# Patient Record
Sex: FEMALE | Race: BLACK OR AFRICAN AMERICAN | NOT HISPANIC OR LATINO | Employment: OTHER | URBAN - METROPOLITAN AREA
[De-identification: names, ages, dates, MRNs, and addresses within clinical notes are randomized per-mention and may not be internally consistent; named-entity substitution may affect disease eponyms.]

---

## 2022-04-13 ENCOUNTER — HISTORICAL (OUTPATIENT)
Dept: ADMINISTRATIVE | Facility: HOSPITAL | Age: 82
End: 2022-04-13

## 2022-05-05 ENCOUNTER — HOSPITAL ENCOUNTER (INPATIENT)
Facility: HOSPITAL | Age: 82
LOS: 12 days | Discharge: NURSING, REHAB OR LONG-TERM CARE | DRG: 472 | End: 2022-05-17
Attending: EMERGENCY MEDICINE | Admitting: INTERNAL MEDICINE
Payer: MEDICARE

## 2022-05-05 DIAGNOSIS — G83.9 MOTOR LEVEL SPINAL WEAKNESS: ICD-10-CM

## 2022-05-05 DIAGNOSIS — M48.00 SPINAL STENOSIS, UNSPECIFIED SPINAL REGION: ICD-10-CM

## 2022-05-05 DIAGNOSIS — Z01.810 PREOP CARDIOVASCULAR EXAM: ICD-10-CM

## 2022-05-05 DIAGNOSIS — G95.19: Primary | ICD-10-CM

## 2022-05-05 LAB
ALBUMIN SERPL-MCNC: 3.3 GM/DL (ref 3.4–4.8)
ALBUMIN/GLOB SERPL: 1.1 RATIO (ref 1.1–2)
ALP SERPL-CCNC: 98 UNIT/L (ref 40–150)
ALT SERPL-CCNC: 34 UNIT/L (ref 0–55)
APTT PPP: 38 SECONDS (ref 23.2–33.7)
AST SERPL-CCNC: 23 UNIT/L (ref 5–34)
BASOPHILS # BLD AUTO: 0.02 X10(3)/MCL (ref 0–0.2)
BASOPHILS NFR BLD AUTO: 0.2 %
BILIRUBIN DIRECT+TOT PNL SERPL-MCNC: 0.1 MG/DL (ref 0–0.5)
BILIRUBIN DIRECT+TOT PNL SERPL-MCNC: 0.1 MG/DL (ref 0–0.8)
BILIRUBIN DIRECT+TOT PNL SERPL-MCNC: 0.2 MG/DL
BUN SERPL-MCNC: 77.1 MG/DL (ref 9.8–20.1)
CALCIUM SERPL-MCNC: 9.6 MG/DL (ref 8.4–10.2)
CHLORIDE SERPL-SCNC: 100 MMOL/L (ref 98–107)
CO2 SERPL-SCNC: 23 MMOL/L (ref 23–31)
CREAT SERPL-MCNC: 2.05 MG/DL (ref 0.55–1.02)
EOSINOPHIL # BLD AUTO: 0.02 X10(3)/MCL (ref 0–0.9)
EOSINOPHIL NFR BLD AUTO: 0.2 %
ERYTHROCYTE [DISTWIDTH] IN BLOOD BY AUTOMATED COUNT: 13.5 % (ref 11.5–17)
GLOBULIN SER-MCNC: 3 GM/DL (ref 2.4–3.5)
GLUCOSE SERPL-MCNC: 312 MG/DL (ref 82–115)
HCT VFR BLD AUTO: 31 % (ref 37–47)
HGB BLD-MCNC: 9.9 GM/DL (ref 12–16)
IMM GRANULOCYTES # BLD AUTO: 0.04 X10(3)/MCL (ref 0–0.02)
IMM GRANULOCYTES NFR BLD AUTO: 0.5 % (ref 0–0.43)
INR BLD: 0.98 (ref 0–1.3)
LYMPHOCYTES # BLD AUTO: 0.92 X10(3)/MCL (ref 0.6–4.6)
LYMPHOCYTES NFR BLD AUTO: 10.8 %
MCH RBC QN AUTO: 31.4 PG (ref 27–31)
MCHC RBC AUTO-ENTMCNC: 31.9 MG/DL (ref 33–36)
MCV RBC AUTO: 98.4 FL (ref 80–94)
MONOCYTES # BLD AUTO: 0.2 X10(3)/MCL (ref 0.1–1.3)
MONOCYTES NFR BLD AUTO: 2.4 %
NEUTROPHILS # BLD AUTO: 7.3 X10(3)/MCL (ref 2.1–9.2)
NEUTROPHILS NFR BLD AUTO: 85.9 %
NRBC BLD AUTO-RTO: 0 %
PLATELET # BLD AUTO: 202 X10(3)/MCL (ref 130–400)
PMV BLD AUTO: 13.9 FL (ref 9.4–12.4)
POTASSIUM SERPL-SCNC: 5.3 MMOL/L (ref 3.5–5.1)
PROT SERPL-MCNC: 6.3 GM/DL (ref 5.8–7.6)
PROTHROMBIN TIME: 12.7 SECONDS (ref 12.5–14.5)
RBC # BLD AUTO: 3.15 X10(6)/MCL (ref 4.2–5.4)
SODIUM SERPL-SCNC: 132 MMOL/L (ref 136–145)
WBC # SPEC AUTO: 8.5 X10(3)/MCL (ref 4.5–11.5)

## 2022-05-05 PROCEDURE — 63600175 PHARM REV CODE 636 W HCPCS: Performed by: INTERNAL MEDICINE

## 2022-05-05 PROCEDURE — 11000001 HC ACUTE MED/SURG PRIVATE ROOM

## 2022-05-05 PROCEDURE — 99285 EMERGENCY DEPT VISIT HI MDM: CPT | Mod: 25

## 2022-05-05 PROCEDURE — 85610 PROTHROMBIN TIME: CPT | Performed by: EMERGENCY MEDICINE

## 2022-05-05 PROCEDURE — 80053 COMPREHEN METABOLIC PANEL: CPT | Performed by: EMERGENCY MEDICINE

## 2022-05-05 PROCEDURE — 85025 COMPLETE CBC W/AUTO DIFF WBC: CPT | Performed by: EMERGENCY MEDICINE

## 2022-05-05 PROCEDURE — 85730 THROMBOPLASTIN TIME PARTIAL: CPT | Performed by: EMERGENCY MEDICINE

## 2022-05-05 RX ORDER — TIMOLOL MALEATE 2.5 MG/ML
1 SOLUTION/ DROPS OPHTHALMIC 2 TIMES DAILY
Status: DISCONTINUED | OUTPATIENT
Start: 2022-05-06 | End: 2022-05-17 | Stop reason: HOSPADM

## 2022-05-05 RX ORDER — DEXTROSE MONOHYDRATE 25 G/50ML
INJECTION, SOLUTION INTRAVENOUS
Status: ON HOLD | COMMUNITY
End: 2022-05-17 | Stop reason: HOSPADM

## 2022-05-05 RX ORDER — AMOXICILLIN 250 MG
1 CAPSULE ORAL 2 TIMES DAILY
Status: DISCONTINUED | OUTPATIENT
Start: 2022-05-06 | End: 2022-05-17 | Stop reason: HOSPADM

## 2022-05-05 RX ORDER — ATORVASTATIN CALCIUM 40 MG/1
80 TABLET, FILM COATED ORAL DAILY
Status: DISCONTINUED | OUTPATIENT
Start: 2022-05-06 | End: 2022-05-17 | Stop reason: HOSPADM

## 2022-05-05 RX ORDER — BRIMONIDINE TARTRATE 1.5 MG/ML
1 SOLUTION/ DROPS OPHTHALMIC 2 TIMES DAILY
Status: DISCONTINUED | OUTPATIENT
Start: 2022-05-06 | End: 2022-05-17 | Stop reason: HOSPADM

## 2022-05-05 RX ORDER — SUCRALFATE 1 G/1
1 TABLET ORAL
COMMUNITY

## 2022-05-05 RX ORDER — ONDANSETRON 2 MG/ML
4 INJECTION INTRAMUSCULAR; INTRAVENOUS EVERY 4 HOURS PRN
Status: DISCONTINUED | OUTPATIENT
Start: 2022-05-06 | End: 2022-05-17 | Stop reason: HOSPADM

## 2022-05-05 RX ORDER — BACLOFEN 10 MG/1
5 TABLET ORAL 2 TIMES DAILY
Status: ON HOLD | COMMUNITY
End: 2022-05-17 | Stop reason: HOSPADM

## 2022-05-05 RX ORDER — ACETAMINOPHEN 325 MG/1
650 TABLET ORAL EVERY 8 HOURS PRN
Status: DISCONTINUED | OUTPATIENT
Start: 2022-05-06 | End: 2022-05-17 | Stop reason: HOSPADM

## 2022-05-05 RX ORDER — BISACODYL 10 MG
10 SUPPOSITORY, RECTAL RECTAL
Status: ON HOLD | COMMUNITY
End: 2022-06-22 | Stop reason: HOSPADM

## 2022-05-05 RX ORDER — TIMOLOL MALEATE 2.5 MG/ML
1 SOLUTION/ DROPS OPHTHALMIC 2 TIMES DAILY
COMMUNITY

## 2022-05-05 RX ORDER — POLYETHYLENE GLYCOL 3350 17 G/17G
17 POWDER, FOR SOLUTION ORAL DAILY
COMMUNITY

## 2022-05-05 RX ORDER — LACTULOSE 10 G/15ML
20 SOLUTION ORAL; RECTAL
COMMUNITY

## 2022-05-05 RX ORDER — AMLODIPINE BESYLATE 5 MG/1
5 TABLET ORAL DAILY
Status: ON HOLD | COMMUNITY
End: 2022-05-17 | Stop reason: HOSPADM

## 2022-05-05 RX ORDER — BRIMONIDINE TARTRATE 2 MG/ML
1 SOLUTION/ DROPS OPHTHALMIC 2 TIMES DAILY
COMMUNITY

## 2022-05-05 RX ORDER — INSULIN ASPART 100 [IU]/ML
0-5 INJECTION, SOLUTION INTRAVENOUS; SUBCUTANEOUS
Status: DISCONTINUED | OUTPATIENT
Start: 2022-05-06 | End: 2022-05-07

## 2022-05-05 RX ORDER — BACLOFEN 5 MG/1
5 TABLET ORAL 2 TIMES DAILY
Status: DISCONTINUED | OUTPATIENT
Start: 2022-05-06 | End: 2022-05-17 | Stop reason: HOSPADM

## 2022-05-05 RX ORDER — AMLODIPINE BESYLATE 5 MG/1
5 TABLET ORAL DAILY
Status: DISCONTINUED | OUTPATIENT
Start: 2022-05-06 | End: 2022-05-08

## 2022-05-05 RX ORDER — OXYCODONE AND ACETAMINOPHEN 5; 325 MG/1; MG/1
1 TABLET ORAL EVERY 4 HOURS PRN
Status: DISCONTINUED | OUTPATIENT
Start: 2022-05-06 | End: 2022-05-09

## 2022-05-05 RX ORDER — PREGABALIN 75 MG/1
75 CAPSULE ORAL 2 TIMES DAILY
Status: ON HOLD | COMMUNITY
End: 2022-06-22 | Stop reason: HOSPADM

## 2022-05-05 RX ORDER — NALOXONE HCL 0.4 MG/ML
0.02 VIAL (ML) INJECTION
Status: DISCONTINUED | OUTPATIENT
Start: 2022-05-06 | End: 2022-05-17 | Stop reason: HOSPADM

## 2022-05-05 RX ORDER — IBUPROFEN 200 MG
16 TABLET ORAL
Status: DISCONTINUED | OUTPATIENT
Start: 2022-05-06 | End: 2022-05-07

## 2022-05-05 RX ORDER — DEXAMETHASONE SODIUM PHOSPHATE 4 MG/ML
4 INJECTION, SOLUTION INTRA-ARTICULAR; INTRALESIONAL; INTRAMUSCULAR; INTRAVENOUS; SOFT TISSUE EVERY 6 HOURS
Status: DISCONTINUED | OUTPATIENT
Start: 2022-05-06 | End: 2022-05-07

## 2022-05-05 RX ORDER — DEXAMETHASONE SODIUM PHOSPHATE 4 MG/ML
4 INJECTION, SOLUTION INTRA-ARTICULAR; INTRALESIONAL; INTRAMUSCULAR; INTRAVENOUS; SOFT TISSUE EVERY 6 HOURS
Status: ON HOLD | COMMUNITY
End: 2022-05-17 | Stop reason: HOSPADM

## 2022-05-05 RX ORDER — LABETALOL 200 MG/1
200 TABLET, FILM COATED ORAL 2 TIMES DAILY
Status: ON HOLD | COMMUNITY
Start: 2022-03-21 | End: 2022-06-22 | Stop reason: HOSPADM

## 2022-05-05 RX ORDER — LIDOCAINE 50 MG/G
1 PATCH TOPICAL
Status: ON HOLD | COMMUNITY
End: 2022-05-17 | Stop reason: HOSPADM

## 2022-05-05 RX ORDER — ACETAMINOPHEN 325 MG/1
650 TABLET ORAL EVERY 4 HOURS PRN
Status: DISCONTINUED | OUTPATIENT
Start: 2022-05-06 | End: 2022-05-09

## 2022-05-05 RX ORDER — NAPROXEN SODIUM 220 MG/1
81 TABLET, FILM COATED ORAL DAILY
COMMUNITY

## 2022-05-05 RX ORDER — ERGOCALCIFEROL 1.25 MG/1
50000 CAPSULE ORAL
COMMUNITY
Start: 2022-03-16

## 2022-05-05 RX ORDER — CLOPIDOGREL BISULFATE 75 MG/1
75 TABLET ORAL DAILY
COMMUNITY
Start: 2022-01-31

## 2022-05-05 RX ORDER — LATANOPROST 50 UG/ML
1 SOLUTION/ DROPS OPHTHALMIC NIGHTLY
Status: DISCONTINUED | OUTPATIENT
Start: 2022-05-06 | End: 2022-05-17 | Stop reason: HOSPADM

## 2022-05-05 RX ORDER — MORPHINE SULFATE 4 MG/ML
2 INJECTION, SOLUTION INTRAMUSCULAR; INTRAVENOUS EVERY 4 HOURS PRN
Status: DISCONTINUED | OUTPATIENT
Start: 2022-05-06 | End: 2022-05-09

## 2022-05-05 RX ORDER — POLYETHYLENE GLYCOL 3350 17 G/17G
17 POWDER, FOR SOLUTION ORAL DAILY
Status: DISCONTINUED | OUTPATIENT
Start: 2022-05-06 | End: 2022-05-17 | Stop reason: HOSPADM

## 2022-05-05 RX ORDER — SODIUM CHLORIDE 0.9 % (FLUSH) 0.9 %
10 SYRINGE (ML) INJECTION EVERY 12 HOURS PRN
Status: DISCONTINUED | OUTPATIENT
Start: 2022-05-06 | End: 2022-05-17 | Stop reason: HOSPADM

## 2022-05-05 RX ORDER — LINAGLIPTIN 5 MG/1
5 TABLET, FILM COATED ORAL DAILY
COMMUNITY

## 2022-05-05 RX ORDER — OXYCODONE AND ACETAMINOPHEN 5; 325 MG/1; MG/1
1 TABLET ORAL EVERY 4 HOURS PRN
Status: ON HOLD | COMMUNITY
End: 2022-05-17 | Stop reason: HOSPADM

## 2022-05-05 RX ORDER — SUCRALFATE 1 G/1
1 TABLET ORAL
Status: DISCONTINUED | OUTPATIENT
Start: 2022-05-06 | End: 2022-05-17 | Stop reason: HOSPADM

## 2022-05-05 RX ORDER — VALSARTAN 80 MG/1
80 TABLET ORAL DAILY
Status: ON HOLD | COMMUNITY
End: 2022-06-22 | Stop reason: HOSPADM

## 2022-05-05 RX ORDER — HYDROCODONE BITARTRATE AND ACETAMINOPHEN 5; 325 MG/1; MG/1
1 TABLET ORAL EVERY 6 HOURS PRN
Status: DISCONTINUED | OUTPATIENT
Start: 2022-05-06 | End: 2022-05-17 | Stop reason: HOSPADM

## 2022-05-05 RX ORDER — PANTOPRAZOLE SODIUM 40 MG/1
40 TABLET, DELAYED RELEASE ORAL DAILY
COMMUNITY

## 2022-05-05 RX ORDER — DEXAMETHASONE SODIUM PHOSPHATE 4 MG/ML
4 INJECTION, SOLUTION INTRA-ARTICULAR; INTRALESIONAL; INTRAMUSCULAR; INTRAVENOUS; SOFT TISSUE EVERY 6 HOURS
Status: DISCONTINUED | OUTPATIENT
Start: 2022-05-06 | End: 2022-05-06

## 2022-05-05 RX ORDER — IBUPROFEN 200 MG
24 TABLET ORAL
Status: DISCONTINUED | OUTPATIENT
Start: 2022-05-06 | End: 2022-05-07

## 2022-05-05 RX ORDER — DEXAMETHASONE SODIUM PHOSPHATE 4 MG/ML
10 INJECTION, SOLUTION INTRA-ARTICULAR; INTRALESIONAL; INTRAMUSCULAR; INTRAVENOUS; SOFT TISSUE ONCE
Status: COMPLETED | OUTPATIENT
Start: 2022-05-05 | End: 2022-05-05

## 2022-05-05 RX ORDER — PANTOPRAZOLE SODIUM 40 MG/1
40 TABLET, DELAYED RELEASE ORAL DAILY
Status: DISCONTINUED | OUTPATIENT
Start: 2022-05-06 | End: 2022-05-17 | Stop reason: HOSPADM

## 2022-05-05 RX ORDER — INSULIN GLARGINE 100 [IU]/ML
25 INJECTION, SOLUTION SUBCUTANEOUS NIGHTLY
COMMUNITY

## 2022-05-05 RX ORDER — PREGABALIN 75 MG/1
75 CAPSULE ORAL 2 TIMES DAILY
Status: DISCONTINUED | OUTPATIENT
Start: 2022-05-06 | End: 2022-05-17 | Stop reason: HOSPADM

## 2022-05-05 RX ORDER — GLUCAGON 1 MG
1 KIT INJECTION
Status: DISCONTINUED | OUTPATIENT
Start: 2022-05-06 | End: 2022-05-07

## 2022-05-05 RX ORDER — LABETALOL 200 MG/1
200 TABLET, FILM COATED ORAL 2 TIMES DAILY
Status: DISCONTINUED | OUTPATIENT
Start: 2022-05-06 | End: 2022-05-17 | Stop reason: HOSPADM

## 2022-05-05 RX ORDER — LATANOPROST 50 UG/ML
1 SOLUTION/ DROPS OPHTHALMIC NIGHTLY
COMMUNITY
Start: 2022-03-24

## 2022-05-05 RX ORDER — ATORVASTATIN CALCIUM 80 MG/1
80 TABLET, FILM COATED ORAL DAILY
Status: ON HOLD | COMMUNITY
Start: 2022-03-16 | End: 2022-06-22 | Stop reason: HOSPADM

## 2022-05-05 RX ADMIN — DEXAMETHASONE SODIUM PHOSPHATE 10 MG: 4 INJECTION, SOLUTION INTRA-ARTICULAR; INTRALESIONAL; INTRAMUSCULAR; INTRAVENOUS; SOFT TISSUE at 11:05

## 2022-05-06 LAB
ALBUMIN SERPL-MCNC: 3 GM/DL (ref 3.4–4.8)
ALBUMIN/GLOB SERPL: 0.9 RATIO (ref 1.1–2)
ALP SERPL-CCNC: 85 UNIT/L (ref 40–150)
ALT SERPL-CCNC: 31 UNIT/L (ref 0–55)
APTT PPP: 36.9 SECONDS (ref 23.2–33.7)
AST SERPL-CCNC: 19 UNIT/L (ref 5–34)
BASOPHILS # BLD AUTO: 0.01 X10(3)/MCL (ref 0–0.2)
BASOPHILS NFR BLD AUTO: 0.1 %
BILIRUBIN DIRECT+TOT PNL SERPL-MCNC: 0.1 MG/DL (ref 0–0.5)
BILIRUBIN DIRECT+TOT PNL SERPL-MCNC: 0.2 MG/DL (ref 0–0.8)
BILIRUBIN DIRECT+TOT PNL SERPL-MCNC: 0.3 MG/DL
BUN SERPL-MCNC: 74.3 MG/DL (ref 9.8–20.1)
CALCIUM SERPL-MCNC: 9.6 MG/DL (ref 8.4–10.2)
CHLORIDE SERPL-SCNC: 100 MMOL/L (ref 98–107)
CO2 SERPL-SCNC: 23 MMOL/L (ref 23–31)
CREAT SERPL-MCNC: 1.72 MG/DL (ref 0.55–1.02)
EOSINOPHIL # BLD AUTO: 0 X10(3)/MCL (ref 0–0.9)
EOSINOPHIL NFR BLD AUTO: 0 %
ERYTHROCYTE [DISTWIDTH] IN BLOOD BY AUTOMATED COUNT: 13.1 % (ref 11.5–17)
GLOBULIN SER-MCNC: 3.3 GM/DL (ref 2.4–3.5)
GLUCOSE SERPL-MCNC: 298 MG/DL (ref 70–110)
GLUCOSE SERPL-MCNC: 423 MG/DL (ref 82–115)
GLUCOSE SERPL-MCNC: 500 MG/DL (ref 70–110)
HCT VFR BLD AUTO: 30.3 % (ref 37–47)
HGB BLD-MCNC: 9.9 GM/DL (ref 12–16)
IMM GRANULOCYTES # BLD AUTO: 0.1 X10(3)/MCL (ref 0–0.02)
IMM GRANULOCYTES NFR BLD AUTO: 1 % (ref 0–0.43)
INR BLD: 1.04 (ref 0–1.3)
LYMPHOCYTES # BLD AUTO: 1.09 X10(3)/MCL (ref 0.6–4.6)
LYMPHOCYTES NFR BLD AUTO: 11.1 %
MCH RBC QN AUTO: 31.3 PG (ref 27–31)
MCHC RBC AUTO-ENTMCNC: 32.7 MG/DL (ref 33–36)
MCV RBC AUTO: 95.9 FL (ref 80–94)
MONOCYTES # BLD AUTO: 0.08 X10(3)/MCL (ref 0.1–1.3)
MONOCYTES NFR BLD AUTO: 0.8 %
NEUTROPHILS # BLD AUTO: 8.5 X10(3)/MCL (ref 2.1–9.2)
NEUTROPHILS NFR BLD AUTO: 87 %
NRBC BLD AUTO-RTO: 0 %
PHOSPHATE SERPL-MCNC: 2.5 MG/DL (ref 2.3–4.7)
PLATELET # BLD AUTO: 203 X10(3)/MCL (ref 130–400)
PMV BLD AUTO: 14.1 FL (ref 9.4–12.4)
POCT GLUCOSE: 418 MG/DL (ref 70–110)
POTASSIUM SERPL-SCNC: 5.1 MMOL/L (ref 3.5–5.1)
PROT SERPL-MCNC: 6.3 GM/DL (ref 5.8–7.6)
PROTHROMBIN TIME: 13.3 SECONDS (ref 12.5–14.5)
RBC # BLD AUTO: 3.16 X10(6)/MCL (ref 4.2–5.4)
SODIUM SERPL-SCNC: 131 MMOL/L (ref 136–145)
WBC # SPEC AUTO: 9.8 X10(3)/MCL (ref 4.5–11.5)

## 2022-05-06 PROCEDURE — 36415 COLL VENOUS BLD VENIPUNCTURE: CPT | Performed by: INTERNAL MEDICINE

## 2022-05-06 PROCEDURE — 80053 COMPREHEN METABOLIC PANEL: CPT | Performed by: INTERNAL MEDICINE

## 2022-05-06 PROCEDURE — 63600175 PHARM REV CODE 636 W HCPCS: Performed by: NURSE PRACTITIONER

## 2022-05-06 PROCEDURE — 11000001 HC ACUTE MED/SURG PRIVATE ROOM

## 2022-05-06 PROCEDURE — 63600175 PHARM REV CODE 636 W HCPCS: Performed by: INTERNAL MEDICINE

## 2022-05-06 PROCEDURE — 84100 ASSAY OF PHOSPHORUS: CPT | Performed by: INTERNAL MEDICINE

## 2022-05-06 PROCEDURE — 85025 COMPLETE CBC W/AUTO DIFF WBC: CPT | Performed by: INTERNAL MEDICINE

## 2022-05-06 PROCEDURE — 25000003 PHARM REV CODE 250: Performed by: INTERNAL MEDICINE

## 2022-05-06 PROCEDURE — 85730 THROMBOPLASTIN TIME PARTIAL: CPT | Performed by: INTERNAL MEDICINE

## 2022-05-06 PROCEDURE — C9399 UNCLASSIFIED DRUGS OR BIOLOG: HCPCS | Performed by: INTERNAL MEDICINE

## 2022-05-06 PROCEDURE — 85610 PROTHROMBIN TIME: CPT | Performed by: INTERNAL MEDICINE

## 2022-05-06 RX ORDER — INSULIN ASPART 100 [IU]/ML
6 INJECTION, SOLUTION INTRAVENOUS; SUBCUTANEOUS ONCE
Status: COMPLETED | OUTPATIENT
Start: 2022-05-06 | End: 2022-05-06

## 2022-05-06 RX ORDER — SODIUM CHLORIDE 9 MG/ML
INJECTION, SOLUTION INTRAVENOUS CONTINUOUS
Status: DISCONTINUED | OUTPATIENT
Start: 2022-05-06 | End: 2022-05-10

## 2022-05-06 RX ORDER — ENOXAPARIN SODIUM 100 MG/ML
30 INJECTION SUBCUTANEOUS EVERY 24 HOURS
Status: DISCONTINUED | OUTPATIENT
Start: 2022-05-06 | End: 2022-05-17 | Stop reason: HOSPADM

## 2022-05-06 RX ORDER — HYDRALAZINE HYDROCHLORIDE 20 MG/ML
10 INJECTION INTRAMUSCULAR; INTRAVENOUS EVERY 4 HOURS PRN
Status: DISCONTINUED | OUTPATIENT
Start: 2022-05-06 | End: 2022-05-17 | Stop reason: HOSPADM

## 2022-05-06 RX ADMIN — LABETALOL HYDROCHLORIDE 200 MG: 200 TABLET, FILM COATED ORAL at 10:05

## 2022-05-06 RX ADMIN — AMLODIPINE BESYLATE 5 MG: 5 TABLET ORAL at 10:05

## 2022-05-06 RX ADMIN — SODIUM ZIRCONIUM CYCLOSILICATE 10 G: 10 POWDER, FOR SUSPENSION ORAL at 02:05

## 2022-05-06 RX ADMIN — INSULIN DETEMIR 10 UNITS: 100 INJECTION, SOLUTION SUBCUTANEOUS at 09:05

## 2022-05-06 RX ADMIN — PREGABALIN 75 MG: 75 CAPSULE ORAL at 10:05

## 2022-05-06 RX ADMIN — LATANOPROST 1 DROP: 50 SOLUTION OPHTHALMIC at 10:05

## 2022-05-06 RX ADMIN — ENOXAPARIN SODIUM 30 MG: 30 INJECTION SUBCUTANEOUS at 05:05

## 2022-05-06 RX ADMIN — SUCRALFATE 1 G: 1 TABLET ORAL at 12:05

## 2022-05-06 RX ADMIN — BACLOFEN 5 MG: 5 TABLET ORAL at 10:05

## 2022-05-06 RX ADMIN — TIMOLOL MALEATE 1 DROP: 2.5 SOLUTION/ DROPS OPHTHALMIC at 10:05

## 2022-05-06 RX ADMIN — TIMOLOL MALEATE 1 DROP: 2.5 SOLUTION/ DROPS OPHTHALMIC at 11:05

## 2022-05-06 RX ADMIN — SUCRALFATE 1 G: 1 TABLET ORAL at 10:05

## 2022-05-06 RX ADMIN — INSULIN DETEMIR 30 UNITS: 100 INJECTION, SOLUTION SUBCUTANEOUS at 11:05

## 2022-05-06 RX ADMIN — INSULIN ASPART 5 UNITS: 100 INJECTION, SOLUTION INTRAVENOUS; SUBCUTANEOUS at 05:05

## 2022-05-06 RX ADMIN — SODIUM CHLORIDE: 9 INJECTION, SOLUTION INTRAVENOUS at 10:05

## 2022-05-06 RX ADMIN — DEXAMETHASONE SODIUM PHOSPHATE 4 MG: 4 INJECTION, SOLUTION INTRA-ARTICULAR; INTRALESIONAL; INTRAMUSCULAR; INTRAVENOUS; SOFT TISSUE at 05:05

## 2022-05-06 RX ADMIN — PANTOPRAZOLE SODIUM 40 MG: 40 TABLET, DELAYED RELEASE ORAL at 10:05

## 2022-05-06 RX ADMIN — SENNOSIDES, DOCUSATE SODIUM 1 TABLET: 8.6; 5 TABLET ORAL at 10:05

## 2022-05-06 RX ADMIN — DEXAMETHASONE SODIUM PHOSPHATE 4 MG: 4 INJECTION, SOLUTION INTRA-ARTICULAR; INTRALESIONAL; INTRAMUSCULAR; INTRAVENOUS; SOFT TISSUE at 11:05

## 2022-05-06 RX ADMIN — ATORVASTATIN CALCIUM 80 MG: 40 TABLET, FILM COATED ORAL at 10:05

## 2022-05-06 RX ADMIN — INSULIN ASPART 3 UNITS: 100 INJECTION, SOLUTION INTRAVENOUS; SUBCUTANEOUS at 12:05

## 2022-05-06 RX ADMIN — SUCRALFATE 1 G: 1 TABLET ORAL at 05:05

## 2022-05-06 RX ADMIN — DEXAMETHASONE SODIUM PHOSPHATE 4 MG: 4 INJECTION, SOLUTION INTRA-ARTICULAR; INTRALESIONAL; INTRAMUSCULAR; INTRAVENOUS; SOFT TISSUE at 06:05

## 2022-05-06 RX ADMIN — BRIMONIDINE TARTRATE 1 DROP: 1.5 SOLUTION OPHTHALMIC at 03:05

## 2022-05-06 RX ADMIN — INSULIN ASPART 3 UNITS: 100 INJECTION, SOLUTION INTRAVENOUS; SUBCUTANEOUS at 11:05

## 2022-05-06 RX ADMIN — DEXAMETHASONE SODIUM PHOSPHATE 4 MG: 4 INJECTION, SOLUTION INTRA-ARTICULAR; INTRALESIONAL; INTRAMUSCULAR; INTRAVENOUS; SOFT TISSUE at 12:05

## 2022-05-06 RX ADMIN — INSULIN ASPART 5 UNITS: 100 INJECTION, SOLUTION INTRAVENOUS; SUBCUTANEOUS at 06:05

## 2022-05-06 RX ADMIN — BRIMONIDINE TARTRATE 1 DROP: 1.5 SOLUTION OPHTHALMIC at 10:05

## 2022-05-06 RX ADMIN — INSULIN ASPART 6 UNITS: 100 INJECTION, SOLUTION INTRAVENOUS; SUBCUTANEOUS at 11:05

## 2022-05-06 NOTE — PROGRESS NOTES
[facility]  Hospital Medicine Progress Note        Chief Complaint: Inpatient Follow-up for Lucien leg weakness     HPI:   Natty Nelson is a 82 y.o. female who was transferred from [facility] ED with complaint of weakness and MRI cervical spine showing severe stenosis and cord compression.  Patient has been suffering from progressive weakness of lower extremities> upper extremities over the past 2 to 3 months, she has been having multiple falls, denies low of bowel or bladder control. 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 On arrival to ED she was hemodynamically stable and afebrile.  Labs notable for LALIT on CKD and mild hypokalemia.  MRI imaging as described below.  Neurosurgery consulted and referred to hospital medicine service for further evaluation and management. Seen by neurosurgery team and recommended surgical intervention.     Interval Hx:   Patient today awake and comfortable. Has mild neck pain. Lucien Leg weakness is now better. Denies any UE weakness or numbness. Has not ambulated.     Objective/physical exam:  General: In no acute distress, afebrile, + Obese   Chest: Clear to auscultation bilaterally  Heart: S1, S2, no appreciable murmur  Abdomen: Soft, nontender, BS +  MSK: Warm, no lower extremity edema, no clubbing or cyanosis  Neurologic: Alert and oriented x4, Lucien UE strength 5/5, LE strength 4/5     VITAL SIGNS: 24 HRS MIN & MAX LAST   Temp  Min: 97.5 °F (36.4 °C)  Max: 98.2 °F (36.8 °C) 98.2 °F (36.8 °C)   BP  Min: 123/66  Max: 175/82 123/66   Pulse  Min: 71  Max: 92  88   Resp  Min: 18  Max: 20 18   SpO2  Min: 93 %  Max: 100 % 97 %       Recent Labs   Lab 05/05/22 2000 05/06/22  0424   WBC 8.5 9.8   RBC 3.15* 3.16*   HGB 9.9* 9.9*   HCT 31.0* 30.3*   MCV 98.4* 95.9*   MCH 31.4* 31.3*   MCHC 31.9* 32.7*   RDW 13.5 13.1   MPV 13.9* 14.1*        Recent Labs   Lab 05/05/22 2000 05/06/22  0424   CO2 23 23   BUN 77.1* 74.3*   CREATININE 2.05* 1.72*   CALCIUM 9.6 9.6   ALBUMIN 3.3* 3.0*   ALKPHOS 98 85   ALT 34 31   AST 23 19          Microbiology Results (last 7 days)     ** No results found for the last 168 hours. **           MRI Cervical Spine Without Contrast  Narrative: EXAMINATION:  MRI CERVICAL SPINE WITHOUT CONTRAST    CLINICAL HISTORY:  Spinal stenosis, cervical;    TECHNIQUE:  Multiple MRI pulse sequences were performed of the cervical spine without contrast.    COMPARISON:  None available    FINDINGS:  Cervical vertebrae stature is maintained.  Cervical vertebral alignment is also preserved.  Small anterior degenerative hypertrophic spurring.  There is no prevertebral soft tissue prominence.  No acute marrow edematous signal.  Multiple indentations upon the thecal sac by disc pathology and spondylosis.  There is no acute cord edema or myelomalacia identified.  Disc segmental is given below:    At C2-C3, there is disc bulge which effaces the ventral subarachnoid space without cord compression.  Right neural foramen is unremarkable.  There is mild spondylotic narrowing of the left neural foramen.    At C3-C4, there is central 2 left paracentral osteophyte disc complex which indents the ventral thecal sac and also causes compression upon the left exiting nerve root.  There is also bilateral facet arthropathy and ligamentum flavum thickening.  Cord is not compressed.  There is moderate narrowing of the right neural foramen and marked narrowing of the left neural canal.    At C4-C5, there is osteophyte disc complex which effaces the ventral subarachnoid space without causing cord compression.  There is moderate spondylotic narrowing of the right neural foramen and mild narrowing of the left neural canal.    At C5-C6, there is osteophyte disc complex and facets arthropathy causing complete effacement of the subarachnoid space.  Cord is also  compressed.  There are bilateral compression upon the exiting nerve roots and bilateral severe narrowings of the neural foramina.    At C6-C7, there is central disc bulge which indents the ventral thecal sac without cord compression.  There is moderate spondylolytic narrowing of the right neural foramen and mild narrowing of the left neural canal.    At C7-T1, disc height is preserved.  Central canal is not stenosed.  There are no narrowings of the neural foramen.  Impression: Cervical spine degenerative disc disease and spondylosis level by level discussed above.  These findings are most significant at the level of C5-C6.    Electronically signed by: Yrn Mcfarland  Date:    05/06/2022  Time:    10:40      Scheduled Med:   amLODIPine  5 mg Oral Daily    atorvastatin  80 mg Oral Daily    baclofen  5 mg Oral BID    brimonidine 0.15 % OPTH DROP  1 drop Both Eyes BID    dexamethasone  4 mg Intravenous Q6H    dexamethasone  4 mg Intravenous Q6H    insulin detemir U-100  25 Units Subcutaneous QHS    labetaloL  200 mg Oral BID    latanoprost  1 drop Both Eyes Nightly    pantoprazole  40 mg Oral Daily    polyethylene glycol  17 g Oral Daily    pregabalin  75 mg Oral BID    senna-docusate 8.6-50 mg  1 tablet Oral BID    sucralfate  1 g Oral QID (AC & HS)    timolol maleate 0.25%  1 drop Both Eyes BID        Continuous Infusions:   sodium chloride 0.9% 75 mL/hr at 05/06/22 1053        PRN Meds:  acetaminophen, acetaminophen, dextrose 10%, dextrose 10%, glucagon (human recombinant), glucose, glucose, hydrALAZINE, HYDROcodone-acetaminophen, insulin aspart U-100, morphine, naloxone, ondansetron, oxyCODONE-acetaminophen, sodium chloride 0.9%       Assessment/Plan:  Severe cervical stenosis with cord compression Vs myelomalacia at C5-6  LALIT superimposed on CKD3  DM2 with hyperglycemia   Obesity     Plan:  Patient feels her weakness is worse when she stands up. Now on bedrest  Will add Lovenox 30 mg sq q hs for VTE  Prophylaxis  Cont iv steroids   For her hyperglycemia will increase dose of Levemir to 30 units sq q hs and cont sliding scale   Cont iv fluids for now and monitor renal functions and volume status   Strict aspiration and fall precautions   No NSAIDs     Labs in am    Cont supportive care         Anticipated discharge and Disposition:      All diagnosis and differential diagnosis have been reviewed; assessment and plan has been documented; I have personally reviewed the labs and test results that are presently available; I have reviewed the patients medication list; I have reviewed the consulting providers response and recommendations. I have reviewed or attempted to review medical records based upon their availability    All of the patient's questions have been  addressed and answered. Patient's is agreeable to the above stated plan. I will continue to monitor closely and make adjustments to medical management as needed.      Nutrition Status:        Rolando Madera MD   05/06/2022

## 2022-05-06 NOTE — ED NOTES
Pt states numbness and weakness in her legs and weakness in her arms ever since she had surgery in April for a pseudoaneurysm repair.  Pt states that she is starting to feel things a little bit in her legs, able to slightly wiggle her toes.  Pt had been at a rehab facility and had an MRI done today and was sent to the ER for neurosurgical eval.

## 2022-05-06 NOTE — PLAN OF CARE
Problem: Adult Inpatient Plan of Care  Goal: Plan of Care Review  Outcome: Ongoing, Progressing  Goal: Patient-Specific Goal (Individualized)  Outcome: Ongoing, Progressing  Goal: Absence of Hospital-Acquired Illness or Injury  Outcome: Ongoing, Progressing  Goal: Optimal Comfort and Wellbeing  Outcome: Ongoing, Progressing  Goal: Readiness for Transition of Care  Outcome: Ongoing, Progressing     Problem: Skin Injury Risk Increased  Goal: Skin Health and Integrity  Outcome: Ongoing, Progressing  Intervention: Optimize Skin Protection  Flowsheets (Taken 5/6/2022 8039)  Pressure Reduction Techniques: frequent weight shift encouraged  Head of Bed (HOB) Positioning: HOB at 20-30 degrees

## 2022-05-06 NOTE — CONSULTS
[facility] - 9th Floor Med Surg  Neurosurgery  Consult Note    Inpatient consult to Neurosurgery  Consult performed by: Yaritza Mcduffie AGACNP-BC  Consult ordered by: Rohith Arredondo MD        Subjective:     Chief Complaint/Reason for Admission:  Weakness.    History of Present Illness:  This is a 82 y.o.  female who was transferred from [facility] emergency department with complaints of weakness and MRI cervical spine showing severe stenosis and cord compression.  Patient has been suffering from progressive weakness of lower extremities> upper extremities over the past 2 to 3 months, she has been having multiple falls, denies low of bowel or bladder control.  She tells me that she is in a wheelchair at baseline was in rehab after a very difficult medical course following some type of pseudoaneurysm repair in her upper extremity which then resulted in her being debilitated and severely deconditioned.      On arrival to ED she was hemodynamically stable and afebrile.  Labs notable for LALIT on CKD and mild hypokalemia.  MRI imaging as described below.  Neurosurgery was consulted to evaluate.  Dr. Joseph has already seen the patient and she will be having surgery on Monday for stabilization of her cervical spine.  Aspirin and Plavix on hold.  Decadron was administered.    On exam she is extremely pleasant and provides a lot of the information.  She moves all extremities but is extremely weak with  strength.  She is also extremely weak in her triceps.  Moderately weak in her biceps.  She is strong to her bilateral lower extremities.  She states that she has been in a wheelchair and that her balance is not good.  Neurologically she is intact and oriented to all spheres.  She does complain of numbness to bilateral upper extremities.  No bowel or bladder dysfunction reported.      PTA Medications   Medication Sig    amLODIPine (NORVASC) 5 MG tablet Take 5 mg by mouth once daily.    aspirin 81 MG  Chew Take 81 mg by mouth Daily.    atorvastatin (LIPITOR) 80 MG tablet Take 80 mg by mouth once daily.    baclofen (LIORESAL) 10 MG tablet Take 5 mg by mouth 2 (two) times daily. On 5/13 increase to 10mg TID    bisacodyL (DULCOLAX) 10 mg Supp Place 10 mg rectally every 72 hours as needed.    brimonidine 0.2% (ALPHAGAN) 0.2 % Drop Place 1 drop into both eyes 2 (two) times a day.    clopidogreL (PLAVIX) 75 mg tablet Take 75 mg by mouth once daily.    dexamethasone (DECADRON) 4 mg/mL injection Inject 4 mg into the vein every 6 (six) hours.    dextrose 50 % in water (DEXTROSE 50%, D50W,) solution Inject into the vein as needed.    insulin glargine (LANTUS) 100 unit/mL injection Inject 25 Units into the skin every evening.    insulin regular 100 unit/mL Inj injection Inject into the skin. Sliding scale as directed prn    iron/vitamin B complex (GERITOL ORAL) Take 20 mLs by mouth 2 (two) times a day.    labetaloL (NORMODYNE) 200 MG tablet Take 200 mg by mouth 2 (two) times daily.    lactulose (CHRONULAC) 10 gram/15 mL solution Take 20 g by mouth every 48 hours as needed.    latanoprost 0.005 % ophthalmic solution Place 1 drop into both eyes nightly.    LIDOcaine (LIDODERM) 5 % Place 1 patch onto the skin every 24 hours. Remove & Discard patch within 12 hours or as directed by MD    linaGLIPtin (TRADJENTA) 5 mg Tab tablet Take 5 mg by mouth once daily.    oxyCODONE-acetaminophen (PERCOCET) 5-325 mg per tablet Take 1 tablet by mouth every 4 (four) hours as needed for Pain.    pantoprazole (PROTONIX) 40 MG tablet Take 40 mg by mouth once daily.    polyethylene glycol (GLYCOLAX) 17 gram PwPk Take 17 g by mouth once daily.    pregabalin (LYRICA) 75 MG capsule Take 75 mg by mouth 2 (two) times daily.    sucralfate (CARAFATE) 1 gram tablet Take 1 g by mouth 4 (four) times daily before meals and nightly.    timolol maleate 0.25% (TIMOPTIC) 0.25 % Drop Place 1 drop into both eyes 2 (two) times daily.     valsartan (DIOVAN) 80 MG tablet Take 80 mg by mouth once daily.    VITAMIN D2 1,250 mcg (50,000 unit) capsule Take 50,000 Units by mouth every Tuesday.       Review of patient's allergies indicates:  No Known Allergies    Past Medical History:   Diagnosis Date    Arthritis     Diabetes mellitus     Hypertension      No past surgical history on file.  Family History    None       Tobacco Use    Smoking status: Not on file    Smokeless tobacco: Not on file   Substance and Sexual Activity    Alcohol use: Not on file    Drug use: Not on file    Sexual activity: Not on file     Review of Systems   Neurological: Positive for weakness and numbness.        Numbness to bilateral upper extremities.     Objective:     Weight: 85.7 kg (188 lb 15 oz)  Body mass index is 34.55 kg/m².  Vital Signs (Most Recent):  Temp: 97.5 °F (36.4 °C) (05/06/22 0700)  Pulse: 92 (05/06/22 1055)  Resp: 20 (05/06/22 0700)  BP: (!) 175/82 (05/06/22 1055)  SpO2: (!) 93 % (05/06/22 0700) Vital Signs (24h Range):  Temp:  [97.5 °F (36.4 °C)-98.2 °F (36.8 °C)] 97.5 °F (36.4 °C)  Pulse:  [71-92] 92  Resp:  [18-20] 20  SpO2:  [93 %-100 %] 93 %  BP: (127-175)/(61-83) 175/82                          Physical Exam:  Vitals reviewed.    Constitutional: She appears well-developed and well-nourished. She is not diaphoretic. No distress.     Eyes: Pupils are equal, round, and reactive to light. Conjunctivae and EOM are normal.   Oriented to all spheres.  PERRLA.  Intact cough corneal gag.  Pleasant.     Cardiovascular: Regular rhythm, normal pulses and intact distal pulses.     Abdominal: Soft. Bowel sounds are normal.     Skin: Skin displays no rash on trunk and no rash on extremities. Skin displays no lesions on trunk and no lesions on extremities.     Psych/Behavior: She is alert. She is oriented to person, place, and time. She has a normal mood and affect.     Musculoskeletal:        Right Upper Extremities: Muscle strength is 2/5.        Left Upper  Extremities: Muscle strength is 2/5.       Right Lower Extremities: Muscle strength is 4/5.        Left Lower Extremities: Muscle strength is 4/5.      Comments: Right upper and lower extremities with triceps strength of 1 or 2 at best. Biceps strength of 3. Bilateral lower extremity strength of 4. Complaints of bilateral upper extremity numbness.   strength extremely poor patient states she drops things.     Neurological:        Sensory: There is no sensory deficit in the trunk. There is no sensory deficit in the extremities.        Cranial nerves: Cranial nerve(s) II, III, IV, V, VI, VII, VIII, IX, X, XI and XII are intact.       Significant Labs:  Recent Labs   Lab 05/05/22 2000 05/06/22 0424   CO2 23 23   BUN 77.1* 74.3*   CREATININE 2.05* 1.72*   CALCIUM 9.6 9.6     Recent Labs   Lab 05/05/22 2000 05/06/22 0424   WBC 8.5 9.8   HGB 9.9* 9.9*   HCT 31.0* 30.3*     Recent Labs   Lab 05/05/22 2000 05/06/22 0424   INR 0.98 1.04   APTT 38.0* 36.9*     Microbiology Results (last 7 days)     ** No results found for the last 168 hours. **          Significant Diagnostics:  MRI cervical spine:  Impression:     1. Grade I posterior listhesis of C3 on C4.   2. There is moderate-to-severe extrinsic cord compression at C5-C6 secondary to disc herniation and ligamentum flavum hypertrophy. There is associated subtle STIR hyperintense signal within the cord at this level (series 3 image 7). This is suspicious for cord edema versus myelomalacia.   3. Moderate diffuse disc bulge with moderate ligamentum flavum hypertrophy is seen at C5-C6 with severe canal stenosis and moderate-to-severe bilateral neuroforaminal narrowing. Mild-to-moderate central disc protrusions are also seen at other levels with associated facet hypertrophy. There is severe left uncinate hypertrophy at C3-C4. There is associated mild-to-moderate canal stenosis. Severe left and moderate to severe right neuroforaminal narrowing is seen at C3-C4.  Correlate clinically as regards to additional evaluation and follow up.   4. Degenerative changes and other details as above.     Assessment/Plan:    Patient was started on Decadron  Surgery scheduled for Monday  NPO after midnight on Sunday night.  Labs on chart.  Please place consent on chart  Pain control  Fall precautions  SCDs       There are no hospital problems to display for this patient.      Thank you for your consult.     MAKSIM Davis-BC  Neurosurgery  [facility] - 9th Floor Med Surg

## 2022-05-06 NOTE — ED PROVIDER NOTES
"Encounter Date: 5/5/2022       History     Chief Complaint   Patient presents with    Neurologic Problem     From [facility] rehab for "neurology consult". Unknown what symptoms.      Patient has a 82-year-old female transferred from a rehab facility to the ER secondary to abnormal MRI of the cervical spine which was done earlier today.  Patient is somewhat of a poor historian but states she has been having weakness of the bilateral lower and bilateral upper extremities which has been going on now for several weeks..  Patient has a history of peripheral vascular disease and coronary artery disease, hypertension and diabetes.  Patient states she thinks she is on blood thinners but I do not have any current records documenting this.  Patient did have surgery last week to the left upper extremity secondary to pseudoaneurysm MRI of the C-spine that was done earlier today is not available to us, no disc was sent.  There is no MRI study within our system.  MRI will have to be repeated.  MRI report was sent with patient which shows C5-6 large disc osteophyte complex with ligamentum flavum buckling and facet disease contributing to severe central canal stenosis with complete obliteration of the thecal sac and cord edema.  There is severe bilateral neural foraminal and lateral recess stenosis..         Review of patient's allergies indicates:  No Known Allergies  No past medical history on file.  No past surgical history on file.  No family history on file.     Review of Systems   Constitutional: Negative.    HENT: Negative.    Eyes: Negative.    Respiratory: Positive for cough. Negative for chest tightness, shortness of breath, wheezing and stridor.    Cardiovascular: Negative.    Gastrointestinal: Negative.    Genitourinary: Negative for difficulty urinating, flank pain and frequency.   Musculoskeletal: Positive for arthralgias and neck pain.   Neurological: Positive for weakness. Negative for dizziness, facial " asymmetry, light-headedness and headaches.       Physical Exam     Initial Vitals [05/05/22 1850]   BP Pulse Resp Temp SpO2   (!) 143/61 72 18 98.2 °F (36.8 °C) 100 %      MAP       --         Physical Exam    Constitutional: She appears well-developed and well-nourished.   HENT:   Head: Normocephalic and atraumatic.   Eyes: Pupils are equal, round, and reactive to light.   Neck: Neck supple.   Normal range of motion.  Cardiovascular: Regular rhythm.   Murmur heard.  Pulmonary/Chest: Breath sounds normal. No respiratory distress. She has no wheezes. She has no rhonchi. She has no rales.   Musculoskeletal:      Cervical back: Normal range of motion and neck supple.     Neurological: She is alert and oriented to person, place, and time.   Patient has generalized weakness of extremities x4.  Patient can move all extremities however she has difficulty lifting the bilateral lower extremities against gravity.  She can move all digits of both feet.  Patient has weak bilateral hand , she can move both upper extremities against gravity, left-sided seems stronger than right upper extremity PICC   Skin: Skin is warm and dry.   Psychiatric: She has a normal mood and affect. Thought content normal.         ED Course   Procedures  Labs Reviewed   CBC W/ AUTO DIFFERENTIAL    Narrative:     The following orders were created for panel order CBC auto differential.  Procedure                               Abnormality         Status                     ---------                               -----------         ------                     CBC with Differential[[id number]]                                                         Please view results for these tests on the individual orders.   COMPREHENSIVE METABOLIC PANEL   APTT   PROTIME-INR   CBC WITH DIFFERENTIAL          Imaging Results          MRI Cervical Spine Without Contrast (In process)                  Medications - No data to display              ED Course as of 05/05/22  2214   Thu May 05, 2022   2126 Consult Dr. Joseph [MM]   2138 Consult Hospitalist [MM]   2158 I spoke with Dr. Barros earlier, agrees with repeat MRI secondary to images are not available, does not recommend steroids at this time, admit to hospitalist, JOCELINE Plavix, consult Cardiology. [KG]   2159 Love NP, OK to admit [KG]      ED Course User Index  [KG] José Luis Vasques MD  [MM] Seble Martines             Clinical Impression:   Final diagnoses:  [G95.19] Edema, spinal cord (Primary)  [M48.00] Spinal stenosis, unspecified spinal region  [G83.9] Motor level spinal weakness          ED Disposition Condition    Admit               José Luis Vasques MD  05/05/22 2214

## 2022-05-06 NOTE — ED NOTES
Call[facility], spoke to Izabella, asked her to fax the current med list

## 2022-05-06 NOTE — ED TRIAGE NOTES
Pt from [facility], sent due to abnormal MRI done today in [place]. Was told she needs to see a neurosurgeon.  Pt has been at rehab for 2 weeks following a surgery. Has had numbness to bilateral UE and LE since then. Numbness has been improving, but still there. No saddle numbness or incontinence.  Denies any pain.

## 2022-05-06 NOTE — H&P
[facility]  Hospital Medicine History & Physical Examination       Patient Name: Natty Nelson  MRN: [id number]  Patient Class: IP- Inpatient   Admission Date: 5/5/2022  6:52 PM  Admitting Physician: Rohith Arredondo MD  Length of Stay: 0  Attending Physician: Roihth Arredondo MD  Primary Care Provider: Primary Doctor No  Face-to-Face encounter date: 05/05/2022  Code Status: Full  Chief Complaint: weakness    HISTORY OF PRESENT ILLNESS:   Natty Nelson is a 82 y.o. female who was transferred from [facility] ED with complaint of weakness and MRI cervical spine showing severe stenosis and cord compression.  Patient has been suffering from progressive weakness of lower extremities> upper extremities over the past 2 to 3 months, she has been having multiple falls, denies low of bowel or bladder control. 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 On arrival to ED she was hemodynamically stable and afebrile.  Labs notable for LALIT on CKD and mild hypokalemia.  MRI imaging as described below.  Neurosurgery consulted and referred to hospital medicine service for further evaluation and management.      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 Authorizing Provider   amLODIPine (NORVASC) 5 MG tablet Take 5 mg by mouth once daily.     Historical Provider   aspirin 81 MG Chew Take 81 mg by mouth Daily.    Historical Provider   atorvastatin (LIPITOR) 80 MG tablet Take 80 mg by mouth once daily. 3/16/22   Historical Provider   baclofen (LIORESAL) 10 MG tablet Take 5 mg by mouth 2 (two) times daily. On 5/13 increase to 10mg TID    Historical Provider   bisacodyL (DULCOLAX) 10 mg Supp Place 10 mg rectally every 72 hours as needed.    Historical Provider   brimonidine 0.2% (ALPHAGAN) 0.2 % Drop Place 1 drop into both eyes 2 (two) times a day.    Historical Provider   clopidogreL (PLAVIX) 75 mg tablet Take 75 mg by mouth once daily. 1/31/22   Historical Provider   dexamethasone (DECADRON) 4 mg/mL injection Inject 4 mg into the vein every 6 (six) hours.    Historical Provider   dextrose 50 % in water (DEXTROSE 50%, D50W,) solution Inject into the vein as needed.    Historical Provider   insulin glargine (LANTUS) 100 unit/mL injection Inject 25 Units into the skin every evening.    Historical Provider   insulin regular 100 unit/mL Inj injection Inject into the skin. Sliding scale as directed prn    Historical Provider   iron/vitamin B complex (GERITOL ORAL) Take 20 mLs by mouth 2 (two) times a day.    Historical Provider   labetaloL (NORMODYNE) 200 MG tablet Take 200 mg by mouth 2 (two) times daily. 3/21/22   Historical Provider   lactulose (CHRONULAC) 10 gram/15 mL solution Take 20 g by mouth every 48 hours as needed.    Historical Provider   latanoprost 0.005 % ophthalmic solution Place 1 drop into both eyes nightly. 3/24/22   Historical Provider   LIDOcaine (LIDODERM) 5 % Place 1 patch onto the skin every 24 hours. Remove & Discard patch within 12 hours or as directed by MD    Historical Provider   linaGLIPtin (TRADJENTA) 5 mg Tab tablet Take 5 mg by mouth once daily.    Historical Provider   oxyCODONE-acetaminophen (PERCOCET) 5-325 mg per tablet Take 1 tablet by mouth every 4 (four) hours as needed for Pain.    Historical Provider   pantoprazole  "(PROTONIX) 40 MG tablet Take 40 mg by mouth once daily.    Historical Provider   polyethylene glycol (GLYCOLAX) 17 gram PwPk Take 17 g by mouth once daily.    Historical Provider   pregabalin (LYRICA) 75 MG capsule Take 75 mg by mouth 2 (two) times daily.    Historical Provider   sucralfate (CARAFATE) 1 gram tablet Take 1 g by mouth 4 (four) times daily before meals and nightly.    Historical Provider   timolol maleate 0.25% (TIMOPTIC) 0.25 % Drop Place 1 drop into both eyes 2 (two) times daily.    Historical Provider   valsartan (DIOVAN) 80 MG tablet Take 80 mg by mouth once daily.    Historical Provider   VITAMIN D2 1,250 mcg (50,000 unit) capsule Take 50,000 Units by mouth every Tuesday. 3/16/22   Historical Provider       REVIEW OF SYSTEMS:   Except as documented, all other systems reviewed and negative     PHYSICAL EXAM:   BP (!) 164/67   Pulse 80   Temp 98.2 °F (36.8 °C) (Oral)   Resp 18   Ht 5' 2" (1.575 m)   Wt 81.2 kg (179 lb)   SpO2 99%   BMI 32.74 kg/m²     Vitals Reviewed  General appearance: Well-developed  HENT: NC/AT  Eyes: Normal extraocular movements.   Neck: Supple.   Lungs: Clear to auscultation bilaterally. No wheezing present.   Heart: RRR. S1 and S2 present, No pedal edema. No JVD present.   Abdomen: Soft, non-distended, non-tender.  Extremities: No cyanosis, clubbing, or edema.  Skin: No Rash.   Neuro: RUE 3/5, LUE 4/5, BLE 2/5  Psych/mental status: Appropriate mood and affect. Responds appropriately to questions.     LABS AND IMAGING:     Admission on 05/05/2022   Component Date Value    Sodium Level 05/05/2022 132 (A)    Potassium Level 05/05/2022 5.3 (A)    Chloride 05/05/2022 100     Carbon Dioxide 05/05/2022 23     Glucose Level 05/05/2022 312 (A)    Blood Urea Nitrogen 05/05/2022 77.1 (A)    Creatinine 05/05/2022 2.05 (A)    Calcium Level Total 05/05/2022 9.6     Protein Total 05/05/2022 6.3     Albumin Level 05/05/2022 3.3 (A)    Globulin 05/05/2022 3.0     " Albumin/Globulin Ratio 05/05/2022 1.1     Bilirubin Total 05/05/2022 0.2     Bilirubin Direct 05/05/2022 0.1     Bilirubin Indirect 05/05/2022 0.10     Alkaline Phosphatase 05/05/2022 98     Alanine Aminotransferase 05/05/2022 34     Aspartate Aminotransfera* 05/05/2022 23     Estimated GFR- Am* 05/05/2022 30     PTT 05/05/2022 38.0 (A)    PT 05/05/2022 12.7     INR 05/05/2022 0.98     WBC 05/05/2022 8.5     RBC 05/05/2022 3.15 (A)    Hgb 05/05/2022 9.9 (A)    Hct 05/05/2022 31.0 (A)    MCV 05/05/2022 98.4 (A)    MCH 05/05/2022 31.4 (A)    MCHC 05/05/2022 31.9 (A)    RDW 05/05/2022 13.5     Platelet 05/05/2022 202     MPV 05/05/2022 13.9 (A)    Neutro Auto 05/05/2022 85.9     Lymph Auto 05/05/2022 10.8     Mono Auto 05/05/2022 2.4     Eos Auto 05/05/2022 0.2     Basophil Auto 05/05/2022 0.2     Abs Lymph 05/05/2022 0.92     Abs Neutro 05/05/2022 7.3     Abs Mono 05/05/2022 0.20     Abs Eos 05/05/2022 0.02     Abs Baso 05/05/2022 0.02     IG# 05/05/2022 0.04 (A)    IG% 05/05/2022 0.5 (A)    NRBC% 05/05/2022 0.0         Microbiology Results (last 7 days)     ** No results found for the last 168 hours. **           MRI Cervical Spine Without Contrast    Result Date: 5/5/2022  START OF REPORT: Technique: Multiplanar, multisequence MRI of the cervical spine without contrast was performed in neutral position. Comparison: None. Clinical History: Multiple falls, leg weakness, pt. in ED, NO PRIORS. Findings: Spinal cord: There is moderate-to-severe extrinsic cord compression at C5-C6 secondary to disc herniation and ligamentum flavum hypertrophy. There is associated subtle STIR hyperintense signal within the cord at this level (series 3 image 7). This is suspicious for cord edema versus myelomalacia. Alignment: Grade I posterior listhesis of C3 on C4. Curvature: Normal cervical lordosis. Vertebral body fracture/deformity: Vertebral body is maintained. Disc morphology: Multilevel disc  desiccation is seen. There is loss of disc height at C3-C4. Mild decreased disc height is also seen at C5-C6. Moderate diffuse disc bulge with moderate ligamentum flavum hypertrophy is seen at C5-C6 with severe canal stenosis and moderate-to-severe bilateral neuroforaminal narrowing. Mild-to-moderate central disc protrusions are also seen at other levels with associated facet hypertrophy. There is severe left uncinate hypertrophy at C3-C4. There is associated mild-to-moderate canal stenosis. Severe left and moderate to severe right neuroforaminal narrowing is seen at C3-C4.     Impression:   1. Grade I posterior listhesis of C3 on C4.   2. There is moderate-to-severe extrinsic cord compression at C5-C6 secondary to disc herniation and ligamentum flavum hypertrophy. There is associated subtle STIR hyperintense signal within the cord at this level (series 3 image 7). This is suspicious for cord edema versus myelomalacia.   3. Moderate diffuse disc bulge with moderate ligamentum flavum hypertrophy is seen at C5-C6 with severe canal stenosis and moderate-to-severe bilateral neuroforaminal narrowing. Mild-to-moderate central disc protrusions are also seen at other levels with associated facet hypertrophy. There is severe left uncinate hypertrophy at C3-C4. There is associated mild-to-moderate canal stenosis. Severe left and moderate to severe right neuroforaminal narrowing is seen at C3-C4. Correlate clinically as regards to additional evaluation and follow up.   4. Degenerative changes and other details as above.       ASSESSMENT & PLAN:   1. 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 LALIT superimposed on CKD3    Plan:    - Dexamethasone 4mg IV q6h  - Hold plavix/aspirin  - Neurosurgery consult  - NS at 75 ml/hr  - SSI q6h  - Resumed home meds  - NPO after midnight    HX HTN, HLD, TDM, glaucoma, anemia of chronic disease      VTE Prophylaxis:  SCDs    __________________________________________________________________________  INPATIENT LIST OF MEDICATIONS     Current Facility-Administered Medications:     acetaminophen tablet 650 mg, 650 mg, Oral, Q4H PRN, Rohith Arredondo MD    acetaminophen tablet 650 mg, 650 mg, Oral, Q8H PRN, Rohith Arredondo MD    amLODIPine tablet 5 mg, 5 mg, Oral, Daily, Rohith Arredondo MD    atorvastatin tablet 80 mg, 80 mg, Oral, Daily, Rohith Arredondo MD    baclofen tablet 5 mg, 5 mg, Oral, BID, Rohith Arredondo MD    brimonidine 0.15 % OPTH DROP ophthalmic solution 1 drop, 1 drop, Both Eyes, BID, Rohith Arredondo MD    dexamethasone injection 4 mg, 4 mg, Intravenous, Q6H, Rohith Arredondo MD    dexamethasone injection 4 mg, 4 mg, Intravenous, Q6H, Rohith Arredondo MD    dextrose 10% bolus 125 mL, 12.5 g, Intravenous, PRN, Rohith Arredondo MD    dextrose 10% bolus 250 mL, 25 g, Intravenous, PRN, Rohith Arredondo MD    glucagon (human recombinant) injection 1 mg, 1 mg, Intramuscular, PRN, Rohith Arredondo MD    glucose chewable tablet 16 g, 16 g, Oral, PRN, Rohith Arredondo MD    glucose chewable tablet 24 g, 24 g, Oral, PRN, Rohith Arredondo MD    hydrALAZINE injection 10 mg, 10 mg, Intravenous, Q4H PRN, Rohith Arredondo MD    HYDROcodone-acetaminophen 5-325 mg per tablet 1 tablet, 1 tablet, Oral, Q6H PRN, Rohith Arredondo MD    insulin aspart U-100 injection 0-5 Units, 0-5 Units, Subcutaneous, QID (AC + HS) PRN, Rohith Arredondo MD    insulin detemir U-100 injection 25 Units, 25 Units, Subcutaneous, QHS, Rohith Arredondo MD    labetaloL tablet 200 mg, 200 mg, Oral, BID, Rohith Arredondo MD    latanoprost 0.005 % ophthalmic solution 1 drop, 1 drop, Both Eyes, Nightly, Rohith Arredondo MD    morphine injection 2 mg, 2 mg, Intravenous, Q4H PRN, Rohith Arredondo MD    naloxone 0.4 mg/mL injection 0.02 mg, 0.02 mg, Intravenous, PRN, Rohith Arredondo MD    ondansetron injection 4 mg, 4 mg, Intravenous, Q4H PRN, Rohith rAredondo MD    oxyCODONE-acetaminophen 5-325 mg per tablet 1 tablet, 1  tablet, Oral, Q4H PRN, Rohith Arredondo MD    pantoprazole EC tablet 40 mg, 40 mg, Oral, Daily, Rohith Arredondo MD    polyethylene glycol packet 17 g, 17 g, Oral, Daily, Rohith Arredondo MD    pregabalin capsule 75 mg, 75 mg, Oral, BID, Rohith Arredondo MD    senna-docusate 8.6-50 mg per tablet 1 tablet, 1 tablet, Oral, BID, Rohith Arredondo MD    sodium chloride 0.9% flush 10 mL, 10 mL, Intravenous, Q12H PRN, Rohith Arredondo MD    sucralfate tablet 1 g, 1 g, Oral, QID (AC & HS), Rohith Arredondo MD    timolol maleate 0.25% ophthalmic solution 1 drop, 1 drop, Both Eyes, BID, Rohith Arredondo MD      Scheduled Meds:   [START ON 5/6/2022] dexamethasone  4 mg Intravenous Q6H    [START ON 5/6/2022] senna-docusate 8.6-50 mg  1 tablet Oral BID    [START ON 5/6/2022] sodium zirconium cyclosilicate  10 g Oral Once     Continuous Infusions:  PRN Meds:.[START ON 5/6/2022] acetaminophen, [START ON 5/6/2022] acetaminophen, [START ON 5/6/2022] dextrose 10%, [START ON 5/6/2022] dextrose 10%, [START ON 5/6/2022] glucagon (human recombinant), [START ON 5/6/2022] glucose, [START ON 5/6/2022] glucose, [START ON 5/6/2022] HYDROcodone-acetaminophen, [START ON 5/6/2022] insulin aspart U-100, [START ON 5/6/2022] morphine, [START ON 5/6/2022] naloxone, [START ON 5/6/2022] ondansetron, [START ON 5/6/2022] sodium chloride 0.9%        Rohith Arredondo MD   05/05/2022

## 2022-05-06 NOTE — ED NOTES
Received pt's current med list, MRI report was included in all pt's paperwork but no disc was sent

## 2022-05-07 PROBLEM — M48.02 CERVICAL STENOSIS OF SPINAL CANAL: Status: ACTIVE | Noted: 2022-05-07

## 2022-05-07 LAB
GLUCOSE SERPL-MCNC: 324 MG/DL (ref 70–110)
GLUCOSE SERPL-MCNC: 367 MG/DL (ref 70–110)
GLUCOSE SERPL-MCNC: 458 MG/DL (ref 70–110)
GLUCOSE SERPL-MCNC: 500 MG/DL (ref 70–110)
POCT GLUCOSE: 341 MG/DL (ref 70–110)
POCT GLUCOSE: 352 MG/DL (ref 70–110)
POCT GLUCOSE: 363 MG/DL (ref 70–110)
POCT GLUCOSE: 367 MG/DL (ref 70–110)
POCT GLUCOSE: 418 MG/DL (ref 70–110)
POCT GLUCOSE: 458 MG/DL (ref 70–110)
POCT GLUCOSE: 469 MG/DL (ref 70–110)
POCT GLUCOSE: >500 MG/DL (ref 70–110)
POCT GLUCOSE: >500 MG/DL (ref 70–110)

## 2022-05-07 PROCEDURE — 63600175 PHARM REV CODE 636 W HCPCS: Performed by: INTERNAL MEDICINE

## 2022-05-07 PROCEDURE — 63600175 PHARM REV CODE 636 W HCPCS: Performed by: NURSE PRACTITIONER

## 2022-05-07 PROCEDURE — 11000001 HC ACUTE MED/SURG PRIVATE ROOM

## 2022-05-07 PROCEDURE — C9399 UNCLASSIFIED DRUGS OR BIOLOG: HCPCS | Performed by: INTERNAL MEDICINE

## 2022-05-07 PROCEDURE — 25000003 PHARM REV CODE 250: Performed by: INTERNAL MEDICINE

## 2022-05-07 RX ORDER — DEXAMETHASONE SODIUM PHOSPHATE 4 MG/ML
4 INJECTION, SOLUTION INTRA-ARTICULAR; INTRALESIONAL; INTRAMUSCULAR; INTRAVENOUS; SOFT TISSUE 3 TIMES DAILY
Status: DISCONTINUED | OUTPATIENT
Start: 2022-05-07 | End: 2022-05-10

## 2022-05-07 RX ORDER — INSULIN ASPART 100 [IU]/ML
10 INJECTION, SOLUTION INTRAVENOUS; SUBCUTANEOUS
Status: DISCONTINUED | OUTPATIENT
Start: 2022-05-07 | End: 2022-05-08

## 2022-05-07 RX ORDER — INSULIN ASPART 100 [IU]/ML
8 INJECTION, SOLUTION INTRAVENOUS; SUBCUTANEOUS ONCE
Status: COMPLETED | OUTPATIENT
Start: 2022-05-07 | End: 2022-05-07

## 2022-05-07 RX ORDER — IBUPROFEN 200 MG
24 TABLET ORAL
Status: DISCONTINUED | OUTPATIENT
Start: 2022-05-07 | End: 2022-05-17 | Stop reason: HOSPADM

## 2022-05-07 RX ORDER — INSULIN ASPART 100 [IU]/ML
1-10 INJECTION, SOLUTION INTRAVENOUS; SUBCUTANEOUS
Status: DISCONTINUED | OUTPATIENT
Start: 2022-05-07 | End: 2022-05-17 | Stop reason: HOSPADM

## 2022-05-07 RX ORDER — IBUPROFEN 200 MG
16 TABLET ORAL
Status: DISCONTINUED | OUTPATIENT
Start: 2022-05-07 | End: 2022-05-17 | Stop reason: HOSPADM

## 2022-05-07 RX ORDER — GLUCAGON 1 MG
1 KIT INJECTION
Status: DISCONTINUED | OUTPATIENT
Start: 2022-05-07 | End: 2022-05-17 | Stop reason: HOSPADM

## 2022-05-07 RX ADMIN — INSULIN DETEMIR 30 UNITS: 100 INJECTION, SOLUTION SUBCUTANEOUS at 09:05

## 2022-05-07 RX ADMIN — SUCRALFATE 1 G: 1 TABLET ORAL at 05:05

## 2022-05-07 RX ADMIN — DEXAMETHASONE SODIUM PHOSPHATE 4 MG: 4 INJECTION, SOLUTION INTRA-ARTICULAR; INTRALESIONAL; INTRAMUSCULAR; INTRAVENOUS; SOFT TISSUE at 06:05

## 2022-05-07 RX ADMIN — LABETALOL HYDROCHLORIDE 200 MG: 200 TABLET, FILM COATED ORAL at 10:05

## 2022-05-07 RX ADMIN — BACLOFEN 5 MG: 5 TABLET ORAL at 10:05

## 2022-05-07 RX ADMIN — INSULIN ASPART 8 UNITS: 100 INJECTION, SOLUTION INTRAVENOUS; SUBCUTANEOUS at 02:05

## 2022-05-07 RX ADMIN — INSULIN ASPART 10 UNITS: 100 INJECTION, SOLUTION INTRAVENOUS; SUBCUTANEOUS at 06:05

## 2022-05-07 RX ADMIN — POLYETHYLENE GLYCOL 3350 17 G: 17 POWDER, FOR SOLUTION ORAL at 10:05

## 2022-05-07 RX ADMIN — ATORVASTATIN CALCIUM 80 MG: 40 TABLET, FILM COATED ORAL at 10:05

## 2022-05-07 RX ADMIN — BACLOFEN 5 MG: 5 TABLET ORAL at 09:05

## 2022-05-07 RX ADMIN — PANTOPRAZOLE SODIUM 40 MG: 40 TABLET, DELAYED RELEASE ORAL at 10:05

## 2022-05-07 RX ADMIN — INSULIN ASPART 10 UNITS: 100 INJECTION, SOLUTION INTRAVENOUS; SUBCUTANEOUS at 05:05

## 2022-05-07 RX ADMIN — SENNOSIDES, DOCUSATE SODIUM 1 TABLET: 8.6; 5 TABLET ORAL at 09:05

## 2022-05-07 RX ADMIN — SENNOSIDES, DOCUSATE SODIUM 1 TABLET: 8.6; 5 TABLET ORAL at 10:05

## 2022-05-07 RX ADMIN — DEXAMETHASONE SODIUM PHOSPHATE 4 MG: 4 INJECTION, SOLUTION INTRA-ARTICULAR; INTRALESIONAL; INTRAMUSCULAR; INTRAVENOUS; SOFT TISSUE at 09:05

## 2022-05-07 RX ADMIN — TIMOLOL MALEATE 1 DROP: 2.5 SOLUTION/ DROPS OPHTHALMIC at 10:05

## 2022-05-07 RX ADMIN — SUCRALFATE 1 G: 1 TABLET ORAL at 10:05

## 2022-05-07 RX ADMIN — INSULIN ASPART 4 UNITS: 100 INJECTION, SOLUTION INTRAVENOUS; SUBCUTANEOUS at 09:05

## 2022-05-07 RX ADMIN — PREGABALIN 75 MG: 75 CAPSULE ORAL at 10:05

## 2022-05-07 RX ADMIN — LATANOPROST 1 DROP: 50 SOLUTION OPHTHALMIC at 09:05

## 2022-05-07 RX ADMIN — SUCRALFATE 1 G: 1 TABLET ORAL at 04:05

## 2022-05-07 RX ADMIN — TIMOLOL MALEATE 1 DROP: 2.5 SOLUTION/ DROPS OPHTHALMIC at 09:05

## 2022-05-07 RX ADMIN — LABETALOL HYDROCHLORIDE 200 MG: 200 TABLET, FILM COATED ORAL at 09:05

## 2022-05-07 RX ADMIN — BRIMONIDINE TARTRATE 1 DROP: 1.5 SOLUTION OPHTHALMIC at 09:05

## 2022-05-07 RX ADMIN — PREGABALIN 75 MG: 75 CAPSULE ORAL at 09:05

## 2022-05-07 RX ADMIN — BRIMONIDINE TARTRATE 1 DROP: 1.5 SOLUTION OPHTHALMIC at 10:05

## 2022-05-07 RX ADMIN — INSULIN ASPART 8 UNITS: 100 INJECTION, SOLUTION INTRAVENOUS; SUBCUTANEOUS at 10:05

## 2022-05-07 RX ADMIN — INSULIN ASPART 10 UNITS: 100 INJECTION, SOLUTION INTRAVENOUS; SUBCUTANEOUS at 03:05

## 2022-05-07 RX ADMIN — DEXAMETHASONE SODIUM PHOSPHATE 4 MG: 4 INJECTION, SOLUTION INTRA-ARTICULAR; INTRALESIONAL; INTRAMUSCULAR; INTRAVENOUS; SOFT TISSUE at 12:05

## 2022-05-07 RX ADMIN — AMLODIPINE BESYLATE 5 MG: 5 TABLET ORAL at 10:05

## 2022-05-07 RX ADMIN — SODIUM CHLORIDE: 9 INJECTION, SOLUTION INTRAVENOUS at 12:05

## 2022-05-07 RX ADMIN — SUCRALFATE 1 G: 1 TABLET ORAL at 09:05

## 2022-05-07 RX ADMIN — ENOXAPARIN SODIUM 30 MG: 30 INJECTION SUBCUTANEOUS at 04:05

## 2022-05-07 NOTE — PROGRESS NOTES
[facility]  Hospital Medicine Progress Note        Chief Complaint: Inpatient Follow-up for Albert leg weakness     HPI:   Natty Nelson is a 82 y.o. female who was transferred from [facility] ED with complaint of weakness and MRI cervical spine showing severe stenosis and cord compression.  Patient has been suffering from progressive weakness of lower extremities> upper extremities over the past 2 to 3 months, she has been having multiple falls, denies low of bowel or bladder control. 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 On arrival to ED she was hemodynamically stable and afebrile.  Labs notable for LALIT on CKD and mild hypokalemia.  MRI imaging as described below.  Neurosurgery consulted and referred to hospital medicine service for further evaluation and management. Seen by neurosurgery team and recommended surgical intervention.     Interval Hx:   Patient today awake and comfortable. Patient still has albert LE weakness. States she is afraid to ambulate. Eating using her hands. Has been afebrile.     Objective/physical exam:  General: In no acute distress, afebrile, + Obese   Chest: Clear to auscultation bilaterally  Heart: S1, S2, no appreciable murmur  Abdomen: Soft, nontender, BS +  MSK: Warm, no lower extremity edema, no clubbing or cyanosis  Neurologic: Alert and oriented x4, Albert UE strength 5/5, LE strength 4/5     VITAL SIGNS: 24 HRS MIN & MAX LAST   Temp  Min: 97.7 °F (36.5 °C)  Max: 98.4 °F (36.9 °C) 97.7 °F (36.5 °C)   BP  Min: 118/69  Max: 183/70 (!) 183/70   Pulse  Min: 76  Max: 89  76   Resp  Min: 18  Max: 18 18   SpO2  Min: 97 %  Max: 98 % 98 %       Recent Labs   Lab 05/05/22 2000 05/06/22  0424   WBC 8.5 9.8   RBC 3.15* 3.16*   HGB 9.9* 9.9*   HCT 31.0* 30.3*   MCV 98.4* 95.9*   MCH 31.4* 31.3*   MCHC 31.9* 32.7*   RDW 13.5 13.1   MPV 13.9* 14.1*        Recent Labs   Lab 05/05/22 2000 05/06/22  0424   CO2 23 23   BUN 77.1* 74.3*   CREATININE 2.05* 1.72*   CALCIUM 9.6 9.6   ALBUMIN 3.3* 3.0*   ALKPHOS 98 85   ALT 34 31   AST 23 19          Microbiology Results (last 7 days)     ** No results found for the last 168 hours. **           MRI Cervical Spine Without Contrast  Narrative: EXAMINATION:  MRI CERVICAL SPINE WITHOUT CONTRAST    CLINICAL HISTORY:  Spinal stenosis, cervical;    TECHNIQUE:  Multiple MRI pulse sequences were performed of the cervical spine without contrast.    COMPARISON:  None available    FINDINGS:  Cervical vertebrae stature is maintained.  Cervical vertebral alignment is also preserved.  Small anterior degenerative hypertrophic spurring.  There is no prevertebral soft tissue prominence.  No acute marrow edematous signal.  Multiple indentations upon the thecal sac by disc pathology and spondylosis.  There is no acute cord edema or myelomalacia identified.  Disc segmental is given below:    At C2-C3, there is disc bulge which effaces the ventral subarachnoid space without cord compression.  Right neural foramen is unremarkable.  There is mild spondylotic narrowing of the left neural foramen.    At C3-C4, there is central 2 left paracentral osteophyte disc complex which indents the ventral thecal sac and also causes compression upon the left exiting nerve root.  There is also bilateral facet arthropathy and ligamentum flavum thickening.  Cord is not compressed.  There is moderate narrowing of the right neural foramen and marked narrowing of the left neural canal.    At C4-C5, there is osteophyte disc complex which effaces the ventral subarachnoid space without causing cord compression.  There is moderate spondylotic narrowing of the right neural foramen and mild narrowing of the left neural canal.    At C5-C6, there is osteophyte disc complex and facets arthropathy causing complete effacement of the subarachnoid space.  Cord is also  compressed.  There are bilateral compression upon the exiting nerve roots and bilateral severe narrowings of the neural foramina.    At C6-C7, there is central disc bulge which indents the ventral thecal sac without cord compression.  There is moderate spondylolytic narrowing of the right neural foramen and mild narrowing of the left neural canal.    At C7-T1, disc height is preserved.  Central canal is not stenosed.  There are no narrowings of the neural foramen.  Impression: Cervical spine degenerative disc disease and spondylosis level by level discussed above.  These findings are most significant at the level of C5-C6.    Electronically signed by: Yrn Mcfarland  Date:    05/06/2022  Time:    10:40      Scheduled Med:   amLODIPine  5 mg Oral Daily    atorvastatin  80 mg Oral Daily    baclofen  5 mg Oral BID    brimonidine 0.15 % OPTH DROP  1 drop Both Eyes BID    dexamethasone  4 mg Intravenous TID    enoxaparin  30 mg Subcutaneous Daily    insulin aspart U-100  10 Units Subcutaneous TIDWM    insulin detemir U-100  30 Units Subcutaneous QHS    labetaloL  200 mg Oral BID    latanoprost  1 drop Both Eyes Nightly    pantoprazole  40 mg Oral Daily    polyethylene glycol  17 g Oral Daily    pregabalin  75 mg Oral BID    senna-docusate 8.6-50 mg  1 tablet Oral BID    sucralfate  1 g Oral QID (AC & HS)    timolol maleate 0.25%  1 drop Both Eyes BID        Continuous Infusions:   sodium chloride 0.9% 75 mL/hr at 05/07/22 1200        PRN Meds:  acetaminophen, acetaminophen, dextrose 10%, dextrose 10%, glucagon (human recombinant), glucose, glucose, hydrALAZINE, HYDROcodone-acetaminophen, insulin aspart U-100, morphine, naloxone, ondansetron, oxyCODONE-acetaminophen, sodium chloride 0.9%       Assessment/Plan:  Severe cervical stenosis with cord compression 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 States she is afraid to try  to ambulate. Feels her legs are still weak.   Cont iv steroids, will wean to TID dose   For her hyperglycemia will cont Levemir to 30 units sq q hs and added lispro   Cont iv fluids for now and monitor renal functions and volume status   Strict aspiration and fall precautions   No NSAIDs     Labs in am    Cont supportive care         Anticipated discharge and Disposition:      All diagnosis and differential diagnosis have been reviewed; assessment and plan has been documented; I have personally reviewed the labs and test results that are presently available; I have reviewed the patients medication list; I have reviewed the consulting providers response and recommendations. I have reviewed or attempted to review medical records based upon their availability    All of the patient's questions have been  addressed and answered. Patient's is agreeable to the above stated plan. I will continue to monitor closely and make adjustments to medical management as needed.      Nutrition Status:        Rolando Madera MD   05/07/2022

## 2022-05-07 NOTE — PROGRESS NOTES
[facility] - 9th Floor Med Surg  Neurosurgery  Progress note      Subjective:    Doing well  Eating and drinking  Sitting up in bed  Continues with upper extremity symptoms.  Vital signs stable        MRI cervical spine showing severe stenosis and cord compression.    On exam she is extremely pleasant and provides a lot of the information.  She moves all extremities but is extremely weak with  strength.  She is also extremely weak in her triceps.  Moderately weak in her biceps.  She is strong to her bilateral lower extremities.  She states that she has been in a wheelchair and that her balance is not good.  Neurologically she is intact and oriented to all spheres.  She does complain of numbness to bilateral upper extremities.  No bowel or bladder dysfunction reported.      PTA Medications   Medication Sig    amLODIPine (NORVASC) 5 MG tablet Take 5 mg by mouth once daily.    aspirin 81 MG Chew Take 81 mg by mouth Daily.    atorvastatin (LIPITOR) 80 MG tablet Take 80 mg by mouth once daily.    baclofen (LIORESAL) 10 MG tablet Take 5 mg by mouth 2 (two) times daily. On 5/13 increase to 10mg TID    bisacodyL (DULCOLAX) 10 mg Supp Place 10 mg rectally every 72 hours as needed.    brimonidine 0.2% (ALPHAGAN) 0.2 % Drop Place 1 drop into both eyes 2 (two) times a day.    clopidogreL (PLAVIX) 75 mg tablet Take 75 mg by mouth once daily.    dexamethasone (DECADRON) 4 mg/mL injection Inject 4 mg into the vein every 6 (six) hours.    dextrose 50 % in water (DEXTROSE 50%, D50W,) solution Inject into the vein as needed.    insulin glargine (LANTUS) 100 unit/mL injection Inject 25 Units into the skin every evening.    insulin regular 100 unit/mL Inj injection Inject into the skin. Sliding scale as directed prn    iron/vitamin B complex (GERITOL ORAL) Take 20 mLs by mouth 2 (two) times a day.    labetaloL (NORMODYNE) 200 MG tablet Take 200 mg by mouth 2 (two) times daily.    lactulose (CHRONULAC)  10 gram/15 mL solution Take 20 g by mouth every 48 hours as needed.    latanoprost 0.005 % ophthalmic solution Place 1 drop into both eyes nightly.    LIDOcaine (LIDODERM) 5 % Place 1 patch onto the skin every 24 hours. Remove & Discard patch within 12 hours or as directed by MD    linaGLIPtin (TRADJENTA) 5 mg Tab tablet Take 5 mg by mouth once daily.    oxyCODONE-acetaminophen (PERCOCET) 5-325 mg per tablet Take 1 tablet by mouth every 4 (four) hours as needed for Pain.    pantoprazole (PROTONIX) 40 MG tablet Take 40 mg by mouth once daily.    polyethylene glycol (GLYCOLAX) 17 gram PwPk Take 17 g by mouth once daily.    pregabalin (LYRICA) 75 MG capsule Take 75 mg by mouth 2 (two) times daily.    sucralfate (CARAFATE) 1 gram tablet Take 1 g by mouth 4 (four) times daily before meals and nightly.    timolol maleate 0.25% (TIMOPTIC) 0.25 % Drop Place 1 drop into both eyes 2 (two) times daily.    valsartan (DIOVAN) 80 MG tablet Take 80 mg by mouth once daily.    VITAMIN D2 1,250 mcg (50,000 unit) capsule Take 50,000 Units by mouth every Tuesday.       Review of patient's allergies indicates:  No Known Allergies    Past Medical History:   Diagnosis Date    Arthritis     Diabetes mellitus     Hypertension      No past surgical history on file.  Family History    None       Tobacco Use    Smoking status: Not on file    Smokeless tobacco: Not on file   Substance and Sexual Activity    Alcohol use: Not on file    Drug use: Not on file    Sexual activity: Not on file     Review of Systems   Neurological: Positive for weakness and numbness.        Numbness to bilateral upper extremities.     Objective:     Weight: 85.7 kg (188 lb 15 oz)  Body mass index is 34.55 kg/m².  Vital Signs (Most Recent):  Temp: 97.7 °F (36.5 °C) (05/07/22 0700)  Pulse: 76 (05/07/22 1002)  Resp: 18 (05/07/22 0700)  BP: (!) 183/70 (05/07/22 1002)  SpO2: 98 % (05/07/22 0700) Vital Signs (24h Range):  Temp:  [97.7 °F (36.5 °C)-98.4 °F  (36.9 °C)] 97.7 °F (36.5 °C)  Pulse:  [76-89] 76  Resp:  [18] 18  SpO2:  [97 %-98 %] 98 %  BP: (118-183)/(61-86) 183/70                          Physical Exam:  Vitals reviewed.    Constitutional: She appears well-developed and well-nourished. She is not diaphoretic. No distress.     Eyes: Pupils are equal, round, and reactive to light. Conjunctivae and EOM are normal.   Oriented to all spheres.  PERRLA.  Intact cough corneal gag.  Pleasant.     Cardiovascular: Regular rhythm, normal pulses and intact distal pulses.     Abdominal: Soft. Bowel sounds are normal.     Skin: Skin displays no rash on trunk and no rash on extremities. Skin displays no lesions on trunk and no lesions on extremities.     Psych/Behavior: She is alert. She is oriented to person, place, and time. She has a normal mood and affect.     Musculoskeletal:        Right Upper Extremities: Muscle strength is 2/5.        Left Upper Extremities: Muscle strength is 2/5.       Right Lower Extremities: Muscle strength is 4/5.        Left Lower Extremities: Muscle strength is 4/5.      Comments: Right upper and lower extremities with triceps strength of 1 or 2 at best. Biceps strength of 3. Bilateral lower extremity strength of 4. Complaints of bilateral upper extremity numbness.   strength extremely poor patient states she drops things.     Neurological:        Sensory: There is no sensory deficit in the trunk. There is no sensory deficit in the extremities.        Cranial nerves: Cranial nerve(s) II, III, IV, V, VI, VII, VIII, IX, X, XI and XII are intact.       Significant Labs:  Recent Labs   Lab 05/05/22 2000 05/06/22 0424   CO2 23 23   BUN 77.1* 74.3*   CREATININE 2.05* 1.72*   CALCIUM 9.6 9.6     Recent Labs   Lab 05/05/22 2000 05/06/22 0424   WBC 8.5 9.8   HGB 9.9* 9.9*   HCT 31.0* 30.3*     Recent Labs   Lab 05/05/22 2000 05/06/22 0424   INR 0.98 1.04   APTT 38.0* 36.9*     Microbiology Results (last 7 days)     ** No results found for  the last 168 hours. **          Significant Diagnostics:  MRI cervical spine:  Impression:     1. Grade I posterior listhesis of C3 on C4.   2. There is moderate-to-severe extrinsic cord compression at C5-C6 secondary to disc herniation and ligamentum flavum hypertrophy. There is associated subtle STIR hyperintense signal within the cord at this level (series 3 image 7). This is suspicious for cord edema versus myelomalacia.   3. Moderate diffuse disc bulge with moderate ligamentum flavum hypertrophy is seen at C5-C6 with severe canal stenosis and moderate-to-severe bilateral neuroforaminal narrowing. Mild-to-moderate central disc protrusions are also seen at other levels with associated facet hypertrophy. There is severe left uncinate hypertrophy at C3-C4. There is associated mild-to-moderate canal stenosis. Severe left and moderate to severe right neuroforaminal narrowing is seen at C3-C4. Correlate clinically as regards to additional evaluation and follow up.   4. Degenerative changes and other details as above.     Assessment/Plan:    Patient on Decadron  Surgery scheduled for Monday  NPO after midnight on Sunday night.  Labs on chart.  Consent placed on chart.  ACDF 5 6,- 6 7.   Pain control  Fall precautions  SCDs      Patient needs cardiac clearance- consult cardiology please     There are no hospital problems to display for this patient.      Thank you for your consult.     TATIANNA Davis-BC  Neurosurgery  [facility] - 9th Floor Med Surg

## 2022-05-08 ENCOUNTER — ANESTHESIA EVENT (OUTPATIENT)
Dept: SURGERY | Facility: HOSPITAL | Age: 82
DRG: 472 | End: 2022-05-08
Payer: MEDICARE

## 2022-05-08 LAB
ANION GAP SERPL CALC-SCNC: 7 MEQ/L
BUN SERPL-MCNC: 38.5 MG/DL (ref 9.8–20.1)
CALCIUM SERPL-MCNC: 9.2 MG/DL (ref 8.4–10.2)
CHLORIDE SERPL-SCNC: 108 MMOL/L (ref 98–107)
CO2 SERPL-SCNC: 23 MMOL/L (ref 23–31)
CREAT SERPL-MCNC: 1.16 MG/DL (ref 0.55–1.02)
CREAT/UREA NIT SERPL: 33
GLUCOSE SERPL-MCNC: 210 MG/DL (ref 82–115)
GLUCOSE SERPL-MCNC: 254 MG/DL (ref 70–110)
POCT GLUCOSE: 162 MG/DL (ref 70–110)
POCT GLUCOSE: 199 MG/DL (ref 70–110)
POCT GLUCOSE: 202 MG/DL (ref 70–110)
POCT GLUCOSE: 218 MG/DL (ref 70–110)
POCT GLUCOSE: 237 MG/DL (ref 70–110)
POCT GLUCOSE: 254 MG/DL (ref 70–110)
POCT GLUCOSE: 324 MG/DL (ref 70–110)
POTASSIUM SERPL-SCNC: 4.5 MMOL/L (ref 3.5–5.1)
SODIUM SERPL-SCNC: 138 MMOL/L (ref 136–145)

## 2022-05-08 PROCEDURE — 63600175 PHARM REV CODE 636 W HCPCS: Performed by: NURSE PRACTITIONER

## 2022-05-08 PROCEDURE — C9399 UNCLASSIFIED DRUGS OR BIOLOG: HCPCS | Performed by: INTERNAL MEDICINE

## 2022-05-08 PROCEDURE — 25000003 PHARM REV CODE 250: Performed by: INTERNAL MEDICINE

## 2022-05-08 PROCEDURE — 93010 ELECTROCARDIOGRAM REPORT: CPT | Mod: ,,, | Performed by: INTERNAL MEDICINE

## 2022-05-08 PROCEDURE — 11000001 HC ACUTE MED/SURG PRIVATE ROOM

## 2022-05-08 PROCEDURE — 63600175 PHARM REV CODE 636 W HCPCS: Performed by: INTERNAL MEDICINE

## 2022-05-08 PROCEDURE — 80048 BASIC METABOLIC PNL TOTAL CA: CPT | Performed by: INTERNAL MEDICINE

## 2022-05-08 PROCEDURE — 93005 ELECTROCARDIOGRAM TRACING: CPT

## 2022-05-08 PROCEDURE — 36415 COLL VENOUS BLD VENIPUNCTURE: CPT | Performed by: INTERNAL MEDICINE

## 2022-05-08 PROCEDURE — 93010 EKG 12-LEAD: ICD-10-PCS | Mod: ,,, | Performed by: INTERNAL MEDICINE

## 2022-05-08 RX ORDER — AMLODIPINE BESYLATE 5 MG/1
10 TABLET ORAL DAILY
Status: DISCONTINUED | OUTPATIENT
Start: 2022-05-09 | End: 2022-05-17 | Stop reason: HOSPADM

## 2022-05-08 RX ORDER — INSULIN ASPART 100 [IU]/ML
12 INJECTION, SOLUTION INTRAVENOUS; SUBCUTANEOUS
Status: DISCONTINUED | OUTPATIENT
Start: 2022-05-08 | End: 2022-05-13

## 2022-05-08 RX ADMIN — LABETALOL HYDROCHLORIDE 200 MG: 200 TABLET, FILM COATED ORAL at 08:05

## 2022-05-08 RX ADMIN — HYDRALAZINE HYDROCHLORIDE 10 MG: 20 INJECTION INTRAMUSCULAR; INTRAVENOUS at 01:05

## 2022-05-08 RX ADMIN — SUCRALFATE 1 G: 1 TABLET ORAL at 05:05

## 2022-05-08 RX ADMIN — INSULIN ASPART 3 UNITS: 100 INJECTION, SOLUTION INTRAVENOUS; SUBCUTANEOUS at 12:05

## 2022-05-08 RX ADMIN — PANTOPRAZOLE SODIUM 40 MG: 40 TABLET, DELAYED RELEASE ORAL at 08:05

## 2022-05-08 RX ADMIN — PREGABALIN 75 MG: 75 CAPSULE ORAL at 08:05

## 2022-05-08 RX ADMIN — INSULIN ASPART 12 UNITS: 100 INJECTION, SOLUTION INTRAVENOUS; SUBCUTANEOUS at 05:05

## 2022-05-08 RX ADMIN — INSULIN ASPART 2 UNITS: 100 INJECTION, SOLUTION INTRAVENOUS; SUBCUTANEOUS at 04:05

## 2022-05-08 RX ADMIN — TIMOLOL MALEATE 1 DROP: 2.5 SOLUTION/ DROPS OPHTHALMIC at 08:05

## 2022-05-08 RX ADMIN — BACLOFEN 5 MG: 5 TABLET ORAL at 08:05

## 2022-05-08 RX ADMIN — SUCRALFATE 1 G: 1 TABLET ORAL at 08:05

## 2022-05-08 RX ADMIN — INSULIN ASPART 10 UNITS: 100 INJECTION, SOLUTION INTRAVENOUS; SUBCUTANEOUS at 08:05

## 2022-05-08 RX ADMIN — SENNOSIDES, DOCUSATE SODIUM 1 TABLET: 8.6; 5 TABLET ORAL at 08:05

## 2022-05-08 RX ADMIN — HYDRALAZINE HYDROCHLORIDE 10 MG: 20 INJECTION INTRAMUSCULAR; INTRAVENOUS at 05:05

## 2022-05-08 RX ADMIN — BRIMONIDINE TARTRATE 1 DROP: 1.5 SOLUTION OPHTHALMIC at 08:05

## 2022-05-08 RX ADMIN — POLYETHYLENE GLYCOL 3350 17 G: 17 POWDER, FOR SOLUTION ORAL at 08:05

## 2022-05-08 RX ADMIN — SUCRALFATE 1 G: 1 TABLET ORAL at 12:05

## 2022-05-08 RX ADMIN — LATANOPROST 1 DROP: 50 SOLUTION OPHTHALMIC at 08:05

## 2022-05-08 RX ADMIN — INSULIN ASPART 1 UNITS: 100 INJECTION, SOLUTION INTRAVENOUS; SUBCUTANEOUS at 07:05

## 2022-05-08 RX ADMIN — BRIMONIDINE TARTRATE 1 DROP: 1.5 SOLUTION OPHTHALMIC at 09:05

## 2022-05-08 RX ADMIN — INSULIN ASPART 12 UNITS: 100 INJECTION, SOLUTION INTRAVENOUS; SUBCUTANEOUS at 12:05

## 2022-05-08 RX ADMIN — DEXAMETHASONE SODIUM PHOSPHATE 4 MG: 4 INJECTION, SOLUTION INTRA-ARTICULAR; INTRALESIONAL; INTRAMUSCULAR; INTRAVENOUS; SOFT TISSUE at 08:05

## 2022-05-08 RX ADMIN — INSULIN DETEMIR 35 UNITS: 100 INJECTION, SOLUTION SUBCUTANEOUS at 09:05

## 2022-05-08 RX ADMIN — INSULIN ASPART 4 UNITS: 100 INJECTION, SOLUTION INTRAVENOUS; SUBCUTANEOUS at 09:05

## 2022-05-08 RX ADMIN — DEXAMETHASONE SODIUM PHOSPHATE 4 MG: 4 INJECTION, SOLUTION INTRA-ARTICULAR; INTRALESIONAL; INTRAMUSCULAR; INTRAVENOUS; SOFT TISSUE at 03:05

## 2022-05-08 RX ADMIN — ENOXAPARIN SODIUM 30 MG: 30 INJECTION SUBCUTANEOUS at 05:05

## 2022-05-08 RX ADMIN — SODIUM CHLORIDE: 9 INJECTION, SOLUTION INTRAVENOUS at 01:05

## 2022-05-08 RX ADMIN — ATORVASTATIN CALCIUM 80 MG: 40 TABLET, FILM COATED ORAL at 08:05

## 2022-05-08 RX ADMIN — SODIUM CHLORIDE: 9 INJECTION, SOLUTION INTRAVENOUS at 03:05

## 2022-05-08 RX ADMIN — AMLODIPINE BESYLATE 5 MG: 5 TABLET ORAL at 08:05

## 2022-05-08 NOTE — ANESTHESIA PREPROCEDURE EVALUATION
05/08/2022  Natty Nelson is a 82 y.o., female here for C5-7 ACDF with motor evoked potentials as the inpatient.  Patient transferred from outside hospital May 8th with weakness primarily left upper extremity and unable walk for 3 month further workup revealed severe stenosis with cord compression.  Patient is on Plavix due to peripheral stent.  The the  Patient evaluated by Cardiology and deemed intermediate risk and Plavix held by their recommendation    TTE 5/4/2021: LV decreased in size.  Global LV systolic function is normal.  LVEF 60%.  LV diastolic function is impaired grade 1 with normal left atrial pressure.  Anterior mitral leaflet is mildly thickened.  Posterior mitral leaflet is mildly thickened.  Mild MAC is noted.  Mild calcification aortic valve is noted with adequate cusp excursion.  1+ TR.  Trace MR.  PASP 25 mmHg       Last 3 sets of Vitals    Vitals - 1 value per visit 5/8/2022 5/9/2022 5/9/2022   SYSTOLIC 162 187 182   DIASTOLIC 72 76 91   Pulse 75 72 70   Temp 98.3 98.4 -   Resp 16 18 16   SPO2 98 99 100   Weight (lb) - - -   Weight (kg) - - -   Height - - -   BMI (Calculated) - - -         Lab Results   Component Value Date    WBC 16.8 (H) 05/09/2022    HGB 9.3 (L) 05/09/2022    HCT 29.3 (L) 05/09/2022    MCV 98.0 (H) 05/09/2022          BMP  Lab Results   Component Value Date    CO2 23 05/09/2022    BUN 32.4 (H) 05/09/2022    CREATININE 1.10 (H) 05/09/2022    CALCIUM 9.1 05/09/2022        CMP  Carbon Dioxide   Date Value Ref Range Status   05/09/2022 23 23 - 31 mmol/L Final     Blood Urea Nitrogen   Date Value Ref Range Status   05/09/2022 32.4 (H) 9.8 - 20.1 mg/dL Final     Creatinine   Date Value Ref Range Status   05/09/2022 1.10 (H) 0.55 - 1.02 mg/dL Final     Calcium Level Total   Date Value Ref Range Status   05/09/2022 9.1 8.4 - 10.2 mg/dL Final     Albumin Level   Date Value  Ref Range Status   05/06/2022 3.0 (L) 3.4 - 4.8 gm/dL Final     Alkaline Phosphatase   Date Value Ref Range Status   05/06/2022 85 40 - 150 unit/L Final     Aspartate Aminotransferase   Date Value Ref Range Status   05/06/2022 19 5 - 34 unit/L Final     Alanine Aminotransferase   Date Value Ref Range Status   05/06/2022 31 0 - 55 unit/L Final        Lab Results   Component Value Date    INR 1.04 05/06/2022    INR 0.98 05/05/2022               Pre-op Assessment    I have reviewed the Patient Summary Reports.     I have reviewed the Nursing Notes. I have reviewed the NPO Status.   I have reviewed the Medications.     Review of Systems  Anesthesia Hx:  No problems with previous Anesthesia  History of prior surgery of interest to airway management or planning:   Cardiovascular:   Hypertension  Functional Capacity good / => 4 METS    Endocrine:   Diabetes        Physical Exam  General: Well nourished, Cooperative, Alert and Oriented    Airway:  Mallampati: I   Mouth Opening: Normal  TM Distance: Normal  Tongue: Normal  Neck ROM: Normal ROM    Dental:  Edentulous    Chest/Lungs:  Clear to auscultation, Normal Respiratory Rate    Heart:  Rate: Normal  Rhythm: Regular Rhythm    Skin:Limb alert left upper extremity with right neck scar from carotid surgery      Anesthesia Plan  Type of Anesthesia, risks & benefits discussed:    Anesthesia Type: Gen ETT  Intra-op Monitoring Plan: Standard ASA Monitors and Central Line  Post Op Pain Control Plan: multimodal analgesia and IV/PO Opioids PRN  Induction:  IV  Airway Plan: Direct  Informed Consent: Informed consent signed with the Patient and all parties understand the risks and agree with anesthesia plan.  All questions answered.   ASA Score: 3 Emergent  Day of Surgery Review of History & Physical: H&P Update referred to the surgeon/provider.  Anesthesia Plan Notes: Motor evoked potentials with the TIVA    Ready For Surgery From Anesthesia Perspective.     .

## 2022-05-08 NOTE — PROGRESS NOTES
[facility]  Hospital Medicine Progress Note        Chief Complaint: Inpatient Follow-up for Lucien leg weakness     HPI:   Natty Nelson is a 82 y.o. female who was transferred from [facility] ED with complaint of weakness and MRI cervical spine showing severe stenosis and cord compression.  Patient has been suffering from progressive weakness of lower extremities> upper extremities over the past 2 to 3 months, she has been having multiple falls, denies low of bowel or bladder control. 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 On arrival to ED she was hemodynamically stable and afebrile.  Labs notable for LALIT on CKD and mild hypokalemia.  MRI imaging as described below.  Neurosurgery consulted and referred to hospital medicine service for further evaluation and management. Seen by neurosurgery team and recommended surgical intervention.     Interval Hx:   Patient today awake and comfortable. Patient states she is ready for surgery in am. No acute issues overnight. Has been afebrile.     Objective/physical exam:  General: In no acute distress, afebrile, + Obese   Chest: Clear to auscultation bilaterally  Heart: S1, S2, no appreciable murmur  Abdomen: Soft, nontender, BS +  MSK: Warm, no lower extremity edema, no clubbing or cyanosis  Neurologic: Alert and oriented x4, Lucien UE strength 5/5, LE strength 4/5     VITAL SIGNS: 24 HRS MIN & MAX LAST   Temp  Min: 97.5 °F (36.4 °C)  Max: 98.6 °F (37 °C) 98.6 °F (37 °C)   BP  Min: 143/55  Max: 181/79 (!) 163/67   Pulse  Min: 70  Max: 80  70   Resp  Min: 16  Max: 18 18   SpO2  Min: 97 %  Max: 99 % 97 %       Recent Labs   Lab 05/05/22 2000 05/06/22  0424   WBC 8.5 9.8   RBC 3.15* 3.16*   HGB 9.9* 9.9*   HCT 31.0* 30.3*   MCV 98.4* 95.9*   MCH 31.4* 31.3*   MCHC 31.9* 32.7*   RDW 13.5 13.1   MPV 13.9* 14.1*       Recent Labs   Lab  05/05/22 2000 05/06/22  0424 05/08/22  0357   CO2 23 23 23   BUN 77.1* 74.3* 38.5*   CREATININE 2.05* 1.72* 1.16*   CALCIUM 9.6 9.6 9.2   ALBUMIN 3.3* 3.0*  --    ALKPHOS 98 85  --    ALT 34 31  --    AST 23 19  --           Microbiology Results (last 7 days)     ** No results found for the last 168 hours. **           MRI Cervical Spine Without Contrast  Narrative: EXAMINATION:  MRI CERVICAL SPINE WITHOUT CONTRAST    CLINICAL HISTORY:  Spinal stenosis, cervical;    TECHNIQUE:  Multiple MRI pulse sequences were performed of the cervical spine without contrast.    COMPARISON:  None available    FINDINGS:  Cervical vertebrae stature is maintained.  Cervical vertebral alignment is also preserved.  Small anterior degenerative hypertrophic spurring.  There is no prevertebral soft tissue prominence.  No acute marrow edematous signal.  Multiple indentations upon the thecal sac by disc pathology and spondylosis.  There is no acute cord edema or myelomalacia identified.  Disc segmental is given below:    At C2-C3, there is disc bulge which effaces the ventral subarachnoid space without cord compression.  Right neural foramen is unremarkable.  There is mild spondylotic narrowing of the left neural foramen.    At C3-C4, there is central 2 left paracentral osteophyte disc complex which indents the ventral thecal sac and also causes compression upon the left exiting nerve root.  There is also bilateral facet arthropathy and ligamentum flavum thickening.  Cord is not compressed.  There is moderate narrowing of the right neural foramen and marked narrowing of the left neural canal.    At C4-C5, there is osteophyte disc complex which effaces the ventral subarachnoid space without causing cord compression.  There is moderate spondylotic narrowing of the right neural foramen and mild narrowing of the left neural canal.    At C5-C6, there is osteophyte disc complex and facets arthropathy causing complete effacement of the  subarachnoid space.  Cord is also compressed.  There are bilateral compression upon the exiting nerve roots and bilateral severe narrowings of the neural foramina.    At C6-C7, there is central disc bulge which indents the ventral thecal sac without cord compression.  There is moderate spondylolytic narrowing of the right neural foramen and mild narrowing of the left neural canal.    At C7-T1, disc height is preserved.  Central canal is not stenosed.  There are no narrowings of the neural foramen.  Impression: Cervical spine degenerative disc disease and spondylosis level by level discussed above.  These findings are most significant at the level of C5-C6.    Electronically signed by: Yrn Mcfarland  Date:    05/06/2022  Time:    10:40      Scheduled Med:   [START ON 5/9/2022] amLODIPine  10 mg Oral Daily    atorvastatin  80 mg Oral Daily    baclofen  5 mg Oral BID    brimonidine 0.15 % OPTH DROP  1 drop Both Eyes BID    dexamethasone  4 mg Intravenous TID    enoxaparin  30 mg Subcutaneous Daily    insulin aspart U-100  12 Units Subcutaneous TIDWM    insulin detemir U-100  35 Units Subcutaneous QHS    labetaloL  200 mg Oral BID    latanoprost  1 drop Both Eyes Nightly    pantoprazole  40 mg Oral Daily    polyethylene glycol  17 g Oral Daily    pregabalin  75 mg Oral BID    senna-docusate 8.6-50 mg  1 tablet Oral BID    sucralfate  1 g Oral QID (AC & HS)    timolol maleate 0.25%  1 drop Both Eyes BID        Continuous Infusions:   sodium chloride 0.9% 75 mL/hr at 05/08/22 0104        PRN Meds:  acetaminophen, acetaminophen, dextrose 10%, dextrose 10%, glucagon (human recombinant), glucose, glucose, hydrALAZINE, HYDROcodone-acetaminophen, insulin aspart U-100, morphine, naloxone, ondansetron, oxyCODONE-acetaminophen, sodium chloride 0.9%       Assessment/Plan:  Severe cervical stenosis with cord compression Vs myelomalacia at C5-6  LALIT superimposed on CKD3  DM2 with hyperglycemia   Obesity      Plan:  Patient has no new issues. Has been afebrile.   Cont iv steroids TID dose   For her hyperglycemia will cont Levemir to 30 units sq q hs and lispro, adjust dose to keep blood sugars <180  Cont iv fluids, renal functions have improved   Strict aspiration and fall precautions   No NSAIDs     Scheduled for surgery in am     Labs in am    Cont supportive care         Anticipated discharge and Disposition:      All diagnosis and differential diagnosis have been reviewed; assessment and plan has been documented; I have personally reviewed the labs and test results that are presently available; I have reviewed the patients medication list; I have reviewed the consulting providers response and recommendations. I have reviewed or attempted to review medical records based upon their availability    All of the patient's questions have been  addressed and answered. Patient's is agreeable to the above stated plan. I will continue to monitor closely and make adjustments to medical management as needed.      Nutrition Status:        Rolando Madera MD   05/08/2022

## 2022-05-08 NOTE — CONSULTS
Inpatient consult to Cardiology  Consult performed by: RHETT Luther  Consult ordered by: Rolando Madera MD        [facility] - 9th Floor Med Surg  Cardiology  Consult Note    Patient Name: Natty Nelson  MRN: [id number]  Admission Date: 5/5/2022  Hospital Length of Stay: 3 days  Code Status: Full Code   Attending Provider: Rolando Madera MD   Consulting Provider: RHETT Luther  Primary Care Physician: Primary Doctor No  Principal Problem:Cervical stenosis of spinal canal    Patient information was obtained from patient, past medical records and ER records.     Subjective:     Chief Complaint:  Consulted for cardiac risk stratification     HPI:   Ms. Nelson is an 82 y/o AAF, known to Dr. Pierce, with PMH of PVD, JODI s/p R CEA, CAD, HTN, HLD, VHD, DM2.  She initially ER with complaints weakness.  MRI was performed there revealed cervical spine stenosis and cord compression.  She waited and transferred to [facility] for higher level of care.  Patient had been suffering from progressive weakness lower extremities over the past 2 months, had multiple falls.  Patient is planned to have surgery on her cervical spine.  He been consulted for cardiac risk stratification.    Of note patient was recently admitted for repair of left upper extremity pseudoaneurysm following a peripheral angiogram by Dr. Means as outpatient.      PMH: PVD, CIS, CAD, hypertension, hyperlipidemia, VHD, DM 2  PSH: CABG, hysterectomy, back surgery  Family History: Daughter hypertension  Social History: Current smoker    Previous Diagnostics:   BLE Art NIVA 4.14.22  The right lower extremity demonstrated mild arterial flow reduction consistent with multilevel calcified vessel disease.  The left lower extremity demonstrated mild arterial flow reduction consistent with multilevel calcified vessel disease.  The left posterior tibial artery does not compress.    LUE arterial U/S 4/12/22: A 2.4 cm x 1.5 cm  x 3.7cm similar predominantly thrombosed pseudoaneurysm was identified arising from the left distal brachial artery.  A 1.1 cm x 0.2cm x 0.6 cm active pseudoaneurysm remains  BLE arterial ultrasound 4/4/2022: High-grade stenosis bilateral CFA's.  Mild to moderate plaque remainder both legs with no significant stenosis  BLE arterial ultrasound 2/17/2022: Severe bilateral common femoral artery plaquing with elevated peak systolic flow velocities.  Imaging findings are consistent with severe stenosis.  Aortoiliac inflow disease is also suspected  TTE 5/4/2021: LV decreased in size.  Global LV systolic function is normal.  LVEF 60%.  LV diastolic function is impaired grade 1 with normal left atrial pressure.  Anterior mitral leaflet is mildly thickened.  Posterior mitral leaflet is mildly thickened.  Mild MAC is noted.  Mild calcification aortic valve is noted with adequate cusp excursion.  1+ TR.  Trace MR.  PASP 25 mmHg         Review of patient's allergies indicates:  No Known Allergies    No current facility-administered medications on file prior to encounter.     Current Outpatient Medications on File Prior to Encounter   Medication Sig    amLODIPine (NORVASC) 5 MG tablet Take 5 mg by mouth once daily.    aspirin 81 MG Chew Take 81 mg by mouth Daily.    atorvastatin (LIPITOR) 80 MG tablet Take 80 mg by mouth once daily.    baclofen (LIORESAL) 10 MG tablet Take 5 mg by mouth 2 (two) times daily. On 5/13 increase to 10mg TID    bisacodyL (DULCOLAX) 10 mg Supp Place 10 mg rectally every 72 hours as needed.    brimonidine 0.2% (ALPHAGAN) 0.2 % Drop Place 1 drop into both eyes 2 (two) times a day.    clopidogreL (PLAVIX) 75 mg tablet Take 75 mg by mouth once daily.    dexamethasone (DECADRON) 4 mg/mL injection Inject 4 mg into the vein every 6 (six) hours.    dextrose 50 % in water (DEXTROSE 50%, D50W,) solution Inject into the vein as needed.    insulin glargine (LANTUS) 100 unit/mL injection Inject 25 Units  into the skin every evening.    insulin regular 100 unit/mL Inj injection Inject into the skin. Sliding scale as directed prn    iron/vitamin B complex (GERITOL ORAL) Take 20 mLs by mouth 2 (two) times a day.    labetaloL (NORMODYNE) 200 MG tablet Take 200 mg by mouth 2 (two) times daily.    lactulose (CHRONULAC) 10 gram/15 mL solution Take 20 g by mouth every 48 hours as needed.    latanoprost 0.005 % ophthalmic solution Place 1 drop into both eyes nightly.    LIDOcaine (LIDODERM) 5 % Place 1 patch onto the skin every 24 hours. Remove & Discard patch within 12 hours or as directed by MD    linaGLIPtin (TRADJENTA) 5 mg Tab tablet Take 5 mg by mouth once daily.    oxyCODONE-acetaminophen (PERCOCET) 5-325 mg per tablet Take 1 tablet by mouth every 4 (four) hours as needed for Pain.    pantoprazole (PROTONIX) 40 MG tablet Take 40 mg by mouth once daily.    polyethylene glycol (GLYCOLAX) 17 gram PwPk Take 17 g by mouth once daily.    pregabalin (LYRICA) 75 MG capsule Take 75 mg by mouth 2 (two) times daily.    sucralfate (CARAFATE) 1 gram tablet Take 1 g by mouth 4 (four) times daily before meals and nightly.    timolol maleate 0.25% (TIMOPTIC) 0.25 % Drop Place 1 drop into both eyes 2 (two) times daily.    valsartan (DIOVAN) 80 MG tablet Take 80 mg by mouth once daily.    VITAMIN D2 1,250 mcg (50,000 unit) capsule Take 50,000 Units by mouth every Tuesday.         Review of Systems:  Review of Systems   Constitutional: Negative.    HENT: Negative.    Eyes: Negative.    Respiratory: Negative.    Cardiovascular: Negative.    Gastrointestinal: Negative.    Endocrine: Negative.    Genitourinary: Negative.    Musculoskeletal: Negative.    Skin: Negative.    Allergic/Immunologic: Negative.    Neurological: Positive for weakness.   Hematological: Negative.    Psychiatric/Behavioral: Negative.        Objective:     Vital Signs (Most Recent):  Temp: 97.8 °F (36.6 °C) (05/08/22 0558)  Pulse: 80 (05/08/22  0558)  Resp: 18 (05/08/22 0558)  BP: (!) 163/70 (05/08/22 0558)  SpO2: 99 % (05/08/22 0558) Vital Signs (24h Range):  Temp:  [97.5 °F (36.4 °C)-98.4 °F (36.9 °C)] 97.8 °F (36.6 °C)  Pulse:  [71-80] 80  Resp:  [16-18] 18  SpO2:  [98 %-100 %] 99 %  BP: (143-183)/(55-87) 163/70     Weight: 85.7 kg (188 lb 15 oz)  Body mass index is 34.55 kg/m².    SpO2: 99 %  O2 Device (Oxygen Therapy): room air      Intake/Output Summary (Last 24 hours) at 5/8/2022 0719  Last data filed at 5/7/2022 1630  Gross per 24 hour   Intake --   Output 1000 ml   Net -1000 ml       Lines/Drains/Airways     Peripheral Intravenous Line  Duration                Peripheral IV - Single Lumen 05/05/22 1930 22 G Right Antecubital 2 days                  Significant Labs:  Recent Lab Results  (Last 5 results in the past 72 hours)      05/08/22  0357   05/08/22  0011   05/08/22  0004   05/07/22  2126   05/07/22  2100        Anion Gap 7.0               BUN 38.5               BUN/Creatinine Ratio 33               Calcium 9.2               Chloride 108               CO2 23               Creatinine 1.16               eGFR if  57               Glucose 210               POC Glucose   254       324       POCT Glucose     254   324         Potassium 4.5               Sodium 138                                      Significant Imaging: pending EKG  Imaging Results          MRI Cervical Spine Without Contrast (Final result)  Result time 05/06/22 10:40:45    Final result by Yrn Mcfarland MD (05/06/22 10:40:45)                 Impression:      Cervical spine degenerative disc disease and spondylosis level by level discussed above.  These findings are most significant at the level of C5-C6.      Electronically signed by: Yrn Mcfarland  Date:    05/06/2022  Time:    10:40             Narrative:    EXAMINATION:  MRI CERVICAL SPINE WITHOUT CONTRAST    CLINICAL HISTORY:  Spinal stenosis, cervical;    TECHNIQUE:  Multiple MRI pulse sequences were  performed of the cervical spine without contrast.    COMPARISON:  None available    FINDINGS:  Cervical vertebrae stature is maintained.  Cervical vertebral alignment is also preserved.  Small anterior degenerative hypertrophic spurring.  There is no prevertebral soft tissue prominence.  No acute marrow edematous signal.  Multiple indentations upon the thecal sac by disc pathology and spondylosis.  There is no acute cord edema or myelomalacia identified.  Disc segmental is given below:    At C2-C3, there is disc bulge which effaces the ventral subarachnoid space without cord compression.  Right neural foramen is unremarkable.  There is mild spondylotic narrowing of the left neural foramen.    At C3-C4, there is central 2 left paracentral osteophyte disc complex which indents the ventral thecal sac and also causes compression upon the left exiting nerve root.  There is also bilateral facet arthropathy and ligamentum flavum thickening.  Cord is not compressed.  There is moderate narrowing of the right neural foramen and marked narrowing of the left neural canal.    At C4-C5, there is osteophyte disc complex which effaces the ventral subarachnoid space without causing cord compression.  There is moderate spondylotic narrowing of the right neural foramen and mild narrowing of the left neural canal.    At C5-C6, there is osteophyte disc complex and facets arthropathy causing complete effacement of the subarachnoid space.  Cord is also compressed.  There are bilateral compression upon the exiting nerve roots and bilateral severe narrowings of the neural foramina.    At C6-C7, there is central disc bulge which indents the ventral thecal sac without cord compression.  There is moderate spondylolytic narrowing of the right neural foramen and mild narrowing of the left neural canal.    At C7-T1, disc height is preserved.  Central canal is not stenosed.  There are no narrowings of the neural foramen.                     Preliminary result by Yrn Mcfarland MD (05/05/22 21:45:10)                 Narrative:    START OF REPORT:  Technique: Multiplanar, multisequence MRI of the cervical spine without contrast was performed in neutral position.    Comparison: None.    Clinical History: Multiple falls, leg weakness, pt. in ED, NO PRIORS.    Findings:  Spinal cord: There is moderate-to-severe extrinsic cord compression at C5-C6 secondary to disc herniation and ligamentum flavum hypertrophy. There is associated subtle STIR hyperintense signal within the cord at this level (series 3 image 7). This is suspicious for cord edema versus myelomalacia.  Alignment: Grade I posterior listhesis of C3 on C4.  Curvature: Normal cervical lordosis.  Vertebral body fracture/deformity: Vertebral body is maintained.  Disc morphology: Multilevel disc desiccation is seen. There is loss of disc height at C3-C4. Mild decreased disc height is also seen at C5-C6. Moderate diffuse disc bulge with moderate ligamentum flavum hypertrophy is seen at C5-C6 with severe canal stenosis and moderate-to-severe bilateral neuroforaminal narrowing. Mild-to-moderate central disc protrusions are also seen at other levels with associated facet hypertrophy. There is severe left uncinate hypertrophy at C3-C4. There is associated mild-to-moderate canal stenosis. Severe left and moderate to severe right neuroforaminal narrowing is seen at C3-C4.      Impression:  1. Grade I posterior listhesis of C3 on C4.  2. There is moderate-to-severe extrinsic cord compression at C5-C6 secondary to disc herniation and ligamentum flavum hypertrophy. There is associated subtle STIR hyperintense signal within the cord at this level (series 3 image 7). This is suspicious for cord edema versus myelomalacia.  3. Moderate diffuse disc bulge with moderate ligamentum flavum hypertrophy is seen at C5-C6 with severe canal stenosis and moderate-to-severe bilateral neuroforaminal narrowing. Mild-to-moderate  central disc protrusions are also seen at other levels with associated facet hypertrophy. There is severe left uncinate hypertrophy at C3-C4. There is associated mild-to-moderate canal stenosis. Severe left and moderate to severe right neuroforaminal narrowing is seen at C3-C4. Correlate clinically as regards to additional evaluation and follow up.  4. Degenerative changes and other details as above.                                  Last Lipids:  No results found for: CHOL  No results found for: HDL  No results found for: LDLCALC  No results found for: TRIG  No results found for: CHOLHDL    EKG:  No results found for this visit on 05/05/22.    Telemetry:      Physical Exam:  Physical Exam  Constitutional:       Appearance: She is obese.   HENT:      Head: Normocephalic.   Eyes:      Extraocular Movements: Extraocular movements intact.      Conjunctiva/sclera: Conjunctivae normal.   Cardiovascular:      Rate and Rhythm: Normal rate and regular rhythm.      Heart sounds: Normal heart sounds.   Pulmonary:      Effort: Pulmonary effort is normal.      Breath sounds: Normal breath sounds.   Abdominal:      Palpations: Abdomen is soft.   Musculoskeletal:      Cervical back: Neck supple.   Skin:     General: Skin is warm and dry.   Neurological:      Mental Status: She is alert and oriented to person, place, and time. Mental status is at baseline.   Psychiatric:         Mood and Affect: Mood normal.         Behavior: Behavior normal.         Home Medications:   No current facility-administered medications on file prior to encounter.     Current Outpatient Medications on File Prior to Encounter   Medication Sig Dispense Refill    amLODIPine (NORVASC) 5 MG tablet Take 5 mg by mouth once daily.      aspirin 81 MG Chew Take 81 mg by mouth Daily.      atorvastatin (LIPITOR) 80 MG tablet Take 80 mg by mouth once daily.      baclofen (LIORESAL) 10 MG tablet Take 5 mg by mouth 2 (two) times daily. On 5/13 increase to 10mg TID       bisacodyL (DULCOLAX) 10 mg Supp Place 10 mg rectally every 72 hours as needed.      brimonidine 0.2% (ALPHAGAN) 0.2 % Drop Place 1 drop into both eyes 2 (two) times a day.      clopidogreL (PLAVIX) 75 mg tablet Take 75 mg by mouth once daily.      dexamethasone (DECADRON) 4 mg/mL injection Inject 4 mg into the vein every 6 (six) hours.      dextrose 50 % in water (DEXTROSE 50%, D50W,) solution Inject into the vein as needed.      insulin glargine (LANTUS) 100 unit/mL injection Inject 25 Units into the skin every evening.      insulin regular 100 unit/mL Inj injection Inject into the skin. Sliding scale as directed prn      iron/vitamin B complex (GERITOL ORAL) Take 20 mLs by mouth 2 (two) times a day.      labetaloL (NORMODYNE) 200 MG tablet Take 200 mg by mouth 2 (two) times daily.      lactulose (CHRONULAC) 10 gram/15 mL solution Take 20 g by mouth every 48 hours as needed.      latanoprost 0.005 % ophthalmic solution Place 1 drop into both eyes nightly.      LIDOcaine (LIDODERM) 5 % Place 1 patch onto the skin every 24 hours. Remove & Discard patch within 12 hours or as directed by MD      linaGLIPtin (TRADJENTA) 5 mg Tab tablet Take 5 mg by mouth once daily.      oxyCODONE-acetaminophen (PERCOCET) 5-325 mg per tablet Take 1 tablet by mouth every 4 (four) hours as needed for Pain.      pantoprazole (PROTONIX) 40 MG tablet Take 40 mg by mouth once daily.      polyethylene glycol (GLYCOLAX) 17 gram PwPk Take 17 g by mouth once daily.      pregabalin (LYRICA) 75 MG capsule Take 75 mg by mouth 2 (two) times daily.      sucralfate (CARAFATE) 1 gram tablet Take 1 g by mouth 4 (four) times daily before meals and nightly.      timolol maleate 0.25% (TIMOPTIC) 0.25 % Drop Place 1 drop into both eyes 2 (two) times daily.      valsartan (DIOVAN) 80 MG tablet Take 80 mg by mouth once daily.      VITAMIN D2 1,250 mcg (50,000 unit) capsule Take 50,000 Units by mouth every Tuesday.         Current  Inpatient Medications:    Current Facility-Administered Medications:     0.9%  NaCl infusion, , Intravenous, Continuous, Rohith rAredondo MD, Last Rate: 75 mL/hr at 05/08/22 0104, New Bag at 05/08/22 0104    acetaminophen tablet 650 mg, 650 mg, Oral, Q4H PRN, Rohith Arredondo MD    acetaminophen tablet 650 mg, 650 mg, Oral, Q8H PRN, Rohith Arredondo MD    amLODIPine tablet 5 mg, 5 mg, Oral, Daily, Rohith Arredondo MD, 5 mg at 05/07/22 1002    atorvastatin tablet 80 mg, 80 mg, Oral, Daily, Rohith Arredondo MD, 80 mg at 05/07/22 1003    baclofen tablet 5 mg, 5 mg, Oral, BID, Rohith Arredondo MD, 5 mg at 05/07/22 2131    brimonidine 0.15 % OPTH DROP ophthalmic solution 1 drop, 1 drop, Both Eyes, BID, Rohith Arredondo MD, 1 drop at 05/07/22 2128    dexamethasone injection 4 mg, 4 mg, Intravenous, TID, Rolando Madera MD, 4 mg at 05/07/22 2131    dextrose 10% bolus 125 mL, 12.5 g, Intravenous, PRN, Jaimie Nash, FNP    dextrose 10% bolus 250 mL, 25 g, Intravenous, PRN, Jaimie Nash, FNP    enoxaparin injection 30 mg, 30 mg, Subcutaneous, Daily, Rolando Madera MD, 30 mg at 05/07/22 1629    glucagon (human recombinant) injection 1 mg, 1 mg, Intramuscular, PRN, Jaimie Nash, ANITAP    glucose chewable tablet 16 g, 16 g, Oral, PRN, Jaimie Nash, FNP    glucose chewable tablet 24 g, 24 g, Oral, PRN, Jaimie Nash, FNP    hydrALAZINE injection 10 mg, 10 mg, Intravenous, Q4H PRN, Rohith Arredondo MD, 10 mg at 05/08/22 0101    HYDROcodone-acetaminophen 5-325 mg per tablet 1 tablet, 1 tablet, Oral, Q6H PRN, Rohith Arredondo MD    insulin aspart U-100 injection 1-10 Units, 1-10 Units, Subcutaneous, QID (AC + HS) PRN, Jaimie Nash, FNP, 2 Units at 05/08/22 0415    insulin aspart U-100 injection 10 Units, 10 Units, Subcutaneous, TIDWM, Rolando Madera MD, 10 Units at 05/07/22 1839    insulin detemir U-100 injection 30 Units, 30 Units, Subcutaneous, QHS, Rolando Madera MD, 30  Units at 05/07/22 2130    labetaloL tablet 200 mg, 200 mg, Oral, BID, Rohith Arredondo MD, 200 mg at 05/07/22 2131    latanoprost 0.005 % ophthalmic solution 1 drop, 1 drop, Both Eyes, Nightly, Rohith Arredondo MD, 1 drop at 05/07/22 2128    morphine injection 2 mg, 2 mg, Intravenous, Q4H PRN, Rohith Arredondo MD    naloxone 0.4 mg/mL injection 0.02 mg, 0.02 mg, Intravenous, PRN, Rohith Arredondo MD    ondansetron injection 4 mg, 4 mg, Intravenous, Q4H PRN, Rohith Arredondo MD    oxyCODONE-acetaminophen 5-325 mg per tablet 1 tablet, 1 tablet, Oral, Q4H PRN, Rohith Arredondo MD    pantoprazole EC tablet 40 mg, 40 mg, Oral, Daily, Rohith Arredondo MD, 40 mg at 05/07/22 1003    polyethylene glycol packet 17 g, 17 g, Oral, Daily, Rohith Arredondo MD, 17 g at 05/07/22 1002    pregabalin capsule 75 mg, 75 mg, Oral, BID, Rohith Arredondo MD, 75 mg at 05/07/22 2131    senna-docusate 8.6-50 mg per tablet 1 tablet, 1 tablet, Oral, BID, Rohith Arredondo MD, 1 tablet at 05/07/22 2131    sodium chloride 0.9% flush 10 mL, 10 mL, Intravenous, Q12H PRN, Rohith Arredondo MD    sucralfate tablet 1 g, 1 g, Oral, QID (AC & HS), Rohith Arredondo MD, 1 g at 05/08/22 0545    timolol maleate 0.25% ophthalmic solution 1 drop, 1 drop, Both Eyes, BID, Rohith Arredondo MD, 1 drop at 05/07/22 2128         VTE Risk Mitigation (From admission, onward)         Ordered     enoxaparin injection 30 mg  Daily         05/06/22 1533     Reason for No Pharmacological VTE Prophylaxis  Once        Question:  Reasons:  Answer:  Physician Provided (leave comment)  Comment:  possible surgery    05/05/22 2342     IP VTE HIGH RISK PATIENT  Once         05/05/22 2342     Place sequential compression device  Until discontinued         05/05/22 2342                Assessment:     IMPRESSION:  Cardiac risk stratification  Cervical spine stenosis with cord compression  PAD/intervention  CAD  JODI/right CEA  HTN  HLD  VHD  DM type 2  HX of the left brachial artery pseudoaneurysm/repair    PLAN:      Plan:  Intermediate risk for intermediate risk procedure. Not able to perform any METs due to debility. Since this procedure is urgent, with RCRI 6.6%, patient is at elevated risk for MACE. Patient is able to proceed with needed urgent surgery without any further invasive cardiac work-up.  Ok to hold DAPT for now but resume ASAP following surgery  F/U with Dr. Pierce in 7-10 days after DC  Will sign off. Thank you for allowing us to participate in the care of this patient. Please call us with any questions.        Thank you for your consult.     Joaquín Mclain, [facility]-BC  Cardiology  [facility] - 9th Floor Med Surg  05/08/2022 7:19 AM

## 2022-05-08 NOTE — PROGRESS NOTES
The patient is not ready for surgery tomorrow.  I went over the risk and consent with her yesterday.  I showed her the images of his cervical spine.    She would like to proceed with surgery and again I went over the risks with her.    She will be NPO after midnight tonight and she signed the consent.

## 2022-05-09 ENCOUNTER — ANESTHESIA (OUTPATIENT)
Dept: SURGERY | Facility: HOSPITAL | Age: 82
DRG: 472 | End: 2022-05-09
Payer: MEDICARE

## 2022-05-09 LAB
ANION GAP SERPL CALC-SCNC: 6 MEQ/L
ANTIBODY IDENTIFICATION: NORMAL
BASOPHILS # BLD AUTO: 0.01 X10(3)/MCL (ref 0–0.2)
BASOPHILS NFR BLD AUTO: 0.1 %
BUN SERPL-MCNC: 32.4 MG/DL (ref 9.8–20.1)
CALCIUM SERPL-MCNC: 9.1 MG/DL (ref 8.4–10.2)
CHLORIDE SERPL-SCNC: 109 MMOL/L (ref 98–107)
CO2 SERPL-SCNC: 23 MMOL/L (ref 23–31)
CREAT SERPL-MCNC: 1.1 MG/DL (ref 0.55–1.02)
CREAT/UREA NIT SERPL: 29
EOSINOPHIL # BLD AUTO: 0 X10(3)/MCL (ref 0–0.9)
EOSINOPHIL NFR BLD AUTO: 0 %
ERYTHROCYTE [DISTWIDTH] IN BLOOD BY AUTOMATED COUNT: 14.1 % (ref 11.5–17)
GLUCOSE SERPL-MCNC: 148 MG/DL (ref 70–110)
GLUCOSE SERPL-MCNC: 192 MG/DL (ref 70–110)
GLUCOSE SERPL-MCNC: 213 MG/DL (ref 82–115)
GROUP & RH: ABNORMAL
HCT VFR BLD AUTO: 29.3 % (ref 37–47)
HGB BLD-MCNC: 9.3 GM/DL (ref 12–16)
IMM GRANULOCYTES # BLD AUTO: 0.23 X10(3)/MCL (ref 0–0.02)
IMM GRANULOCYTES NFR BLD AUTO: 1.4 % (ref 0–0.43)
INDIRECT COOMBS GEL: ABNORMAL
LYMPHOCYTES # BLD AUTO: 2.14 X10(3)/MCL (ref 0.6–4.6)
LYMPHOCYTES NFR BLD AUTO: 12.8 %
MCH RBC QN AUTO: 31.1 PG (ref 27–31)
MCHC RBC AUTO-ENTMCNC: 31.7 MG/DL (ref 33–36)
MCV RBC AUTO: 98 FL (ref 80–94)
MONOCYTES # BLD AUTO: 0.88 X10(3)/MCL (ref 0.1–1.3)
MONOCYTES NFR BLD AUTO: 5.2 %
NEUTROPHILS # BLD AUTO: 13.5 X10(3)/MCL (ref 2.1–9.2)
NEUTROPHILS NFR BLD AUTO: 80.5 %
NRBC BLD AUTO-RTO: 0 %
PLATELET # BLD AUTO: 198 X10(3)/MCL (ref 130–400)
PMV BLD AUTO: 13 FL (ref 9.4–12.4)
POCT GLUCOSE: 148 MG/DL (ref 70–110)
POCT GLUCOSE: 192 MG/DL (ref 70–110)
POCT GLUCOSE: 195 MG/DL (ref 70–110)
POCT GLUCOSE: 247 MG/DL (ref 70–110)
POTASSIUM SERPL-SCNC: 4.6 MMOL/L (ref 3.5–5.1)
RBC # BLD AUTO: 2.99 X10(6)/MCL (ref 4.2–5.4)
SODIUM SERPL-SCNC: 138 MMOL/L (ref 136–145)
WBC # SPEC AUTO: 16.8 X10(3)/MCL (ref 4.5–11.5)

## 2022-05-09 PROCEDURE — C1713 ANCHOR/SCREW BN/BN,TIS/BN: HCPCS | Performed by: NEUROLOGICAL SURGERY

## 2022-05-09 PROCEDURE — 63600175 PHARM REV CODE 636 W HCPCS: Performed by: INTERNAL MEDICINE

## 2022-05-09 PROCEDURE — 27201423 OPTIME MED/SURG SUP & DEVICES STERILE SUPPLY: Performed by: NEUROLOGICAL SURGERY

## 2022-05-09 PROCEDURE — 25000003 PHARM REV CODE 250: Performed by: NURSE ANESTHETIST, CERTIFIED REGISTERED

## 2022-05-09 PROCEDURE — 97166 OT EVAL MOD COMPLEX 45 MIN: CPT

## 2022-05-09 PROCEDURE — 94760 N-INVAS EAR/PLS OXIMETRY 1: CPT

## 2022-05-09 PROCEDURE — 36000711: Performed by: NEUROLOGICAL SURGERY

## 2022-05-09 PROCEDURE — 63600175 PHARM REV CODE 636 W HCPCS

## 2022-05-09 PROCEDURE — 25000003 PHARM REV CODE 250: Performed by: INTERNAL MEDICINE

## 2022-05-09 PROCEDURE — 63600175 PHARM REV CODE 636 W HCPCS: Performed by: NEUROLOGICAL SURGERY

## 2022-05-09 PROCEDURE — 37000009 HC ANESTHESIA EA ADD 15 MINS: Performed by: NEUROLOGICAL SURGERY

## 2022-05-09 PROCEDURE — 37000008 HC ANESTHESIA 1ST 15 MINUTES: Performed by: NEUROLOGICAL SURGERY

## 2022-05-09 PROCEDURE — 86870 RBC ANTIBODY IDENTIFICATION: CPT | Performed by: INTERNAL MEDICINE

## 2022-05-09 PROCEDURE — 86901 BLOOD TYPING SEROLOGIC RH(D): CPT | Performed by: INTERNAL MEDICINE

## 2022-05-09 PROCEDURE — 25000003 PHARM REV CODE 250: Performed by: NEUROLOGICAL SURGERY

## 2022-05-09 PROCEDURE — 11000001 HC ACUTE MED/SURG PRIVATE ROOM

## 2022-05-09 PROCEDURE — 36415 COLL VENOUS BLD VENIPUNCTURE: CPT | Performed by: INTERNAL MEDICINE

## 2022-05-09 PROCEDURE — 36415 COLL VENOUS BLD VENIPUNCTURE: CPT | Performed by: NEUROLOGICAL SURGERY

## 2022-05-09 PROCEDURE — 86900 BLOOD TYPING SEROLOGIC ABO: CPT | Performed by: INTERNAL MEDICINE

## 2022-05-09 PROCEDURE — 63600175 PHARM REV CODE 636 W HCPCS: Performed by: NURSE PRACTITIONER

## 2022-05-09 PROCEDURE — 80048 BASIC METABOLIC PNL TOTAL CA: CPT | Performed by: INTERNAL MEDICINE

## 2022-05-09 PROCEDURE — 27800903 OPTIME MED/SURG SUP & DEVICES OTHER IMPLANTS: Performed by: NEUROLOGICAL SURGERY

## 2022-05-09 PROCEDURE — 85025 COMPLETE CBC W/AUTO DIFF WBC: CPT | Performed by: INTERNAL MEDICINE

## 2022-05-09 PROCEDURE — 71000033 HC RECOVERY, INTIAL HOUR: Performed by: NEUROLOGICAL SURGERY

## 2022-05-09 PROCEDURE — 63600175 PHARM REV CODE 636 W HCPCS: Performed by: NURSE ANESTHETIST, CERTIFIED REGISTERED

## 2022-05-09 PROCEDURE — 97162 PT EVAL MOD COMPLEX 30 MIN: CPT

## 2022-05-09 PROCEDURE — 36000710: Performed by: NEUROLOGICAL SURGERY

## 2022-05-09 PROCEDURE — 63600175 PHARM REV CODE 636 W HCPCS: Performed by: ANESTHESIOLOGY

## 2022-05-09 DEVICE — IMPLANTABLE DEVICE: Type: IMPLANTABLE DEVICE | Site: NECK | Status: FUNCTIONAL

## 2022-05-09 DEVICE — PUTTY GRAFTON DBF 3CC: Type: IMPLANTABLE DEVICE | Site: NECK | Status: FUNCTIONAL

## 2022-05-09 RX ORDER — CEFAZOLIN SODIUM 2 G/50ML
2 SOLUTION INTRAVENOUS
Status: COMPLETED | OUTPATIENT
Start: 2022-05-09 | End: 2022-05-10

## 2022-05-09 RX ORDER — MAG HYDROX/ALUMINUM HYD/SIMETH 200-200-20
30 SUSPENSION, ORAL (FINAL DOSE FORM) ORAL EVERY 4 HOURS PRN
Status: DISCONTINUED | OUTPATIENT
Start: 2022-05-09 | End: 2022-05-17 | Stop reason: HOSPADM

## 2022-05-09 RX ORDER — METHOCARBAMOL 750 MG/1
750 TABLET, FILM COATED ORAL EVERY 8 HOURS PRN
Status: DISCONTINUED | OUTPATIENT
Start: 2022-05-09 | End: 2022-05-17 | Stop reason: HOSPADM

## 2022-05-09 RX ORDER — HYDRALAZINE HYDROCHLORIDE 20 MG/ML
INJECTION INTRAMUSCULAR; INTRAVENOUS
Status: COMPLETED
Start: 2022-05-09 | End: 2022-05-09

## 2022-05-09 RX ORDER — ROCURONIUM BROMIDE 10 MG/ML
INJECTION, SOLUTION INTRAVENOUS
Status: DISCONTINUED | OUTPATIENT
Start: 2022-05-09 | End: 2022-05-09

## 2022-05-09 RX ORDER — LORAZEPAM 2 MG/ML
0.25 INJECTION INTRAMUSCULAR ONCE AS NEEDED
Status: DISCONTINUED | OUTPATIENT
Start: 2022-05-09 | End: 2022-05-17 | Stop reason: HOSPADM

## 2022-05-09 RX ORDER — CEFAZOLIN SODIUM 1 G/3ML
INJECTION, POWDER, FOR SOLUTION INTRAMUSCULAR; INTRAVENOUS
Status: DISCONTINUED | OUTPATIENT
Start: 2022-05-09 | End: 2022-05-09

## 2022-05-09 RX ORDER — MIDAZOLAM HYDROCHLORIDE 1 MG/ML
2 INJECTION INTRAMUSCULAR; INTRAVENOUS ONCE AS NEEDED
Status: CANCELLED | OUTPATIENT
Start: 2022-05-09 | End: 2033-10-04

## 2022-05-09 RX ORDER — IPRATROPIUM BROMIDE AND ALBUTEROL SULFATE 2.5; .5 MG/3ML; MG/3ML
3 SOLUTION RESPIRATORY (INHALATION)
Status: DISCONTINUED | OUTPATIENT
Start: 2022-05-09 | End: 2022-05-17 | Stop reason: HOSPADM

## 2022-05-09 RX ORDER — HYDROMORPHONE HYDROCHLORIDE 2 MG/ML
INJECTION, SOLUTION INTRAMUSCULAR; INTRAVENOUS; SUBCUTANEOUS
Status: DISCONTINUED | OUTPATIENT
Start: 2022-05-09 | End: 2022-05-09

## 2022-05-09 RX ORDER — MORPHINE SULFATE 10 MG/ML
4 INJECTION INTRAMUSCULAR; INTRAVENOUS; SUBCUTANEOUS
Status: DISCONTINUED | OUTPATIENT
Start: 2022-05-09 | End: 2022-05-17 | Stop reason: HOSPADM

## 2022-05-09 RX ORDER — REMIFENTANIL HYDROCHLORIDE 1 MG/ML
INJECTION, POWDER, LYOPHILIZED, FOR SOLUTION INTRAVENOUS CONTINUOUS PRN
Status: DISCONTINUED | OUTPATIENT
Start: 2022-05-09 | End: 2022-05-09

## 2022-05-09 RX ORDER — MEPERIDINE HYDROCHLORIDE 25 MG/ML
12.5 INJECTION INTRAMUSCULAR; INTRAVENOUS; SUBCUTANEOUS EVERY 10 MIN PRN
Status: ACTIVE | OUTPATIENT
Start: 2022-05-09 | End: 2022-05-10

## 2022-05-09 RX ORDER — PROCHLORPERAZINE EDISYLATE 5 MG/ML
5 INJECTION INTRAMUSCULAR; INTRAVENOUS EVERY 30 MIN PRN
Status: DISCONTINUED | OUTPATIENT
Start: 2022-05-09 | End: 2022-05-17 | Stop reason: HOSPADM

## 2022-05-09 RX ORDER — PROPOFOL 10 MG/ML
VIAL (ML) INTRAVENOUS
Status: DISCONTINUED | OUTPATIENT
Start: 2022-05-09 | End: 2022-05-09

## 2022-05-09 RX ORDER — HYDROMORPHONE HYDROCHLORIDE 2 MG/ML
0.4 INJECTION, SOLUTION INTRAMUSCULAR; INTRAVENOUS; SUBCUTANEOUS EVERY 5 MIN PRN
Status: DISCONTINUED | OUTPATIENT
Start: 2022-05-09 | End: 2022-05-17 | Stop reason: HOSPADM

## 2022-05-09 RX ORDER — LIDOCAINE HYDROCHLORIDE 10 MG/ML
1 INJECTION, SOLUTION EPIDURAL; INFILTRATION; INTRACAUDAL; PERINEURAL ONCE
Status: CANCELLED | OUTPATIENT
Start: 2022-05-09 | End: 2022-05-09

## 2022-05-09 RX ORDER — CEFAZOLIN SODIUM 1 G/3ML
INJECTION, POWDER, FOR SOLUTION INTRAMUSCULAR; INTRAVENOUS
Status: DISCONTINUED | OUTPATIENT
Start: 2022-05-09 | End: 2022-05-09 | Stop reason: HOSPADM

## 2022-05-09 RX ORDER — HYDROCODONE BITARTRATE AND ACETAMINOPHEN 10; 325 MG/1; MG/1
1 TABLET ORAL EVERY 4 HOURS PRN
Status: DISCONTINUED | OUTPATIENT
Start: 2022-05-09 | End: 2022-05-17 | Stop reason: HOSPADM

## 2022-05-09 RX ORDER — FENTANYL CITRATE 50 UG/ML
INJECTION, SOLUTION INTRAMUSCULAR; INTRAVENOUS
Status: DISCONTINUED | OUTPATIENT
Start: 2022-05-09 | End: 2022-05-09

## 2022-05-09 RX ORDER — SODIUM CHLORIDE, SODIUM GLUCONATE, SODIUM ACETATE, POTASSIUM CHLORIDE AND MAGNESIUM CHLORIDE 30; 37; 368; 526; 502 MG/100ML; MG/100ML; MG/100ML; MG/100ML; MG/100ML
1000 INJECTION, SOLUTION INTRAVENOUS CONTINUOUS
Status: CANCELLED | OUTPATIENT
Start: 2022-05-09 | End: 2022-06-08

## 2022-05-09 RX ORDER — HYDROMORPHONE HYDROCHLORIDE 2 MG/ML
INJECTION, SOLUTION INTRAMUSCULAR; INTRAVENOUS; SUBCUTANEOUS
Status: DISPENSED
Start: 2022-05-09 | End: 2022-05-09

## 2022-05-09 RX ORDER — ADHESIVE BANDAGE
30 BANDAGE TOPICAL DAILY PRN
Status: DISCONTINUED | OUTPATIENT
Start: 2022-05-09 | End: 2022-05-17 | Stop reason: HOSPADM

## 2022-05-09 RX ORDER — PROPOFOL 10 MG/ML
VIAL (ML) INTRAVENOUS CONTINUOUS PRN
Status: DISCONTINUED | OUTPATIENT
Start: 2022-05-09 | End: 2022-05-09

## 2022-05-09 RX ORDER — PROCHLORPERAZINE EDISYLATE 5 MG/ML
10 INJECTION INTRAMUSCULAR; INTRAVENOUS EVERY 6 HOURS PRN
Status: DISCONTINUED | OUTPATIENT
Start: 2022-05-09 | End: 2022-05-17 | Stop reason: HOSPADM

## 2022-05-09 RX ADMIN — CEFAZOLIN 2 G: 330 INJECTION, POWDER, FOR SOLUTION INTRAMUSCULAR; INTRAVENOUS at 07:05

## 2022-05-09 RX ADMIN — HYDRALAZINE HYDROCHLORIDE 10 MG: 20 INJECTION INTRAMUSCULAR; INTRAVENOUS at 09:05

## 2022-05-09 RX ADMIN — HYDROMORPHONE HYDROCHLORIDE 0.4 MG: 2 INJECTION INTRAMUSCULAR; INTRAVENOUS; SUBCUTANEOUS at 08:05

## 2022-05-09 RX ADMIN — HYDROMORPHONE HYDROCHLORIDE 0.4 MG: 2 INJECTION, SOLUTION INTRAMUSCULAR; INTRAVENOUS; SUBCUTANEOUS at 09:05

## 2022-05-09 RX ADMIN — REMIFENTANIL HYDROCHLORIDE 0.1 MCG/KG/MIN: 1 INJECTION, POWDER, LYOPHILIZED, FOR SOLUTION INTRAVENOUS at 07:05

## 2022-05-09 RX ADMIN — SUGAMMADEX 200 MG: 100 INJECTION, SOLUTION INTRAVENOUS at 07:05

## 2022-05-09 RX ADMIN — DEXAMETHASONE SODIUM PHOSPHATE 4 MG: 4 INJECTION, SOLUTION INTRA-ARTICULAR; INTRALESIONAL; INTRAMUSCULAR; INTRAVENOUS; SOFT TISSUE at 04:05

## 2022-05-09 RX ADMIN — INSULIN ASPART 2 UNITS: 100 INJECTION, SOLUTION INTRAVENOUS; SUBCUTANEOUS at 01:05

## 2022-05-09 RX ADMIN — CEFAZOLIN SODIUM 2 G: 2 SOLUTION INTRAVENOUS at 04:05

## 2022-05-09 RX ADMIN — ROCURONIUM BROMIDE 50 MG: 10 SOLUTION INTRAVENOUS at 06:05

## 2022-05-09 RX ADMIN — PROPOFOL 120 MG: 10 INJECTION, EMULSION INTRAVENOUS at 06:05

## 2022-05-09 RX ADMIN — HYDROMORPHONE HYDROCHLORIDE 0.2 MG: 2 INJECTION INTRAMUSCULAR; INTRAVENOUS; SUBCUTANEOUS at 08:05

## 2022-05-09 RX ADMIN — HYDROCODONE BITARTRATE AND ACETAMINOPHEN 1 TABLET: 10; 325 TABLET ORAL at 05:05

## 2022-05-09 RX ADMIN — LABETALOL HYDROCHLORIDE 200 MG: 200 TABLET, FILM COATED ORAL at 05:05

## 2022-05-09 RX ADMIN — PROPOFOL 100 MCG/KG/MIN: 10 INJECTION, EMULSION INTRAVENOUS at 07:05

## 2022-05-09 RX ADMIN — SUCRALFATE 1 G: 1 TABLET ORAL at 04:05

## 2022-05-09 RX ADMIN — INSULIN ASPART 12 UNITS: 100 INJECTION, SOLUTION INTRAVENOUS; SUBCUTANEOUS at 05:05

## 2022-05-09 RX ADMIN — ENOXAPARIN SODIUM 30 MG: 30 INJECTION SUBCUTANEOUS at 04:05

## 2022-05-09 RX ADMIN — INSULIN ASPART 1 UNITS: 100 INJECTION, SOLUTION INTRAVENOUS; SUBCUTANEOUS at 05:05

## 2022-05-09 RX ADMIN — CEFAZOLIN SODIUM 2 G: 2 SOLUTION INTRAVENOUS at 09:05

## 2022-05-09 RX ADMIN — FENTANYL CITRATE 50 MCG: 50 INJECTION, SOLUTION INTRAMUSCULAR; INTRAVENOUS at 06:05

## 2022-05-09 NOTE — ANESTHESIA PROCEDURE NOTES
Intubation    Date/Time: 5/9/2022 6:59 AM  Performed by: Nehemias Coronel CRNA  Authorized by: Bladimir Rodriguez Jr., MD     Intubation:     Induction:  Intravenous    Intubated:  Postinduction    Mask Ventilation:  Easy mask    Attempts:  1    Attempted By:  CRNA    Method of Intubation:  Direct    Blade:  Sebastian 2    Laryngeal View Grade: Grade I - full view of cords      Difficult Airway Encountered?: No      Complications:  None    Airway Device:  EMG ETT (NIMS)    Airway Device Size:  7.0    Style/Cuff Inflation:  Cuffed (inflated to minimal occlusive pressure)    Secured at:  The lips (Marked area between vocal cords)    Placement Verified By:  Capnometry    Complicating Factors:  None    Findings Post-Intubation:  BS equal bilateral and atraumatic/condition of teeth unchanged

## 2022-05-09 NOTE — ANESTHESIA PROCEDURE NOTES
Central Line    Diagnosis: inadequate IV access  Patient location during procedure: done in OR  Timeout: 5/9/2022 7:06 AM  Procedure end time: 5/9/2022 7:12 AM    Staffing  Authorizing Provider: Bladimir Rodriguez Jr., MD  Performing Provider: Bladimir Rodriguez Jr., MD    Staffing  Performed: anesthesiologist   Anesthesiologist: Bladimir Rodriguez Jr., MD  Anesthesiologist was present at the time of the procedure.  Preanesthetic Checklist  Completed: patient identified, IV checked, site marked, risks and benefits discussed, surgical consent, monitors and equipment checked, pre-op evaluation, timeout performed and anesthesia consent given  Indication   Indication: hemodynamic monitoring, vascular access, med administration     Anesthesia   general anesthesia    Central Line   Skin Prep: skin prepped with ChloraPrep, skin prep agent completely dried prior to procedure  Sterile Barriers Followed: Yes    All five maximal barriers used- gloves, gown, cap, mask, and large sterile sheet    hand hygiene performed prior to central venous catheter insertion  Location: right internal jugular.   Catheter type: triple lumen  Catheter Size: 7 Fr  Inserted Catheter Length: 16 cm  Ultrasound: none     Manometry: none  Insertion Attempts: 1   Securement:line sutured and chlorhexidine patch    Post-Procedure    Adverse Events:none      Guidewire  Guidewire removed intact, verified with nurse.

## 2022-05-09 NOTE — NURSING
Per Dr. Cruz, I have reached to neuro surgery team to inquire if they are ready to start weaning her off the Dexamethasone. KRISTIN Toro stated that they will start doing this beginning with tomorrow.

## 2022-05-09 NOTE — BRIEF OP NOTE
[facility] - 9th Floor Med Surg  Brief Operative Note    SUMMARY     Surgery Date: 5/9/2022     Surgeon(s) and Role:     * Toni Joseph MD - Primary    Assisting Surgeon: None    Pre-op Diagnosis:  Edema, spinal cord [G95.19]    Post-op Diagnosis:  Post-Op Diagnosis Codes:     * Edema, spinal cord [G95.19]    Procedure(s) (LRB):  DISCECTOMY, SPINE, CERVICAL, ANTERIOR APPROACH, WITH FUSION (Bilateral)  ACDF C56/ fusion, medtronic  Anesthesia: General    Operative Findings: dictated    Estimated Blood Loss: * No values recorded between 5/9/2022  7:21 AM and 5/9/2022  8:32 AM *    Estimated Blood Loss has been documented.         Specimens:   Specimen (24h ago, onward)            None          [id number]

## 2022-05-09 NOTE — PT/OT/SLP EVAL
Occupational Therapy   Evaluation    Name: Natty Nelson  MRN: [id number]  Admitting Diagnosis:  Cervical stenosis of spinal canal  Recent Surgery: Procedure(s) (LRB):  DISCECTOMY, SPINE, CERVICAL, ANTERIOR APPROACH, WITH FUSION (Bilateral) Day of Surgery    Recommendations:     Discharge Recommendations: rehabilitation facility  Discharge Equipment Recommendations:     Barriers to discharge: Severity of deficits    Assessment:     Natty Nelson is a 82 y.o. female with a medical diagnosis of Cervical stenosis of spinal canal. Pt lives w/ alone, however her daughter and granddaughter has been staying w/ pt to care for her over the past few months 2/2 progressive weakness. Pt has required assistance for all ADLs, except feeding and grooming, and has been mostly bed bound. Performance deficits noted during OT eval affecting function: weakness, decreased upper extremity function, impaired coordination, decreased lower extremity function, impaired balance, impaired endurance, impaired self care skills, impaired functional mobilty, impaired fine motor. Pt noted w/ significant UE weakness, w/ the RUE weakness greater than the LUE. Pt required max A to sit EOB, and SBA for static sitting balance. Some R lateral leaning noted, requiring vc to correct. Attempted sit<>stand, however pt unable to clear the bed. Pt would likely benefit from IPR upon d/c in order to maximize function and improve IND w/ ADLs and mobility.     Rehab Prognosis: Good; patient would benefit from acute skilled OT services to address these deficits, reach maximum level of function, and reduce caregiver burden.       Plan:     Patient to be seen 5 x/week, daily to address the above listed problems via self-care/home management, therapeutic activities, therapeutic exercises  · Plan of Care Expires: 05/23/22  · Plan of Care Reviewed with: patient    Subjective     Patient/Family Comments/goals: To get well    Occupational Profile:  Living Environment:  Lives in  home, assistance from daughter and granddaughter  Previous level of function: Assistance for ADLs, except grooming and feeding, mostly bedbound  Equipment Used at Home:  cane, straight, wheelchair  Assistance upon Discharge: Care from family    Pain/Comfort:  · Pain Rating 1: 0/10    Objective:     Communicated with: RN prior to session.  Patient found supine with AYLA drain, peripheral IV, lugo catheter, blood pressure cuff upon OT entry to room.    General Precautions: Standard, fall   Orthopedic Precautions: cervical precautions   Braces:  soft c-collar  Respiratory Status: Nasal cannula, flow 2 L/min    Occupational Performance:    Bed Mobility:    · Patient completed Supine to Sit with maximal assistance X 2  · Patient completed Sit to Supine with maximal assistance X 2    Functional Mobility/Transfers:  · Functional Mobility: Attempted sit<>stand however pt unable to clear bed    Activities of Daily Living:  Feeding:  Set-up to unscrew cap from water bottle and vc for hand to mouth excursion while maintaining cervical precautions    Cognitive/Visual Perceptual:  Cognitive/Psychosocial Skills:     -       Oriented to: Person, Place, Time and Situation   -       Follows Commands/attention:Follows two-step  commands  -       Safety awareness/insight to disability: intact    Physical Exam:  Postural examination/scapula alignment:    -       Rounded shoulders  -       Forward head  -       Kyphosis  Sensation:    -       Intact  light/touch BUEs  Upper Extremity Range of Motion:     -       Right Upper Extremity: PROM WNL; AROM to digits, wrist and elbow WFL; AROM in shoulder flexion limited to 90 degrees 2/2 cervical pxns  -       Left Upper Extremity: PROM WNL; AROM to digits, wrist and elbow WFL; AROM in shoulder flexion limited to 90 degrees 2/2 cervical pxns  Upper Extremity Strength:    -       Right Upper Extremity: 3/5  -       Left Upper Extremity: 3+/5    AMPAC 6 Click ADL:  AMPAC Total Score:  11    Treatment & Education:  Pt participated in cervical precaution education, verbalized understanding and would benefit from cont education and SPV. Large soft collar used, however appears to large and places neck in extension vs neutral. Discussed w/ nurse who reports plans to order a smaller size.  Education:    Patient left supine with all lines intact, call button in reach and RN notified    GOALS:   Multidisciplinary Problems     Occupational Therapy Goals        Problem: Occupational Therapy    Goal Priority Disciplines Outcome Interventions   Occupational Therapy Goal     OT, PT/OT Ongoing, Progressing    Description: Goals to be met by: 05/23/2022    Patient will increase functional independence with ADLs by performing:    Grooming while seated with Stand-by Assistance.  Sitting at edge of bed x10 minutes with Stand-by Assistance.  Toilet transfer to bedside commode with Moderate Assistance.                     History:     Past Medical History:   Diagnosis Date    Arthritis     Diabetes mellitus     Hypertension        No past surgical history on file.    Time Tracking:     OT Date of Treatment: 05/09/22  OT Start Time: 1359  OT Stop Time: 1422  OT Total Time (min): 23 min    Billable Minutes:Evaluation 23 Minute Mod Complex    5/9/2022

## 2022-05-09 NOTE — ANESTHESIA POSTPROCEDURE EVALUATION
Anesthesia Post Evaluation    Patient: Natty Nelsno    Procedure(s) Performed: Procedure(s) (LRB):  DISCECTOMY, SPINE, CERVICAL, ANTERIOR APPROACH, WITH FUSION (Bilateral)    Final Anesthesia Type: general      Patient location during evaluation: PACU  Patient participation: Yes- Able to Participate  Level of consciousness: awake and alert  Post-procedure vital signs: reviewed and stable  Pain management: adequate  Airway patency: patent  CORINA mitigation strategies: Verification of full reversal of neuromuscular block and Multimodal analgesia  PONV status at discharge: No PONV  Anesthetic complications: no      Cardiovascular status: blood pressure returned to baseline, stable and hemodynamically stable  Respiratory status: unassisted and room air  Hydration status: euvolemic  Follow-up not needed.          Vitals Value Taken Time   /62 05/09/22 0942     05/09/22 1354   Pulse 70 05/09/22 0947   Resp 10 05/09/22 0947   SpO2 100 % 05/09/22 0947   Vitals shown include unvalidated device data.      Event Time   Out of Recovery 05/09/2022 09:48:00         Pain/Marcie Score: Pain Rating Prior to Med Admin: 5 (5/9/2022  9:25 AM)  Marcie Score: 9 (5/9/2022  9:30 AM)

## 2022-05-09 NOTE — TRANSFER OF CARE
"Anesthesia Transfer of Care Note    Patient: Natty Nelson    Procedure(s) Performed: Procedure(s) (LRB):  DISCECTOMY, SPINE, CERVICAL, ANTERIOR APPROACH, WITH FUSION (Bilateral)    Patient location: PACU    Transport from OR: Transported from OR on 2-3 L/min O2 by NC with adequate spontaneous ventilation    Post pain: adequate analgesia    Post assessment: no apparent anesthetic complications and tolerated procedure well    Post vital signs: stable    Level of consciousness: awake and alert    Complications: none    Transfer of care protocol was followed      Last vitals:   Visit Vitals  BP (!) 172/74   Pulse 75   Temp 36.9 °C (98.4 °F) (Oral)   Resp 10   Ht 5' 2.01" (1.575 m)   Wt 85.7 kg (188 lb 15 oz)   SpO2 100%   Breastfeeding No   BMI 34.55 kg/m²     "

## 2022-05-09 NOTE — OP NOTE
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    POSTOPERATIVE DIAGNOSIS:  Severe cord compression 5-6 with weakness, myelopathy,   inability to walk for several months now.    PROCEDURE:  Anterior cervical diskectomy, removal of pressure off the spinal   cord, subsequent fusion with graft and plate from 5-6 with two screws in 5 and   two in 6.    COMPLICATIONS:  There were no issues, no complications.    INDICATION:  The patient is a lady with severe cord compression, now here for   ACDF.  She had her blood thinners stopped a few days ago, has clearance now.    She understands why we are doing this surgery.  Has been falling, has a hard   time walking, difficulty using her legs.  Now here for anterior cervical   diskectomy, fusion.  The risks, benefits, consent discussed.  We spent some time   going over it.  She understands, wants to proceed with surgery.    OPERATIVE PROCEDURE:  In the operating room, intubated.  Neck was prepped and   draped.  We made a midline incision, went through the platysma down to the   longus colli, identified the 5-6, dissected 5 and 6, did a diskectomy, curetted   the endplates, taking the pressure off the spinal cord, gradually freeing up the   nerve roots then the thecal sac as the herniation came out.  We went behind the   vertebral body.  Each foramen was opened up.  It took some decent amount of   time.  We obtained hemostasis with  FloSeal and Avitene into the spinal cord.    Everything was nice and dry.  We made sure multiple times, making sure   everything was all clean and dry with FloSeal and Avitene.  We then put a graft   from 5 to 6.  We elected not to do 6-7 because after reviewing, there was no   cord compression at that level, so we only did 5-6.    We then put a plate from 5 to 6, with two screws in 5 and two in 6 with locking   mechanism.  Final x-ray AP lateral looked great.  There were no issues, no   complications.  We put a drain, closed it with 3-0 Vicryl, Steri-Strips.      In fact, there was slight improvement of motors at the end of the case.        ______________________________  MD DIOGO Gr/PIPER  DD:  05/09/2022  Time:  08:35AM  DT:  05/09/2022  Time:  09:15AM  Job #:  [id number]      OPERATIVE REPORT

## 2022-05-09 NOTE — PT/OT/SLP EVAL
Physical Therapy Evaluation    Patient Name:  Natty Nelson   MRN:  [id number]    Recommendations:     Discharge Recommendations:  rehabilitation facility   Discharge Equipment Recommendations:     Barriers to discharge: impaired functional mobility    Assessment:     Natty Nelson is a 82 y.o. female admitted with a medical diagnosis of Cervical stenosis of spinal canal. Pt is now s/p C5-6 ACDF.  She presents with the following impairments/functional limitations:  weakness, impaired endurance, impaired functional mobilty, gait instability, impaired balance, decreased upper extremity function, decreased lower extremity function .    Rehab Prognosis: Good; patient would benefit from acute skilled PT services to address these deficits and reach maximum level of function.    Recent Surgery: Procedure(s) (LRB):  DISCECTOMY, SPINE, CERVICAL, ANTERIOR APPROACH, WITH FUSION (Bilateral) Day of Surgery    Plan:     During this hospitalization, patient to be seen daily to address the identified rehab impairments via gait training, therapeutic activities, therapeutic exercises, neuromuscular re-education and progress toward the following goals:    · Plan of Care Expires:  05/30/22    Subjective     Chief Complaint: none stated  Patient/Family Comments/goals: to get stronger  Pain/Comfort:  · Pain Rating 1: 0/10    Patients cultural, spiritual, Roman Catholic conflicts given the current situation:      Living Environment:  Prior to admission, patient reports she lived alone, however recently her granddaughter and daughter have started staying with her around the clock due to progressive weakness.   Prior to admission, patients level of function was independent, however recently began requiring assist for mobility and ADLs.  Equipment used at home: cane, straight, wheelchair.  DME owned (not currently used): none.  Upon discharge, patient will have assistance from family.    Objective:     Communicated with RN prior to session.   Patient found supine with blood pressure cuff, lugo catheter, AYLA drain, peripheral IV, SCD  upon PT entry to room.    General Precautions: Standard, other (see comments) (cervical)   Orthopedic Precautions:spinal precautions   Braces: Cervical collar  Respiratory Status: Room air    Exams:  · RLE ROM: WFL  · RLE Strength: Grossly 3+ to 4/5  · LLE ROM: WFL  · LLE Strength: Grossly 4/5    Functional Mobility:  · Bed Mobility:     · Supine to Sit: maximal assistance  · Transfers:     · Sit to Stand:  maximal assistance with hand-held assist  · Balance: Pt required modA for static sitting balance at EOB, slight R lateral lean.     AM-PAC 6 CLICK MOBILITY  Total Score:10     Patient left supine with all lines intact, call button in reach and RN notified.    GOALS:   Multidisciplinary Problems     Physical Therapy Goals        Problem: Physical Therapy    Goal Priority Disciplines Outcome Goal Variances Interventions   Physical Therapy Goal     PT, PT/OT Ongoing, Progressing     Description: Goals to be met by: 22     Patient will increase functional independence with mobility by performin. Supine to sit with Stand-by Assistance  2. Sit to stand transfer with Minimal Assistance  3. Gait  x 150ft feet with Minimal Assistance using Rolling Walker.                      History:     Past Medical History:   Diagnosis Date    Arthritis     Diabetes mellitus     Hypertension        No past surgical history on file.    Time Tracking:     PT Received On: 22  PT Start Time: 1402     PT Stop Time: 1425  PT Total Time (min): 23 min     Billable Minutes: Evaluation Mod complexity, 23min.       2022

## 2022-05-09 NOTE — PLAN OF CARE
Problem: Occupational Therapy  Goal: Occupational Therapy Goal  Description: Goals to be met by: 05/23/2022    Patient will increase functional independence with ADLs by performing:    Grooming while seated with Stand-by Assistance.  Sitting at edge of bed x10 minutes with Stand-by Assistance.  Toilet transfer to bedside commode with Moderate Assistance.    Outcome: Ongoing, Progressing

## 2022-05-09 NOTE — PT/OT/SLP PROGRESS
Occupational Therapy      Patient Name:  Natty Nelson   MRN:  [id number]    Patient not seen today secondary to need for cervical collar.     Cervical collar not present in room upon attempt for OT eval. Will need further clarification regarding soft collar vs Aspen. Discussed w/ nursing who reports plans to contact MD for clarification.      Will follow-up as able.    5/9/2022

## 2022-05-09 NOTE — PROGRESS NOTES
[facility]  Hospital Medicine Progress Note        Chief Complaint: Inpatient Follow-up for Lucien leg weakness     HPI:   Natty Nelson is a 82 y.o. female who was transferred from [facility] ED with complaint of weakness and MRI cervical spine showing severe stenosis and cord compression.  Patient has been suffering from progressive weakness of lower extremities> upper extremities over the past 2 to 3 months, she has been having multiple falls, denies low of bowel or bladder control. 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 On arrival to ED she was hemodynamically stable and afebrile.  Labs notable for LALIT on CKD and mild hypokalemia.  MRI imaging as described below.  Neurosurgery consulted and referred to hospital medicine service for further evaluation and management. Seen by neurosurgery team and recommended surgical intervention.     Interval Hx:   Patient today awake and comfortable. Has been afebrile. Pain well controlled.     She is s/p   DISCECTOMY, SPINE, CERVICAL, ANTERIOR APPROACH, WITH FUSION (Bilateral)  ACDF C56/ fusion, medtronic      Objective/physical exam:  General: In no acute distress, afebrile, + Obese   Chest: Clear to auscultation bilaterally  Heart: RRR +S1, S2, no appreciable murmur  Abdomen: Soft, nontender, BS +  MSK: Warm, no lower extremity edema, no clubbing or cyanosis  Neurologic: Alert and oriented x4, Lucien UE strength 5/5, LE strength 4/5     VITAL SIGNS: 24 HRS MIN & MAX LAST   Temp  Min: 98.3 °F (36.8 °C)  Max: 98.4 °F (36.9 °C) 98.4 °F (36.9 °C)   BP  Min: 131/62  Max: 187/76 131/62   Pulse  Min: 67  Max: 75  68   Resp  Min: 10  Max: 18 12   SpO2  Min: 98 %  Max: 100 % 100 %       Recent Labs   Lab 05/05/22 2000 05/06/22 0424 05/09/22 0424   WBC 8.5 9.8 16.8*   RBC 3.15* 3.16* 2.99*   HGB 9.9* 9.9* 9.3*   HCT 31.0* 30.3* 29.3*    MCV 98.4* 95.9* 98.0*   MCH 31.4* 31.3* 31.1*   MCHC 31.9* 32.7* 31.7*   RDW 13.5 13.1 14.1   MPV 13.9* 14.1* 13.0*       Recent Labs   Lab 05/05/22 2000 05/06/22  0424 05/08/22  0357 05/09/22  0424   CO2 23 23 23 23   BUN 77.1* 74.3* 38.5* 32.4*   CREATININE 2.05* 1.72* 1.16* 1.10*   CALCIUM 9.6 9.6 9.2 9.1   ALBUMIN 3.3* 3.0*  --   --    ALKPHOS 98 85  --   --    ALT 34 31  --   --    AST 23 19  --   --           Microbiology Results (last 7 days)     ** No results found for the last 168 hours. **               Scheduled Med:   amLODIPine  10 mg Oral Daily    atorvastatin  80 mg Oral Daily    baclofen  5 mg Oral BID    brimonidine 0.15 % OPTH DROP  1 drop Both Eyes BID    ceFAZolin (ANCEF) IVPB  2 g Intravenous Q8H    dexamethasone  4 mg Intravenous TID    enoxaparin  30 mg Subcutaneous Daily    HYDROmorphone (PF)        insulin aspart U-100  12 Units Subcutaneous TIDWM    insulin detemir U-100  35 Units Subcutaneous QHS    labetaloL  200 mg Oral BID    latanoprost  1 drop Both Eyes Nightly    pantoprazole  40 mg Oral Daily    polyethylene glycol  17 g Oral Daily    pregabalin  75 mg Oral BID    senna-docusate 8.6-50 mg  1 tablet Oral BID    sucralfate  1 g Oral QID (AC & HS)    timolol maleate 0.25%  1 drop Both Eyes BID        Continuous Infusions:   sodium chloride 0.9% 75 mL/hr at 05/08/22 1511        PRN Meds:  acetaminophen, albuterol-ipratropium, aluminum-magnesium hydroxide-simethicone, dextrose 10%, dextrose 10%, glucagon (human recombinant), glucose, glucose, hydrALAZINE, HYDROcodone-acetaminophen, HYDROcodone-acetaminophen, HYDROmorphone, insulin aspart U-100, lorazepam, magnesium hydroxide 400 mg/5 ml, meperidine, methocarbamoL, morphine, naloxone, ondansetron, prochlorperazine, prochlorperazine, sodium chloride 0.9%       Assessment/Plan:  Severe cervical stenosis with cord compression Vs myelomalacia at C5-6  LALIT superimposed on CKD3  DM2 with hyperglycemia   Obesity      Plan:  Patient is now S/P   DISCECTOMY, SPINE, CERVICAL, ANTERIOR APPROACH, WITH FUSION (Bilateral)  ACDF C56/ fusion, medtronic    Pain is now well controlled. Moving extremities  Cont iv steroids TID dose, wean per neurosurgery team   For her hyperglycemia will cont Levemir to 30 units sq q hs and lispro, adjust dose to keep blood sugars <180  Cont iv fluids, renal functions have improved. Hold IV fluids when patient startes to eat well  Start OT/PT per neurosurgery   Strict aspiration and fall precautions   No NSAIDs       Labs in am    Cont supportive care         Anticipated discharge and Disposition:      All diagnosis and differential diagnosis have been reviewed; assessment and plan has been documented; I have personally reviewed the labs and test results that are presently available; I have reviewed the patients medication list; I have reviewed the consulting providers response and recommendations. I have reviewed or attempted to review medical records based upon their availability    All of the patient's questions have been  addressed and answered. Patient's is agreeable to the above stated plan. I will continue to monitor closely and make adjustments to medical management as needed.      Nutrition Status:        Rolando Madera MD   05/09/2022

## 2022-05-09 NOTE — TRANSFER OF CARE
"Anesthesia Transfer of Care Note    Patient: Natty Nelson    Procedure(s) Performed: Procedure(s) (LRB):  DISCECTOMY, SPINE, CERVICAL, ANTERIOR APPROACH, WITH FUSION (Bilateral)    Anesthesia PACU Handoff    Last vitals:   Visit Vitals  BP (!) 172/74   Pulse 75   Temp 36.9 °C (98.4 °F) (Oral)   Resp 10   Ht 5' 2.01" (1.575 m)   Wt 85.7 kg (188 lb 15 oz)   SpO2 100%   Breastfeeding No   BMI 34.55 kg/m²     "

## 2022-05-09 NOTE — PLAN OF CARE
Problem: Physical Therapy  Goal: Physical Therapy Goal  Description: Goals to be met by: 22     Patient will increase functional independence with mobility by performin. Supine to sit with Stand-by Assistance  2. Sit to stand transfer with Minimal Assistance  3. Gait  x 150ft feet with Minimal Assistance using Rolling Walker.     Outcome: Ongoing, Progressing

## 2022-05-10 LAB
ANION GAP SERPL CALC-SCNC: 7 MEQ/L
BASOPHILS # BLD AUTO: 0.01 X10(3)/MCL (ref 0–0.2)
BASOPHILS NFR BLD AUTO: 0.1 %
BUN SERPL-MCNC: 23.5 MG/DL (ref 9.8–20.1)
CALCIUM SERPL-MCNC: 8.9 MG/DL (ref 8.4–10.2)
CHLORIDE SERPL-SCNC: 108 MMOL/L (ref 98–107)
CO2 SERPL-SCNC: 22 MMOL/L (ref 23–31)
CREAT SERPL-MCNC: 1.07 MG/DL (ref 0.55–1.02)
CREAT/UREA NIT SERPL: 22
EOSINOPHIL # BLD AUTO: 0 X10(3)/MCL (ref 0–0.9)
EOSINOPHIL NFR BLD AUTO: 0 %
ERYTHROCYTE [DISTWIDTH] IN BLOOD BY AUTOMATED COUNT: 14 % (ref 11.5–17)
GLUCOSE SERPL-MCNC: 103 MG/DL (ref 82–115)
HCT VFR BLD AUTO: 31 % (ref 37–47)
HGB BLD-MCNC: 9.6 GM/DL (ref 12–16)
IMM GRANULOCYTES # BLD AUTO: 0.08 X10(3)/MCL (ref 0–0.02)
IMM GRANULOCYTES NFR BLD AUTO: 0.5 % (ref 0–0.43)
LYMPHOCYTES # BLD AUTO: 1.13 X10(3)/MCL (ref 0.6–4.6)
LYMPHOCYTES NFR BLD AUTO: 7.3 %
MCH RBC QN AUTO: 31.1 PG (ref 27–31)
MCHC RBC AUTO-ENTMCNC: 31 MG/DL (ref 33–36)
MCV RBC AUTO: 100.3 FL (ref 80–94)
MONOCYTES # BLD AUTO: 0.69 X10(3)/MCL (ref 0.1–1.3)
MONOCYTES NFR BLD AUTO: 4.5 %
NEUTROPHILS # BLD AUTO: 13.5 X10(3)/MCL (ref 2.1–9.2)
NEUTROPHILS NFR BLD AUTO: 87.6 %
NRBC BLD AUTO-RTO: 0 %
PLATELET # BLD AUTO: 197 X10(3)/MCL (ref 130–400)
PMV BLD AUTO: 12.9 FL (ref 9.4–12.4)
POCT GLUCOSE: 107 MG/DL (ref 70–110)
POCT GLUCOSE: 136 MG/DL (ref 70–110)
POCT GLUCOSE: 251 MG/DL (ref 70–110)
POCT GLUCOSE: 266 MG/DL (ref 70–110)
POCT GLUCOSE: 434 MG/DL (ref 70–110)
POCT GLUCOSE: 69 MG/DL (ref 70–110)
POCT GLUCOSE: 79 MG/DL (ref 70–110)
POTASSIUM SERPL-SCNC: 4.6 MMOL/L (ref 3.5–5.1)
RBC # BLD AUTO: 3.09 X10(6)/MCL (ref 4.2–5.4)
SODIUM SERPL-SCNC: 137 MMOL/L (ref 136–145)
WBC # SPEC AUTO: 15.4 X10(3)/MCL (ref 4.5–11.5)

## 2022-05-10 PROCEDURE — 63600175 PHARM REV CODE 636 W HCPCS: Performed by: INTERNAL MEDICINE

## 2022-05-10 PROCEDURE — 63600175 PHARM REV CODE 636 W HCPCS: Performed by: NURSE PRACTITIONER

## 2022-05-10 PROCEDURE — 85025 COMPLETE CBC W/AUTO DIFF WBC: CPT | Performed by: INTERNAL MEDICINE

## 2022-05-10 PROCEDURE — 11000001 HC ACUTE MED/SURG PRIVATE ROOM

## 2022-05-10 PROCEDURE — 80048 BASIC METABOLIC PNL TOTAL CA: CPT | Performed by: INTERNAL MEDICINE

## 2022-05-10 PROCEDURE — 63600175 PHARM REV CODE 636 W HCPCS: Performed by: PHYSICIAN ASSISTANT

## 2022-05-10 PROCEDURE — 97530 THERAPEUTIC ACTIVITIES: CPT

## 2022-05-10 PROCEDURE — 25000003 PHARM REV CODE 250: Performed by: INTERNAL MEDICINE

## 2022-05-10 PROCEDURE — 36415 COLL VENOUS BLD VENIPUNCTURE: CPT | Performed by: INTERNAL MEDICINE

## 2022-05-10 PROCEDURE — 97530 THERAPEUTIC ACTIVITIES: CPT | Mod: CO

## 2022-05-10 PROCEDURE — 63600175 PHARM REV CODE 636 W HCPCS: Performed by: NEUROLOGICAL SURGERY

## 2022-05-10 PROCEDURE — C9399 UNCLASSIFIED DRUGS OR BIOLOG: HCPCS | Performed by: INTERNAL MEDICINE

## 2022-05-10 RX ORDER — LACTULOSE 10 G/15ML
30 SOLUTION ORAL ONCE
Status: COMPLETED | OUTPATIENT
Start: 2022-05-10 | End: 2022-05-10

## 2022-05-10 RX ORDER — DEXAMETHASONE 4 MG/1
4 TABLET ORAL 3 TIMES DAILY
Status: DISCONTINUED | OUTPATIENT
Start: 2022-05-10 | End: 2022-05-11

## 2022-05-10 RX ADMIN — CEFAZOLIN SODIUM 2 G: 2 SOLUTION INTRAVENOUS at 12:05

## 2022-05-10 RX ADMIN — SUCRALFATE 1 G: 1 TABLET ORAL at 08:05

## 2022-05-10 RX ADMIN — TIMOLOL MALEATE 1 DROP: 2.5 SOLUTION/ DROPS OPHTHALMIC at 08:05

## 2022-05-10 RX ADMIN — LATANOPROST 1 DROP: 50 SOLUTION OPHTHALMIC at 09:05

## 2022-05-10 RX ADMIN — HYDRALAZINE HYDROCHLORIDE 10 MG: 20 INJECTION INTRAMUSCULAR; INTRAVENOUS at 11:05

## 2022-05-10 RX ADMIN — SENNOSIDES, DOCUSATE SODIUM 1 TABLET: 8.6; 5 TABLET ORAL at 08:05

## 2022-05-10 RX ADMIN — INSULIN ASPART 12 UNITS: 100 INJECTION, SOLUTION INTRAVENOUS; SUBCUTANEOUS at 09:05

## 2022-05-10 RX ADMIN — BRIMONIDINE TARTRATE 1 DROP: 1.5 SOLUTION OPHTHALMIC at 08:05

## 2022-05-10 RX ADMIN — SUCRALFATE 1 G: 1 TABLET ORAL at 12:05

## 2022-05-10 RX ADMIN — POLYETHYLENE GLYCOL 3350 17 G: 17 POWDER, FOR SOLUTION ORAL at 08:05

## 2022-05-10 RX ADMIN — INSULIN ASPART 12 UNITS: 100 INJECTION, SOLUTION INTRAVENOUS; SUBCUTANEOUS at 04:05

## 2022-05-10 RX ADMIN — LABETALOL HYDROCHLORIDE 200 MG: 200 TABLET, FILM COATED ORAL at 08:05

## 2022-05-10 RX ADMIN — ENOXAPARIN SODIUM 30 MG: 30 INJECTION SUBCUTANEOUS at 04:05

## 2022-05-10 RX ADMIN — SENNOSIDES, DOCUSATE SODIUM 1 TABLET: 8.6; 5 TABLET ORAL at 12:05

## 2022-05-10 RX ADMIN — INSULIN ASPART 3 UNITS: 100 INJECTION, SOLUTION INTRAVENOUS; SUBCUTANEOUS at 10:05

## 2022-05-10 RX ADMIN — PREGABALIN 75 MG: 75 CAPSULE ORAL at 08:05

## 2022-05-10 RX ADMIN — PANTOPRAZOLE SODIUM 40 MG: 40 TABLET, DELAYED RELEASE ORAL at 08:05

## 2022-05-10 RX ADMIN — TIMOLOL MALEATE 1 DROP: 2.5 SOLUTION/ DROPS OPHTHALMIC at 12:05

## 2022-05-10 RX ADMIN — BRIMONIDINE TARTRATE 1 DROP: 1.5 SOLUTION OPHTHALMIC at 09:05

## 2022-05-10 RX ADMIN — PREGABALIN 75 MG: 75 CAPSULE ORAL at 12:05

## 2022-05-10 RX ADMIN — BACLOFEN 5 MG: 5 TABLET ORAL at 08:05

## 2022-05-10 RX ADMIN — PREGABALIN 75 MG: 75 CAPSULE ORAL at 09:05

## 2022-05-10 RX ADMIN — DEXAMETHASONE SODIUM PHOSPHATE 4 MG: 4 INJECTION, SOLUTION INTRA-ARTICULAR; INTRALESIONAL; INTRAMUSCULAR; INTRAVENOUS; SOFT TISSUE at 12:05

## 2022-05-10 RX ADMIN — LACTULOSE 30 G: 10 SOLUTION ORAL at 04:05

## 2022-05-10 RX ADMIN — TIMOLOL MALEATE 1 DROP: 2.5 SOLUTION/ DROPS OPHTHALMIC at 09:05

## 2022-05-10 RX ADMIN — SUCRALFATE 1 G: 1 TABLET ORAL at 04:05

## 2022-05-10 RX ADMIN — LATANOPROST 1 DROP: 50 SOLUTION OPHTHALMIC at 12:05

## 2022-05-10 RX ADMIN — LABETALOL HYDROCHLORIDE 200 MG: 200 TABLET, FILM COATED ORAL at 09:05

## 2022-05-10 RX ADMIN — ATORVASTATIN CALCIUM 80 MG: 40 TABLET, FILM COATED ORAL at 04:05

## 2022-05-10 RX ADMIN — SUCRALFATE 1 G: 1 TABLET ORAL at 11:05

## 2022-05-10 RX ADMIN — DEXAMETHASONE 4 MG: 4 TABLET ORAL at 09:05

## 2022-05-10 RX ADMIN — DEXAMETHASONE 4 MG: 4 TABLET ORAL at 04:05

## 2022-05-10 RX ADMIN — BACLOFEN 5 MG: 5 TABLET ORAL at 09:05

## 2022-05-10 RX ADMIN — AMLODIPINE BESYLATE 10 MG: 5 TABLET ORAL at 08:05

## 2022-05-10 RX ADMIN — INSULIN DETEMIR 35 UNITS: 100 INJECTION, SOLUTION SUBCUTANEOUS at 09:05

## 2022-05-10 RX ADMIN — SUCRALFATE 1 G: 1 TABLET ORAL at 09:05

## 2022-05-10 RX ADMIN — BRIMONIDINE TARTRATE 1 DROP: 1.5 SOLUTION OPHTHALMIC at 12:05

## 2022-05-10 RX ADMIN — DEXAMETHASONE SODIUM PHOSPHATE 4 MG: 4 INJECTION, SOLUTION INTRA-ARTICULAR; INTRALESIONAL; INTRAMUSCULAR; INTRAVENOUS; SOFT TISSUE at 08:05

## 2022-05-10 RX ADMIN — LABETALOL HYDROCHLORIDE 200 MG: 200 TABLET, FILM COATED ORAL at 12:05

## 2022-05-10 RX ADMIN — BACLOFEN 5 MG: 5 TABLET ORAL at 12:05

## 2022-05-10 NOTE — PT/OT/SLP PROGRESS
Occupational Therapy  Treatment    Natty Nelson   MRN: [id number]   Admitting Diagnosis: Cervical stenosis of spinal canal    OT Date of Treatment: 05/10/22   OT Start Time: 1446  OT Stop Time: 1512  OT Total Time (min): 26 min     Billable Minutes:  Therapeutic Activity 26  Total Minutes: 26     OT/CLAYTON: CLAYTON     CLAYTON Visit Number: 1    General Precautions: Standard, fall    Spiritual, Cultural Beliefs, Yazidism Practices, Values that Affect Care: no    Subjective:  Communicated with RN prior to session.    Pain/Comfort  Pain Rating 1: 0/10  Pain Rating Post-Intervention 1: 0/10  Pain Rating 2: 0/10  Pain Rating Post-Intervention 2: 0/10    Objective:  Patient found with: Isela    Functional Mobility:  Bed Mobility:   Supine to sit: Activity did not occur   Sit to supine: Maximum Assistance   Rolling: Minimal Assistance   Scooting: Maximum Assistance    Transfer Training:   Bed <> Chair:  Squat Pivot with Maximum Assistance with gait belt       Balance:   Static Sit: FAIR: Maintains without assist, but unable to take any challenges   Dynamic Sit: FAIR      Additional Treatment:  Tolerated Tx well.  Stated she likes sitting up in the chair.    Patient left HOB elevated with call button in reach    ASSESSMENT:  Natty Nelson is a 82 y.o. female with a medical diagnosis of Cervical stenosis of spinal canal and presents with decreased mobility, decreased activity tolerance and decreased ADL skills..    Rehab potential is good    Activity tolerance: Good    Discharge recommendations: rehabilitation facility      GOALS:   Multidisciplinary Problems     Occupational Therapy Goals        Problem: Occupational Therapy    Goal Priority Disciplines Outcome Interventions   Occupational Therapy Goal     OT, PT/OT Ongoing, Progressing    Description: Goals to be met by: 05/23/2022    Patient will increase functional independence with ADLs by performing:    Grooming while seated with Stand-by Assistance.  Sitting at edge of  bed x10 minutes with Stand-by Assistance.  Toilet transfer to bedside commode with Moderate Assistance.                     Plan:  Patient to be seen 5 x/week to address the above listed problems via self-care/home management, therapeutic activities, therapeutic exercises  Plan of Care expires: 05/23/22  Plan of Care reviewed with: patient         05/10/2022

## 2022-05-10 NOTE — PROGRESS NOTES
"[facility] - 9th Floor Med Surg  Adult Nutrition  Progress Note    SUMMARY       Recommendations    Recommendation/Intervention:   1. Continue Diabetic diet as tolerated.  2. Recommend Glycemic control.   3. 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 MD to monitor for need to order SLP eval. Hypergycemia noted.    Nutrition Risk Screen    Nutrition Risk Screen: dysphagia or difficulty swallowing    Anthropometrics    Temp: 97.6 °F (36.4 °C)  Height Method: Estimated  Height: 5' 2.01" (157.5 cm)  Height (inches): 62.01 in  Weight Method: Estimated  Weight: 85.7 kg (188 lb 15 oz)  Weight (lb): 188.94 lb  Ideal Body Weight (IBW), Female: 110.05 lb  % Ideal Body Weight, Female (lb): 171.69 %  BMI (Calculated): 34.5  BMI Grade: 30 - 34.9- obesity - grade I       Lab/Procedures/Meds    Pertinent Labs Reviewed: reviewed  Pertinent Labs Comments: 5/10: WBC-15.4, H/H-9.6/31.0, Bun-23.5, Crea-1.07  Pertinent Medications Reviewed: reviewed  Pertinent Medications Comments: dexamethasone, Levemir, SSI        Nutrition Prescription Ordered    Current Diet Order: Diabetic    Evaluation of Received Nutrient/Fluid Intake       % Intake of Estimated Energy Needs: 50 - 75 %  % Meal Intake: 50 - 75 %    Nutrition Risk    Level of Risk/Frequency of Follow-up: low     Monitor and Evaluation    Food and Nutrient Intake: food and beverage intake     Nutrition Follow-Up    RD Follow-up?: Yes    "

## 2022-05-10 NOTE — PROGRESS NOTES
POD#1 ACDF C5-6  She is sitting up in bed, NAD  She does c/o pain at the incision site  Her pre op weakness is unchanged  She is having some difficulty swallowing liquids  She denies HA and N/V    AFVSS  Strength unchanged from pre op  Incision with moderate drainage at AYLA site  AYLA output 30cc overnight    Plan: Continue AYLA drain  OK for daily dressing changes  PT/OT  Will see how she tolerates diet; may need ST consult  Continue decadron for now  SCDs for DVT prophylaxis

## 2022-05-10 NOTE — PROGRESS NOTES
[facility]  Hospital Medicine Progress Note        Chief Complaint: Inpatient Follow-up for Lucien leg weakness     HPI:   Natty Nelson is a 82 y.o. female who was transferred from [facility] ED with complaint of weakness and MRI cervical spine showing severe stenosis and cord compression.  Patient has been suffering from progressive weakness of lower extremities> upper extremities over the past 2 to 3 months, she has been having multiple falls, denies low of bowel or bladder control. 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 On arrival to ED she was hemodynamically stable and afebrile.  Labs notable for LALIT on CKD and mild hypokalemia.  MRI imaging as described below.  Neurosurgery consulted and referred to hospital medicine service for further evaluation and management. Seen by neurosurgery team and recommended surgical intervention.     Interval Hx:   Patient today awake and comfortable sitting up in the chair. Participated with PTx. Pain well controlled. Has been afebrile.     She is s/p   DISCECTOMY, SPINE, CERVICAL, ANTERIOR APPROACH, WITH FUSION (Bilateral)  ACDF C56/ fusion, medtronic      Objective/physical exam:  General: In no acute distress, afebrile, + Obese   Chest: Clear to auscultation bilaterally  Heart: RRR +S1, S2, no appreciable murmur  Abdomen: Soft, nontender, BS +  MSK: Warm, no lower extremity edema, no clubbing or cyanosis  Neurologic: Alert and oriented x4, Lucien UE strength 5/5, LE strength 4/5     VITAL SIGNS: 24 HRS MIN & MAX LAST   Temp  Min: 97.4 °F (36.3 °C)  Max: 98.2 °F (36.8 °C) 98.2 °F (36.8 °C)   BP  Min: 133/56  Max: 195/77 (!) 195/77   Pulse  Min: 65  Max: 80  76   Resp  Min: 16  Max: 18 18   SpO2  Min: 98 %  Max: 100 % 98 %       Recent Labs   Lab 05/06/22  0424 05/09/22  0424 05/10/22  0440   WBC 9.8 16.8* 15.4*   RBC 3.16* 2.99*  3.09*   HGB 9.9* 9.3* 9.6*   HCT 30.3* 29.3* 31.0*   MCV 95.9* 98.0* 100.3*   MCH 31.3* 31.1* 31.1*   MCHC 32.7* 31.7* 31.0*   RDW 13.1 14.1 14.0   MPV 14.1* 13.0* 12.9*       Recent Labs   Lab 05/05/22  2000 05/06/22  0424 05/08/22  0357 05/09/22  0424 05/10/22  0440   CO2 23 23 23 23 22*   BUN 77.1* 74.3* 38.5* 32.4* 23.5*   CREATININE 2.05* 1.72* 1.16* 1.10* 1.07*   CALCIUM 9.6 9.6 9.2 9.1 8.9   ALBUMIN 3.3* 3.0*  --   --   --    ALKPHOS 98 85  --   --   --    ALT 34 31  --   --   --    AST 23 19  --   --   --           Microbiology Results (last 7 days)     ** No results found for the last 168 hours. **               Scheduled Med:   amLODIPine  10 mg Oral Daily    atorvastatin  80 mg Oral Daily    baclofen  5 mg Oral BID    brimonidine 0.15 % OPTH DROP  1 drop Both Eyes BID    dexamethasone  4 mg Intravenous TID    enoxaparin  30 mg Subcutaneous Daily    insulin aspart U-100  12 Units Subcutaneous TIDWM    insulin detemir U-100  35 Units Subcutaneous QHS    labetaloL  200 mg Oral BID    latanoprost  1 drop Both Eyes Nightly    pantoprazole  40 mg Oral Daily    polyethylene glycol  17 g Oral Daily    pregabalin  75 mg Oral BID    senna-docusate 8.6-50 mg  1 tablet Oral BID    sucralfate  1 g Oral QID (AC & HS)    timolol maleate 0.25%  1 drop Both Eyes BID        Continuous Infusions:       PRN Meds:  acetaminophen, albuterol-ipratropium, aluminum-magnesium hydroxide-simethicone, dextrose 10%, dextrose 10%, glucagon (human recombinant), glucose, glucose, hydrALAZINE, HYDROcodone-acetaminophen, HYDROcodone-acetaminophen, HYDROmorphone, insulin aspart U-100, lorazepam, magnesium hydroxide 400 mg/5 ml, methocarbamoL, morphine, naloxone, ondansetron, prochlorperazine, prochlorperazine, sodium chloride 0.9%       Assessment/Plan:  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 Pain well controlled   Cont iv steroids TID dose, wean per neurosurgery team   For her hyperglycemia will cont Levemir to 30 units sq q hs and lispro, adjust dose to keep blood sugars <180    Dc iv fluids, start po DM diet   Strict aspiration and fall precautions   No NSAIDs       Labs in am    Cont supportive care         Anticipated discharge and Disposition: ? SNF vs Home with HH      All diagnosis and differential diagnosis have been reviewed; assessment and plan has been documented; I have personally reviewed the labs and test results that are presently available; I have reviewed the patients medication list; I have reviewed the consulting providers response and recommendations. I have reviewed or attempted to review medical records based upon their availability    All of the patient's questions have been  addressed and answered. Patient's is agreeable to the above stated plan. I will continue to monitor closely and make adjustments to medical management as needed.      Nutrition Status:        Rolando Madera MD   05/10/2022

## 2022-05-10 NOTE — PT/OT/SLP PROGRESS
Physical Therapy Treatment    Patient Name:  Natty Nelson   MRN:  [id number]    Recommendations:     Discharge Recommendations:  rehabilitation facility   Discharge Equipment Recommendations:     Barriers to discharge: impaired functional mobility    Assessment:     Natty Nelson is a 82 y.o. female admitted with a medical diagnosis of Cervical stenosis of spinal canal, s/p ACDF C5-6.  She presents with the following impairments/functional limitations:  weakness, impaired endurance, impaired sensation, impaired functional mobilty, gait instability, impaired balance, decreased lower extremity function, decreased upper extremity function .    Rehab Prognosis: Good; patient would benefit from acute skilled PT services to address these deficits and reach maximum level of function.    Recent Surgery: Procedure(s) (LRB):  DISCECTOMY, SPINE, CERVICAL, ANTERIOR APPROACH, WITH FUSION (Bilateral) 1 Day Post-Op    Plan:     During this hospitalization, patient to be seen daily to address the identified rehab impairments via gait training, therapeutic activities, therapeutic exercises, neuromuscular re-education and progress toward the following goals:    · Plan of Care Expires:  05/30/22    Subjective     Chief Complaint: none  Patient/Family Comments/goals: to get stronger  Pain/Comfort:  · Pain Rating 1: 0/10      Objective:     Communicated with RN prior to session.  Patient found supine with blood pressure cuff, cervical collar, lugo catheter, AYLA drain, PICC line, telemetry upon PT entry to room.     General Precautions: Standard, fall, other (see comments) (cervical precautions, c-collar at all times)   Orthopedic Precautions:spinal precautions   Braces: Cervical collar  Respiratory Status: Room air     Functional Mobility:  · Bed Mobility:  Supine to Sit: maximal assistance  · Transfers:  Sit to Stand:  maximal assistance and of 2 persons with hand-held assist  · Bed to Chair: maximal assistance and of 2 persons with   gait belt  using  Squat Pivot  · Balance: Pt with improved sitting balance at EOB, able to maintain w/ CGA.       AM-PAC 6 CLICK MOBILITY  Turning over in bed (including adjusting bedclothes, sheets and blankets)?: 2  Sitting down on and standing up from a chair with arms (e.g., wheelchair, bedside commode, etc.): 2  Moving from lying on back to sitting on the side of the bed?: 2  Moving to and from a bed to a chair (including a wheelchair)?: 2  Need to walk in hospital room?: 1  Climbing 3-5 steps with a railing?: 1  Basic Mobility Total Score: 10       Therapeutic Activities and Exercises:   Patient tolerated PT treatment well, mobilizing with mod-maxA. Patient required maxA for supine to sit at EOB and scooting. Pt required modA, progressing to CGA for static sitting balance at EOB. Pt required maxA x2 for sit<>stand, unable to take steps at this time. Pt required maxA x2 for squat pivot transfer to chair. Pt is appropriate for continued acute PT services with recommended d/c to IP rehab.     Patient left up in chair with all lines intact, call button in reach, RN notified, daughter present and pt on itzel pad. ..    GOALS:   Multidisciplinary Problems     Physical Therapy Goals        Problem: Physical Therapy    Goal Priority Disciplines Outcome Goal Variances Interventions   Physical Therapy Goal     PT, PT/OT Ongoing, Progressing     Description: Goals to be met by: 22     Patient will increase functional independence with mobility by performin. Supine to sit with Stand-by Assistance  2. Sit to stand transfer with Minimal Assistance  3. Gait  x 150ft feet with Minimal Assistance using Rolling Walker.                      Time Tracking:     PT Received On: 05/10/22  PT Start Time: 1019     PT Stop Time: 1045  PT Total Time (min): 26 min     Billable Minutes: Therapeutic Activity 26min.     Treatment Type: Treatment  PT/PTA: PT     PTA Visit Number: 1     05/10/2022

## 2022-05-11 LAB
ANION GAP SERPL CALC-SCNC: 4 MEQ/L
BASOPHILS # BLD AUTO: 0.01 X10(3)/MCL (ref 0–0.2)
BASOPHILS NFR BLD AUTO: 0.1 %
BUN SERPL-MCNC: 29.4 MG/DL (ref 9.8–20.1)
CALCIUM SERPL-MCNC: 8.9 MG/DL (ref 8.4–10.2)
CHLORIDE SERPL-SCNC: 106 MMOL/L (ref 98–107)
CO2 SERPL-SCNC: 23 MMOL/L (ref 23–31)
CREAT SERPL-MCNC: 1.07 MG/DL (ref 0.55–1.02)
CREAT/UREA NIT SERPL: 27
EOSINOPHIL # BLD AUTO: 0 X10(3)/MCL (ref 0–0.9)
EOSINOPHIL NFR BLD AUTO: 0 %
ERYTHROCYTE [DISTWIDTH] IN BLOOD BY AUTOMATED COUNT: 14 % (ref 11.5–17)
GLUCOSE SERPL-MCNC: 187 MG/DL (ref 82–115)
HCT VFR BLD AUTO: 26.3 % (ref 37–47)
HGB BLD-MCNC: 9 GM/DL (ref 12–16)
IMM GRANULOCYTES # BLD AUTO: 0.09 X10(3)/MCL (ref 0–0.02)
IMM GRANULOCYTES NFR BLD AUTO: 0.6 % (ref 0–0.43)
LYMPHOCYTES # BLD AUTO: 1.31 X10(3)/MCL (ref 0.6–4.6)
LYMPHOCYTES NFR BLD AUTO: 9.1 %
MCH RBC QN AUTO: 31.6 PG (ref 27–31)
MCHC RBC AUTO-ENTMCNC: 34.2 MG/DL (ref 33–36)
MCV RBC AUTO: 92.3 FL (ref 80–94)
MONOCYTES # BLD AUTO: 0.64 X10(3)/MCL (ref 0.1–1.3)
MONOCYTES NFR BLD AUTO: 4.5 %
NEUTROPHILS # BLD AUTO: 12.3 X10(3)/MCL (ref 2.1–9.2)
NEUTROPHILS NFR BLD AUTO: 85.7 %
NRBC BLD AUTO-RTO: 0 %
PLATELET # BLD AUTO: 181 X10(3)/MCL (ref 130–400)
PMV BLD AUTO: 12.7 FL (ref 9.4–12.4)
POCT GLUCOSE: 194 MG/DL (ref 70–110)
POCT GLUCOSE: 200 MG/DL (ref 70–110)
POCT GLUCOSE: 263 MG/DL (ref 70–110)
POCT GLUCOSE: 300 MG/DL (ref 70–110)
POCT GLUCOSE: 323 MG/DL (ref 70–110)
POTASSIUM SERPL-SCNC: 4.5 MMOL/L (ref 3.5–5.1)
RBC # BLD AUTO: 2.85 X10(6)/MCL (ref 4.2–5.4)
SODIUM SERPL-SCNC: 133 MMOL/L (ref 136–145)
WBC # SPEC AUTO: 14.3 X10(3)/MCL (ref 4.5–11.5)

## 2022-05-11 PROCEDURE — 80048 BASIC METABOLIC PNL TOTAL CA: CPT | Performed by: INTERNAL MEDICINE

## 2022-05-11 PROCEDURE — 94761 N-INVAS EAR/PLS OXIMETRY MLT: CPT

## 2022-05-11 PROCEDURE — 25000003 PHARM REV CODE 250: Performed by: INTERNAL MEDICINE

## 2022-05-11 PROCEDURE — 63600175 PHARM REV CODE 636 W HCPCS: Performed by: INTERNAL MEDICINE

## 2022-05-11 PROCEDURE — 99900035 HC TECH TIME PER 15 MIN (STAT)

## 2022-05-11 PROCEDURE — 63600175 PHARM REV CODE 636 W HCPCS: Performed by: PHYSICIAN ASSISTANT

## 2022-05-11 PROCEDURE — 11000001 HC ACUTE MED/SURG PRIVATE ROOM

## 2022-05-11 PROCEDURE — 63600175 PHARM REV CODE 636 W HCPCS: Performed by: NURSE PRACTITIONER

## 2022-05-11 PROCEDURE — C9399 UNCLASSIFIED DRUGS OR BIOLOG: HCPCS | Performed by: INTERNAL MEDICINE

## 2022-05-11 PROCEDURE — 85025 COMPLETE CBC W/AUTO DIFF WBC: CPT | Performed by: INTERNAL MEDICINE

## 2022-05-11 PROCEDURE — 36415 COLL VENOUS BLD VENIPUNCTURE: CPT | Performed by: INTERNAL MEDICINE

## 2022-05-11 PROCEDURE — 97530 THERAPEUTIC ACTIVITIES: CPT

## 2022-05-11 RX ORDER — DEXAMETHASONE 4 MG/1
4 TABLET ORAL EVERY 12 HOURS
Status: COMPLETED | OUTPATIENT
Start: 2022-05-11 | End: 2022-05-13

## 2022-05-11 RX ORDER — HYDRALAZINE HYDROCHLORIDE 50 MG/1
50 TABLET, FILM COATED ORAL EVERY 8 HOURS
Status: DISCONTINUED | OUTPATIENT
Start: 2022-05-11 | End: 2022-05-17 | Stop reason: HOSPADM

## 2022-05-11 RX ADMIN — LATANOPROST 1 DROP: 50 SOLUTION OPHTHALMIC at 09:05

## 2022-05-11 RX ADMIN — BRIMONIDINE TARTRATE 1 DROP: 1.5 SOLUTION OPHTHALMIC at 10:05

## 2022-05-11 RX ADMIN — INSULIN ASPART 1 UNITS: 100 INJECTION, SOLUTION INTRAVENOUS; SUBCUTANEOUS at 09:05

## 2022-05-11 RX ADMIN — SUCRALFATE 1 G: 1 TABLET ORAL at 09:05

## 2022-05-11 RX ADMIN — LABETALOL HYDROCHLORIDE 200 MG: 200 TABLET, FILM COATED ORAL at 09:05

## 2022-05-11 RX ADMIN — SUCRALFATE 1 G: 1 TABLET ORAL at 06:05

## 2022-05-11 RX ADMIN — TIMOLOL MALEATE 1 DROP: 2.5 SOLUTION/ DROPS OPHTHALMIC at 10:05

## 2022-05-11 RX ADMIN — SUCRALFATE 1 G: 1 TABLET ORAL at 05:05

## 2022-05-11 RX ADMIN — BACLOFEN 5 MG: 5 TABLET ORAL at 10:05

## 2022-05-11 RX ADMIN — PANTOPRAZOLE SODIUM 40 MG: 40 TABLET, DELAYED RELEASE ORAL at 10:05

## 2022-05-11 RX ADMIN — PREGABALIN 75 MG: 75 CAPSULE ORAL at 10:05

## 2022-05-11 RX ADMIN — LABETALOL HYDROCHLORIDE 200 MG: 200 TABLET, FILM COATED ORAL at 10:05

## 2022-05-11 RX ADMIN — ATORVASTATIN CALCIUM 80 MG: 40 TABLET, FILM COATED ORAL at 05:05

## 2022-05-11 RX ADMIN — BACLOFEN 5 MG: 5 TABLET ORAL at 09:05

## 2022-05-11 RX ADMIN — INSULIN ASPART 12 UNITS: 100 INJECTION, SOLUTION INTRAVENOUS; SUBCUTANEOUS at 03:05

## 2022-05-11 RX ADMIN — INSULIN ASPART 2 UNITS: 100 INJECTION, SOLUTION INTRAVENOUS; SUBCUTANEOUS at 04:05

## 2022-05-11 RX ADMIN — AMLODIPINE BESYLATE 10 MG: 5 TABLET ORAL at 10:05

## 2022-05-11 RX ADMIN — TIMOLOL MALEATE 1 DROP: 2.5 SOLUTION/ DROPS OPHTHALMIC at 09:05

## 2022-05-11 RX ADMIN — SENNOSIDES, DOCUSATE SODIUM 1 TABLET: 8.6; 5 TABLET ORAL at 10:05

## 2022-05-11 RX ADMIN — BRIMONIDINE TARTRATE 1 DROP: 1.5 SOLUTION OPHTHALMIC at 09:05

## 2022-05-11 RX ADMIN — INSULIN DETEMIR 40 UNITS: 100 INJECTION, SOLUTION SUBCUTANEOUS at 09:05

## 2022-05-11 RX ADMIN — SUCRALFATE 1 G: 1 TABLET ORAL at 12:05

## 2022-05-11 RX ADMIN — INSULIN ASPART 12 UNITS: 100 INJECTION, SOLUTION INTRAVENOUS; SUBCUTANEOUS at 10:05

## 2022-05-11 RX ADMIN — HYDRALAZINE HYDROCHLORIDE 50 MG: 50 TABLET ORAL at 09:05

## 2022-05-11 RX ADMIN — HYDRALAZINE HYDROCHLORIDE 50 MG: 50 TABLET ORAL at 03:05

## 2022-05-11 RX ADMIN — INSULIN ASPART 12 UNITS: 100 INJECTION, SOLUTION INTRAVENOUS; SUBCUTANEOUS at 05:05

## 2022-05-11 RX ADMIN — ENOXAPARIN SODIUM 30 MG: 30 INJECTION SUBCUTANEOUS at 05:05

## 2022-05-11 RX ADMIN — SENNOSIDES, DOCUSATE SODIUM 1 TABLET: 8.6; 5 TABLET ORAL at 09:05

## 2022-05-11 RX ADMIN — PREGABALIN 75 MG: 75 CAPSULE ORAL at 09:05

## 2022-05-11 RX ADMIN — DEXAMETHASONE 4 MG: 4 TABLET ORAL at 09:05

## 2022-05-11 NOTE — PT/OT/SLP PROGRESS
Physical Therapy Treatment    Patient Name:  Natty Nelson   MRN:  [id number]    Recommendations:     Discharge Recommendations:  rehabilitation facility   Discharge Equipment Recommendations:     Barriers to discharge: impaired functional mobility    Assessment:     Natty Nelson is a 82 y.o. female admitted with a medical diagnosis of Cervical stenosis of spinal canal.  She presents with the following impairments/functional limitations:  weakness, impaired endurance, impaired sensation, impaired functional mobilty, impaired balance, decreased lower extremity function, decreased upper extremity function .    Rehab Prognosis: Good; patient would benefit from acute skilled PT services to address these deficits and reach maximum level of function.    Recent Surgery: Procedure(s) (LRB):  DISCECTOMY, SPINE, CERVICAL, ANTERIOR APPROACH, WITH FUSION (Bilateral) 2 Days Post-Op    Plan:     During this hospitalization, patient to be seen daily to address the identified rehab impairments via gait training, therapeutic activities, therapeutic exercises, neuromuscular re-education and progress toward the following goals:    · Plan of Care Expires:  05/31/22    Subjective     Chief Complaint: none stated  Patient/Family Comments/goals: to get stronger  Pain/Comfort:  · Pain Rating 1: 0/10      Objective:     Communicated with RN prior to session.  Patient found supine with cervical collar, AYLA drain, PureWick, peripheral IV, PICC line, SCD, telemetry upon PT entry to room.     General Precautions: Standard, fall, other (see comments) (cervical precautions, c-collar at all times)   Orthopedic Precautions:spinal precautions   Braces: Cervical collar  Respiratory Status: Room air     Functional Mobility:  · Bed Mobility:  Supine to Sit: moderate assistance  · Transfers:  Sit to Stand:  maximal assistance with hand-held assist  · Gait: unable      AM-PAC 6 CLICK MOBILITY  Turning over in bed (including adjusting bedclothes, sheets  and blankets)?: 3  Sitting down on and standing up from a chair with arms (e.g., wheelchair, bedside commode, etc.): 2  Moving from lying on back to sitting on the side of the bed?: 2  Moving to and from a bed to a chair (including a wheelchair)?: 2  Need to walk in hospital room?: 1  Climbing 3-5 steps with a railing?: 1  Basic Mobility Total Score: 11       Therapeutic Activities and Exercises:   Patient seen for PT treatment. Patient with improved bed mobility, supine<>sit at EOB w/ modA, improved ability to move LEs. Pt performed sit<>stand x2 trials w/ maxA x2, HHA w/ bilateral knees blocked. Pt performed squat pivot transfer from bed to chair w/ maxA. Pt is progressing towards functional PT goals and remains appropriate for continued acute PT services.     Patient left up in chair with all lines intact, call button in reach and RN notified, seated on Mary pad.     GOALS:   Multidisciplinary Problems     Physical Therapy Goals        Problem: Physical Therapy    Goal Priority Disciplines Outcome Goal Variances Interventions   Physical Therapy Goal     PT, PT/OT Ongoing, Progressing     Description: Goals to be met by: 22     Patient will increase functional independence with mobility by performin. Supine to sit with Stand-by Assistance  2. Sit to stand transfer with Minimal Assistance  3. Gait  x 150ft feet with Minimal Assistance using Rolling Walker.                      Time Tracking:     PT Received On: 22  PT Start Time: 1108     PT Stop Time: 1132  PT Total Time (min): 24 min     Billable Minutes: Therapeutic Activity 24min.     Treatment Type: Treatment  PT/PTA: PT     PTA Visit Number: 2     2022

## 2022-05-11 NOTE — PROGRESS NOTES
POD#2 ACDF C5-6  She is sitting up in bed, NAD  She does c/o pain at the incision site, improved from previous exam  Her pre op weakness is improving  She is having less difficulty swallowing today  She did work with PT yesterday and did well    AFVSS  Strength improving in LE  Numbness improved in UE  Incision c/d/i  AYLA output 10cc overnight    Plan: We will dc her AYLA drain  OK for daily dressing changes  PT/OT  Will see how she tolerates diet; may need ST consult  Continue decadron for now; will wean to BID today  SCDs for DVT prophylaxis; ok for lovenox tomorrow

## 2022-05-11 NOTE — PLAN OF CARE
Problem: Adult Inpatient Plan of Care  Goal: Plan of Care Review  Outcome: Ongoing, Progressing  Goal: Patient-Specific Goal (Individualized)  Outcome: Ongoing, Progressing  Goal: Absence of Hospital-Acquired Illness or Injury  Outcome: Ongoing, Progressing  Goal: Optimal Comfort and Wellbeing  Outcome: Ongoing, Progressing  Goal: Readiness for Transition of Care  Outcome: Ongoing, Progressing     Problem: Skin Injury Risk Increased  Goal: Skin Health and Integrity  Outcome: Ongoing, Progressing     Problem: Fall Injury Risk  Goal: Absence of Fall and Fall-Related Injury  Outcome: Ongoing, Progressing     Problem: Infection  Goal: Absence of Infection Signs and Symptoms  Outcome: Ongoing, Progressing

## 2022-05-11 NOTE — PROGRESS NOTES
[facility]  Hospital Medicine Progress Note        Chief Complaint: Inpatient Follow-up for Lucien leg weakness     HPI:   Natty Nelson is a 82 y.o. female who was transferred from [facility] ED with complaint of weakness and MRI cervical spine showing severe stenosis and cord compression.  Patient has been suffering from progressive weakness of lower extremities> upper extremities over the past 2 to 3 months, she has been having multiple falls, denies low of bowel or bladder control. 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 On arrival to ED she was hemodynamically stable and afebrile.  Labs notable for LALIT on CKD and mild hypokalemia.  MRI imaging as described below.  Neurosurgery consulted and referred to hospital medicine service for further evaluation and management. Seen by neurosurgery team and recommended surgical intervention.     Interval Hx:   Patient today awake and comfortable sitting up in the chair. Feels her legs are still heavy. Participating with PTx. No family at bedside.     She is s/p   DISCECTOMY, SPINE, CERVICAL, ANTERIOR APPROACH, WITH FUSION (Bilateral)  ACDF C56/ fusion, medtronic      Objective/physical exam:  General: In no acute distress, afebrile, + Obese   Chest: Clear to auscultation bilaterally  Heart: RRR +S1, S2, no appreciable murmur  Abdomen: Soft, nontender, BS +  MSK: Warm, no lower extremity edema, no clubbing or cyanosis  Neurologic: Alert and oriented x4, Lucien UE strength 5/5, LE strength 4/5     VITAL SIGNS: 24 HRS MIN & MAX LAST   Temp  Min: 97.5 °F (36.4 °C)  Max: 98.9 °F (37.2 °C) 98.1 °F (36.7 °C)   BP  Min: 150/69  Max: 186/70 (!) 174/71   Pulse  Min: 79  Max: 93  86   Resp  Min: 18  Max: 20 18   SpO2  Min: 97 %  Max: 100 % 99 %       Recent Labs   Lab 05/09/22  0424 05/10/22  0440 05/11/22  0431   WBC 16.8* 15.4* 14.3*    RBC 2.99* 3.09* 2.85*   HGB 9.3* 9.6* 9.0*   HCT 29.3* 31.0* 26.3*   MCV 98.0* 100.3* 92.3   MCH 31.1* 31.1* 31.6*   MCHC 31.7* 31.0* 34.2   RDW 14.1 14.0 14.0    197 181   MPV 13.0* 12.9* 12.7*       Recent Labs   Lab 05/05/22  2000 05/06/22  0424 05/08/22  0357 05/09/22  0424 05/10/22  0440 05/11/22  0436   * 131*   < > 138 137 133*   K 5.3* 5.1   < > 4.6 4.6 4.5   CO2 23 23   < > 23 22* 23   BUN 77.1* 74.3*   < > 32.4* 23.5* 29.4*   CREATININE 2.05* 1.72*   < > 1.10* 1.07* 1.07*   CALCIUM 9.6 9.6   < > 9.1 8.9 8.9   ALBUMIN 3.3* 3.0*  --   --   --   --    ALKPHOS 98 85  --   --   --   --    ALT 34 31  --   --   --   --    AST 23 19  --   --   --   --    BILITOT 0.2 0.3  --   --   --   --     < > = values in this interval not displayed.          Microbiology Results (last 7 days)     ** No results found for the last 168 hours. **               Scheduled Med:   amLODIPine  10 mg Oral Daily    atorvastatin  80 mg Oral Daily    baclofen  5 mg Oral BID    brimonidine 0.15 % OPTH DROP  1 drop Both Eyes BID    dexAMETHasone  4 mg Oral Q12H    enoxaparin  30 mg Subcutaneous Daily    hydrALAZINE  50 mg Oral Q8H    insulin aspart U-100  12 Units Subcutaneous TIDWM    insulin detemir U-100  40 Units Subcutaneous QHS    labetaloL  200 mg Oral BID    latanoprost  1 drop Both Eyes Nightly    pantoprazole  40 mg Oral Daily    polyethylene glycol  17 g Oral Daily    pregabalin  75 mg Oral BID    senna-docusate 8.6-50 mg  1 tablet Oral BID    sucralfate  1 g Oral QID (AC & HS)    timolol maleate 0.25%  1 drop Both Eyes BID        Continuous Infusions:       PRN Meds:  acetaminophen, albuterol-ipratropium, aluminum-magnesium hydroxide-simethicone, dextrose 10%, dextrose 10%, glucagon (human recombinant), glucose, glucose, hydrALAZINE, HYDROcodone-acetaminophen, HYDROcodone-acetaminophen, HYDROmorphone, insulin aspart U-100, lorazepam, magnesium hydroxide 400 mg/5 ml, methocarbamoL, morphine,  naloxone, ondansetron, prochlorperazine, prochlorperazine, sodium chloride 0.9%       Assessment/Plan:  Severe cervical stenosis with cord compression 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 Still feels her legs ar heavy   Cont OT/PTx   Cont PO steroids BID dose, wean per neurosurgery team   For her hyperglycemia will cont Levemir and lispro, adjust dose to keep blood sugars <180    Cont po DM diet   Strict aspiration and fall precautions   No NSAIDs       Labs in am    Cont supportive care         Anticipated discharge and Disposition: ? SNF vs Rehab       All diagnosis and differential diagnosis have been reviewed; assessment and plan has been documented; I have personally reviewed the labs and test results that are presently available; I have reviewed the patients medication list; I have reviewed the consulting providers response and recommendations. I have reviewed or attempted to review medical records based upon their availability    All of the patient's questions have been  addressed and answered. Patient's is agreeable to the above stated plan. I will continue to monitor closely and make adjustments to medical management as needed.      Nutrition Status:        Rolando Madera MD   05/11/2022         Leukocytosis from steroid use. No signs of sepsis

## 2022-05-11 NOTE — PT/OT/SLP PROGRESS
Occupational Therapy   Treatment    Name: Natty Nelson  MRN: [id number]  Admitting Diagnosis:  Cervical stenosis of spinal canal  2 Days Post-Op    Recommendations:     Discharge Recommendations: rehabilitation facility  Discharge Equipment Recommendations:     Barriers to discharge:   (Severity of Deficits)    Assessment:     Natty Nelson is a 82 y.o. female with a medical diagnosis of Cervical stenosis of spinal canal.  Performance deficits affecting function are weakness, gait instability, decreased upper extremity function, impaired coordination, decreased lower extremity function, impaired balance, impaired endurance, decreased safety awareness, orthopedic precautions, impaired self care skills, impaired functional mobilty. Pt motivated to participate in OT tx. Pt cont to require max A X 2 assistance for all mobility, w/ significant upper and lower body weakness. Pt is a good candidate for IPR to maximize function and improve safety and IND w/ ADLs and mobility.     Rehab Prognosis:  Good; patient would benefit from acute skilled OT services to address these deficits and reach maximum level of function.       Plan:     Patient to be seen 5 x/week to address the above listed problems via self-care/home management, neuromuscular re-education, therapeutic activities, therapeutic exercises  · Plan of Care Expires: 05/23/22  · Plan of Care Reviewed with: patient    Subjective     Pain/Comfort:  · Pain Rating 1: 0/10    Objective:     Communicated with: RN prior to session.  Patient found up in chair with cervical collar, PICC line, PureWick upon OT entry to room.    General Precautions: Standard, fall   Orthopedic Precautions:spinal precautions   Braces: Cervical collar  Respiratory Status: Room air     Occupational Performance:     Bed Mobility:    · Patient completed Sit to Supine with maximal assistance and 2 persons     Functional Mobility/Transfers:  · Patient completed Sit <> Stand Transfer with maximal  assistance and of 2 persons  with  B knee blocking   · Patient completed Bed <> Chair Transfer using Squat Pivot technique with maximal assistance and of 2 persons      AMPAC 6 Click ADL: 13    Patient left supine with call button in reach and RN notifiedEducation:      GOALS:   Multidisciplinary Problems     Occupational Therapy Goals        Problem: Occupational Therapy    Goal Priority Disciplines Outcome Interventions   Occupational Therapy Goal     OT, PT/OT Ongoing, Progressing    Description: Goals to be met by: 05/23/2022    Patient will increase functional independence with ADLs by performing:    Grooming while seated with Stand-by Assistance.  Sitting at edge of bed x10 minutes with Stand-by Assistance.  Toilet transfer to bedside commode with Moderate Assistance.                     Time Tracking:     OT Date of Treatment: 05/11/22  OT Start Time: 1402  OT Stop Time: 1420  OT Total Time (min): 18 min    Billable Minutes:Therapeutic Activity 18 Minutes    OT/CLAYTON: OT     CLAYTON Visit Number: 2    5/11/2022

## 2022-05-12 LAB
POCT GLUCOSE: 258 MG/DL (ref 70–110)
POCT GLUCOSE: 292 MG/DL (ref 70–110)
POCT GLUCOSE: 311 MG/DL (ref 70–110)

## 2022-05-12 PROCEDURE — 63600175 PHARM REV CODE 636 W HCPCS: Performed by: NURSE PRACTITIONER

## 2022-05-12 PROCEDURE — 97530 THERAPEUTIC ACTIVITIES: CPT

## 2022-05-12 PROCEDURE — C9399 UNCLASSIFIED DRUGS OR BIOLOG: HCPCS | Performed by: INTERNAL MEDICINE

## 2022-05-12 PROCEDURE — 63600175 PHARM REV CODE 636 W HCPCS: Performed by: INTERNAL MEDICINE

## 2022-05-12 PROCEDURE — 25000003 PHARM REV CODE 250: Performed by: INTERNAL MEDICINE

## 2022-05-12 PROCEDURE — 97110 THERAPEUTIC EXERCISES: CPT

## 2022-05-12 PROCEDURE — 97530 THERAPEUTIC ACTIVITIES: CPT | Mod: CO

## 2022-05-12 PROCEDURE — 11000001 HC ACUTE MED/SURG PRIVATE ROOM

## 2022-05-12 PROCEDURE — 63600175 PHARM REV CODE 636 W HCPCS: Performed by: PHYSICIAN ASSISTANT

## 2022-05-12 RX ADMIN — PREGABALIN 75 MG: 75 CAPSULE ORAL at 09:05

## 2022-05-12 RX ADMIN — DEXAMETHASONE 4 MG: 4 TABLET ORAL at 08:05

## 2022-05-12 RX ADMIN — INSULIN ASPART 8 UNITS: 100 INJECTION, SOLUTION INTRAVENOUS; SUBCUTANEOUS at 05:05

## 2022-05-12 RX ADMIN — TIMOLOL MALEATE 1 DROP: 2.5 SOLUTION/ DROPS OPHTHALMIC at 09:05

## 2022-05-12 RX ADMIN — AMLODIPINE BESYLATE 10 MG: 5 TABLET ORAL at 08:05

## 2022-05-12 RX ADMIN — HYDRALAZINE HYDROCHLORIDE 50 MG: 50 TABLET ORAL at 02:05

## 2022-05-12 RX ADMIN — INSULIN ASPART 3 UNITS: 100 INJECTION, SOLUTION INTRAVENOUS; SUBCUTANEOUS at 09:05

## 2022-05-12 RX ADMIN — LABETALOL HYDROCHLORIDE 200 MG: 200 TABLET, FILM COATED ORAL at 09:05

## 2022-05-12 RX ADMIN — SENNOSIDES, DOCUSATE SODIUM 1 TABLET: 8.6; 5 TABLET ORAL at 08:05

## 2022-05-12 RX ADMIN — BACLOFEN 5 MG: 5 TABLET ORAL at 09:05

## 2022-05-12 RX ADMIN — DEXAMETHASONE 4 MG: 4 TABLET ORAL at 09:05

## 2022-05-12 RX ADMIN — INSULIN ASPART 6 UNITS: 100 INJECTION, SOLUTION INTRAVENOUS; SUBCUTANEOUS at 05:05

## 2022-05-12 RX ADMIN — ENOXAPARIN SODIUM 30 MG: 30 INJECTION SUBCUTANEOUS at 05:05

## 2022-05-12 RX ADMIN — SUCRALFATE 1 G: 1 TABLET ORAL at 12:05

## 2022-05-12 RX ADMIN — INSULIN DETEMIR 45 UNITS: 100 INJECTION, SOLUTION SUBCUTANEOUS at 09:05

## 2022-05-12 RX ADMIN — SUCRALFATE 1 G: 1 TABLET ORAL at 05:05

## 2022-05-12 RX ADMIN — INSULIN ASPART 12 UNITS: 100 INJECTION, SOLUTION INTRAVENOUS; SUBCUTANEOUS at 12:05

## 2022-05-12 RX ADMIN — POLYETHYLENE GLYCOL 3350 17 G: 17 POWDER, FOR SOLUTION ORAL at 08:05

## 2022-05-12 RX ADMIN — PANTOPRAZOLE SODIUM 40 MG: 40 TABLET, DELAYED RELEASE ORAL at 08:05

## 2022-05-12 RX ADMIN — ATORVASTATIN CALCIUM 80 MG: 40 TABLET, FILM COATED ORAL at 08:05

## 2022-05-12 RX ADMIN — BACLOFEN 5 MG: 5 TABLET ORAL at 08:05

## 2022-05-12 RX ADMIN — SENNOSIDES, DOCUSATE SODIUM 1 TABLET: 8.6; 5 TABLET ORAL at 09:05

## 2022-05-12 RX ADMIN — BRIMONIDINE TARTRATE 1 DROP: 1.5 SOLUTION OPHTHALMIC at 08:05

## 2022-05-12 RX ADMIN — HYDRALAZINE HYDROCHLORIDE 50 MG: 50 TABLET ORAL at 05:05

## 2022-05-12 RX ADMIN — TIMOLOL MALEATE 1 DROP: 2.5 SOLUTION/ DROPS OPHTHALMIC at 08:05

## 2022-05-12 RX ADMIN — LATANOPROST 1 DROP: 50 SOLUTION OPHTHALMIC at 09:05

## 2022-05-12 RX ADMIN — PREGABALIN 75 MG: 75 CAPSULE ORAL at 08:05

## 2022-05-12 RX ADMIN — SUCRALFATE 1 G: 1 TABLET ORAL at 09:05

## 2022-05-12 RX ADMIN — BRIMONIDINE TARTRATE 1 DROP: 1.5 SOLUTION OPHTHALMIC at 09:05

## 2022-05-12 RX ADMIN — HYDRALAZINE HYDROCHLORIDE 50 MG: 50 TABLET ORAL at 09:05

## 2022-05-12 RX ADMIN — INSULIN ASPART 6 UNITS: 100 INJECTION, SOLUTION INTRAVENOUS; SUBCUTANEOUS at 12:05

## 2022-05-12 RX ADMIN — INSULIN ASPART 12 UNITS: 100 INJECTION, SOLUTION INTRAVENOUS; SUBCUTANEOUS at 05:05

## 2022-05-12 NOTE — PROGRESS NOTES
POD#3 ACDF C5-6  She is sitting up in bed, NAD  She continues with pain at the operative site  Her pre op weakness continues to improve  She is tolerating PO    AFVSS  Strength improving in LE  Numbness improved in UE  Incision c/d/i  AYLA site dry    Plan:   Continue with daily dressing changes  PT/OT  We are weaning decadron  SCDs and lovenox for DVT prophylaxis  Rehab placement pending

## 2022-05-12 NOTE — PLAN OF CARE
Problem: Adult Inpatient Plan of Care  Goal: Plan of Care Review  Outcome: Ongoing, Progressing  Goal: Patient-Specific Goal (Individualized)  Outcome: Ongoing, Progressing  Goal: Absence of Hospital-Acquired Illness or Injury  Outcome: Ongoing, Progressing     Problem: Skin Injury Risk Increased  Goal: Skin Health and Integrity  Outcome: Ongoing, Progressing     Problem: Fall Injury Risk  Goal: Absence of Fall and Fall-Related Injury  Outcome: Ongoing, Progressing     Problem: Infection  Goal: Absence of Infection Signs and Symptoms  Outcome: Ongoing, Progressing

## 2022-05-12 NOTE — PT/OT/SLP PROGRESS
Occupational Therapy  Treatment    Natty Nelson   MRN: [id number]   Admitting Diagnosis: Cervical stenosis of spinal canal    OT Date of Treatment: 05/12/22   OT Start Time: 1215  OT Stop Time: 1249  OT Total Time (min): 34 min    Billable Minutes:  Therapeutic Activity 20 and Therapeutic Exercise 14    OT/CLAYTON: OT     CLAYTON Visit Number: 0    General Precautions: Standard, fall, other (see comments) (cervical for anterior approach discectomy)  Orthopedic Precautions:    Braces: Cervical collar  Respiratory Status: Room air         Subjective:  Communicated with nurse prior to session.  Pt up in recliner per PT since this morning.  Pain/Comfort  Pain Rating 1: 0/10    Objective:  Patient found with: cervical collar, PureWick, PICC line     Functional Mobility:  Bed Mobility:       Transfers:        Functional Ambulation: NA    Activities of Daily Living:     Feeding adaptive equipment: NA   Pt fed herself sitting up in recliner with some setup required to open some containers; pt able to open some herself but limited by poor /pinch strength.     UE adaptive equipment: NA     LE adaptive equipment: NA                    Bathing adaptive equipment: NA    Balance:   Static Sit: GOOD-  Dynamic Sit: GOOD-: Incosistently Maintains balance through MODERATE excursions of active trunk movement,       Therapeutic Activities and Exercises:  Pt sitting up in recliner with good endurance and no c/o pain or discomfort.  Pt very motivated, participated in chest presses below 90 degrees shoulder flexion and biceps curls with 2# dowel 20 repsx3, in unsupported sitting position at edge of chair.  Pt performed gentle modified situps and reaching activities to improve trunk strength and postural control, along with underhand throwing/catching in unsupported sitting with rest breaks as needed.  Pt tolerated session well, remained up in recliner to sit for lunch which arrived end of session.    AM-PAC 6 CLICK ADL   How much help from  "another person does this patient currently need?   1 = Unable, Total/Dependent Assistance  2 = A lot, Maximum/Moderate Assistance  3 = A little, Minimum/Contact Guard/Supervision  4 = None, Modified Russell/Independent    Putting on and taking off regular lower body clothing? : 2  Bathing (including washing, rinsing, drying)?: 1  Toileting, which includes using toilet, bedpan, or urinal? : 1 (pt reported incontinent, loose bowels, and PureWick in place)  Putting on and taking off regular upper body clothing?: 2  Taking care of personal grooming such as brushing teeth?: 3  Eating meals?: 4  Daily Activity Total Score: 13     AM-PAC Raw Score CMS "G-Code Modifier Level of Impairment Assistance   6 % Total / Unable   7 - 8 CM 80 - 100% Maximal Assist   9-13 CL 60 - 80% Moderate Assist   14 - 19 CK 40 - 60% Moderate Assist   20 - 22 CJ 20 - 40% Minimal Assist   23 CI 1-20% SBA / CGA   24 CH 0% Independent/ Mod I       Patient left up in chair with call button in reach and nurse aware, and notified to use Mary for transfer back to bed when pt is ready.    ASSESSMENT:  Natty Nelson is a 82 y.o. female with a medical diagnosis of Cervical stenosis of spinal canal and presents with general weakness, decreased endurance, and /pinch weakness limiting FM tasks.    Rehab identified problem list/impairments:      Rehab potential is excellent.    Activity tolerance: Excellent    Discharge recommendations:       Barriers to discharge:      Equipment recommendations:       GOALS:   Multidisciplinary Problems     Occupational Therapy Goals        Problem: Occupational Therapy    Goal Priority Disciplines Outcome Interventions   Occupational Therapy Goal     OT, PT/OT Ongoing, Progressing    Description: Goals to be met by: 05/23/2022    Patient will increase functional independence with ADLs by performing:    Grooming while seated with Stand-by Assistance.  Sitting at edge of bed x10 minutes with Stand-by " Assistance.  Toilet transfer to bedside commode with Moderate Assistance.                     Plan:  Patient to be seen 5 x/week to address the above listed problems via self-care/home management, neuromuscular re-education, therapeutic activities, therapeutic exercises  Plan of Care expires: 05/23/22  Plan of Care reviewed with: patient         05/12/2022

## 2022-05-12 NOTE — PT/OT/SLP PROGRESS
Physical Therapy Treatment    Patient Name:  Natty Nelson   MRN:  [id number]    Recommendations:     Discharge Recommendations:  rehabilitation facility   Discharge Equipment Recommendations:     Barriers to discharge: impaired functional mobility    Assessment:     Natty Nelson is a 82 y.o. female admitted with a medical diagnosis of Cervical stenosis of spinal canal, s/p C5-6 ACDF.  She presents with the following impairments/functional limitations:  weakness, impaired endurance, impaired sensation, impaired functional mobilty, gait instability, impaired balance, decreased safety awareness .    Rehab Prognosis: Good; patient would benefit from acute skilled PT services to address these deficits and reach maximum level of function.    Recent Surgery: Procedure(s) (LRB):  DISCECTOMY, SPINE, CERVICAL, ANTERIOR APPROACH, WITH FUSION (Bilateral) 3 Days Post-Op    Plan:     During this hospitalization, patient to be seen daily to address the identified rehab impairments via gait training, therapeutic activities, therapeutic exercises, neuromuscular re-education and progress toward the following goals:    · Plan of Care Expires:  05/23/22    Subjective     Chief Complaint: none stated  Patient/Family Comments/goals: to get stronger  Pain/Comfort:  · Pain Rating 1: 0/10      Objective:     Communicated with RN prior to session.  Patient found HOB elevated with PureWick, peripheral IV, SCD upon PT entry to room.     General Precautions: Standard, fall   Orthopedic Precautions:spinal precautions   Braces: Cervical collar  Respiratory Status: Room air     Functional Mobility:  · Bed Mobility:  Supine to Sit: stand by assistance  · Transfers:  Sit to Stand:  maximal assistance and of 2 persons with hand-held assist  · Bed to Chair: maximal assistance and of 2 persons with  no AD  using  Squat Pivot      AM-PAC 6 CLICK MOBILITY  Turning over in bed (including adjusting bedclothes, sheets and blankets)?: 4  Sitting down on  and standing up from a chair with arms (e.g., wheelchair, bedside commode, etc.): 2  Moving from lying on back to sitting on the side of the bed?: 4  Moving to and from a bed to a chair (including a wheelchair)?: 2  Need to walk in hospital room?: 1  Climbing 3-5 steps with a railing?: 1  Basic Mobility Total Score: 14       Therapeutic Activities and Exercises:   Pt tolerated PT treatment well, eager to participate. Patient able to mobilize to EOB with SBA for first time since surgery with HOB elevated, used hand rail. Pt able to maintain static sitting balance at EOB w/ SBA. Pt performed sit<>stand x2 trials w/ maxA x2 with HHA and bilateral knee blocking. Patient's LE sensation re-assessed, patient unable to localize light touch in BLEs and presents with impaired proprioception at great toe bilaterally. Patient required maxA for squat pivot transfer from bed to chair. Pt is appropriate for continued acute PT services with recommended d/c to IP rehab facility.     Patient left up in chair with all lines intact, call button in reach, RN notified and itzel pad positioned under patient...    GOALS:   Multidisciplinary Problems     Physical Therapy Goals        Problem: Physical Therapy    Goal Priority Disciplines Outcome Goal Variances Interventions   Physical Therapy Goal     PT, PT/OT Ongoing, Progressing     Description: Goals to be met by: 22     Patient will increase functional independence with mobility by performin. Supine to sit with Stand-by Assistance  2. Sit to stand transfer with Minimal Assistance  3. Gait  x 150ft feet with Minimal Assistance using Rolling Walker.                      Time Tracking:     PT Received On: 22  PT Start Time: 838     PT Stop Time: 853  PT Total Time (min): 15 min     Billable Minutes: Therapeutic Activity 15min.     Treatment Type: Treatment  PT/PTA: PT     PTA Visit Number: 3     2022

## 2022-05-12 NOTE — PT/OT/SLP PROGRESS
Occupational Therapy  Treatment    Natty Nelson   MRN: [id number]   Admitting Diagnosis: Cervical stenosis of spinal canal    OT Date of Treatment: 05/12/22   OT Start Time: 1425  OT Stop Time: 1450  OT Total Time (min): 25 min     Billable Minutes:  Therapeutic Activity 25  Total Minutes: 25     OT/CLAYTON: CLAYTON     CLAYTON Visit Number: 5    General Precautions: Standard, fall  Orthopedic Precautions:    Braces: Cervical collar    Spiritual, Cultural Beliefs, Buddhist Practices, Values that Affect Care: no    Subjective:  Communicated with RN prior to session.    Pain/Comfort  Pain Rating 1: 0/10  Pain Rating Post-Intervention 1: 0/10  Pain Rating 2: 0/10  Pain Rating Post-Intervention 2: 0/10    Objective:       Functional Mobility:  Bed Mobility:   Sit to supine: Moderate Assistance   Scooting: Maximum Assistance    Transfer Training:   Bed <> Chair:  Squat Pivot with Maximum Assistance with gaitbelt unable to turn on first attempt, repositioned patients feet, completed on second attempt      Additional Treatment:  Patient also completed 1 set of 5 reps sit to stand from edge of bed with Mod/Max A.  Pt improving with strength and activity tolerance.    Patient left HOB elevated with call button in reach    ASSESSMENT:  Natty Nelson is a 82 y.o. female with a medical diagnosis of Cervical stenosis of spinal canal and presents with decreased ADL skills, decreased mobility and decreased ax tolerance.    Rehab potential is good    Activity tolerance: Good    Discharge recommendations: rehabilitation facility       GOALS:   Multidisciplinary Problems     Occupational Therapy Goals        Problem: Occupational Therapy    Goal Priority Disciplines Outcome Interventions   Occupational Therapy Goal     OT, PT/OT Ongoing, Progressing    Description: Goals to be met by: 05/23/2022    Patient will increase functional independence with ADLs by performing:    Grooming while seated with Stand-by Assistance.  Sitting at edge of bed  x10 minutes with Stand-by Assistance.  Toilet transfer to bedside commode with Moderate Assistance.                     Plan:  Patient to be seen 5 x/week to address the above listed problems via self-care/home management, therapeutic activities, therapeutic exercises  Plan of Care expires: 05/23/22  Plan of Care reviewed with: patient         05/12/2022

## 2022-05-12 NOTE — PROGRESS NOTES
[facility]  Hospital Medicine Progress Note        Chief Complaint: Inpatient Follow-up for Lucien leg weakness     HPI:   Natty Nelson is a 82 y.o. female who was transferred from [facility] ED with complaint of weakness and MRI cervical spine showing severe stenosis and cord compression.  Patient has been suffering from progressive weakness of lower extremities> upper extremities over the past 2 to 3 months, she has been having multiple falls, denies low of bowel or bladder control. 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 On arrival to ED she was hemodynamically stable and afebrile.  Labs notable for LALIT on CKD and mild hypokalemia.  MRI imaging as described below.  Neurosurgery consulted and referred to hospital medicine service for further evaluation and management. Seen by neurosurgery team and recommended surgical intervention. She is s/p   DISCECTOMY, SPINE, CERVICAL, ANTERIOR APPROACH, WITH FUSION (Bilateral)  ACDF C56/ fusion, medtronic      Interval Hx:   Patient today awake and comfortable sitting up in the chair. Needing full assist for ADLs. Has been afebrile.       Objective/physical exam:  General: In no acute distress, afebrile, + Obese   Chest: Clear to auscultation bilaterally  Heart: RRR +S1, S2, no appreciable murmur  Abdomen: Soft, nontender, BS +  MSK: Warm, no lower extremity edema, no clubbing or cyanosis  Neurologic: Alert and oriented x4, Lucien UE strength 5/5, LE strength 4/5     VITAL SIGNS: 24 HRS MIN & MAX LAST   Temp  Min: 97.5 °F (36.4 °C)  Max: 99 °F (37.2 °C) 98.5 °F (36.9 °C)   BP  Min: 102/56  Max: 158/66 (!) 158/66   Pulse  Min: 71  Max: 77  77   Resp  Min: 18  Max: 19 18   SpO2  Min: 97 %  Max: 100 % 98 %       Recent Labs   Lab 05/09/22  0424 05/10/22  0440 05/11/22  0431   WBC 16.8* 15.4* 14.3*   RBC 2.99* 3.09* 2.85*   HGB 9.3*  9.6* 9.0*   HCT 29.3* 31.0* 26.3*   MCV 98.0* 100.3* 92.3   MCH 31.1* 31.1* 31.6*   MCHC 31.7* 31.0* 34.2   RDW 14.1 14.0 14.0    197 181   MPV 13.0* 12.9* 12.7*       Recent Labs   Lab 05/05/22  2000 05/06/22  0424 05/08/22  0357 05/09/22  0424 05/10/22  0440 05/11/22  0436   * 131*   < > 138 137 133*   K 5.3* 5.1   < > 4.6 4.6 4.5   CO2 23 23   < > 23 22* 23   BUN 77.1* 74.3*   < > 32.4* 23.5* 29.4*   CREATININE 2.05* 1.72*   < > 1.10* 1.07* 1.07*   CALCIUM 9.6 9.6   < > 9.1 8.9 8.9   ALBUMIN 3.3* 3.0*  --   --   --   --    ALKPHOS 98 85  --   --   --   --    ALT 34 31  --   --   --   --    AST 23 19  --   --   --   --    BILITOT 0.2 0.3  --   --   --   --     < > = values in this interval not displayed.          Microbiology Results (last 7 days)     ** No results found for the last 168 hours. **               Scheduled Med:   amLODIPine  10 mg Oral Daily    atorvastatin  80 mg Oral Daily    baclofen  5 mg Oral BID    brimonidine 0.15 % OPTH DROP  1 drop Both Eyes BID    dexAMETHasone  4 mg Oral Q12H    enoxaparin  30 mg Subcutaneous Daily    hydrALAZINE  50 mg Oral Q8H    insulin aspart U-100  12 Units Subcutaneous TIDWM    insulin detemir U-100  40 Units Subcutaneous QHS    labetaloL  200 mg Oral BID    latanoprost  1 drop Both Eyes Nightly    pantoprazole  40 mg Oral Daily    polyethylene glycol  17 g Oral Daily    pregabalin  75 mg Oral BID    senna-docusate 8.6-50 mg  1 tablet Oral BID    sucralfate  1 g Oral QID (AC & HS)    timolol maleate 0.25%  1 drop Both Eyes BID        Continuous Infusions:       PRN Meds:  acetaminophen, albuterol-ipratropium, aluminum-magnesium hydroxide-simethicone, dextrose 10%, dextrose 10%, glucagon (human recombinant), glucose, glucose, hydrALAZINE, HYDROcodone-acetaminophen, HYDROcodone-acetaminophen, HYDROmorphone, insulin aspart U-100, lorazepam, magnesium hydroxide 400 mg/5 ml, methocarbamoL, morphine, naloxone, ondansetron, prochlorperazine,  prochlorperazine, sodium chloride 0.9%       Assessment/Plan:  Severe cervical stenosis with cord compression Vs 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 Needing full assist for ADLs.   Cont OT/PTx   Cont PO steroids BID dose, wean per neurosurgery team   For her hyperglycemia will cont Levemir and lispro, adjust dose to keep blood sugars <180    Cont po DM diet   Strict aspiration and fall precautions   No NSAIDs       Labs in am    Cont supportive care         Anticipated discharge and Disposition: ? SNF vs Rehab       All diagnosis and differential diagnosis have been reviewed; assessment and plan has been documented; I have personally reviewed the labs and test results that are presently available; I have reviewed the patients medication list; I have reviewed the consulting providers response and recommendations. I have reviewed or attempted to review medical records based upon their availability    All of the patient's questions have been  addressed and answered. Patient's is agreeable to the above stated plan. I will continue to monitor closely and make adjustments to medical management as needed.      Nutrition Status:        Rolando Madera MD   05/12/2022

## 2022-05-13 LAB
POCT GLUCOSE: 164 MG/DL (ref 70–110)
POCT GLUCOSE: 222 MG/DL (ref 70–110)
POCT GLUCOSE: 225 MG/DL (ref 70–110)
POCT GLUCOSE: 320 MG/DL (ref 70–110)

## 2022-05-13 PROCEDURE — 63600175 PHARM REV CODE 636 W HCPCS: Performed by: PHYSICIAN ASSISTANT

## 2022-05-13 PROCEDURE — 63600175 PHARM REV CODE 636 W HCPCS: Performed by: NURSE PRACTITIONER

## 2022-05-13 PROCEDURE — 97530 THERAPEUTIC ACTIVITIES: CPT | Mod: CQ

## 2022-05-13 PROCEDURE — 97110 THERAPEUTIC EXERCISES: CPT | Mod: CQ

## 2022-05-13 PROCEDURE — 25000003 PHARM REV CODE 250: Performed by: INTERNAL MEDICINE

## 2022-05-13 PROCEDURE — C9399 UNCLASSIFIED DRUGS OR BIOLOG: HCPCS | Performed by: INTERNAL MEDICINE

## 2022-05-13 PROCEDURE — 97168 OT RE-EVAL EST PLAN CARE: CPT

## 2022-05-13 PROCEDURE — 63600175 PHARM REV CODE 636 W HCPCS: Performed by: INTERNAL MEDICINE

## 2022-05-13 PROCEDURE — 11000001 HC ACUTE MED/SURG PRIVATE ROOM

## 2022-05-13 RX ORDER — INSULIN ASPART 100 [IU]/ML
15 INJECTION, SOLUTION INTRAVENOUS; SUBCUTANEOUS
Status: DISCONTINUED | OUTPATIENT
Start: 2022-05-13 | End: 2022-05-17 | Stop reason: HOSPADM

## 2022-05-13 RX ADMIN — LABETALOL HYDROCHLORIDE 200 MG: 200 TABLET, FILM COATED ORAL at 10:05

## 2022-05-13 RX ADMIN — PANTOPRAZOLE SODIUM 40 MG: 40 TABLET, DELAYED RELEASE ORAL at 10:05

## 2022-05-13 RX ADMIN — SUCRALFATE 1 G: 1 TABLET ORAL at 04:05

## 2022-05-13 RX ADMIN — ENOXAPARIN SODIUM 30 MG: 30 INJECTION SUBCUTANEOUS at 04:05

## 2022-05-13 RX ADMIN — TIMOLOL MALEATE 1 DROP: 2.5 SOLUTION/ DROPS OPHTHALMIC at 10:05

## 2022-05-13 RX ADMIN — SUCRALFATE 1 G: 1 TABLET ORAL at 10:05

## 2022-05-13 RX ADMIN — SUCRALFATE 1 G: 1 TABLET ORAL at 06:05

## 2022-05-13 RX ADMIN — SUCRALFATE 1 G: 1 TABLET ORAL at 12:05

## 2022-05-13 RX ADMIN — LATANOPROST 1 DROP: 50 SOLUTION OPHTHALMIC at 10:05

## 2022-05-13 RX ADMIN — SENNOSIDES, DOCUSATE SODIUM 1 TABLET: 8.6; 5 TABLET ORAL at 10:05

## 2022-05-13 RX ADMIN — BACLOFEN 5 MG: 5 TABLET ORAL at 10:05

## 2022-05-13 RX ADMIN — PREGABALIN 75 MG: 75 CAPSULE ORAL at 10:05

## 2022-05-13 RX ADMIN — ATORVASTATIN CALCIUM 80 MG: 40 TABLET, FILM COATED ORAL at 10:05

## 2022-05-13 RX ADMIN — HYDRALAZINE HYDROCHLORIDE 50 MG: 50 TABLET ORAL at 06:05

## 2022-05-13 RX ADMIN — INSULIN DETEMIR 50 UNITS: 100 INJECTION, SOLUTION SUBCUTANEOUS at 10:05

## 2022-05-13 RX ADMIN — HYDRALAZINE HYDROCHLORIDE 50 MG: 50 TABLET ORAL at 04:05

## 2022-05-13 RX ADMIN — SUCRALFATE 1 G: 1 TABLET ORAL at 05:05

## 2022-05-13 RX ADMIN — AMLODIPINE BESYLATE 10 MG: 5 TABLET ORAL at 10:05

## 2022-05-13 RX ADMIN — INSULIN ASPART 3 UNITS: 100 INJECTION, SOLUTION INTRAVENOUS; SUBCUTANEOUS at 10:05

## 2022-05-13 RX ADMIN — BRIMONIDINE TARTRATE 1 DROP: 1.5 SOLUTION OPHTHALMIC at 10:05

## 2022-05-13 RX ADMIN — DEXAMETHASONE 4 MG: 4 TABLET ORAL at 10:05

## 2022-05-13 RX ADMIN — INSULIN ASPART 15 UNITS: 100 INJECTION, SOLUTION INTRAVENOUS; SUBCUTANEOUS at 04:05

## 2022-05-13 RX ADMIN — INSULIN ASPART 2 UNITS: 100 INJECTION, SOLUTION INTRAVENOUS; SUBCUTANEOUS at 05:05

## 2022-05-13 RX ADMIN — INSULIN ASPART 1 UNITS: 100 INJECTION, SOLUTION INTRAVENOUS; SUBCUTANEOUS at 07:05

## 2022-05-13 RX ADMIN — INSULIN ASPART 12 UNITS: 100 INJECTION, SOLUTION INTRAVENOUS; SUBCUTANEOUS at 07:05

## 2022-05-13 RX ADMIN — INSULIN ASPART 2 UNITS: 100 INJECTION, SOLUTION INTRAVENOUS; SUBCUTANEOUS at 01:05

## 2022-05-13 RX ADMIN — INSULIN ASPART 15 UNITS: 100 INJECTION, SOLUTION INTRAVENOUS; SUBCUTANEOUS at 12:05

## 2022-05-13 RX ADMIN — HYDRALAZINE HYDROCHLORIDE 50 MG: 50 TABLET ORAL at 10:05

## 2022-05-13 NOTE — PT/OT/SLP PROGRESS
Physical Therapy         Treatment        Natty Nelson   MRN: [id number]     PT Received On: 05/13/22  PT Start Time: 0815     PT Stop Time: 0841    PT Total Time (min): 26 min       Billable Minutes:  Therapeutic Activity 16 and Therapeutic Exercise 10  Total Minutes: 26    Treatment Type: Treatment  PT/PTA: PTA     PTA Visit Number: 4       General Precautions: Standard, fall  Orthopedic Precautions: Orthopedic Precautions : spinal precautions   Braces:      Spiritual, Cultural Beliefs, Mandaen Practices, Values that Affect Care: no      Objective:  Patient found in bed upon entry    Functional Mobility:  Bed Mobility:   Supine to sit: Moderate Assistance   Sit to supine: Moderate Assistance   Rolling: Moderate Assistance   Scooting: Moderate Assistance    Balance:   Static Sit: Min A    Static Stand: Mod A  - Pt stood EOB X 2 trials for approx 30 secs each trial. Pt presents with hyperextension of DAVID knee joint and forward flexed posture. TC's to sternum and VC's for trunk extension    Dynamic stand:   Pt performed marching in place at EOB. Pt presents with decreased coordination and proprioception of BLE.     Transfer Training:    Bed > Chair:  Squat Pivot with Maximum Assistance with gait belt     Pt performed sit-to-stand from EOB with Max A.      Additional Treatment:    Therapeutic Exercises  BLE (heel slides, SAQ, hip abd/add) X 12 reps in supine    Pt performed heel to shin slides in sitting and supine X 10 reps to each LE.     Activity Tolerance:  Patient tolerated treatment well    Patient left up in chair with call button in reach.    Assessment:  Natty Nelson is a 82 y.o. female with a medical diagnosis of Cervical stenosis of spinal canal.    Rehab potential is excellent.    Activity tolerance: Excellent    Discharge recommendations: Discharge Facility/Level of Care Needs: rehabilitation facility     Equipment recommendations:       GOALS:   Multidisciplinary Problems     Physical Therapy Goals         Problem: Physical Therapy    Goal Priority Disciplines Outcome Goal Variances Interventions   Physical Therapy Goal     PT, PT/OT Ongoing, Progressing     Description: Goals to be met by: 22     Patient will increase functional independence with mobility by performin. Supine to sit with Stand-by Assistance  2. Sit to stand transfer with Minimal Assistance  3. Gait  x 150ft feet with Minimal Assistance using Rolling Walker.                      PLAN:    Patient to be seen daily  to address the above listed problems via gait training, therapeutic activities, therapeutic exercises, neuromuscular re-education  Plan of Care expires: 22  Plan of Care reviewed with: patient         2022

## 2022-05-13 NOTE — PT/OT/SLP RE-EVAL
Occupational Therapy   Re-evaluation    Name: Natty Nelson  MRN: [id number]  Admitting Diagnosis:  Cervical stenosis of spinal canal  Recent Surgery: Procedure(s) (LRB):  DISCECTOMY, SPINE, CERVICAL, ANTERIOR APPROACH, WITH FUSION (Bilateral) 4 Days Post-Op    Recommendations:     Discharge Recommendations: rehabilitation facility  Discharge Equipment Recommendations:   TBD  Barriers to discharge: Severity of Deficits    Assessment:     Natty Nelson is a 82 y.o. female with a medical diagnosis of Cervical stenosis of spinal canal. Performance deficits affecting function are weakness, gait instability, impaired coordination, decreased lower extremity function, impaired balance, impaired endurance, impaired sensation, impaired fine motor, orthopedic precautions, impaired self care skills, impaired functional mobilty. Pt is making good progress toward OT goals. Pt demonstrates improvements in strength and mobility. Despite such gains, pt cont to require significant assistance, mod-max A. Pt would benefit from AE to address LE dressing at next session. OT recs for d/c include IPR.    Rehab Prognosis:  Good; patient would benefit from acute skilled OT services to address these deficits and reach maximum level of function.       Plan:     Patient to be seen 5 x/week, daily to address the above listed problems via neuromuscular re-education, self-care/home management, therapeutic activities, therapeutic exercises  · Plan of Care Expires: 05/27/22  · Plan of Care Reviewed with: patient    Subjective     Chief Complaint: None at this time  Patient/Family stated goals: To get well  Communicated with: RN prior to session.  Pain/Comfort:  · Pain Rating 1: 0/10    Objective:     Communicated with: RN prior to session.  Patient found up in chair with cervical collar and itzel pad placed underneath upon OT entry to room.    General Precautions: Standard, fall   Orthopedic Precautions:spinal precautions   Braces: Cervical  collar  Respiratory Status: Room air    Occupational Performance:    Bed Mobility:    · Not performed    Functional Mobility/Transfers:  · Patient completed Sit <> Stand Transfer with moderate assistance and of 2 persons  with  B knee blocking     Activities of Daily Living:  · Lower Body Dressing: total assistance to don/doff B socks. Pt would benefit from AE    Cognitive/Visual Perceptual:  Cognitive/Psychosocial Skills:     -       Oriented to: Person, Place, Time and Situation   -       Follows Commands/attention:Follows multistep  commands  -       Safety awareness/insight to disability: intact     Physical Exam:  Sensation:    -       Impaired  Pt reports intact light touch, impaired sharp sensation  Upper Extremity Range of Motion:     -       Right Upper Extremity: WNL  -       Left Upper Extremity: WNL  Upper Extremity Strength:    -       Right Upper Extremity: 3+/5; shoulder hiking noted when testing biceps, verbal and tactile cues to correct  -       Left Upper Extremity: 4-/5  Fine Motor Coordination:    -       Left hand, finger to nose impaired yet functional; Right hand, finger to nose impaired yet functional  -       Left hand thumb/finger opposition skills impaired and Right hand thumb/finger opposition skills impaired    AMPAC 6 Click:  AMPAC Total Score: 15    Treatment & Education:  Education:  Pt able to recall all cervical pxns    Patient left up in chair with call button in reach and RN notified    GOALS:   Multidisciplinary Problems     Occupational Therapy Goals        Problem: Occupational Therapy    Goal Priority Disciplines Outcome Interventions   Occupational Therapy Goal     OT, PT/OT Ongoing, Progressing    Description: Goals to be met by: 05/23/2022    Patient will increase functional independence with ADLs by performing:    Grooming while seated with Stand-by Assistance.  Standing during functional ax x5 minutes with Minimal Assistance.  Toilet transfer to bedside commode with  Moderate Assistance.  Pt will improve UE strength to 4/5 in order to improve IND w/ ADLs by time of d/c.                     History:     Past Medical History:   Diagnosis Date    Arthritis     Diabetes mellitus     Hypertension        Past Surgical History:   Procedure Laterality Date    ANTERIOR CERVICAL DISCECTOMY W/ FUSION Bilateral 5/9/2022    Procedure: DISCECTOMY, SPINE, CERVICAL, ANTERIOR APPROACH, WITH FUSION;  Surgeon: Toni Joseph MD;  Location: [facility];  Service: Neurosurgery;  Laterality: Bilateral;  ACDF C5/6       Time Tracking:     OT Date of Treatment: 05/13/22  OT Start Time: 1048  OT Stop Time: 1104  OT Total Time (min): 16 min    Billable Minutes:Re-eval 16 Minute Re-eval    5/13/2022

## 2022-05-13 NOTE — PLAN OF CARE
Rehab options discussed, verbal FOC obtained. Referral sent to [facility] via care\[facility] [place] [facility]\""

## 2022-05-13 NOTE — PLAN OF CARE
Problem: Occupational Therapy  Goal: Occupational Therapy Goal  Description: Goals to be met by: 05/23/2022    Patient will increase functional independence with ADLs by performing:    Grooming while seated with Stand-by Assistance.  Standing during functional ax x5 minutes with Minimal Assistance.  Toilet transfer to bedside commode with Moderate Assistance.  Pt will improve UE strength to 4/5 in order to improve IND w/ ADLs by time of d/c.    Outcome: Ongoing, Progressing   POC updated to reflect current functional status.

## 2022-05-13 NOTE — PLAN OF CARE
Problem: Occupational Therapy  Goal: Occupational Therapy Goal  Description: Goals to be met by: 05/23/2022    Patient will increase functional independence with ADLs by performing:    Grooming while seated with Stand-by Assistance.  Standing during functional ax x5 minutes with Minimal Assistance.  Toilet transfer to bedside commode with Moderate Assistance.  Pt will improve UE strength to 4/5 in order to improve IND w/ ADLs by time of d/c.  Pt will perform LE dressing w/ min A using AE to improve IND w/ ADLs by time of d/c.    5/13/2022 1442 by Tucker Lara, OT  Outcome: Ongoing, Progressing  5/13/2022 1423 by Tucker Lara OT  Outcome: Ongoing, Progressing   POC updated

## 2022-05-13 NOTE — PROGRESS NOTES
POD#4ACDF C5-6      She is sitting up in bed, NAD  She states she is completely pain-free today.  Her pre op weakness continues to improve  She is tolerating PO    AFVSS  Strength improving in LE  Numbness improved in UE  Incision c/d/i      Plan:     Continue with daily dressing changes  PT/OT  Fall precautions  We are weaning decadron  SCDs and lovenox for DVT prophylaxis  Placement-okay to discharge to rehab when accepted.        [facility] - 9th Floor Med Surg  Neurosurgery  Progress Note          Post-Op Info:  Procedure(s) (LRB):  DISCECTOMY, SPINE, CERVICAL, ANTERIOR APPROACH, WITH FUSION (Bilateral)   4 Days Post-Op      Medications:  Continuous Infusions:  Scheduled Meds:   amLODIPine  10 mg Oral Daily    atorvastatin  80 mg Oral Daily    baclofen  5 mg Oral BID    brimonidine 0.15 % OPTH DROP  1 drop Both Eyes BID    dexAMETHasone  4 mg Oral Q12H    enoxaparin  30 mg Subcutaneous Daily    hydrALAZINE  50 mg Oral Q8H    insulin aspart U-100  15 Units Subcutaneous TIDWM    insulin detemir U-100  50 Units Subcutaneous QHS    labetaloL  200 mg Oral BID    latanoprost  1 drop Both Eyes Nightly    pantoprazole  40 mg Oral Daily    polyethylene glycol  17 g Oral Daily    pregabalin  75 mg Oral BID    senna-docusate 8.6-50 mg  1 tablet Oral BID    sucralfate  1 g Oral QID (AC & HS)    timolol maleate 0.25%  1 drop Both Eyes BID     PRN Meds:acetaminophen, albuterol-ipratropium, aluminum-magnesium hydroxide-simethicone, dextrose 10%, dextrose 10%, glucagon (human recombinant), glucose, glucose, hydrALAZINE, HYDROcodone-acetaminophen, HYDROcodone-acetaminophen, HYDROmorphone, insulin aspart U-100, lorazepam, magnesium hydroxide 400 mg/5 ml, methocarbamoL, morphine, naloxone, ondansetron, prochlorperazine, prochlorperazine, sodium chloride 0.9%     Review of Systems  Objective:     Weight: 85.7 kg (188 lb 15 oz)  Body mass index is 34.56 kg/m².  Vital Signs (Most Recent):  Temp: 98.2  °F (36.8 °C) (05/13/22 0700)  Pulse: 64 (05/13/22 1016)  Resp: 20 (05/13/22 0700)  BP: (!) 183/86 (05/13/22 1016)  SpO2: 100 % (05/13/22 0700) Vital Signs (24h Range):  Temp:  [97.5 °F (36.4 °C)-99.2 °F (37.3 °C)] 98.2 °F (36.8 °C)  Pulse:  [64-89] 64  Resp:  [18-20] 20  SpO2:  [97 %-100 %] 100 %  BP: (104-183)/(51-86) 183/86                          Neurosurgery Physical Exam    Significant Labs:  No results for input(s): GLU, NA, K, CL, CO2, BUN, CREATININE, CALCIUM, MG in the last 48 hours.  No results for input(s): WBC, HGB, HCT, PLT in the last 48 hours.  No results for input(s): LABPT, INR, APTT in the last 48 hours.  Microbiology Results (last 7 days)     ** No results found for the last 168 hours. **          Significant Diagnostics:      Assessment/Plan:     Active Diagnoses:    Diagnosis Date Noted POA    PRINCIPAL PROBLEM:  Cervical stenosis of spinal canal [M48.02] 05/07/2022 Yes      Problems Resolved During this Admission:       Yaritza Mcduffie [facility]-BC  Neurosurgery  [facility] - 9th Floor Med Surg

## 2022-05-13 NOTE — PROGRESS NOTES
[facility]  Hospital Medicine Progress Note        Chief Complaint: Inpatient Follow-up for Lucien leg weakness     HPI:   Natty Nelson is a 82 y.o. female who was transferred from [facility] ED with complaint of weakness and MRI cervical spine showing severe stenosis and cord compression.  Patient has been suffering from progressive weakness of lower extremities> upper extremities over the past 2 to 3 months, she has been having multiple falls, denies low of bowel or bladder control. 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 On arrival to ED she was hemodynamically stable and afebrile.  Labs notable for LALIT on CKD and mild hypokalemia.  MRI imaging as described below.  Neurosurgery consulted and referred to hospital medicine service for further evaluation and management. Seen by neurosurgery team and recommended surgical intervention. She is s/p   DISCECTOMY, SPINE, CERVICAL, ANTERIOR APPROACH, WITH FUSION (Bilateral) ACDF C56/ fusion, medtronic.    Patient had no post operative issues. She was participating with PTx but still weak. Rehab was recommended and patient ok with rehab placement.       Interval Hx:   Patient today awake and comfortable sitting up in the chair. Has lucien leg weakness and unable to ambulate by self. Needing full assist to ambulate.     Objective/physical exam:  General: In no acute distress, afebrile, + Obese   Chest: Clear to auscultation bilaterally  Heart: RRR +S1, S2, no appreciable murmur  Abdomen: Soft, nontender, BS +  MSK: Warm, no lower extremity edema, no clubbing or cyanosis  Neurologic: Alert and oriented x4, Lucien UE strength 5/5, LE strength 4/5     VITAL SIGNS: 24 HRS MIN & MAX LAST   Temp  Min: 97.8 °F (36.6 °C)  Max: 99.2 °F (37.3 °C) 98.2 °F (36.8 °C)   BP  Min: 125/53  Max: 183/86 (!) 183/86   Pulse  Min: 64  Max: 89  64   Resp   Min: 18  Max: 20 20   SpO2  Min: 97 %  Max: 100 % 100 %       Recent Labs   Lab 05/09/22  0424 05/10/22  0440 05/11/22  0431   WBC 16.8* 15.4* 14.3*   RBC 2.99* 3.09* 2.85*   HGB 9.3* 9.6* 9.0*   HCT 29.3* 31.0* 26.3*   MCV 98.0* 100.3* 92.3   MCH 31.1* 31.1* 31.6*   MCHC 31.7* 31.0* 34.2   RDW 14.1 14.0 14.0    197 181   MPV 13.0* 12.9* 12.7*       Recent Labs   Lab 05/09/22  0424 05/10/22  0440 05/11/22  0436    137 133*   K 4.6 4.6 4.5   CO2 23 22* 23   BUN 32.4* 23.5* 29.4*   CREATININE 1.10* 1.07* 1.07*   CALCIUM 9.1 8.9 8.9          Microbiology Results (last 7 days)     ** No results found for the last 168 hours. **               Scheduled Med:   amLODIPine  10 mg Oral Daily    atorvastatin  80 mg Oral Daily    baclofen  5 mg Oral BID    brimonidine 0.15 % OPTH DROP  1 drop Both Eyes BID    enoxaparin  30 mg Subcutaneous Daily    hydrALAZINE  50 mg Oral Q8H    insulin aspart U-100  15 Units Subcutaneous TIDWM    insulin detemir U-100  50 Units Subcutaneous QHS    labetaloL  200 mg Oral BID    latanoprost  1 drop Both Eyes Nightly    pantoprazole  40 mg Oral Daily    polyethylene glycol  17 g Oral Daily    pregabalin  75 mg Oral BID    senna-docusate 8.6-50 mg  1 tablet Oral BID    sucralfate  1 g Oral QID (AC & HS)    timolol maleate 0.25%  1 drop Both Eyes BID        Continuous Infusions:       PRN Meds:  acetaminophen, albuterol-ipratropium, aluminum-magnesium hydroxide-simethicone, dextrose 10%, dextrose 10%, glucagon (human recombinant), glucose, glucose, hydrALAZINE, HYDROcodone-acetaminophen, HYDROcodone-acetaminophen, HYDROmorphone, insulin aspart U-100, lorazepam, magnesium hydroxide 400 mg/5 ml, methocarbamoL, morphine, naloxone, ondansetron, prochlorperazine, prochlorperazine, sodium chloride 0.9%       Assessment/Plan:  Severe cervical stenosis with cord compression Vs 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   Cont OT/PTx   Cont PO steroids BID dose, wean per neurosurgery team   For her hyperglycemia will cont Levemir and lispro, adjust dose to keep blood sugars <180    Cont po DM diet   Strict aspiration and fall precautions   No NSAIDs       Labs in am    Cont supportive care         Anticipated discharge and Disposition: ? SNF vs Rehab       All diagnosis and differential diagnosis have been reviewed; assessment and plan has been documented; I have personally reviewed the labs and test results that are presently available; I have reviewed the patients medication list; I have reviewed the consulting providers response and recommendations. I have reviewed or attempted to review medical records based upon their availability    All of the patient's questions have been  addressed and answered. Patient's is agreeable to the above stated plan. I will continue to monitor closely and make adjustments to medical management as needed.      Nutrition Status:        Rolando Madera MD   05/13/2022

## 2022-05-14 LAB
POCT GLUCOSE: 153 MG/DL (ref 70–110)
POCT GLUCOSE: 178 MG/DL (ref 70–110)
POCT GLUCOSE: 225 MG/DL (ref 70–110)
POCT GLUCOSE: 258 MG/DL (ref 70–110)
POCT GLUCOSE: 285 MG/DL (ref 70–110)
POCT GLUCOSE: 94 MG/DL (ref 70–110)

## 2022-05-14 PROCEDURE — 25000003 PHARM REV CODE 250: Performed by: INTERNAL MEDICINE

## 2022-05-14 PROCEDURE — 63600175 PHARM REV CODE 636 W HCPCS: Performed by: INTERNAL MEDICINE

## 2022-05-14 PROCEDURE — 99900035 HC TECH TIME PER 15 MIN (STAT)

## 2022-05-14 PROCEDURE — 94761 N-INVAS EAR/PLS OXIMETRY MLT: CPT

## 2022-05-14 PROCEDURE — 25000003 PHARM REV CODE 250: Performed by: STUDENT IN AN ORGANIZED HEALTH CARE EDUCATION/TRAINING PROGRAM

## 2022-05-14 PROCEDURE — 11000001 HC ACUTE MED/SURG PRIVATE ROOM

## 2022-05-14 PROCEDURE — C9399 UNCLASSIFIED DRUGS OR BIOLOG: HCPCS | Performed by: INTERNAL MEDICINE

## 2022-05-14 PROCEDURE — 63600175 PHARM REV CODE 636 W HCPCS: Performed by: NURSE PRACTITIONER

## 2022-05-14 RX ORDER — AMLODIPINE BESYLATE 5 MG/1
5 TABLET ORAL DAILY
Status: DISCONTINUED | OUTPATIENT
Start: 2022-05-14 | End: 2022-05-14

## 2022-05-14 RX ORDER — VALSARTAN 80 MG/1
80 TABLET ORAL DAILY
Status: DISCONTINUED | OUTPATIENT
Start: 2022-05-14 | End: 2022-05-17 | Stop reason: HOSPADM

## 2022-05-14 RX ADMIN — SUCRALFATE 1 G: 1 TABLET ORAL at 01:05

## 2022-05-14 RX ADMIN — BACLOFEN 5 MG: 5 TABLET ORAL at 08:05

## 2022-05-14 RX ADMIN — BRIMONIDINE TARTRATE 1 DROP: 1.5 SOLUTION OPHTHALMIC at 08:05

## 2022-05-14 RX ADMIN — PREGABALIN 75 MG: 75 CAPSULE ORAL at 08:05

## 2022-05-14 RX ADMIN — SENNOSIDES, DOCUSATE SODIUM 1 TABLET: 8.6; 5 TABLET ORAL at 08:05

## 2022-05-14 RX ADMIN — INSULIN ASPART 1 UNITS: 100 INJECTION, SOLUTION INTRAVENOUS; SUBCUTANEOUS at 05:05

## 2022-05-14 RX ADMIN — HYDRALAZINE HYDROCHLORIDE 50 MG: 50 TABLET ORAL at 08:05

## 2022-05-14 RX ADMIN — PANTOPRAZOLE SODIUM 40 MG: 40 TABLET, DELAYED RELEASE ORAL at 08:05

## 2022-05-14 RX ADMIN — LABETALOL HYDROCHLORIDE 200 MG: 200 TABLET, FILM COATED ORAL at 08:05

## 2022-05-14 RX ADMIN — TIMOLOL MALEATE 1 DROP: 2.5 SOLUTION/ DROPS OPHTHALMIC at 08:05

## 2022-05-14 RX ADMIN — VALSARTAN 80 MG: 80 TABLET, FILM COATED ORAL at 05:05

## 2022-05-14 RX ADMIN — INSULIN ASPART 15 UNITS: 100 INJECTION, SOLUTION INTRAVENOUS; SUBCUTANEOUS at 05:05

## 2022-05-14 RX ADMIN — HYDRALAZINE HYDROCHLORIDE 50 MG: 50 TABLET ORAL at 05:05

## 2022-05-14 RX ADMIN — SUCRALFATE 1 G: 1 TABLET ORAL at 05:05

## 2022-05-14 RX ADMIN — LATANOPROST 1 DROP: 50 SOLUTION OPHTHALMIC at 08:05

## 2022-05-14 RX ADMIN — HYDRALAZINE HYDROCHLORIDE 50 MG: 50 TABLET ORAL at 01:05

## 2022-05-14 RX ADMIN — SUCRALFATE 1 G: 1 TABLET ORAL at 08:05

## 2022-05-14 RX ADMIN — ATORVASTATIN CALCIUM 80 MG: 40 TABLET, FILM COATED ORAL at 08:05

## 2022-05-14 RX ADMIN — INSULIN DETEMIR 50 UNITS: 100 INJECTION, SOLUTION SUBCUTANEOUS at 08:05

## 2022-05-14 RX ADMIN — AMLODIPINE BESYLATE 10 MG: 5 TABLET ORAL at 08:05

## 2022-05-14 RX ADMIN — ENOXAPARIN SODIUM 30 MG: 30 INJECTION SUBCUTANEOUS at 05:05

## 2022-05-14 RX ADMIN — INSULIN ASPART 1 UNITS: 100 INJECTION, SOLUTION INTRAVENOUS; SUBCUTANEOUS at 08:05

## 2022-05-14 NOTE — PROGRESS NOTES
POD#5  ACDF C5-6      She is assisting to bathe herself sitting up in bed with the nurse's aide.  Very positive attitude and very motivated to rehab.  Ambulated.  She states she is completely pain-free today.  Her pre op weakness continues to improve  She is tolerating PO    AFVSS  Strength improving in LE  Numbness improved in UE  Incision c/d/i      Plan:     Continue with daily dressing changes  PT/OT  Fall precautions  Continue to wean Decadron  SCDs and lovenox for DVT prophylaxis  Placement-okay to discharge to rehab when accepted.    Transfer when accepted.        [facility] 9th Floor Med Surg  Neurosurgery  Progress Note          Post-Op Info:  Procedure(s) (LRB):  DISCECTOMY, SPINE, CERVICAL, ANTERIOR APPROACH, WITH FUSION (Bilateral)   5 Days Post-Op      Medications:  Continuous Infusions:  Scheduled Meds:   amLODIPine  10 mg Oral Daily    atorvastatin  80 mg Oral Daily    baclofen  5 mg Oral BID    brimonidine 0.15 % OPTH DROP  1 drop Both Eyes BID    enoxaparin  30 mg Subcutaneous Daily    hydrALAZINE  50 mg Oral Q8H    insulin aspart U-100  15 Units Subcutaneous TIDWM    insulin detemir U-100  50 Units Subcutaneous QHS    labetaloL  200 mg Oral BID    latanoprost  1 drop Both Eyes Nightly    pantoprazole  40 mg Oral Daily    polyethylene glycol  17 g Oral Daily    pregabalin  75 mg Oral BID    senna-docusate 8.6-50 mg  1 tablet Oral BID    sucralfate  1 g Oral QID (AC & HS)    timolol maleate 0.25%  1 drop Both Eyes BID     PRN Meds:acetaminophen, albuterol-ipratropium, aluminum-magnesium hydroxide-simethicone, dextrose 10%, dextrose 10%, glucagon (human recombinant), glucose, glucose, hydrALAZINE, HYDROcodone-acetaminophen, HYDROcodone-acetaminophen, HYDROmorphone, insulin aspart U-100, lorazepam, magnesium hydroxide 400 mg/5 ml, methocarbamoL, morphine, naloxone, ondansetron, prochlorperazine, prochlorperazine, sodium chloride 0.9%     Review of Systems    Objective:      Weight: 85.7 kg (188 lb 15 oz)  Body mass index is 34.56 kg/m².  Vital Signs (Most Recent):  Temp: 98.6 °F (37 °C) (05/14/22 0700)  Pulse: 81 (05/14/22 0814)  Resp: 18 (05/14/22 0700)  BP: (!) 169/68 (05/14/22 0814)  SpO2: 100 % (05/14/22 0700) Vital Signs (24h Range):  Temp:  [97.9 °F (36.6 °C)-98.6 °F (37 °C)] 98.6 °F (37 °C)  Pulse:  [72-88] 81  Resp:  [18-20] 18  SpO2:  [91 %-100 %] 100 %  BP: (124-169)/(63-74) 169/68                          Neurosurgery Physical Exam      Significant Labs:  No results for input(s): GLU, NA, K, CL, CO2, BUN, CREATININE, CALCIUM, MG in the last 48 hours.  No results for input(s): WBC, HGB, HCT, PLT in the last 48 hours.  No results for input(s): LABPT, INR, APTT in the last 48 hours.  Microbiology Results (last 7 days)     ** No results found for the last 168 hours. **          Significant Diagnostics:      Assessment/Plan:     Active Diagnoses:    Diagnosis Date Noted POA    PRINCIPAL PROBLEM:  Cervical stenosis of spinal canal [M48.02] 05/07/2022 Yes      Problems Resolved During this Admission:       MAKSIM Davis-BC  Neurosurgery  [facility] - 9th Floor Med Surg

## 2022-05-14 NOTE — PROGRESS NOTES
[facility]  Hospital Medicine Progress Note        Chief Complaint: Inpatient Follow-up for Lucien leg weakness     HPI:   Natty Nelson is a 82 y.o. female who was transferred from [facility] ED with complaint of weakness and MRI cervical spine showing severe stenosis and cord compression.  Patient has been suffering from progressive weakness of lower extremities> upper extremities over the past 2 to 3 months, she has been having multiple falls, denies low of bowel or bladder control. 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 On arrival to ED she was hemodynamically stable and afebrile.  Labs notable for LALIT on CKD and mild hypokalemia.  MRI imaging as described below.  Neurosurgery consulted and referred to hospital medicine service for further evaluation and management. Seen by neurosurgery team and recommended surgical intervention. She is s/p   DISCECTOMY, SPINE, CERVICAL, ANTERIOR APPROACH, WITH FUSION (Bilateral) ACDF C56/ fusion, medtronic.    Patient had no post operative issues. She was participating with PTx but still weak. Rehab was recommended and patient ok with rehab placement.       Interval Hx:   Seen and examined patient afebrile vitals stable hemodynamically stable.  She states that her bilateral lower extremity weakness is improving however she still feels numb.   She also stated that she had stent placement to both legs 1 month ago and she has getting aspirin and Plavix.     Objective/physical exam:  General: In no acute distress, afebrile, + Obese   Chest: Clear to auscultation bilaterally  Heart: RRR +S1, S2, no appreciable murmur  Abdomen: Soft, nontender, BS +  MSK: Warm, no lower extremity edema, no clubbing or cyanosis  Neurologic: Alert and oriented x4, Lucien UE strength 5/5, LE strength 4/5     VITAL SIGNS: 24 HRS MIN & MAX LAST   Temp  Min:  97.9 °F (36.6 °C)  Max: 98.6 °F (37 °C) 98.6 °F (37 °C)   BP  Min: 121/57  Max: 169/68 (!) 121/57   Pulse  Min: 72  Max: 88  74   Resp  Min: 18  Max: 20 18   SpO2  Min: 91 %  Max: 100 % 99 %       Recent Labs   Lab 05/09/22  0424 05/10/22  0440 05/11/22  0431   WBC 16.8* 15.4* 14.3*   RBC 2.99* 3.09* 2.85*   HGB 9.3* 9.6* 9.0*   HCT 29.3* 31.0* 26.3*   MCV 98.0* 100.3* 92.3   MCH 31.1* 31.1* 31.6*   MCHC 31.7* 31.0* 34.2   RDW 14.1 14.0 14.0    197 181   MPV 13.0* 12.9* 12.7*       Recent Labs   Lab 05/09/22  0424 05/10/22  0440 05/11/22  0436    137 133*   K 4.6 4.6 4.5   CO2 23 22* 23   BUN 32.4* 23.5* 29.4*   CREATININE 1.10* 1.07* 1.07*   CALCIUM 9.1 8.9 8.9          Microbiology Results (last 7 days)     ** No results found for the last 168 hours. **               Scheduled Med:   amLODIPine  10 mg Oral Daily    atorvastatin  80 mg Oral Daily    baclofen  5 mg Oral BID    brimonidine 0.15 % OPTH DROP  1 drop Both Eyes BID    enoxaparin  30 mg Subcutaneous Daily    hydrALAZINE  50 mg Oral Q8H    insulin aspart U-100  15 Units Subcutaneous TIDWM    insulin detemir U-100  50 Units Subcutaneous QHS    labetaloL  200 mg Oral BID    latanoprost  1 drop Both Eyes Nightly    pantoprazole  40 mg Oral Daily    polyethylene glycol  17 g Oral Daily    pregabalin  75 mg Oral BID    senna-docusate 8.6-50 mg  1 tablet Oral BID    sucralfate  1 g Oral QID (AC & HS)    timolol maleate 0.25%  1 drop Both Eyes BID    valsartan  80 mg Oral Daily        Continuous Infusions:       PRN Meds:  acetaminophen, albuterol-ipratropium, aluminum-magnesium hydroxide-simethicone, dextrose 10%, dextrose 10%, glucagon (human recombinant), glucose, glucose, hydrALAZINE, HYDROcodone-acetaminophen, HYDROcodone-acetaminophen, HYDROmorphone, insulin aspart U-100, lorazepam, magnesium hydroxide 400 mg/5 ml, methocarbamoL, morphine, naloxone, ondansetron, prochlorperazine, prochlorperazine, sodium chloride 0.9%        Assessment/Plan:  Severe cervical stenosis with cord compression 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   Patient has no new issues.Still Needing full assist for ADLs.   Cont OT/PTx   Cont PO steroids BID dose, wean per neurosurgery team   For her hyperglycemia will cont Levemir and lispro, adjust dose to keep blood sugars <180    Cont po DM diet   Strict aspiration and fall precautions   No NSAIDs       Labs in am    Cont supportive care         Anticipated discharge and Disposition: ? SNF vs Rehab       All diagnosis and differential diagnosis have been reviewed; assessment and plan has been documented; I have personally reviewed the labs and test results that are presently available; I have reviewed the patients medication list; I have reviewed the consulting providers response and recommendations. I have reviewed or attempted to review medical records based upon their availability    All of the patient's questions have been  addressed and answered. Patient's is agreeable to the above stated plan. I will continue to monitor closely and make adjustments to medical management as needed.      Nutrition Status:        Julio Lovell MD   05/14/2022

## 2022-05-15 LAB
ALBUMIN SERPL-MCNC: 2.4 GM/DL (ref 3.4–4.8)
ALBUMIN/GLOB SERPL: 1.2 RATIO (ref 1.1–2)
ALP SERPL-CCNC: 54 UNIT/L (ref 40–150)
ALT SERPL-CCNC: 15 UNIT/L (ref 0–55)
AST SERPL-CCNC: 15 UNIT/L (ref 5–34)
BASOPHILS # BLD AUTO: 0 X10(3)/MCL (ref 0–0.2)
BASOPHILS NFR BLD AUTO: 0 %
BILIRUBIN DIRECT+TOT PNL SERPL-MCNC: 0.4 MG/DL
BUN SERPL-MCNC: 34.7 MG/DL (ref 9.8–20.1)
CALCIUM SERPL-MCNC: 8.5 MG/DL (ref 8.4–10.2)
CHLORIDE SERPL-SCNC: 108 MMOL/L (ref 98–107)
CO2 SERPL-SCNC: 25 MMOL/L (ref 23–31)
CREAT SERPL-MCNC: 1.09 MG/DL (ref 0.55–1.02)
EOSINOPHIL # BLD AUTO: 0.11 X10(3)/MCL (ref 0–0.9)
EOSINOPHIL NFR BLD AUTO: 1.2 %
ERYTHROCYTE [DISTWIDTH] IN BLOOD BY AUTOMATED COUNT: 14.2 % (ref 11.5–17)
GLOBULIN SER-MCNC: 2 GM/DL (ref 2.4–3.5)
GLUCOSE SERPL-MCNC: 68 MG/DL (ref 82–115)
HCT VFR BLD AUTO: 27.2 % (ref 37–47)
HGB BLD-MCNC: 8.3 GM/DL (ref 12–16)
IMM GRANULOCYTES # BLD AUTO: 0.08 X10(3)/MCL (ref 0–0.02)
IMM GRANULOCYTES NFR BLD AUTO: 0.8 % (ref 0–0.43)
LYMPHOCYTES # BLD AUTO: 2.36 X10(3)/MCL (ref 0.6–4.6)
LYMPHOCYTES NFR BLD AUTO: 24.8 %
MCH RBC QN AUTO: 30.9 PG (ref 27–31)
MCHC RBC AUTO-ENTMCNC: 30.5 MG/DL (ref 33–36)
MCV RBC AUTO: 101.1 FL (ref 80–94)
MONOCYTES # BLD AUTO: 1.34 X10(3)/MCL (ref 0.1–1.3)
MONOCYTES NFR BLD AUTO: 14.1 %
NEUTROPHILS # BLD AUTO: 5.6 X10(3)/MCL (ref 2.1–9.2)
NEUTROPHILS NFR BLD AUTO: 59.1 %
NRBC BLD AUTO-RTO: 0 %
PLATELET # BLD AUTO: 147 X10(3)/MCL (ref 130–400)
PMV BLD AUTO: 12.4 FL (ref 9.4–12.4)
POCT GLUCOSE: 238 MG/DL (ref 70–110)
POCT GLUCOSE: 246 MG/DL (ref 70–110)
POCT GLUCOSE: 256 MG/DL (ref 70–110)
POCT GLUCOSE: 40 MG/DL (ref 70–110)
POCT GLUCOSE: 93 MG/DL (ref 70–110)
POTASSIUM SERPL-SCNC: 3.9 MMOL/L (ref 3.5–5.1)
PROT SERPL-MCNC: 4.4 GM/DL (ref 5.8–7.6)
RBC # BLD AUTO: 2.69 X10(6)/MCL (ref 4.2–5.4)
SODIUM SERPL-SCNC: 139 MMOL/L (ref 136–145)
WBC # SPEC AUTO: 9.5 X10(3)/MCL (ref 4.5–11.5)

## 2022-05-15 PROCEDURE — 97535 SELF CARE MNGMENT TRAINING: CPT | Mod: CO

## 2022-05-15 PROCEDURE — 25000003 PHARM REV CODE 250: Performed by: INTERNAL MEDICINE

## 2022-05-15 PROCEDURE — 25000003 PHARM REV CODE 250: Performed by: STUDENT IN AN ORGANIZED HEALTH CARE EDUCATION/TRAINING PROGRAM

## 2022-05-15 PROCEDURE — 63600175 PHARM REV CODE 636 W HCPCS: Performed by: NURSE PRACTITIONER

## 2022-05-15 PROCEDURE — 11000001 HC ACUTE MED/SURG PRIVATE ROOM

## 2022-05-15 PROCEDURE — C9399 UNCLASSIFIED DRUGS OR BIOLOG: HCPCS | Performed by: INTERNAL MEDICINE

## 2022-05-15 PROCEDURE — 97530 THERAPEUTIC ACTIVITIES: CPT | Mod: CQ

## 2022-05-15 PROCEDURE — 84075 ASSAY ALKALINE PHOSPHATASE: CPT | Performed by: STUDENT IN AN ORGANIZED HEALTH CARE EDUCATION/TRAINING PROGRAM

## 2022-05-15 PROCEDURE — 85025 COMPLETE CBC W/AUTO DIFF WBC: CPT | Performed by: STUDENT IN AN ORGANIZED HEALTH CARE EDUCATION/TRAINING PROGRAM

## 2022-05-15 PROCEDURE — 63600175 PHARM REV CODE 636 W HCPCS: Performed by: INTERNAL MEDICINE

## 2022-05-15 PROCEDURE — 80053 COMPREHEN METABOLIC PANEL: CPT | Performed by: STUDENT IN AN ORGANIZED HEALTH CARE EDUCATION/TRAINING PROGRAM

## 2022-05-15 PROCEDURE — 82040 ASSAY OF SERUM ALBUMIN: CPT | Performed by: STUDENT IN AN ORGANIZED HEALTH CARE EDUCATION/TRAINING PROGRAM

## 2022-05-15 PROCEDURE — 36415 COLL VENOUS BLD VENIPUNCTURE: CPT | Performed by: STUDENT IN AN ORGANIZED HEALTH CARE EDUCATION/TRAINING PROGRAM

## 2022-05-15 RX ORDER — NAPROXEN SODIUM 220 MG/1
81 TABLET, FILM COATED ORAL DAILY
Status: DISCONTINUED | OUTPATIENT
Start: 2022-05-15 | End: 2022-05-17 | Stop reason: HOSPADM

## 2022-05-15 RX ORDER — CLOPIDOGREL BISULFATE 75 MG/1
75 TABLET ORAL DAILY
Status: DISCONTINUED | OUTPATIENT
Start: 2022-05-16 | End: 2022-05-17 | Stop reason: HOSPADM

## 2022-05-15 RX ADMIN — PANTOPRAZOLE SODIUM 40 MG: 40 TABLET, DELAYED RELEASE ORAL at 09:05

## 2022-05-15 RX ADMIN — BACLOFEN 5 MG: 5 TABLET ORAL at 09:05

## 2022-05-15 RX ADMIN — SUCRALFATE 1 G: 1 TABLET ORAL at 09:05

## 2022-05-15 RX ADMIN — BRIMONIDINE TARTRATE 1 DROP: 1.5 SOLUTION OPHTHALMIC at 09:05

## 2022-05-15 RX ADMIN — HYDRALAZINE HYDROCHLORIDE 50 MG: 50 TABLET ORAL at 05:05

## 2022-05-15 RX ADMIN — SUCRALFATE 1 G: 1 TABLET ORAL at 07:05

## 2022-05-15 RX ADMIN — AMLODIPINE BESYLATE 10 MG: 5 TABLET ORAL at 09:05

## 2022-05-15 RX ADMIN — SUCRALFATE 1 G: 1 TABLET ORAL at 11:05

## 2022-05-15 RX ADMIN — HYDRALAZINE HYDROCHLORIDE 50 MG: 50 TABLET ORAL at 09:05

## 2022-05-15 RX ADMIN — INSULIN DETEMIR 50 UNITS: 100 INJECTION, SOLUTION SUBCUTANEOUS at 09:05

## 2022-05-15 RX ADMIN — SUCRALFATE 1 G: 1 TABLET ORAL at 04:05

## 2022-05-15 RX ADMIN — TIMOLOL MALEATE 1 DROP: 2.5 SOLUTION/ DROPS OPHTHALMIC at 09:05

## 2022-05-15 RX ADMIN — INSULIN ASPART 2 UNITS: 100 INJECTION, SOLUTION INTRAVENOUS; SUBCUTANEOUS at 09:05

## 2022-05-15 RX ADMIN — ASPIRIN 81 MG CHEWABLE TABLET 81 MG: 81 TABLET CHEWABLE at 04:05

## 2022-05-15 RX ADMIN — ATORVASTATIN CALCIUM 80 MG: 40 TABLET, FILM COATED ORAL at 09:05

## 2022-05-15 RX ADMIN — PREGABALIN 75 MG: 75 CAPSULE ORAL at 09:05

## 2022-05-15 RX ADMIN — INSULIN ASPART 15 UNITS: 100 INJECTION, SOLUTION INTRAVENOUS; SUBCUTANEOUS at 04:05

## 2022-05-15 RX ADMIN — ENOXAPARIN SODIUM 30 MG: 30 INJECTION SUBCUTANEOUS at 04:05

## 2022-05-15 RX ADMIN — POLYETHYLENE GLYCOL 3350 17 G: 17 POWDER, FOR SOLUTION ORAL at 09:05

## 2022-05-15 RX ADMIN — LATANOPROST 1 DROP: 50 SOLUTION OPHTHALMIC at 09:05

## 2022-05-15 RX ADMIN — SENNOSIDES, DOCUSATE SODIUM 1 TABLET: 8.6; 5 TABLET ORAL at 09:05

## 2022-05-15 RX ADMIN — VALSARTAN 80 MG: 80 TABLET, FILM COATED ORAL at 09:05

## 2022-05-15 RX ADMIN — INSULIN ASPART 15 UNITS: 100 INJECTION, SOLUTION INTRAVENOUS; SUBCUTANEOUS at 11:05

## 2022-05-15 NOTE — PT/OT/SLP PROGRESS
Occupational Therapy  Treatment    Natty Nelson   MRN: [id number]   Admitting Diagnosis: Cervical stenosis of spinal canal    OT Date of Treatment: 05/15/22   OT Start Time: 1112  OT Stop Time: 1135  OT Total Time (min): 23 min     Billable Minutes:  Self Care/Home Management 23  Total Minutes: 23     OT/CLAYTON: CLAYTON     CLAYTON Visit Number: 1    General Precautions: Standard, fall  Orthopedic Precautions: spinal precautions  Braces: Cervical collar    Spiritual, Cultural Beliefs, Amish Practices, Values that Affect Care: no    Subjective:  Communicated with RN prior to session.    Pain/Comfort  Pain Rating 1: 0/10    Objective:       Functional Mobility:  Transfer Training:   Attempted sit to stand t/f from edge of chair, however Pt unable to clear buttox from chair.    LE Dressing:  Donned/doffed B socks with sock aide and reacher with Min A for doffing B socks d/t increased difficulty with manipulation of reacher on and off heels, however Pt able to utilize sock aide after instruction/demonstration.      Patient left up in chair with call button in reach    ASSESSMENT:  Natty Nelson is a 82 y.o. female with a medical diagnosis of Cervical stenosis of spinal canal and presents with increased weakness, gait instability, impaired coordination, impaired balance, impaired self care skills and functional mobility.    Rehab potential is good    Activity tolerance: Good      GOALS:   Multidisciplinary Problems     Occupational Therapy Goals        Problem: Occupational Therapy    Goal Priority Disciplines Outcome Interventions   Occupational Therapy Goal     OT, PT/OT Ongoing, Progressing    Description: Goals to be met by: 05/23/2022    Patient will increase functional independence with ADLs by performing:    Grooming while seated with Stand-by Assistance.  Standing during functional ax x5 minutes with Minimal Assistance.  Toilet transfer to bedside commode with Moderate Assistance.  Pt will improve UE strength to 4/5  in order to improve IND w/ ADLs by time of d/c.  Pt will perform LE dressing w/ min A using AE to improve IND w/ ADLs by time of d/c.                     Plan:  Patient to be seen 5 x/week, daily to address the above listed problems via neuromuscular re-education, self-care/home management, therapeutic activities, therapeutic exercises  Plan of Care expires: 05/27/22  Plan of Care reviewed with: patient         05/15/2022

## 2022-05-15 NOTE — PROGRESS NOTES
[facility]  Hospital Medicine Progress Note        Chief Complaint: Inpatient Follow-up for Lucien leg weakness     HPI:   Natty Nelson is a 82 y.o. female who was [place] with complaint of weakness and MRI cervical spine showing severe stenosis and cord compression.  Patient has been suffering from progressive weakness of lower extremities> upper extremities over the past 2 to 3 months, she has been having multiple falls, denies low of bowel or bladder control. 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 On arrival to ED she was hemodynamically stable and afebrile.  Labs notable for LALIT on CKD and mild hypokalemia.  MRI imaging as described below.  Neurosurgery consulted and referred to hospital medicine service for further evaluation and management. Seen by neurosurgery team and recommended surgical intervention. She is s/p   DISCECTOMY, SPINE, CERVICAL, ANTERIOR APPROACH, WITH FUSION (Bilateral) ACDF C56/ fusion, medtronic.    Patient had no post operative issues. She was participating with PTx but still weak. Rehab was recommended and patient ok with rehab placement.       Interval Hx:   Seen and examined the pt.   VSS And HDS     Objective/physical exam:  General: In no acute distress, afebrile, + Obese   Chest: Clear to auscultation bilaterally  Heart: RRR +S1, S2, no appreciable murmur  Abdomen: Soft, nontender, BS +  MSK: Warm, no lower extremity edema, no clubbing or cyanosis  Neurologic: Alert and oriented x4, Lucien UE strength 5/5, LE strength 4/5     VITAL SIGNS: 24 HRS MIN & MAX LAST   Temp  Min: 97.4 °F (36.3 °C)  Max: 97.8 °F (36.6 °C) 97.7 °F (36.5 °C)   BP  Min: 95/50  Max: 146/62 125/67   Pulse  Min: 62  Max: 72  67   Resp  Min: 18  Max: 20 18   SpO2  Min: 96 %  Max: 100 % 100 %       Recent Labs   Lab 05/10/22  0440 05/11/22  6735  05/15/22  0530   WBC 15.4* 14.3* 9.5   RBC 3.09* 2.85* 2.69*   HGB 9.6* 9.0* 8.3*   HCT 31.0* 26.3* 27.2*   .3* 92.3 101.1*   MCH 31.1* 31.6* 30.9   MCHC 31.0* 34.2 30.5*   RDW 14.0 14.0 14.2    181 147   MPV 12.9* 12.7* 12.4       Recent Labs   Lab 05/10/22  0440 05/11/22  0436 05/15/22  0530    133* 139   K 4.6 4.5 3.9   CO2 22* 23 25   BUN 23.5* 29.4* 34.7*   CREATININE 1.07* 1.07* 1.09*   CALCIUM 8.9 8.9 8.5   ALBUMIN  --   --  2.4*   ALKPHOS  --   --  54   ALT  --   --  15   AST  --   --  15   BILITOT  --   --  0.4          Microbiology Results (last 7 days)     ** No results found for the last 168 hours. **               Scheduled Med:   amLODIPine  10 mg Oral Daily    atorvastatin  80 mg Oral Daily    baclofen  5 mg Oral BID    brimonidine 0.15 % OPTH DROP  1 drop Both Eyes BID    enoxaparin  30 mg Subcutaneous Daily    hydrALAZINE  50 mg Oral Q8H    insulin aspart U-100  15 Units Subcutaneous TIDWM    insulin detemir U-100  50 Units Subcutaneous QHS    labetaloL  200 mg Oral BID    latanoprost  1 drop Both Eyes Nightly    pantoprazole  40 mg Oral Daily    polyethylene glycol  17 g Oral Daily    pregabalin  75 mg Oral BID    senna-docusate 8.6-50 mg  1 tablet Oral BID    sucralfate  1 g Oral QID (AC & HS)    timolol maleate 0.25%  1 drop Both Eyes BID    valsartan  80 mg Oral Daily        Continuous Infusions:       PRN Meds:  acetaminophen, albuterol-ipratropium, aluminum-magnesium hydroxide-simethicone, dextrose 10%, dextrose 10%, glucagon (human recombinant), glucose, glucose, hydrALAZINE, HYDROcodone-acetaminophen, HYDROcodone-acetaminophen, HYDROmorphone, insulin aspart U-100, lorazepam, magnesium hydroxide 400 mg/5 ml, methocarbamoL, morphine, naloxone, ondansetron, prochlorperazine, prochlorperazine, sodium chloride 0.9%       Assessment/Plan:  Severe cervical stenosis with cord compression Vs 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   Patient has no new issues.Still Needing full assist for ADLs.   Cont OT/PTx   Cont PO steroids BID dose, wean per neurosurgery team   For her hyperglycemia will cont Levemir and lispro, adjust dose to keep blood sugars <180  Start aspirin.     Cont po DM diet   Strict aspiration and fall precautions   No NSAIDs         All diagnosis and differential diagnosis have been reviewed; assessment and plan has been documented; I have personally reviewed the labs and test results that are presently available; I have reviewed the patients medication list; I have reviewed the consulting providers response and recommendations. I have reviewed or attempted to review medical records based upon their availability    All of the patient's questions have been  addressed and answered. Patient's is agreeable to the above stated plan. I will continue to monitor closely and make adjustments to medical management as needed.      Nutrition Status:        Julio Lovell MD   05/15/2022

## 2022-05-15 NOTE — PLAN OF CARE
Problem: Adult Inpatient Plan of Care  Goal: Plan of Care Review  Outcome: Ongoing, Progressing  Goal: Patient-Specific Goal (Individualized)  Outcome: Ongoing, Progressing  Goal: Absence of Hospital-Acquired Illness or Injury  Outcome: Ongoing, Progressing  Goal: Optimal Comfort and Wellbeing  Outcome: Ongoing, Progressing  Goal: Readiness for Transition of Care  Outcome: Ongoing, Progressing     Problem: Skin Injury Risk Increased  Goal: Skin Health and Integrity  Outcome: Ongoing, Progressing     Problem: Fall Injury Risk  Goal: Absence of Fall and Fall-Related Injury  Outcome: Ongoing, Progressing     Problem: Infection  Goal: Absence of Infection Signs and Symptoms  Outcome: Ongoing, Progressing     Problem: Adult Inpatient Plan of Care  Goal: Plan of Care Review  Outcome: Ongoing, Progressing  Goal: Patient-Specific Goal (Individualized)  Outcome: Ongoing, Progressing  Goal: Absence of Hospital-Acquired Illness or Injury  Outcome: Ongoing, Progressing  Goal: Optimal Comfort and Wellbeing  Outcome: Ongoing, Progressing  Goal: Readiness for Transition of Care  Outcome: Ongoing, Progressing     Problem: Skin Injury Risk Increased  Goal: Skin Health and Integrity  Outcome: Ongoing, Progressing     Problem: Fall Injury Risk  Goal: Absence of Fall and Fall-Related Injury  Outcome: Ongoing, Progressing     Problem: Infection  Goal: Absence of Infection Signs and Symptoms  Outcome: Ongoing, Progressing

## 2022-05-15 NOTE — PROGRESS NOTES
POD#6  ACDF C5-6    Sitting up in the chair eating this morning.  No acute issues.  Very positive attitude and very motivated to rehab.  Ambulated.  She states she is completely pain-free today.  Her pre op weakness continues to improve  She is tolerating PO    AFVSS  Strength improving in LE  Numbness improved in UE  Incision c/d/i      Plan:     Continue with daily dressing changes  PT/OT  Fall precautions  Continue to wean Decadron  SCDs and lovenox for DVT prophylaxis  Placement-okay to discharge to rehab when accepted.    Transfer when accepted.        [facility] - 9th Floor Med Surg  Neurosurgery  Progress Note          Post-Op Info:  Procedure(s) (LRB):  DISCECTOMY, SPINE, CERVICAL, ANTERIOR APPROACH, WITH FUSION (Bilateral)   6 Days Post-Op      Medications:  Continuous Infusions:  Scheduled Meds:   amLODIPine  10 mg Oral Daily    aspirin  81 mg Oral Daily    atorvastatin  80 mg Oral Daily    baclofen  5 mg Oral BID    brimonidine 0.15 % OPTH DROP  1 drop Both Eyes BID    enoxaparin  30 mg Subcutaneous Daily    hydrALAZINE  50 mg Oral Q8H    insulin aspart U-100  15 Units Subcutaneous TIDWM    insulin detemir U-100  50 Units Subcutaneous QHS    labetaloL  200 mg Oral BID    latanoprost  1 drop Both Eyes Nightly    pantoprazole  40 mg Oral Daily    polyethylene glycol  17 g Oral Daily    pregabalin  75 mg Oral BID    senna-docusate 8.6-50 mg  1 tablet Oral BID    sucralfate  1 g Oral QID (AC & HS)    timolol maleate 0.25%  1 drop Both Eyes BID    valsartan  80 mg Oral Daily     PRN Meds:acetaminophen, albuterol-ipratropium, aluminum-magnesium hydroxide-simethicone, dextrose 10%, dextrose 10%, glucagon (human recombinant), glucose, glucose, hydrALAZINE, HYDROcodone-acetaminophen, HYDROcodone-acetaminophen, HYDROmorphone, insulin aspart U-100, lorazepam, magnesium hydroxide 400 mg/5 ml, methocarbamoL, morphine, naloxone, ondansetron, prochlorperazine, prochlorperazine, sodium  chloride 0.9%     Review of Systems    Objective:     Weight: 85.7 kg (188 lb 15 oz)  Body mass index is 34.56 kg/m².  Vital Signs (Most Recent):  Temp: 97.7 °F (36.5 °C) (05/15/22 0700)  Pulse: 67 (05/15/22 0700)  Resp: 18 (05/15/22 0700)  BP: 125/67 (05/15/22 0937)  SpO2: 100 % (05/15/22 0700) Vital Signs (24h Range):  Temp:  [97.4 °F (36.3 °C)-97.8 °F (36.6 °C)] 97.7 °F (36.5 °C)  Pulse:  [62-72] 67  Resp:  [18-20] 18  SpO2:  [96 %-100 %] 100 %  BP: ()/(50-67) 125/67                          Neurosurgery Physical Exam      Significant Labs:  Recent Labs   Lab 05/15/22  0530      K 3.9   CO2 25   BUN 34.7*   CREATININE 1.09*   CALCIUM 8.5     Recent Labs   Lab 05/15/22  0530   WBC 9.5   HGB 8.3*   HCT 27.2*        No results for input(s): LABPT, INR, APTT in the last 48 hours.  Microbiology Results (last 7 days)     ** No results found for the last 168 hours. **          Significant Diagnostics:      Assessment/Plan:     Active Diagnoses:    Diagnosis Date Noted POA    PRINCIPAL PROBLEM:  Cervical stenosis of spinal canal [M48.02] 05/07/2022 Yes      Problems Resolved During this Admission:       MAKSIM Davis-BC  Neurosurgery  [facility] - 9th Floor Med Surg

## 2022-05-15 NOTE — PT/OT/SLP PROGRESS
Physical Therapy  Treatment    Natty Nelson   MRN: [id number]   Admitting Diagnosis: Cervical stenosis of spinal canal                          Billable Minutes:  Therapeutic Activity 23    Treatment Type: Treatment  PT/PTA: PTA     PTA Visit Number: 5       General Precautions: Standard, fall  Orthopedic Precautions: spinal precautions   Braces: Cervical collar  Respiratory Status: Room air    Spiritual, Cultural Beliefs, Mormon Practices, Values that Affect Care: no    Subjective:  Communicated with nsg prior to session.           Objective:        Functional Mobility:  Bed Mobility:   Supine to EOB    Transfers:  Squat pivot to chair      Balance:   Static Sit: FAIR+: Able to take MINIMAL challenges from all directions  Dynamic Sit: GOOD: Maintains balance through MODERATE excursions of active trunk movement  Static Stand: 0: Needs MAXIMAL assist to maintain   Dynamic stand: 0: N/A       Therapeutic Activities and Exercises:  Pt performed supine to EOB bed mobility with mod assist. Pt tolerated sitting EOB x 10 mins. Pt performed squat pivot transfer from EOB to chair with max assist, bilateral knees buckled. Pt has itzel pad in chair for nsg to be able to transfer back to bed.    AM-PAC 6 CLICK MOBILITY  How much help from another person does this patient currently need?   1 = Unable, Total/Dependent Assistance  2 = A lot, Maximum/Moderate Assistance  3 = A little, Minimum/Contact Guard/Supervision  4 = None, Modified [place]/Independent         AM-PAC Raw Score CMS G-Code Modifier Level of Impairment Assistance   6 % Total / Unable   7 - 9 CM 80 - 100% Maximal Assist   10 - 14 CL 60 - 80% Moderate Assist   15 - 19 CK 40 - 60% Moderate Assist   20 - 22 CJ 20 - 40% Minimal Assist   23 CI 1-20% SBA / CGA   24 CH 0% Independent/ Mod I     Patient left up in chair with all lines intact and call button in reach.    Assessment:  Natty Nelson is a 82 y.o. female with a medical diagnosis of Cervical  stenosis of spinal canal.    Rehab identified problem list/impairments:      Rehab potential is good.    Activity tolerance: Good    Discharge recommendations:       Barriers to discharge:      Equipment recommendations:       GOALS:   Multidisciplinary Problems     Physical Therapy Goals        Problem: Physical Therapy    Goal Priority Disciplines Outcome Goal Variances Interventions   Physical Therapy Goal     PT, PT/OT Ongoing, Progressing     Description: Goals to be met by: 22     Patient will increase functional independence with mobility by performin. Supine to sit with Stand-by Assistance  2. Sit to stand transfer with Minimal Assistance  3. Gait  x 150ft feet with Minimal Assistance using Rolling Walker.                      PLAN:    Patient to be seen 5 x/week  to address the above listed problems via gait training, therapeutic activities, therapeutic exercises, neuromuscular re-education  Plan of Care expires: 22  Plan of Care reviewed with: patient         05/15/2022

## 2022-05-16 LAB
POCT GLUCOSE: 194 MG/DL (ref 70–110)
POCT GLUCOSE: 209 MG/DL (ref 70–110)
POCT GLUCOSE: 210 MG/DL (ref 70–110)
POCT GLUCOSE: 301 MG/DL (ref 70–110)
POCT GLUCOSE: 67 MG/DL (ref 70–110)

## 2022-05-16 PROCEDURE — 25000003 PHARM REV CODE 250: Performed by: INTERNAL MEDICINE

## 2022-05-16 PROCEDURE — C9399 UNCLASSIFIED DRUGS OR BIOLOG: HCPCS | Performed by: INTERNAL MEDICINE

## 2022-05-16 PROCEDURE — 11000001 HC ACUTE MED/SURG PRIVATE ROOM

## 2022-05-16 PROCEDURE — 25000003 PHARM REV CODE 250: Performed by: STUDENT IN AN ORGANIZED HEALTH CARE EDUCATION/TRAINING PROGRAM

## 2022-05-16 PROCEDURE — 94761 N-INVAS EAR/PLS OXIMETRY MLT: CPT

## 2022-05-16 PROCEDURE — 97535 SELF CARE MNGMENT TRAINING: CPT

## 2022-05-16 PROCEDURE — 97530 THERAPEUTIC ACTIVITIES: CPT

## 2022-05-16 PROCEDURE — 63600175 PHARM REV CODE 636 W HCPCS: Performed by: NURSE PRACTITIONER

## 2022-05-16 PROCEDURE — 97164 PT RE-EVAL EST PLAN CARE: CPT

## 2022-05-16 PROCEDURE — 63600175 PHARM REV CODE 636 W HCPCS: Performed by: INTERNAL MEDICINE

## 2022-05-16 RX ADMIN — VALSARTAN 80 MG: 80 TABLET, FILM COATED ORAL at 08:05

## 2022-05-16 RX ADMIN — ATORVASTATIN CALCIUM 80 MG: 40 TABLET, FILM COATED ORAL at 08:05

## 2022-05-16 RX ADMIN — HYDRALAZINE HYDROCHLORIDE 50 MG: 50 TABLET ORAL at 10:05

## 2022-05-16 RX ADMIN — HYDROCODONE BITARTRATE AND ACETAMINOPHEN 1 TABLET: 5; 325 TABLET ORAL at 11:05

## 2022-05-16 RX ADMIN — INSULIN DETEMIR 50 UNITS: 100 INJECTION, SOLUTION SUBCUTANEOUS at 08:05

## 2022-05-16 RX ADMIN — INSULIN ASPART 2 UNITS: 100 INJECTION, SOLUTION INTRAVENOUS; SUBCUTANEOUS at 04:05

## 2022-05-16 RX ADMIN — SUCRALFATE 1 G: 1 TABLET ORAL at 11:05

## 2022-05-16 RX ADMIN — TIMOLOL MALEATE 1 DROP: 2.5 SOLUTION/ DROPS OPHTHALMIC at 08:05

## 2022-05-16 RX ADMIN — SUCRALFATE 1 G: 1 TABLET ORAL at 08:05

## 2022-05-16 RX ADMIN — BACLOFEN 5 MG: 5 TABLET ORAL at 08:05

## 2022-05-16 RX ADMIN — INSULIN ASPART 2 UNITS: 100 INJECTION, SOLUTION INTRAVENOUS; SUBCUTANEOUS at 11:05

## 2022-05-16 RX ADMIN — INSULIN ASPART 4 UNITS: 100 INJECTION, SOLUTION INTRAVENOUS; SUBCUTANEOUS at 08:05

## 2022-05-16 RX ADMIN — ASPIRIN 81 MG CHEWABLE TABLET 81 MG: 81 TABLET CHEWABLE at 04:05

## 2022-05-16 RX ADMIN — ENOXAPARIN SODIUM 30 MG: 30 INJECTION SUBCUTANEOUS at 04:05

## 2022-05-16 RX ADMIN — INSULIN ASPART 15 UNITS: 100 INJECTION, SOLUTION INTRAVENOUS; SUBCUTANEOUS at 08:05

## 2022-05-16 RX ADMIN — SENNOSIDES, DOCUSATE SODIUM 1 TABLET: 8.6; 5 TABLET ORAL at 08:05

## 2022-05-16 RX ADMIN — CLOPIDOGREL 75 MG: 75 TABLET, FILM COATED ORAL at 08:05

## 2022-05-16 RX ADMIN — LABETALOL HYDROCHLORIDE 200 MG: 200 TABLET, FILM COATED ORAL at 08:05

## 2022-05-16 RX ADMIN — PREGABALIN 75 MG: 75 CAPSULE ORAL at 08:05

## 2022-05-16 RX ADMIN — LATANOPROST 1 DROP: 50 SOLUTION OPHTHALMIC at 08:05

## 2022-05-16 RX ADMIN — BRIMONIDINE TARTRATE 1 DROP: 1.5 SOLUTION OPHTHALMIC at 08:05

## 2022-05-16 RX ADMIN — SUCRALFATE 1 G: 1 TABLET ORAL at 05:05

## 2022-05-16 RX ADMIN — PANTOPRAZOLE SODIUM 40 MG: 40 TABLET, DELAYED RELEASE ORAL at 08:05

## 2022-05-16 RX ADMIN — AMLODIPINE BESYLATE 10 MG: 5 TABLET ORAL at 08:05

## 2022-05-16 RX ADMIN — SUCRALFATE 1 G: 1 TABLET ORAL at 04:05

## 2022-05-16 RX ADMIN — HYDRALAZINE HYDROCHLORIDE 50 MG: 50 TABLET ORAL at 08:05

## 2022-05-16 RX ADMIN — INSULIN ASPART 15 UNITS: 100 INJECTION, SOLUTION INTRAVENOUS; SUBCUTANEOUS at 04:05

## 2022-05-16 RX ADMIN — INSULIN ASPART 8 UNITS: 100 INJECTION, SOLUTION INTRAVENOUS; SUBCUTANEOUS at 05:05

## 2022-05-16 RX ADMIN — HYDRALAZINE HYDROCHLORIDE 50 MG: 50 TABLET ORAL at 04:05

## 2022-05-16 RX ADMIN — POLYETHYLENE GLYCOL 3350 17 G: 17 POWDER, FOR SOLUTION ORAL at 08:05

## 2022-05-16 NOTE — PT/OT/SLP PROGRESS
Occupational Therapy  Treatment    Natty Nelson   MRN: [id number]   Admitting Diagnosis: Cervical stenosis of spinal canal    OT Date of Treatment: 05/16/22   OT Start Time: 0815  OT Stop Time: 0850  OT Total Time (min): 35 min    Billable Minutes:  Self Care/Home Management 20 and Therapeutic Activity 15    OT/CLAYTON: OT     CLAYTON Visit Number: 2    General Precautions: Standard, fall  Orthopedic Precautions:  (cervical)  Braces: Cervical collar  Respiratory Status: Room air         Subjective:  Communicated with nurse prior to session.  Pt in supine waiting to be changed.  Pain/Comfort  Pain Rating 1: 0/10    Objective:  Patient found with: cervical collar     Functional Mobility:  Bed Mobility:       Transfers:        Functional Ambulation: NA    Activities of Daily Living:     Feeding adaptive equipment: none     UE adaptive equipment: none     LE adaptive equipment: Reacher and Sock aid                     Bathing adaptive equipment: no assistive device    Balance:   Static Sit: GOOD-: Takes MODERATE challenges from all directions but inconsistently  Dynamic Sit: GOOD-: Incosistently Maintains balance through MODERATE excursions of active trunk movement,     Static Stand: 0: Needs MAXIMAL assist to maintain   Dynamic stand: 0: N/A    Therapeutic Activities and Exercises:  Pt participated in bed mobility, rolling with SBA to each side for dependent hygiene and clothing management with urinary incontinence overnight.  Pt tf supine to sitting EOB with mod A, sat EOB to dress UB with CGA, max A x2 for tf EOB to WC.  At WC level pt participated in LB dressing training, demkonstrated improved ability to doff and don socks with DME as noted above and setup.  Pt able to apply lotion to BUE and to LEs to just below knee level, total A to apply to feet and lower legs.  Pt demonstrated improved FM coordination and pinch strength with opening breakfast containers but still required min A for some.  Pt completed dynamic sitting  "balance training and core strengthening activities at  level with fair endurance.    AM-PAC 6 CLICK ADL   How much help from another person does this patient currently need?   1 = Unable, Total/Dependent Assistance  2 = A lot, Maximum/Moderate Assistance  3 = A little, Minimum/Contact Guard/Supervision  4 = None, Modified [place]/Independent    Putting on and taking off regular lower body clothing? : 2  Bathing (including washing, rinsing, drying)?: 2  Toileting, which includes using toilet, bedpan, or urinal? : 2  Putting on and taking off regular upper body clothing?: 3  Taking care of personal grooming such as brushing teeth?: 3  Eating meals?: 4  Daily Activity Total Score: 16     AM-PAC Raw Score CMS "G-Code Modifier Level of Impairment Assistance   6 % Total / Unable   7 - 8 CM 80 - 100% Maximal Assist   9-13 CL 60 - 80% Moderate Assist   14 - 19 CK 40 - 60% Moderate Assist   20 - 22 CJ 20 - 40% Minimal Assist   23 CI 1-20% SBA / CGA   24 CH 0% Independent/ Mod I       Patient left up in chair with call button in reach    ASSESSMENT:  Natty Nelson is a 82 y.o. female with a medical diagnosis of Cervical stenosis of spinal canal and presents with general weakness, especially at BLE along with significant proprioceptive deficits of BLEs.    Rehab identified problem list/impairments:      Rehab potential is excellent.    Activity tolerance: Good    Discharge recommendations:       Barriers to discharge:      Equipment recommendations:       GOALS:   Multidisciplinary Problems     Occupational Therapy Goals        Problem: Occupational Therapy    Goal Priority Disciplines Outcome Interventions   Occupational Therapy Goal     OT, PT/OT Ongoing, Progressing    Description: Goals to be met by: 05/23/2022    Patient will increase functional independence with ADLs by performing:    Grooming while seated with Stand-by Assistance.  Standing during functional ax x5 minutes with Minimal Assistance.  Toilet " transfer to bedside commode with Moderate Assistance.  Pt will improve UE strength to 4/5 in order to improve IND w/ ADLs by time of d/c.  Pt will perform LE dressing w/ min A using AE to improve IND w/ ADLs by time of d/c.                     Plan:  Patient to be seen 5 x/week, daily to address the above listed problems via neuromuscular re-education, self-care/home management, therapeutic activities, therapeutic exercises  Plan of Care expires: 05/27/22  Plan of Care reviewed with: patient         05/16/2022

## 2022-05-16 NOTE — PROGRESS NOTES
[facility]  Hospital Medicine Progress Note        Chief Complaint: Inpatient Follow-up for Lucien leg weakness     HPI:   Natty Nelson is a 82 y.o. female who was transferred from [facility] ED with complaint of weakness and MRI cervical spine showing severe stenosis and cord compression.  Patient has been suffering from progressive weakness of lower extremities> upper extremities over the past 2 to 3 months, she has been having multiple falls, denies low of bowel or bladder control. 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 On arrival to ED she was hemodynamically stable and afebrile.  Labs notable for LALIT on CKD and mild hypokalemia.  MRI imaging as described below.  Neurosurgery consulted and referred to hospital medicine service for further evaluation and management. Seen by neurosurgery team and recommended surgical intervention. She is s/p   DISCECTOMY, SPINE, CERVICAL, ANTERIOR APPROACH, WITH FUSION (Bilateral) ACDF C56/ fusion, medtronic.    Patient had no post operative issues. She was participating with PTx but still weak. Rehab was recommended and patient ok with rehab placement.       Interval Hx:   Seen and examined the pt.   VSS And HDS     Objective/physical exam:  General: In no acute distress, afebrile, + Obese   Chest: Clear to auscultation bilaterally  Heart: RRR +S1, S2, no appreciable murmur  Abdomen: Soft, nontender, BS +  MSK: Warm, no lower extremity edema, no clubbing or cyanosis  Neurologic: Alert and oriented x4, Lucien UE strength 5/5, LE strength 4/5     VITAL SIGNS: 24 HRS MIN & MAX LAST   Temp  Min: 97.4 °F (36.3 °C)  Max: 98.6 °F (37 °C) 97.8 °F (36.6 °C)   BP  Min: 115/71  Max: 166/65 119/66   Pulse  Min: 69  Max: 77  69   Resp  Min: 18  Max: 19 19   SpO2  Min: 97 %  Max: 100 % 100 %       Recent Labs   Lab 05/10/22  0440 05/11/22  7512  05/15/22  0530   WBC 15.4* 14.3* 9.5   RBC 3.09* 2.85* 2.69*   HGB 9.6* 9.0* 8.3*   HCT 31.0* 26.3* 27.2*   .3* 92.3 101.1*   MCH 31.1* 31.6* 30.9   MCHC 31.0* 34.2 30.5*   RDW 14.0 14.0 14.2    181 147   MPV 12.9* 12.7* 12.4       Recent Labs   Lab 05/10/22  0440 05/11/22  0436 05/15/22  0530    133* 139   K 4.6 4.5 3.9   CO2 22* 23 25   BUN 23.5* 29.4* 34.7*   CREATININE 1.07* 1.07* 1.09*   CALCIUM 8.9 8.9 8.5   ALBUMIN  --   --  2.4*   ALKPHOS  --   --  54   ALT  --   --  15   AST  --   --  15   BILITOT  --   --  0.4          Microbiology Results (last 7 days)     ** No results found for the last 168 hours. **               Scheduled Med:   amLODIPine  10 mg Oral Daily    aspirin  81 mg Oral Daily    atorvastatin  80 mg Oral Daily    baclofen  5 mg Oral BID    brimonidine 0.15 % OPTH DROP  1 drop Both Eyes BID    clopidogreL  75 mg Oral Daily    enoxaparin  30 mg Subcutaneous Daily    hydrALAZINE  50 mg Oral Q8H    insulin aspart U-100  15 Units Subcutaneous TIDWM    insulin detemir U-100  50 Units Subcutaneous QHS    labetaloL  200 mg Oral BID    latanoprost  1 drop Both Eyes Nightly    pantoprazole  40 mg Oral Daily    polyethylene glycol  17 g Oral Daily    pregabalin  75 mg Oral BID    senna-docusate 8.6-50 mg  1 tablet Oral BID    sucralfate  1 g Oral QID (AC & HS)    timolol maleate 0.25%  1 drop Both Eyes BID    valsartan  80 mg Oral Daily        Continuous Infusions:       PRN Meds:  acetaminophen, albuterol-ipratropium, aluminum-magnesium hydroxide-simethicone, dextrose 10%, dextrose 10%, glucagon (human recombinant), glucose, glucose, hydrALAZINE, HYDROcodone-acetaminophen, HYDROcodone-acetaminophen, HYDROmorphone, insulin aspart U-100, lorazepam, magnesium hydroxide 400 mg/5 ml, methocarbamoL, morphine, naloxone, ondansetron, prochlorperazine, prochlorperazine, sodium chloride 0.9%       Assessment/Plan:  Severe cervical stenosis with cord compression 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 Discussed with neurosurg. Patient has no new issues.Still Needing full assist for ADLs.   Cont OT/PTx   Cont PO steroids BID dose, wean per neurosurgery team   For her hyperglycemia will cont Levemir and lispro, adjust dose to keep blood sugars <180  Start aspirin.     Cont po DM diet   Strict aspiration and fall precautions   No NSAIDs         All diagnosis and differential diagnosis have been reviewed; assessment and plan has been documented; I have personally reviewed the labs and test results that are presently available; I have reviewed the patients medication list; I have reviewed the consulting providers response and recommendations. I have reviewed or attempted to review medical records based upon their availability    All of the patient's questions have been  addressed and answered. Patient's is agreeable to the above stated plan. I will continue to monitor closely and make adjustments to medical management as needed.      Nutrition Status:        Julio Lovell MD   05/16/2022

## 2022-05-16 NOTE — PT/OT/SLP RE-EVAL
Physical Therapy Re-evaluation    Patient Name:  Natty Nelson   MRN:  [id number]    Recommendations:     Discharge Recommendations:  rehabilitation facility   Discharge Equipment Recommendations:     Barriers to discharge: impaired functional mobility    Assessment:     Natty Nelson is a 82 y.o. female admitted with a medical diagnosis of Cervical stenosis of spinal canal.  She presents with the following impairments/functional limitations:  weakness, impaired endurance, impaired sensation, impaired functional mobilty, gait instability, impaired balance . Patient found sitting up in w/c upon arrival with bilateral feet on floor. Pt somewhat lethargic, BP assessed: 82/43, HR: 65. RN notified and patient promptly transferred back to bed, supine BP: 121/66, HR: 76. Patient AxOx4 throughout session and able to assist with transfer. Pt is progressing towards functional PT goals and remains appropriate for continued acute PT services w/ recommended d/c to IP rehab.     Rehab Prognosis:  Good; patient would benefit from acute skilled PT services to address these deficits and reach maximum level of function.      Recent Surgery: Procedure(s) (LRB):  DISCECTOMY, SPINE, CERVICAL, ANTERIOR APPROACH, WITH FUSION (Bilateral) 7 Days Post-Op    Plan:     During this hospitalization, patient to be seen daily to address the above listed problems via therapeutic activities, gait training, therapeutic exercises, neuromuscular re-education  · Plan of Care Expires:  06/06/22   Plan of Care Reviewed with: patient    Subjective     Communicated with RN prior to session.  Patient found up in chair with cervical collar upon PT entry to room, agreeable to evaluation.      Chief Complaint: none stated  Patient comments/goals: to get stronger  Pain/Comfort:  · Pain Rating 1: 0/10    Patients cultural, spiritual, Hoahaoism conflicts given the current situation: no      Objective:     Patient found with: cervical collar     General  Precautions: Standard, fall   Orthopedic Precautions:spinal precautions   Braces: Cervical collar  Respiratory Status: Room air    Exams:  · Cognitive Exam:  Patient is oriented to Person, Place, Time and Situation  · Sensation: -       Impaired  light/touch BLE and proprioception BLEs  · RLE ROM: WNL  · RLE Strength: Grossly 3+ to 4/5  · LLE ROM: WNL  · LLE Strength: Grossly 3+ to 4/5    Functional Mobility:  · Bed Mobility:  Sit to Supine: maximal assistance  · Transfers:  Bed to Chair: maximal assistance with  no AD  using  Squat Pivot    AM-PAC 6 CLICK MOBILITY  Total Score:10       Patient left supine with all lines intact, call button in reach and RN notified.    GOALS:   Multidisciplinary Problems     Physical Therapy Goals        Problem: Physical Therapy    Goal Priority Disciplines Outcome Goal Variances Interventions   Physical Therapy Goal     PT, PT/OT Ongoing, Progressing     Description: Goals to be met by: 22     Patient will increase functional independence with mobility by performin. Supine to sit with Stand-by Assistance  2. Sit to stand transfer with Minimal Assistance  3. Gait  x 150ft feet with Minimal Assistance using Rolling Walker.                      History:     Past Medical History:   Diagnosis Date    Arthritis     Diabetes mellitus     Hypertension        Past Surgical History:   Procedure Laterality Date    ANTERIOR CERVICAL DISCECTOMY W/ FUSION Bilateral 2022    Procedure: DISCECTOMY, SPINE, CERVICAL, ANTERIOR APPROACH, WITH FUSION;  Surgeon: Toni Joseph MD;  Location: [facility];  Service: Neurosurgery;  Laterality: Bilateral;  ACDF C5/6       Time Tracking:     PT Received On: 22  PT Start Time: 1030     PT Stop Time: 1050  PT Total Time (min): 20 min     Billable Minutes: Re-eval 20min      2022

## 2022-05-16 NOTE — PROGRESS NOTES
POD#7  ACDF C5-6    Sitting up in the chair eating.   No acute issues.  Very positive attitude and very motivated to rehab.  Ambulated.  She states she is completely pain-free today.  Her pre op weakness continues to improve  She is tolerating PO    AFVSS  Strength improving in LE  Numbness improved in UE  Incision c/d/i      Plan:     Continue with daily dressing changes  PT/OT  Fall precautions  SCDs and lovenox for DVT prophylaxis  Placement-okay to discharge to rehab when accepted.    Transfer when accepted.    Possibly to rehab today.        [facility] - 9th Floor Med Surg  Neurosurgery  Progress Note          Post-Op Info:  Procedure(s) (LRB):  DISCECTOMY, SPINE, CERVICAL, ANTERIOR APPROACH, WITH FUSION (Bilateral)   7 Days Post-Op      Medications:  Continuous Infusions:  Scheduled Meds:   amLODIPine  10 mg Oral Daily    aspirin  81 mg Oral Daily    atorvastatin  80 mg Oral Daily    baclofen  5 mg Oral BID    brimonidine 0.15 % OPTH DROP  1 drop Both Eyes BID    clopidogreL  75 mg Oral Daily    enoxaparin  30 mg Subcutaneous Daily    hydrALAZINE  50 mg Oral Q8H    insulin aspart U-100  15 Units Subcutaneous TIDWM    insulin detemir U-100  50 Units Subcutaneous QHS    labetaloL  200 mg Oral BID    latanoprost  1 drop Both Eyes Nightly    pantoprazole  40 mg Oral Daily    polyethylene glycol  17 g Oral Daily    pregabalin  75 mg Oral BID    senna-docusate 8.6-50 mg  1 tablet Oral BID    sucralfate  1 g Oral QID (AC & HS)    timolol maleate 0.25%  1 drop Both Eyes BID    valsartan  80 mg Oral Daily     PRN Meds:acetaminophen, albuterol-ipratropium, aluminum-magnesium hydroxide-simethicone, dextrose 10%, dextrose 10%, glucagon (human recombinant), glucose, glucose, hydrALAZINE, HYDROcodone-acetaminophen, HYDROcodone-acetaminophen, HYDROmorphone, insulin aspart U-100, lorazepam, magnesium hydroxide 400 mg/5 ml, methocarbamoL, morphine, naloxone, ondansetron, prochlorperazine,  prochlorperazine, sodium chloride 0.9%     Review of Systems    Objective:     Weight: 85.7 kg (188 lb 15 oz)  Body mass index is 34.56 kg/m².  Vital Signs (Most Recent):  Temp: 98.6 °F (37 °C) (05/16/22 0724)  Pulse: 72 (05/16/22 0820)  Resp: 19 (05/16/22 0724)  BP: (!) 144/69 (05/16/22 0820)  SpO2: 99 % (05/16/22 0724) Vital Signs (24h Range):  Temp:  [97.4 °F (36.3 °C)-98.6 °F (37 °C)] 98.6 °F (37 °C)  Pulse:  [68-77] 72  Resp:  [18-19] 19  SpO2:  [97 %-100 %] 99 %  BP: (110-166)/(47-71) 144/69                     Neurosurgery Physical Exam      Significant Labs:  Recent Labs   Lab 05/15/22  0530      K 3.9   CO2 25   BUN 34.7*   CREATININE 1.09*   CALCIUM 8.5     Recent Labs   Lab 05/15/22  0530   WBC 9.5   HGB 8.3*   HCT 27.2*        No results for input(s): LABPT, INR, APTT in the last 48 hours.  Microbiology Results (last 7 days)     ** No results found for the last 168 hours. **          Significant Diagnostics:      Assessment/Plan:     Active Diagnoses:    Diagnosis Date Noted POA    PRINCIPAL PROBLEM:  Cervical stenosis of spinal canal [M48.02] 05/07/2022 Yes      Problems Resolved During this Admission:       Yaritza Mcduffie [facility]  Neurosurgery  [facility] - 9th Floor Med Surg

## 2022-05-17 VITALS
HEART RATE: 63 BPM | WEIGHT: 188.94 LBS | SYSTOLIC BLOOD PRESSURE: 112 MMHG | BODY MASS INDEX: 34.77 KG/M2 | TEMPERATURE: 98 F | RESPIRATION RATE: 16 BRPM | OXYGEN SATURATION: 99 % | DIASTOLIC BLOOD PRESSURE: 64 MMHG | HEIGHT: 62 IN

## 2022-05-17 LAB
BASOPHILS # BLD AUTO: 0.02 X10(3)/MCL (ref 0–0.2)
BASOPHILS NFR BLD AUTO: 0.2 %
EOSINOPHIL # BLD AUTO: 0.21 X10(3)/MCL (ref 0–0.9)
EOSINOPHIL NFR BLD AUTO: 2.3 %
ERYTHROCYTE [DISTWIDTH] IN BLOOD BY AUTOMATED COUNT: 14.3 % (ref 11.5–17)
HCT VFR BLD AUTO: 25.9 % (ref 37–47)
HGB BLD-MCNC: 8.4 GM/DL (ref 12–16)
IMM GRANULOCYTES # BLD AUTO: 0.08 X10(3)/MCL (ref 0–0.02)
IMM GRANULOCYTES NFR BLD AUTO: 0.9 % (ref 0–0.43)
LYMPHOCYTES # BLD AUTO: 2.52 X10(3)/MCL (ref 0.6–4.6)
LYMPHOCYTES NFR BLD AUTO: 27.3 %
MCH RBC QN AUTO: 31.1 PG (ref 27–31)
MCHC RBC AUTO-ENTMCNC: 32.4 MG/DL (ref 33–36)
MCV RBC AUTO: 95.9 FL (ref 80–94)
MONOCYTES # BLD AUTO: 1 X10(3)/MCL (ref 0.1–1.3)
MONOCYTES NFR BLD AUTO: 10.8 %
NEUTROPHILS # BLD AUTO: 5.4 X10(3)/MCL (ref 2.1–9.2)
NEUTROPHILS NFR BLD AUTO: 58.5 %
NRBC BLD AUTO-RTO: 0 %
PLATELET # BLD AUTO: 161 X10(3)/MCL (ref 130–400)
PMV BLD AUTO: 12.5 FL (ref 9.4–12.4)
POCT GLUCOSE: 144 MG/DL (ref 70–110)
POCT GLUCOSE: 69 MG/DL (ref 70–110)
POCT GLUCOSE: 93 MG/DL (ref 70–110)
RBC # BLD AUTO: 2.7 X10(6)/MCL (ref 4.2–5.4)
WBC # SPEC AUTO: 9.2 X10(3)/MCL (ref 4.5–11.5)

## 2022-05-17 PROCEDURE — 85025 COMPLETE CBC W/AUTO DIFF WBC: CPT | Performed by: STUDENT IN AN ORGANIZED HEALTH CARE EDUCATION/TRAINING PROGRAM

## 2022-05-17 PROCEDURE — 97530 THERAPEUTIC ACTIVITIES: CPT

## 2022-05-17 PROCEDURE — 36415 COLL VENOUS BLD VENIPUNCTURE: CPT | Performed by: STUDENT IN AN ORGANIZED HEALTH CARE EDUCATION/TRAINING PROGRAM

## 2022-05-17 PROCEDURE — 25000003 PHARM REV CODE 250: Performed by: STUDENT IN AN ORGANIZED HEALTH CARE EDUCATION/TRAINING PROGRAM

## 2022-05-17 PROCEDURE — 63600175 PHARM REV CODE 636 W HCPCS: Performed by: INTERNAL MEDICINE

## 2022-05-17 PROCEDURE — 25000003 PHARM REV CODE 250: Performed by: INTERNAL MEDICINE

## 2022-05-17 RX ORDER — AMLODIPINE BESYLATE 10 MG/1
10 TABLET ORAL DAILY
Qty: 30 TABLET | Refills: 11 | Status: ON HOLD | OUTPATIENT
Start: 2022-05-18 | End: 2022-06-22 | Stop reason: HOSPADM

## 2022-05-17 RX ORDER — ACETAMINOPHEN 325 MG/1
650 TABLET ORAL EVERY 8 HOURS PRN
Qty: 30 TABLET | Refills: 0 | Status: ON HOLD
Start: 2022-05-17 | End: 2022-06-22 | Stop reason: HOSPADM

## 2022-05-17 RX ORDER — HYDRALAZINE HYDROCHLORIDE 50 MG/1
50 TABLET, FILM COATED ORAL EVERY 8 HOURS
Qty: 90 TABLET | Refills: 11 | Status: ON HOLD | OUTPATIENT
Start: 2022-05-17 | End: 2022-06-22 | Stop reason: HOSPADM

## 2022-05-17 RX ORDER — AMOXICILLIN 250 MG
1 CAPSULE ORAL 2 TIMES DAILY
Qty: 60 TABLET | Refills: 0 | Status: ON HOLD | OUTPATIENT
Start: 2022-05-17 | End: 2022-06-22 | Stop reason: HOSPADM

## 2022-05-17 RX ORDER — BACLOFEN 5 MG/1
5 TABLET ORAL 2 TIMES DAILY
Qty: 60 TABLET | Refills: 0 | Status: ON HOLD | OUTPATIENT
Start: 2022-05-17 | End: 2022-06-22 | Stop reason: HOSPADM

## 2022-05-17 RX ORDER — METHOCARBAMOL 750 MG/1
750 TABLET, FILM COATED ORAL EVERY 8 HOURS PRN
Qty: 10 TABLET | Refills: 0 | Status: SHIPPED | OUTPATIENT
Start: 2022-05-17 | End: 2022-05-27

## 2022-05-17 RX ORDER — HYDROCODONE BITARTRATE AND ACETAMINOPHEN 5; 325 MG/1; MG/1
1 TABLET ORAL EVERY 6 HOURS PRN
Qty: 28 TABLET | Refills: 0 | Status: SHIPPED | OUTPATIENT
Start: 2022-05-17 | End: 2022-05-24

## 2022-05-17 RX ADMIN — POLYETHYLENE GLYCOL 3350 17 G: 17 POWDER, FOR SOLUTION ORAL at 08:05

## 2022-05-17 RX ADMIN — BACLOFEN 5 MG: 5 TABLET ORAL at 08:05

## 2022-05-17 RX ADMIN — CLOPIDOGREL 75 MG: 75 TABLET, FILM COATED ORAL at 08:05

## 2022-05-17 RX ADMIN — SUCRALFATE 1 G: 1 TABLET ORAL at 04:05

## 2022-05-17 RX ADMIN — HYDRALAZINE HYDROCHLORIDE 50 MG: 50 TABLET ORAL at 04:05

## 2022-05-17 RX ADMIN — PREGABALIN 75 MG: 75 CAPSULE ORAL at 08:05

## 2022-05-17 RX ADMIN — BRIMONIDINE TARTRATE 1 DROP: 1.5 SOLUTION OPHTHALMIC at 08:05

## 2022-05-17 RX ADMIN — PANTOPRAZOLE SODIUM 40 MG: 40 TABLET, DELAYED RELEASE ORAL at 08:05

## 2022-05-17 RX ADMIN — SUCRALFATE 1 G: 1 TABLET ORAL at 11:05

## 2022-05-17 RX ADMIN — LABETALOL HYDROCHLORIDE 200 MG: 200 TABLET, FILM COATED ORAL at 08:05

## 2022-05-17 RX ADMIN — VALSARTAN 80 MG: 80 TABLET, FILM COATED ORAL at 08:05

## 2022-05-17 RX ADMIN — HYDROCODONE BITARTRATE AND ACETAMINOPHEN 1 TABLET: 5; 325 TABLET ORAL at 11:05

## 2022-05-17 RX ADMIN — SENNOSIDES, DOCUSATE SODIUM 1 TABLET: 8.6; 5 TABLET ORAL at 08:05

## 2022-05-17 RX ADMIN — TIMOLOL MALEATE 1 DROP: 2.5 SOLUTION/ DROPS OPHTHALMIC at 08:05

## 2022-05-17 RX ADMIN — ATORVASTATIN CALCIUM 80 MG: 40 TABLET, FILM COATED ORAL at 08:05

## 2022-05-17 RX ADMIN — AMLODIPINE BESYLATE 10 MG: 5 TABLET ORAL at 08:05

## 2022-05-17 RX ADMIN — INSULIN ASPART 15 UNITS: 100 INJECTION, SOLUTION INTRAVENOUS; SUBCUTANEOUS at 09:05

## 2022-05-17 NOTE — PT/OT/SLP PROGRESS
Physical Therapy Treatment    Patient Name:  Natty Nelson   MRN:  [id number]    Recommendations:     Discharge Recommendations:  rehabilitation facility   Discharge Equipment Recommendations:     Barriers to discharge: impaired functional mobility    Assessment:     Natty Nelson is a 82 y.o. female admitted with a medical diagnosis of Cervical stenosis of spinal canal, s/p C5-6 ACDF.  She presents with the following impairments/functional limitations:  weakness, impaired endurance, impaired sensation, impaired functional mobilty, gait instability, impaired balance, decreased lower extremity function, decreased safety awareness.    Rehab Prognosis: Good; patient would benefit from acute skilled PT services to address these deficits and reach maximum level of function.    Recent Surgery: Procedure(s) (LRB):  DISCECTOMY, SPINE, CERVICAL, ANTERIOR APPROACH, WITH FUSION (Bilateral) 8 Days Post-Op    Plan:     During this hospitalization, patient to be seen daily to address the identified rehab impairments via gait training, therapeutic activities, therapeutic exercises, neuromuscular re-education and progress toward the following goals:    · Plan of Care Expires:  06/06/22    Subjective     Chief Complaint: none stated  Patient/Family Comments/goals: to get stronger  Pain/Comfort:  · Pain Rating 1: 0/10      Objective:     Communicated with RN prior to session.  Patient found supine with cervical collar, SCD upon PT entry to room.     General Precautions: Standard, fall   Orthopedic Precautions:spinal precautions   Braces: Cervical collar  Respiratory Status: Room air     Functional Mobility:  · Bed Mobility:  Supine to Sit: moderate assistance  · Transfers:  Bed to Chair: maximal assistance with  no AD  using  Squat Pivot      AM-PAC 6 CLICK MOBILITY  Turning over in bed (including adjusting bedclothes, sheets and blankets)?: 3  Sitting down on and standing up from a chair with arms (e.g., wheelchair, bedside  commode, etc.): 2  Moving from lying on back to sitting on the side of the bed?: 3  Moving to and from a bed to a chair (including a wheelchair)?: 2  Need to walk in hospital room?: 1  Climbing 3-5 steps with a railing?: 1  Basic Mobility Total Score: 12     Patient left up in wheelchair with transporter present to transport patient to  rehab. ..    GOALS:   Multidisciplinary Problems     Physical Therapy Goals        Problem: Physical Therapy    Goal Priority Disciplines Outcome Goal Variances Interventions   Physical Therapy Goal     PT, PT/OT Ongoing, Progressing     Description: Goals to be met by: 22     Patient will increase functional independence with mobility by performin. Supine to sit with Stand-by Assistance  2. Sit to stand transfer with Minimal Assistance  3. Gait  x 150ft feet with Minimal Assistance using Rolling Walker.                      Time Tracking:     PT Received On: 22  PT Start Time: 1140     PT Stop Time: 1155  PT Total Time (min): 15 min     Billable Minutes: Therapeutic Activity 15min    Treatment Type: Treatment  PT/PTA: PT     PTA Visit Number: 1     2022

## 2022-05-17 NOTE — PROGRESS NOTES
POD#8  ACDF C5-6    Patient being discharged to rehab currently.        [facility] - 9th Floor Med Surg  Neurosurgery  Progress Note          Post-Op Info:  Procedure(s) (LRB):  DISCECTOMY, SPINE, CERVICAL, ANTERIOR APPROACH, WITH FUSION (Bilateral)   8 Days Post-Op      Medications:  Continuous Infusions:  Scheduled Meds:   amLODIPine  10 mg Oral Daily    aspirin  81 mg Oral Daily    atorvastatin  80 mg Oral Daily    baclofen  5 mg Oral BID    brimonidine 0.15 % OPTH DROP  1 drop Both Eyes BID    clopidogreL  75 mg Oral Daily    enoxaparin  30 mg Subcutaneous Daily    hydrALAZINE  50 mg Oral Q8H    insulin aspart U-100  15 Units Subcutaneous TIDWM    insulin detemir U-100  50 Units Subcutaneous QHS    labetaloL  200 mg Oral BID    latanoprost  1 drop Both Eyes Nightly    pantoprazole  40 mg Oral Daily    polyethylene glycol  17 g Oral Daily    pregabalin  75 mg Oral BID    senna-docusate 8.6-50 mg  1 tablet Oral BID    sucralfate  1 g Oral QID (AC & HS)    timolol maleate 0.25%  1 drop Both Eyes BID    valsartan  80 mg Oral Daily     PRN Meds:acetaminophen, albuterol-ipratropium, aluminum-magnesium hydroxide-simethicone, dextrose 10%, dextrose 10%, glucagon (human recombinant), glucose, glucose, hydrALAZINE, HYDROcodone-acetaminophen, HYDROcodone-acetaminophen, HYDROmorphone, insulin aspart U-100, lorazepam, magnesium hydroxide 400 mg/5 ml, methocarbamoL, morphine, naloxone, ondansetron, prochlorperazine, prochlorperazine, sodium chloride 0.9%     Review of Systems    Objective:     Weight: 85.7 kg (188 lb 15 oz)  Body mass index is 34.56 kg/m².  Vital Signs (Most Recent):  Temp: 97.7 °F (36.5 °C) (05/17/22 0712)  Pulse: 71 (05/17/22 0712)  Resp: 18 (05/16/22 2353)  BP: 139/75 (05/17/22 0712)  SpO2: 99 % (05/17/22 0712) Vital Signs (24h Range):  Temp:  [97.7 °F (36.5 °C)-98.5 °F (36.9 °C)] 97.7 °F (36.5 °C)  Pulse:  [69-79] 71  Resp:  [18-19] 18  SpO2:  [98 %-100 %] 99 %  BP:  (119-142)/(51-98) 139/75                     Neurosurgery Physical Exam      Significant Labs:  No results for input(s): GLU, NA, K, CL, CO2, BUN, CREATININE, CALCIUM, MG in the last 48 hours.  Recent Labs   Lab 05/17/22  0822   WBC 9.2   HGB 8.4*   HCT 25.9*        No results for input(s): LABPT, INR, APTT in the last 48 hours.  Microbiology Results (last 7 days)     ** No results found for the last 168 hours. **          Significant Diagnostics:      Assessment/Plan:     Active Diagnoses:    Diagnosis Date Noted POA    PRINCIPAL PROBLEM:  Cervical stenosis of spinal canal [M48.02] 05/07/2022 Yes      Problems Resolved During this Admission:       [facility]  Neurosurgery  [facility] - 9th Floor Med Surg

## 2022-05-17 NOTE — PLAN OF CARE
Discharge orders sent to [facility] via [facility]. [facility] notified for transport, spoke with Geri BEJARANO 1 hour.

## 2022-05-17 NOTE — PT/OT/SLP PROGRESS
Occupational Therapy  Treatment    Natty Nelson   MRN: [id number]   Admitting Diagnosis: Cervical stenosis of spinal canal    OT Date of Treatment: 05/17/22   OT Start Time: 0805  OT Stop Time: 0830  OT Total Time (min): 25 min    Billable Minutes:  Therapeutic Activity 25    OT/CLAYTON: OT     CLAYTON Visit Number: 3    General Precautions: Standard, fall, other (see comments) (monitored for orthostatic hypotension d/t low BP at WC level yesterday per PT report)  Orthopedic Precautions:  (cervical)  Braces: Cervical collar  Respiratory Status: Room air         Subjective:  Communicated with nurse prior to session and alerted CNA after session pt up in WC with stable BP but monitor for any signs of hypotension.  Pt reported no c/o nausea, weakness, or dizziness.  Pain/Comfort  Pain Rating 1: 0/10    Objective:  Patient found with: cervical collar     Functional Mobility:  Bed Mobility:  Rolling to R side with CGA, tf supine to sitting EOB with min/mod A, fair sitting balance EOB       Transfers:  Initiated SB tf training, with max A return demonstration and good participation        Functional Ambulation: NA           Balance:   Static Sit: FAIR+: Able to take MINIMAL challenges from all directions  Dynamic Sit: FAIR+: Maintains balance through MINIMAL excursions of active trunk motion  Static Stand: 0: Needs MAXIMAL assist to maintain   Dynamic stand: 0: N/A    Therapeutic Activities and Exercises:  SB transfer training, WC mobility and positioning activities, BP monitored at 139/75 baseline in supine, 118/55 after initial tf to WC, 129/63 at end of session after 10 min of activity in sitting at .  Pt left with BLE elevated at leg rests and pt educated to use call bell for assist if any hypotensive symptoms noted; reviewed symptoms of hypotension with pt who verbalized understanding.     AM-PAC 6 CLICK ADL   How much help from another person does this patient currently need?   1 = Unable, Total/Dependent Assistance  2 =  "A lot, Maximum/Moderate Assistance  3 = A little, Minimum/Contact Guard/Supervision  4 = None, Modified [place]/Independent    Putting on and taking off regular lower body clothing? : 2  Bathing (including washing, rinsing, drying)?: 2  Toileting, which includes using toilet, bedpan, or urinal? : 2  Putting on and taking off regular upper body clothing?: 3  Taking care of personal grooming such as brushing teeth?: 3  Eating meals?: 3  Daily Activity Total Score: 15     AM-PAC Raw Score CMS "G-Code Modifier Level of Impairment Assistance   6 % Total / Unable   7 - 8 CM 80 - 100% Maximal Assist   9-13 CL 60 - 80% Moderate Assist   14 - 19 CK 40 - 60% Moderate Assist   20 - 22 CJ 20 - 40% Minimal Assist   23 CI 1-20% SBA / CGA   24 CH 0% Independent/ Mod I       Patient left up in chair with call button in reach and CNA notified    ASSESSMENT:  Natty Nelson is a 82 y.o. female with a medical diagnosis of Cervical stenosis of spinal canal and presents with general weakness especially at BLE.    Rehab identified problem list/impairments:      Rehab potential is good.    Activity tolerance: Excellent    Discharge recommendations:       Barriers to discharge:      Equipment recommendations: slide board     GOALS:   Multidisciplinary Problems     Occupational Therapy Goals        Problem: Occupational Therapy    Goal Priority Disciplines Outcome Interventions   Occupational Therapy Goal     OT, PT/OT Ongoing, Progressing    Description: Goals to be met by: 05/23/2022    Patient will increase functional independence with ADLs by performing:    Grooming while seated with Stand-by Assistance.  Standing during functional ax x5 minutes with Minimal Assistance.  Toilet transfer to bedside commode with Moderate Assistance.  Pt will improve UE strength to 4/5 in order to improve IND w/ ADLs by time of d/c.  Pt will perform LE dressing w/ min A using AE to improve IND w/ ADLs by time of d/c.               "       Plan:  Patient to be seen 5 x/week, daily to address the above listed problems via neuromuscular re-education, self-care/home management, therapeutic activities, therapeutic exercises  Plan of Care expires: 05/27/22  Plan of Care reviewed with: patient         05/17/2022

## 2022-05-17 NOTE — DISCHARGE SUMMARY
[facility]  Hospital Medicine Discharge Summary    Admit Date: 5/5/2022  Discharge Date and Time: 5/17/202212:02 PM  Admitting Physician: [unfilled]  Discharging Physician: Julio Lovell MD.  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 female who was transferred from [facility] ED with complaint of weakness and MRI cervical spine showing severe stenosis and cord compression.  Patient has been suffering from progressive weakness of lower extremities> upper extremities over the past 2 to 3 months, she has been having multiple falls, denies low of bowel or bladder control. 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 On arrival to ED she was hemodynamically stable and afebrile.  Labs notable for LALIT on CKD and mild hypokalemia.  MRI imaging as described below.  Neurosurgery consulted and referred to hospital medicine service for further evaluation and management. Seen by neurosurgery team and recommended surgical intervention. She is s/p   DISCECTOMY, SPINE, CERVICAL, ANTERIOR APPROACH, WITH FUSION (Bilateral) ACDF C56/ fusion, medtronic.     S/P DISCECTOMY, SPINE, CERVICAL, ANTERIOR APPROACH, WITH FUSION (Bilateral)  ACDF C56/ fusion, medtronic     Patient was stable during the hospital stay she is otherwise stable hemodynamics stable hemoglobin is stable.  Discussed with neurosurgery team and they are okay starting aspirin Plavix due to recent stent placement to lower extremity arteries.  Hemoglobin stable on repeat CBC patient is doing well sitting in the chair eating her breakfast and is comfortable.  She is okay to be discharged from medical  standpoint and surgical standpoint and she will follow with Neurosurgery as an outpatient.  Patient will be discharged to rehab    Vitals:  VITAL SIGNS: 24 HRS MIN & MAX LAST   Temp  Min: 97.5 °F (36.4 °C)  Max: 98.5 °F (36.9 °C) 97.5 °F (36.4 °C)   BP  Min: 112/64  Max: 142/54 112/64   Pulse  Min: 63  Max: 79  63   Resp  Min: 16  Max: 19 16   SpO2  Min: 98 %  Max: 100 % 99 %       Physical Exam:  General: In no acute distress, afebrile, + Obese   Chest: Clear to auscultation bilaterally  Heart: RRR +S1, S2, no appreciable murmur  Abdomen: Soft, nontender, BS +  MSK: Warm, no lower extremity edema, no clubbing or cyanosis  Neurologic: Alert and oriented x4, Lucien UE strength 5/5, LE strength 4/5     Procedures Performed: No admission procedures for hospital encounter.     Significant Diagnostic Studies: See Full reports for all details    Recent Labs   Lab 05/11/22  0431 05/15/22  0530 05/17/22  0822   WBC 14.3* 9.5 9.2   RBC 2.85* 2.69* 2.70*   HGB 9.0* 8.3* 8.4*   HCT 26.3* 27.2* 25.9*   MCV 92.3 101.1* 95.9*   MCH 31.6* 30.9 31.1*   MCHC 34.2 30.5* 32.4*   RDW 14.0 14.2 14.3    147 161   MPV 12.7* 12.4 12.5*       Recent Labs   Lab 05/11/22  0436 05/15/22  0530   * 139   K 4.5 3.9   CO2 23 25   BUN 29.4* 34.7*   CREATININE 1.07* 1.09*   CALCIUM 8.9 8.5   ALBUMIN  --  2.4*   ALKPHOS  --  54   ALT  --  15   AST  --  15   BILITOT  --  0.4        Microbiology Results (last 7 days)     ** No results found for the last 168 hours. **           X-Ray Cervical Spine 2 or 3 Views  Narrative: EXAMINATION:  XR CERVICAL SPINE 2 OR 3 VIEWS    CLINICAL HISTORY:  Discectomy C5-6;    COMPARISON:  None.  Impression: Spot intraoperative fluoroscopic images show patient is status post ACDF at C5-6.    See operators report for full detail.    Total fluoroscopic time: 17 seconds    Total # images: 2    Electronically signed by: Ranjan Pope  Date:    05/09/2022  Time:    10:55  X-Ray Chest 1 View  Narrative:  EXAMINATION:  XR CHEST 1 VIEW    CLINICAL HISTORY:  central line placement;    TECHNIQUE:  Single view of the chest    COMPARISON:  04/13/2022    FINDINGS:  Left-sided central line projects over the mid SVC.  No focal opacification or pneumothorax.  Impression: Left-sided central line projects over the mid SVC.    Electronically signed by: Andrez Garrison  Date:    05/09/2022  Time:    10:07  SURG FL Surgery Fluoro Usage  See OP Notes for results.     IMPRESSION: See OP Notes for results.     This procedure was auto-finalized by: Virtual Radiologist         Medication List      START taking these medications    acetaminophen 325 MG tablet  Commonly known as: TYLENOL  Take 2 tablets (650 mg total) by mouth every 8 (eight) hours as needed for Pain.     hydrALAZINE 50 MG tablet  Commonly known as: APRESOLINE  Take 1 tablet (50 mg total) by mouth every 8 (eight) hours.     HYDROcodone-acetaminophen 5-325 mg per tablet  Commonly known as: NORCO  Take 1 tablet by mouth every 6 (six) hours as needed for Pain.     methocarbamoL 750 MG Tab  Commonly known as: ROBAXIN  Take 1 tablet (750 mg total) by mouth every 8 (eight) hours as needed.     senna-docusate 8.6-50 mg 8.6-50 mg per tablet  Commonly known as: PERICOLACE  Take 1 tablet by mouth 2 (two) times daily.        CHANGE how you take these medications    amLODIPine 10 MG tablet  Commonly known as: NORVASC  Take 1 tablet (10 mg total) by mouth once daily.  Start taking on: May 18, 2022  What changed:   · medication strength  · how much to take     baclofen 5 mg Tab tablet  Commonly known as: LIORESAL  Take 1 tablet (5 mg total) by mouth 2 (two) times daily.  What changed:   · medication strength  · additional instructions        CONTINUE taking these medications    aspirin 81 MG Chew     atorvastatin 80 MG tablet  Commonly known as: LIPITOR     bisacodyL 10 mg Supp  Commonly known as: DULCOLAX     brimonidine 0.2% 0.2 % Drop  Commonly known as: ALPHAGAN     clopidogreL 75  mg tablet  Commonly known as: PLAVIX     GERITOL ORAL     insulin glargine 100 unit/mL injection  Commonly known as: Lantus     insulin regular 100 unit/mL injection     labetaloL 200 MG tablet  Commonly known as: NORMODYNE     lactulose 10 gram/15 mL solution  Commonly known as: CHRONULAC     latanoprost 0.005 % ophthalmic solution     pantoprazole 40 MG tablet  Commonly known as: PROTONIX     polyethylene glycol 17 gram Pwpk  Commonly known as: GLYCOLAX     pregabalin 75 MG capsule  Commonly known as: LYRICA     sucralfate 1 gram tablet  Commonly known as: CARAFATE     timolol maleate 0.25% 0.25 % Drop  Commonly known as: TIMOPTIC     TRADJENTA 5 mg Tab tablet  Generic drug: linaGLIPtin     valsartan 80 MG tablet  Commonly known as: DIOVAN     VITAMIN D2 50,000 unit Cap  Generic drug: ergocalciferol        STOP taking these medications    dexamethasone 4 mg/mL injection  Commonly known as: DECADRON     dextrose 50% (D50W) solution     LIDOcaine 5 %  Commonly known as: LIDODERM     PERCOCET 5-325 mg per tablet  Generic drug: oxyCODONE-acetaminophen           Where to Get Your Medications      These medications were sent to [facility] - [address]  [address] [place] [address], [facility] [address]    Phone: [phone]   · amLODIPine 10 MG tablet  · baclofen 5 mg Tab tablet  · hydrALAZINE 50 MG tablet  · senna-docusate 8.6-50 mg 8.6-50 mg per tablet     You can get these medications from any pharmacy    Bring a paper prescription for each of these medications  · HYDROcodone-acetaminophen 5-325 mg per tablet  · methocarbamoL 750 MG Tab     Information about where to get these medications is not yet available    Ask your nurse or doctor about these medications  · acetaminophen 325 MG tablet          Explained in detail to the patient about the discharge plan, medications, and follow-up visits. Pt understands and agrees with the treatment  plan  Discharged Condition: stable  Diet-   Dietary Orders (From admission, onward)     Start     Ordered    05/10/22 1054  Diet diabetic  Diet effective now         05/10/22 1054               Medications Per DC med rec  Activities as tolerated    For further questions contact hospitalist office    Discharge time 33 minutes    For worsening symptoms, chest pain, shortness of breath, increased abdominal pain, high grade fever, stroke or stroke like symptoms, immediately go to the nearest Emergency Room or call 911 as soon as possible.      Julio Hernandes M.D on 5/17/2022. at 12:02 PM.

## 2022-06-01 ENCOUNTER — HOSPITAL ENCOUNTER (INPATIENT)
Facility: HOSPITAL | Age: 82
LOS: 21 days | Discharge: NURSING, REHAB OR LONG-TERM CARE | DRG: 471 | End: 2022-06-22
Attending: STUDENT IN AN ORGANIZED HEALTH CARE EDUCATION/TRAINING PROGRAM | Admitting: INTERNAL MEDICINE
Payer: MEDICARE

## 2022-06-01 DIAGNOSIS — R07.9 CHEST PAIN: ICD-10-CM

## 2022-06-01 DIAGNOSIS — Z98.1 S/P SPINAL FUSION: ICD-10-CM

## 2022-06-01 DIAGNOSIS — M54.2 NECK PAIN: Primary | ICD-10-CM

## 2022-06-01 DIAGNOSIS — R29.898 WEAKNESS OF LOWER EXTREMITY, UNSPECIFIED LATERALITY: ICD-10-CM

## 2022-06-01 LAB
ALBUMIN SERPL-MCNC: 2.7 GM/DL (ref 3.4–4.8)
ALBUMIN/GLOB SERPL: 1.2 RATIO (ref 1.1–2)
ALP SERPL-CCNC: 85 UNIT/L (ref 40–150)
ALT SERPL-CCNC: 33 UNIT/L (ref 0–55)
APTT PPP: 40 SECONDS (ref 23.2–33.7)
AST SERPL-CCNC: 20 UNIT/L (ref 5–34)
BASOPHILS # BLD AUTO: 0.03 X10(3)/MCL (ref 0–0.2)
BASOPHILS NFR BLD AUTO: 0.5 %
BILIRUBIN DIRECT+TOT PNL SERPL-MCNC: 0.2 MG/DL
BUN SERPL-MCNC: 52.1 MG/DL (ref 9.8–20.1)
CALCIUM SERPL-MCNC: 9.4 MG/DL (ref 8.4–10.2)
CHLORIDE SERPL-SCNC: 104 MMOL/L (ref 98–107)
CO2 SERPL-SCNC: 25 MMOL/L (ref 23–31)
CREAT SERPL-MCNC: 1.62 MG/DL (ref 0.55–1.02)
EOSINOPHIL # BLD AUTO: 0.37 X10(3)/MCL (ref 0–0.9)
EOSINOPHIL NFR BLD AUTO: 6.3 %
ERYTHROCYTE [DISTWIDTH] IN BLOOD BY AUTOMATED COUNT: 13.9 % (ref 11.5–17)
GLOBULIN SER-MCNC: 2.3 GM/DL (ref 2.4–3.5)
GLUCOSE SERPL-MCNC: 248 MG/DL (ref 82–115)
HCT VFR BLD AUTO: 25 % (ref 37–47)
HGB BLD-MCNC: 8.2 GM/DL (ref 12–16)
IMM GRANULOCYTES # BLD AUTO: 0.02 X10(3)/MCL (ref 0–0.02)
IMM GRANULOCYTES NFR BLD AUTO: 0.3 % (ref 0–0.43)
INR BLD: 0.98 (ref 0–1.3)
LYMPHOCYTES # BLD AUTO: 2.14 X10(3)/MCL (ref 0.6–4.6)
LYMPHOCYTES NFR BLD AUTO: 36.4 %
MCH RBC QN AUTO: 32.3 PG (ref 27–31)
MCHC RBC AUTO-ENTMCNC: 32.8 MG/DL (ref 33–36)
MCV RBC AUTO: 98.4 FL (ref 80–94)
MONOCYTES # BLD AUTO: 0.69 X10(3)/MCL (ref 0.1–1.3)
MONOCYTES NFR BLD AUTO: 11.7 %
NEUTROPHILS # BLD AUTO: 2.6 X10(3)/MCL (ref 2.1–9.2)
NEUTROPHILS NFR BLD AUTO: 44.8 %
NRBC BLD AUTO-RTO: 0 %
PLATELET # BLD AUTO: 247 X10(3)/MCL (ref 130–400)
PMV BLD AUTO: 12.4 FL (ref 9.4–12.4)
POTASSIUM SERPL-SCNC: 4.6 MMOL/L (ref 3.5–5.1)
PROT SERPL-MCNC: 5 GM/DL (ref 5.8–7.6)
PROTHROMBIN TIME: 12.7 SECONDS (ref 12.5–14.5)
RBC # BLD AUTO: 2.54 X10(6)/MCL (ref 4.2–5.4)
SARS-COV-2 RDRP RESP QL NAA+PROBE: NEGATIVE
SODIUM SERPL-SCNC: 136 MMOL/L (ref 136–145)
WBC # SPEC AUTO: 5.9 X10(3)/MCL (ref 4.5–11.5)

## 2022-06-01 PROCEDURE — 85730 THROMBOPLASTIN TIME PARTIAL: CPT | Performed by: PHYSICIAN ASSISTANT

## 2022-06-01 PROCEDURE — 96374 THER/PROPH/DIAG INJ IV PUSH: CPT

## 2022-06-01 PROCEDURE — 11000001 HC ACUTE MED/SURG PRIVATE ROOM

## 2022-06-01 PROCEDURE — 63600175 PHARM REV CODE 636 W HCPCS: Performed by: STUDENT IN AN ORGANIZED HEALTH CARE EDUCATION/TRAINING PROGRAM

## 2022-06-01 PROCEDURE — 96375 TX/PRO/DX INJ NEW DRUG ADDON: CPT

## 2022-06-01 PROCEDURE — 85610 PROTHROMBIN TIME: CPT | Performed by: PHYSICIAN ASSISTANT

## 2022-06-01 PROCEDURE — 87635 SARS-COV-2 COVID-19 AMP PRB: CPT | Performed by: STUDENT IN AN ORGANIZED HEALTH CARE EDUCATION/TRAINING PROGRAM

## 2022-06-01 PROCEDURE — 36415 COLL VENOUS BLD VENIPUNCTURE: CPT | Performed by: PHYSICIAN ASSISTANT

## 2022-06-01 PROCEDURE — 99285 EMERGENCY DEPT VISIT HI MDM: CPT | Mod: 25

## 2022-06-01 PROCEDURE — 63600175 PHARM REV CODE 636 W HCPCS

## 2022-06-01 PROCEDURE — 80053 COMPREHEN METABOLIC PANEL: CPT | Performed by: PHYSICIAN ASSISTANT

## 2022-06-01 PROCEDURE — 85025 COMPLETE CBC W/AUTO DIFF WBC: CPT | Performed by: PHYSICIAN ASSISTANT

## 2022-06-01 PROCEDURE — 84075 ASSAY ALKALINE PHOSPHATASE: CPT | Performed by: PHYSICIAN ASSISTANT

## 2022-06-01 RX ORDER — INSULIN ASPART 100 [IU]/ML
1-10 INJECTION, SOLUTION INTRAVENOUS; SUBCUTANEOUS EVERY 6 HOURS PRN
Status: CANCELLED | OUTPATIENT
Start: 2022-06-02

## 2022-06-01 RX ORDER — ONDANSETRON 2 MG/ML
INJECTION INTRAMUSCULAR; INTRAVENOUS
Status: COMPLETED
Start: 2022-06-01 | End: 2022-06-01

## 2022-06-01 RX ORDER — ONDANSETRON 2 MG/ML
4 INJECTION INTRAMUSCULAR; INTRAVENOUS EVERY 4 HOURS PRN
Status: DISCONTINUED | OUTPATIENT
Start: 2022-06-02 | End: 2022-06-03

## 2022-06-01 RX ORDER — PROCHLORPERAZINE EDISYLATE 5 MG/ML
5 INJECTION INTRAMUSCULAR; INTRAVENOUS EVERY 6 HOURS PRN
Status: DISCONTINUED | OUTPATIENT
Start: 2022-06-02 | End: 2022-06-03

## 2022-06-01 RX ORDER — HYDROCODONE BITARTRATE AND ACETAMINOPHEN 5; 325 MG/1; MG/1
1 TABLET ORAL EVERY 6 HOURS PRN
Status: DISCONTINUED | OUTPATIENT
Start: 2022-06-02 | End: 2022-06-02

## 2022-06-01 RX ORDER — HYDRALAZINE HYDROCHLORIDE 50 MG/1
50 TABLET, FILM COATED ORAL EVERY 8 HOURS
Status: DISCONTINUED | OUTPATIENT
Start: 2022-06-02 | End: 2022-06-02

## 2022-06-01 RX ORDER — LABETALOL 200 MG/1
200 TABLET, FILM COATED ORAL 2 TIMES DAILY
Status: DISCONTINUED | OUTPATIENT
Start: 2022-06-02 | End: 2022-06-06

## 2022-06-01 RX ORDER — POLYETHYLENE GLYCOL 3350 17 G/17G
17 POWDER, FOR SOLUTION ORAL 2 TIMES DAILY PRN
Status: DISCONTINUED | OUTPATIENT
Start: 2022-06-02 | End: 2022-06-22 | Stop reason: HOSPADM

## 2022-06-01 RX ORDER — BACLOFEN 5 MG/1
5 TABLET ORAL 2 TIMES DAILY
Status: DISCONTINUED | OUTPATIENT
Start: 2022-06-02 | End: 2022-06-17

## 2022-06-01 RX ORDER — AMOXICILLIN 250 MG
1 CAPSULE ORAL 2 TIMES DAILY PRN
Status: DISCONTINUED | OUTPATIENT
Start: 2022-06-02 | End: 2022-06-22 | Stop reason: HOSPADM

## 2022-06-01 RX ORDER — ATORVASTATIN CALCIUM 40 MG/1
80 TABLET, FILM COATED ORAL DAILY
Status: DISCONTINUED | OUTPATIENT
Start: 2022-06-02 | End: 2022-06-06

## 2022-06-01 RX ORDER — BISACODYL 10 MG
10 SUPPOSITORY, RECTAL RECTAL DAILY
Status: DISCONTINUED | OUTPATIENT
Start: 2022-06-02 | End: 2022-06-04

## 2022-06-01 RX ORDER — SODIUM CHLORIDE 9 MG/ML
INJECTION, SOLUTION INTRAVENOUS CONTINUOUS
Status: DISCONTINUED | OUTPATIENT
Start: 2022-06-02 | End: 2022-06-02

## 2022-06-01 RX ORDER — HYDROCODONE BITARTRATE AND ACETAMINOPHEN 500; 5 MG/1; MG/1
TABLET ORAL
Status: DISCONTINUED | OUTPATIENT
Start: 2022-06-02 | End: 2022-06-04

## 2022-06-01 RX ORDER — MAG HYDROX/ALUMINUM HYD/SIMETH 200-200-20
30 SUSPENSION, ORAL (FINAL DOSE FORM) ORAL EVERY 4 HOURS PRN
Status: DISCONTINUED | OUTPATIENT
Start: 2022-06-02 | End: 2022-06-22 | Stop reason: HOSPADM

## 2022-06-01 RX ORDER — GLUCAGON 1 MG
1 KIT INJECTION
Status: CANCELLED | OUTPATIENT
Start: 2022-06-02

## 2022-06-01 RX ORDER — ONDANSETRON 2 MG/ML
4 INJECTION INTRAMUSCULAR; INTRAVENOUS ONCE
Status: COMPLETED | OUTPATIENT
Start: 2022-06-01 | End: 2022-06-01

## 2022-06-01 RX ORDER — PANTOPRAZOLE SODIUM 40 MG/1
40 TABLET, DELAYED RELEASE ORAL DAILY
Status: DISCONTINUED | OUTPATIENT
Start: 2022-06-02 | End: 2022-06-22 | Stop reason: HOSPADM

## 2022-06-01 RX ORDER — BISACODYL 10 MG
10 SUPPOSITORY, RECTAL RECTAL DAILY PRN
Status: DISCONTINUED | OUTPATIENT
Start: 2022-06-02 | End: 2022-06-22 | Stop reason: HOSPADM

## 2022-06-01 RX ORDER — MORPHINE SULFATE 4 MG/ML
4 INJECTION, SOLUTION INTRAMUSCULAR; INTRAVENOUS ONCE
Status: COMPLETED | OUTPATIENT
Start: 2022-06-01 | End: 2022-06-01

## 2022-06-01 RX ORDER — PREGABALIN 75 MG/1
75 CAPSULE ORAL 2 TIMES DAILY
Status: DISCONTINUED | OUTPATIENT
Start: 2022-06-02 | End: 2022-06-18

## 2022-06-01 RX ORDER — LATANOPROST 50 UG/ML
1 SOLUTION/ DROPS OPHTHALMIC NIGHTLY
Status: DISCONTINUED | OUTPATIENT
Start: 2022-06-02 | End: 2022-06-22 | Stop reason: HOSPADM

## 2022-06-01 RX ORDER — AMLODIPINE BESYLATE 5 MG/1
10 TABLET ORAL DAILY
Status: DISCONTINUED | OUTPATIENT
Start: 2022-06-02 | End: 2022-06-08

## 2022-06-01 RX ORDER — INSULIN ASPART 100 [IU]/ML
1-10 INJECTION, SOLUTION INTRAVENOUS; SUBCUTANEOUS
Status: DISCONTINUED | OUTPATIENT
Start: 2022-06-02 | End: 2022-06-06

## 2022-06-01 RX ORDER — VALSARTAN 80 MG/1
80 TABLET ORAL DAILY
Status: DISCONTINUED | OUTPATIENT
Start: 2022-06-02 | End: 2022-06-02

## 2022-06-01 RX ORDER — ACETAMINOPHEN 325 MG/1
650 TABLET ORAL EVERY 4 HOURS PRN
Status: DISCONTINUED | OUTPATIENT
Start: 2022-06-02 | End: 2022-06-03

## 2022-06-01 RX ORDER — ACETAMINOPHEN 325 MG/1
650 TABLET ORAL EVERY 4 HOURS PRN
Status: DISCONTINUED | OUTPATIENT
Start: 2022-06-02 | End: 2022-06-22 | Stop reason: HOSPADM

## 2022-06-01 RX ADMIN — MORPHINE SULFATE 4 MG: 4 INJECTION INTRAVENOUS at 05:06

## 2022-06-01 RX ADMIN — ONDANSETRON 4 MG: 2 INJECTION INTRAMUSCULAR; INTRAVENOUS at 05:06

## 2022-06-01 NOTE — ED PROVIDER NOTES
Encounter Date: 6/1/2022    SCRIBE #1 NOTE: I, Jyothi Ferreira, am scribing for, and in the presence of,  Elils Mercer MD. I have scribed the following portions of the note - Other sections scribed: HPI, ROS, PE.       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 Hogue about a month ago,  presents to the ED sent from [facility] rehab for abnormal MRI - ordered for worsening pain, baseline LE weakness since compression a month ago that is not worsened.  CT done at [facility] showed C3-C4 spondylitis disc protrusion with cord impingement. MRI done at [facility] showed dural scarring at C5-C6 with cord stenosis and edema. CT Denies new weakness or sensation deficits. Denies trauma, bladder/bowel incontinence.     The pt states her neurosurgeon is Dr. Hogue.    The history is provided by the patient. No  was used.   Neck Pain   This is a chronic problem. The current episode started several weeks ago. The problem occurs constantly. The problem has been gradually worsening. Associated with: neck surgery.     Review of patient's allergies indicates:  No Known Allergies  Past Medical History:   Diagnosis Date    Arthritis     Diabetes mellitus     Hypertension      Past Surgical History:   Procedure Laterality Date    ANTERIOR CERVICAL DISCECTOMY W/ FUSION Bilateral 5/9/2022    Procedure: DISCECTOMY, SPINE, CERVICAL, ANTERIOR APPROACH, WITH FUSION;  Surgeon: Toni Hogue MD;  Location: [facility];  Service: Neurosurgery;  Laterality: Bilateral;  ACDF C5/6     History reviewed. No pertinent family history.     Review of Systems   Constitutional: Negative for chills.   HENT: Negative for congestion, rhinorrhea and sore throat.    Eyes: Negative for visual  disturbance.   Respiratory: Negative for cough and shortness of breath.    Cardiovascular: Negative for leg swelling.   Gastrointestinal: Negative for nausea and vomiting.   Genitourinary: Negative for hematuria, vaginal bleeding and vaginal discharge.   Musculoskeletal: Positive for neck pain. Negative for joint swelling.        C-collar in place   Skin: Negative for rash.   Psychiatric/Behavioral: Negative for confusion.       Physical Exam     Initial Vitals   BP Pulse Resp Temp SpO2   06/01/22 1624 06/01/22 1624 06/01/22 1624 06/01/22 1624 06/01/22 2002   (!) 178/102 76 18 98.2 °F (36.8 °C) 96 %      MAP       --                Physical Exam    Nursing note and vitals reviewed.  Constitutional: She is not diaphoretic. No distress.   HENT:   Head: Normocephalic and atraumatic.   Eyes: EOM are normal. Pupils are equal, round, and reactive to light.   Neck: Neck supple.   Diffuse C spine tenderness, soft collar in place     Cardiovascular: Normal rate and regular rhythm.   No murmur heard.  Pulmonary/Chest: Breath sounds normal. No respiratory distress. She has no wheezes. She has no rales.   Abdominal: Abdomen is soft. She exhibits no distension. There is no abdominal tenderness.   Musculoskeletal:         General: Normal range of motion.      Cervical back: Neck supple.     Neurological: She is alert and oriented to person, place, and time. No cranial nerve deficit or sensory deficit.    2/5 bilateral LE (baseline per report)  Able to plantar and dorsal flex against resistance, wiggle toes  Sensation intact    Skin: Skin is warm. No rash noted.   Psychiatric: She has a normal mood and affect.         ED Course   Procedures  Labs Reviewed   COMPREHENSIVE METABOLIC PANEL - Abnormal; Notable for the following components:       Result Value    Glucose Level 248 (*)     Blood Urea Nitrogen 52.1 (*)     Creatinine 1.62 (*)     Protein Total 5.0 (*)     Albumin Level 2.7 (*)     Globulin 2.3 (*)     All other components  within normal limits   APTT - Abnormal; Notable for the following components:    PTT 40.0 (*)     All other components within normal limits   CBC WITH DIFFERENTIAL - Abnormal; Notable for the following components:    RBC 2.54 (*)     Hgb 8.2 (*)     Hct 25.0 (*)     MCV 98.4 (*)     MCH 32.3 (*)     MCHC 32.8 (*)     IG# 0.02 (*)     All other components within normal limits   PROTIME-INR - Normal   SARS-COV-2 RNA AMPLIFICATION, QUAL - Normal   CBC W/ AUTO DIFFERENTIAL    Narrative:     The following orders were created for panel order CBC auto differential.  Procedure                               Abnormality         Status                     ---------                               -----------         ------                     CBC with Differential[[id number]]        Abnormal            Final result                 Please view results for these tests on the individual orders.          Imaging Results    None          Medications   acetaminophen tablet 650 mg (has no administration in time range)   amLODIPine tablet 10 mg (10 mg Oral Given 6/2/22 1058)   atorvastatin tablet 80 mg (80 mg Oral Given 6/2/22 1058)   baclofen tablet 5 mg (5 mg Oral Given 6/2/22 1058)   bisacodyL suppository 10 mg (10 mg Rectal Not Given 6/2/22 0900)   labetaloL tablet 200 mg (200 mg Oral Given 6/2/22 1058)   latanoprost 0.005 % ophthalmic solution 1 drop (1 drop Both Eyes Not Given 6/2/22 0000)   pantoprazole EC tablet 40 mg (40 mg Oral Given 6/2/22 1059)   pregabalin capsule 75 mg (75 mg Oral Given 6/2/22 1059)   trazodone split tablet 25 mg (has no administration in time range)   ondansetron injection 4 mg (has no administration in time range)   prochlorperazine injection Soln 5 mg (has no administration in time range)   polyethylene glycol packet 17 g (has no administration in time range)   senna-docusate 8.6-50 mg per tablet 1 tablet (has no administration in time range)   bisacodyL suppository 10 mg (has no administration in time  range)   acetaminophen tablet 650 mg (has no administration in time range)   aluminum-magnesium hydroxide-simethicone 200-200-20 mg/5 mL suspension 30 mL (has no administration in time range)   insulin aspart U-100 injection 1-10 Units (has no administration in time range)   0.9%  NaCl infusion (for blood administration) (has no administration in time range)   hydrALAZINE tablet 100 mg (100 mg Oral Given 6/2/22 1455)   HYDROcodone-acetaminophen 5-325 mg per tablet 1 tablet (has no administration in time range)   HYDROmorphone (PF) injection 1 mg (has no administration in time range)   morphine injection 4 mg (4 mg Intravenous Given 6/1/22 1727)   ondansetron injection 4 mg (4 mg Intravenous Not Given 6/1/22 1830)     Medical Decision Making:   History:   Old Medical Records: I decided to obtain old medical records.  Initial Assessment:   See HPI for history - in summary 83 yo with recent C5-C6 surgery with Dr. Joseph send for abnormal MRI in setting of worsening pain and persistent LE weakness from procedure a month ago. CT/MRI reports uploaded to media tab - attempted to obtain discs from [facility] multiple times, they told us someone would send them over probably not until AM.  Patient has significant LE weakness on exam - but reportedly this has been present for the past month. Has severe pain but no worsening deficits, no bladder or bowel incontinence.   Clinical Tests:   Lab Tests: Ordered and Reviewed  ED Management:  IV pain medications           Scribe Attestation:   Scribe #1: I performed the above scribed service and the documentation accurately describes the services I performed. I attest to the accuracy of the note.        ED Course as of 06/02/22 1522   Wed Jun 01, 2022 1925 Spoke with Dr. Alberto. He states this is not his pt and they should be admitted to the hospitalist. [VM]   2027 Rehab facility states they will send someone with the images but it will likely not be tonight. I will upload  images of report and discuss with neurosurgery. [VM]   2045 Dr. Deluna's NP will discuss with Dr. Deluna and call back. [VM]   2048 Dr. Deluna states admit to medicine and consult Dr. Joseph in the morning. [VM]   2105 Spoke to hospital medicine. Admit to Dr. Singer. [VM]      ED Course User Index  [VM] Jyothi Hanna             Clinical Impression:   Final diagnoses:  [M54.2] Neck pain (Primary)  [Z98.1] S/P spinal fusion  [R29.898] Weakness of lower extremity, unspecified laterality          ED Disposition Condition    Admit       I, Ellis Mercer MD personally performed the history, PE, MDM, and procedures as documented above and agree with the scribe's documentation.           Ellis Mercer IV, MD  06/02/22 3793

## 2022-06-01 NOTE — ED NOTES
Pt c/o upper back pain that radiates into neck bilat and L shoulder. Pt aaox4, calm, and cooperative. Pt follows commands and answers questions appropriately. Pt moves all 4 extremities moderately and equal x4 with sensation and pulses intact. Pt denies numbness/ tingling, weakness, HA, and/ or incontinence. Pt resting with easy respirations. No distress noted at this time. +2 edema to lower legs bilat with +2 edema to R foot and +3 edema to L foot. C collar remains intact from EMS. Educated pt on plan of care, call light use, and importance of keeping c collar on. Pt verbalizes understanding.

## 2022-06-01 NOTE — FIRST PROVIDER EVALUATION
"Medical screening exam completed.  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 female presents to the ED via EMS from [facility], sent here for possible admission. MRI today noting acute traumatic spondylopathy. Neurosurgeon is Dr Joseph. PCP is Dr Alberto.     Vitals:    06/01/22 1624   BP: (!) 178/102   Pulse: 76   Resp: 18   Temp: 98.2 °F (36.8 °C)   TempSrc: Tympanic   Weight: 100 kg (220 lb 7.4 oz)   Height: 5' 2" (1.575 m)       Pertinent physical exam:  Awake, alert, ambulatory, non-labored respirations    Brief workup plan:  labs    Preliminary workup initiated; this workup will be continued and followed by the physician or advanced practice provider that is assigned to the patient when roomed.  "

## 2022-06-02 ENCOUNTER — ANESTHESIA EVENT (OUTPATIENT)
Dept: SURGERY | Facility: HOSPITAL | Age: 82
DRG: 471 | End: 2022-06-02
Payer: MEDICARE

## 2022-06-02 LAB
ALBUMIN SERPL-MCNC: 2.5 GM/DL (ref 3.4–4.8)
ALBUMIN/GLOB SERPL: 0.9 RATIO (ref 1.1–2)
ALP SERPL-CCNC: 78 UNIT/L (ref 40–150)
ALT SERPL-CCNC: 31 UNIT/L (ref 0–55)
ANTIBODY IDENTIFICATION: NORMAL
AST SERPL-CCNC: 19 UNIT/L (ref 5–34)
BASOPHILS # BLD AUTO: 0.03 X10(3)/MCL (ref 0–0.2)
BASOPHILS NFR BLD AUTO: 0.4 %
BILIRUBIN DIRECT+TOT PNL SERPL-MCNC: 0.2 MG/DL
BUN SERPL-MCNC: 47.4 MG/DL (ref 9.8–20.1)
CALCIUM SERPL-MCNC: 9.5 MG/DL (ref 8.4–10.2)
CHLORIDE SERPL-SCNC: 108 MMOL/L (ref 98–107)
CO2 SERPL-SCNC: 25 MMOL/L (ref 23–31)
CREAT SERPL-MCNC: 1.3 MG/DL (ref 0.55–1.02)
EOSINOPHIL # BLD AUTO: 0.39 X10(3)/MCL (ref 0–0.9)
EOSINOPHIL NFR BLD AUTO: 5.7 %
ERYTHROCYTE [DISTWIDTH] IN BLOOD BY AUTOMATED COUNT: 13.8 % (ref 11.5–17)
FERRITIN SERPL-MCNC: 229.41 NG/ML (ref 4.63–204)
GLOBULIN SER-MCNC: 2.7 GM/DL (ref 2.4–3.5)
GLUCOSE SERPL-MCNC: 143 MG/DL (ref 82–115)
GROUP & RH: ABNORMAL
HCT VFR BLD AUTO: 25.5 % (ref 37–47)
HGB BLD-MCNC: 8.1 GM/DL (ref 12–16)
IMM GRANULOCYTES # BLD AUTO: 0.02 X10(3)/MCL (ref 0–0.02)
IMM GRANULOCYTES NFR BLD AUTO: 0.3 % (ref 0–0.43)
INDIRECT COOMBS GEL: ABNORMAL
IRON SATN MFR SERPL: 38 % (ref 20–50)
IRON SERPL-MCNC: 74 UG/DL (ref 50–170)
LYMPHOCYTES # BLD AUTO: 1.91 X10(3)/MCL (ref 0.6–4.6)
LYMPHOCYTES NFR BLD AUTO: 28 %
MCH RBC QN AUTO: 31.4 PG (ref 27–31)
MCHC RBC AUTO-ENTMCNC: 31.8 MG/DL (ref 33–36)
MCV RBC AUTO: 98.8 FL (ref 80–94)
MONOCYTES # BLD AUTO: 0.81 X10(3)/MCL (ref 0.1–1.3)
MONOCYTES NFR BLD AUTO: 11.9 %
NEUTROPHILS # BLD AUTO: 3.7 X10(3)/MCL (ref 2.1–9.2)
NEUTROPHILS NFR BLD AUTO: 53.7 %
NRBC BLD AUTO-RTO: 0 %
PLATELET # BLD AUTO: 261 X10(3)/MCL (ref 130–400)
PMV BLD AUTO: 12.2 FL (ref 9.4–12.4)
POCT GLUCOSE: 127 MG/DL (ref 70–110)
POCT GLUCOSE: 148 MG/DL (ref 70–110)
POCT GLUCOSE: 229 MG/DL (ref 70–110)
POTASSIUM SERPL-SCNC: 4.3 MMOL/L (ref 3.5–5.1)
PROT SERPL-MCNC: 5.2 GM/DL (ref 5.8–7.6)
RBC # BLD AUTO: 2.58 X10(6)/MCL (ref 4.2–5.4)
SODIUM SERPL-SCNC: 139 MMOL/L (ref 136–145)
TIBC SERPL-MCNC: 122 UG/DL (ref 70–310)
TIBC SERPL-MCNC: 196 UG/DL (ref 250–450)
TRANSFERRIN SERPL-MCNC: 177 MG/DL
WBC # SPEC AUTO: 6.8 X10(3)/MCL (ref 4.5–11.5)

## 2022-06-02 PROCEDURE — 11000001 HC ACUTE MED/SURG PRIVATE ROOM

## 2022-06-02 PROCEDURE — 86870 RBC ANTIBODY IDENTIFICATION: CPT | Performed by: NURSE PRACTITIONER

## 2022-06-02 PROCEDURE — 25000003 PHARM REV CODE 250: Performed by: INTERNAL MEDICINE

## 2022-06-02 PROCEDURE — 63600175 PHARM REV CODE 636 W HCPCS: Performed by: NURSE PRACTITIONER

## 2022-06-02 PROCEDURE — 80053 COMPREHEN METABOLIC PANEL: CPT | Performed by: NURSE PRACTITIONER

## 2022-06-02 PROCEDURE — 85025 COMPLETE CBC W/AUTO DIFF WBC: CPT | Performed by: NURSE PRACTITIONER

## 2022-06-02 PROCEDURE — 36415 COLL VENOUS BLD VENIPUNCTURE: CPT | Performed by: NURSE PRACTITIONER

## 2022-06-02 PROCEDURE — 86901 BLOOD TYPING SEROLOGIC RH(D): CPT | Performed by: NURSE PRACTITIONER

## 2022-06-02 PROCEDURE — 94761 N-INVAS EAR/PLS OXIMETRY MLT: CPT

## 2022-06-02 PROCEDURE — 86922 COMPATIBILITY TEST ANTIGLOB: CPT

## 2022-06-02 PROCEDURE — 82728 ASSAY OF FERRITIN: CPT | Performed by: NURSE PRACTITIONER

## 2022-06-02 PROCEDURE — 86922 COMPATIBILITY TEST ANTIGLOB: CPT | Performed by: NEUROLOGICAL SURGERY

## 2022-06-02 PROCEDURE — 25000003 PHARM REV CODE 250: Performed by: NURSE PRACTITIONER

## 2022-06-02 PROCEDURE — 83540 ASSAY OF IRON: CPT | Performed by: NURSE PRACTITIONER

## 2022-06-02 RX ORDER — HYDROCODONE BITARTRATE AND ACETAMINOPHEN 5; 325 MG/1; MG/1
1 TABLET ORAL EVERY 4 HOURS PRN
Status: DISCONTINUED | OUTPATIENT
Start: 2022-06-02 | End: 2022-06-03

## 2022-06-02 RX ORDER — HYDROMORPHONE HYDROCHLORIDE 2 MG/ML
1 INJECTION, SOLUTION INTRAMUSCULAR; INTRAVENOUS; SUBCUTANEOUS EVERY 4 HOURS PRN
Status: DISCONTINUED | OUTPATIENT
Start: 2022-06-02 | End: 2022-06-04

## 2022-06-02 RX ORDER — HYDRALAZINE HYDROCHLORIDE 50 MG/1
100 TABLET, FILM COATED ORAL EVERY 8 HOURS
Status: DISCONTINUED | OUTPATIENT
Start: 2022-06-02 | End: 2022-06-03

## 2022-06-02 RX ADMIN — PREGABALIN 75 MG: 75 CAPSULE ORAL at 09:06

## 2022-06-02 RX ADMIN — BACLOFEN 5 MG: 5 TABLET ORAL at 10:06

## 2022-06-02 RX ADMIN — PANTOPRAZOLE SODIUM 40 MG: 40 TABLET, DELAYED RELEASE ORAL at 10:06

## 2022-06-02 RX ADMIN — LABETALOL HYDROCHLORIDE 200 MG: 200 TABLET, FILM COATED ORAL at 10:06

## 2022-06-02 RX ADMIN — ATORVASTATIN CALCIUM 80 MG: 40 TABLET, FILM COATED ORAL at 10:06

## 2022-06-02 RX ADMIN — HYDRALAZINE HYDROCHLORIDE 100 MG: 50 TABLET, FILM COATED ORAL at 02:06

## 2022-06-02 RX ADMIN — INSULIN ASPART 6 UNITS: 100 INJECTION, SOLUTION INTRAVENOUS; SUBCUTANEOUS at 04:06

## 2022-06-02 RX ADMIN — BACLOFEN 5 MG: 5 TABLET ORAL at 09:06

## 2022-06-02 RX ADMIN — AMLODIPINE BESYLATE 10 MG: 5 TABLET ORAL at 10:06

## 2022-06-02 RX ADMIN — LATANOPROST 1 DROP: 50 SOLUTION OPHTHALMIC at 09:06

## 2022-06-02 RX ADMIN — LABETALOL HYDROCHLORIDE 200 MG: 200 TABLET, FILM COATED ORAL at 09:06

## 2022-06-02 RX ADMIN — INSULIN ASPART 3 UNITS: 100 INJECTION, SOLUTION INTRAVENOUS; SUBCUTANEOUS at 10:06

## 2022-06-02 RX ADMIN — HYDROCODONE BITARTRATE AND ACETAMINOPHEN 1 TABLET: 5; 325 TABLET ORAL at 10:06

## 2022-06-02 RX ADMIN — PREGABALIN 75 MG: 75 CAPSULE ORAL at 10:06

## 2022-06-02 RX ADMIN — HYDRALAZINE HYDROCHLORIDE 50 MG: 50 TABLET, FILM COATED ORAL at 05:06

## 2022-06-02 RX ADMIN — HYDROCODONE BITARTRATE AND ACETAMINOPHEN 1 TABLET: 5; 325 TABLET ORAL at 09:06

## 2022-06-02 RX ADMIN — SODIUM CHLORIDE: 9 INJECTION, SOLUTION INTRAVENOUS at 12:06

## 2022-06-02 RX ADMIN — HYDRALAZINE HYDROCHLORIDE 100 MG: 50 TABLET, FILM COATED ORAL at 09:06

## 2022-06-02 NOTE — PLAN OF CARE
Problem: Adult Inpatient Plan of Care  Goal: Plan of Care Review  Outcome: Ongoing, Progressing  Goal: Patient-Specific Goal (Individualized)  Outcome: Ongoing, Progressing  Goal: Absence of Hospital-Acquired Illness or Injury  Outcome: Ongoing, Progressing  Intervention: Prevent and Manage VTE (Venous Thromboembolism) Risk  Flowsheets (Taken 6/2/2022 1523)  Activity Management: Rolling - L1  VTE Prevention/Management:   remove, assess skin, and reapply sequential compression device   bleeding risk assessed   bleeding precations maintained   ROM (passive) performed  Range of Motion: active ROM (range of motion) encouraged  Goal: Optimal Comfort and Wellbeing  Outcome: Ongoing, Progressing  Intervention: Monitor Pain and Promote Comfort  Flowsheets (Taken 6/2/2022 1523)  Pain Management Interventions:   pain management plan reviewed with patient/caregiver   medication offered   quiet environment facilitated   position adjusted  Intervention: Provide Person-Centered Care  Flowsheets (Taken 6/2/2022 1523)  Trust Relationship/Rapport:   care explained   choices provided   emotional support provided   empathic listening provided   questions answered   questions encouraged   reassurance provided   thoughts/feelings acknowledged     Problem: Skin Injury Risk Increased  Goal: Skin Health and Integrity  Outcome: Ongoing, Progressing  Intervention: Optimize Skin Protection  Flowsheets (Taken 6/2/2022 1523)  Pressure Reduction Techniques:   frequent weight shift encouraged   rest period provided between sit times   weight shift assistance provided     Problem: Fall Injury Risk  Goal: Absence of Fall and Fall-Related Injury  Outcome: Ongoing, Progressing  Intervention: Promote Injury-Free Environment  Flowsheets (Taken 6/2/2022 1523)  Safety Promotion/Fall Prevention:   assistive device/personal item within reach   Fall Risk reviewed with patient/family   instructed to call staff for mobility   medications reviewed

## 2022-06-02 NOTE — PLAN OF CARE
Problem: Adult Inpatient Plan of Care  Goal: Plan of Care Review  Outcome: Ongoing, Progressing  Goal: Patient-Specific Goal (Individualized)  Outcome: Ongoing, Progressing  Goal: Absence of Hospital-Acquired Illness or Injury  Outcome: Ongoing, Progressing  Goal: Optimal Comfort and Wellbeing  Outcome: Ongoing, Progressing  Goal: Readiness for Transition of Care  Outcome: Ongoing, Progressing      ATTENDING NOTE          Teaching Attestation:  I have personally interviewed and examined the patient via face 2 face.  I confirmed the findings listed below:    Obstructive hydrocephalus (CMS/HCC)  Pineal body neoplasm       This was discussed with the resident or nurse practionner and I agree with the assessment and plan as documented. The plan of care was discussed with the .  I have fully participated in the care of this patient.  I have reviewed and agree with all pertinent clinical information including history, physical exam, labs, radiographic studies , the  MDM and the final plan of care.  I have also reviewed and agree with the medications, allergies and past medical history sections for this patient.    HPI    11 year old  Patient presented with headache x 3 weeks described as bi temporal and occipital. Infrequently using tylenol as needed for headache.   Has had intermittent vomiting in the morning, most recent as this morning, NB/NB  Has orthostatic like symptoms such as when standing has blurry vision and feels like he will pass out.   Denies weakness or paresthesia of his arms or legs  No bowel or bladder incontinence  Denies headache history  Denies head trauma  Denies bowel or bladder incontinence  No strong history of migraines     Review of Systems    Neg: pallor, seizure activity, nosebleed, hematochezia, diaphoresis, cyanosis, wound   Pos see HPI  Otherwise negative,  Eye Problem(s):visual blurring  ENT Problem(s):headaches and negative  Cardiovascular problem(s):negative, chest pain and cyanosis  Respiratory problem(s):negative and shortness of breath  Gastro-intestinal problem(s):vomiting  Genito-urinary problem(s):negative and frequency  Musculoskeletal problem(s):negative and back pain  Integumentary problem(s):negative, bruising and itching  Neurological problem(s):migraine headaches  Psychiatric problem(s):negative and anxiety  Endocrine problem(s):negative and cold  intolerance  Hematologic and/or Lymphatic problem(s):negative and easy bruising      Physical Exam    ED Triage Vitals [01/01/21 1002]   /79   Heart Rate 71   Resp 20   Temp 98.1 °F (36.7 °C)   SpO2 100 %        Blood pressure (!) 116/38, pulse 66, temperature 99.5 °F (37.5 °C), temperature source Oral, resp. rate 16, height 5' 6\" (1.676 m), weight 53 kg (116 lb 13.5 oz), SpO2 99 %.     Notable for well appearing child,   GCS 15  no acute distress,   CTAB, no wheezing, no rhonchi, no retractions ,   Normal S1, S2 no m/r/g  Abdomen soft, NT, ND  Cap refill < 2 s   CN 2-12 grossly intact, noted to have intentional tremor but suppresible with light tough, gait normal     MDM:    Pt is a well appearing, non toxic appearing child presents with chronic headache with some red flags: occipital headache, daily headaches 3 weeks, early morning vomiting.   May be related to acute intracranial pathology (mass/hydrocephalus) vs tension headache vs migraine    Pt given mirgraine cocktail with significant improvement in headache  CT head with moderate hydrocephalus and pineal tumor--> head of bed elevated, admit to PICU, Neurosurgery on consult recommend decadron  Pre-op labs obtained as pt liekly will need VPS.   Pt signed out to PICU       Please see ED COURSE for updated pts course and diagnosis               ED Course           Diagnosis:    ED Diagnosis   1. Obstructive hydrocephalus (CMS/HCC)     2. Pineal body neoplasm         MD Melany Stewart MD  01/03/21 1524

## 2022-06-02 NOTE — CONSULTS
[facility] - 9th Floor Med Surg  Neurosurgery  Consult Note    Inpatient consult to Neurosurgery  Consult performed by: MAKSIM Davis-BC  Consult ordered by: MICHELLE Casillas        Subjective:     Chief Complaint/Reason for Admission:  Increasing neck and arm pain.  Decreased mobility.  Weak lower extremities.    History of Present Illness:  This is an 82-year-old  female known to Dr. Alston.  Patient had cervical surgery approximately 1 month ago.  Presents with increasing neck pain as well as bilateral shoulder pain.  Complains that she feels her legs are weaker as well but has weak in her lower extremities since last admission.  Patient has medical history significant for diabetes, hypertension and ACDF with fusion on 05/09/2022.  Patient was at rehab receiving physical therapy.  She states she was standing with a walker and other mobilizing equipment.    Currently she is sitting up in bed with a visitor at bedside.  She is able to move both arms but has a good deal of pain to her entire arm and both hands.  She has good dexterity.  She has posterior neck pain as well.  Pain in the shoulders as well.  Her lower extremities continue to be very weak.  She can slightly lift her left leg off of the bed but not the right.  She has a good deal of edema to her lower extremities.  She can wiggle both toes.  Able to plantar and dorsiflex.  Denies paresthesias.  No bowel or bladder incontinence.  Otherwise fully oriented neurologically.    [facility] spine at [facility] demonstrates bone marrow edema at C3.  MRI demonstrates postoperative changes including stenosis at C5.  Again the MRI disc is on the chart.      MRI was ordered but unable to obtain at this time due to very long line of patient waiting for both routine and stat orders.  Disc is on chart of MRI obtained at [facility] which indicates acute traumatic spondylopathy.  The plan is for a PCDF C3-C7 for tomorrow.      PTA Medications    Medication Sig    acetaminophen (TYLENOL) 325 MG tablet Take 2 tablets (650 mg total) by mouth every 8 (eight) hours as needed for Pain.    amLODIPine (NORVASC) 10 MG tablet Take 1 tablet (10 mg total) by mouth once daily.    aspirin 81 MG Chew Take 81 mg by mouth Daily.    atorvastatin (LIPITOR) 80 MG tablet Take 80 mg by mouth once daily.    baclofen (LIORESAL) 5 mg Tab tablet Take 1 tablet (5 mg total) by mouth 2 (two) times daily.    bisacodyL (DULCOLAX) 10 mg Supp Place 10 mg rectally every 72 hours as needed.    brimonidine 0.2% (ALPHAGAN) 0.2 % Drop Place 1 drop into both eyes 2 (two) times a day.    clopidogreL (PLAVIX) 75 mg tablet Take 75 mg by mouth once daily.    hydrALAZINE (APRESOLINE) 50 MG tablet Take 1 tablet (50 mg total) by mouth every 8 (eight) hours.    insulin glargine (LANTUS) 100 unit/mL injection Inject 25 Units into the skin every evening.    insulin regular 100 unit/mL Inj injection Inject into the skin. Sliding scale as directed prn    iron/vitamin B complex (GERITOL ORAL) Take 20 mLs by mouth 2 (two) times a day.    labetaloL (NORMODYNE) 200 MG tablet Take 200 mg by mouth 2 (two) times daily.    lactulose (CHRONULAC) 10 gram/15 mL solution Take 20 g by mouth every 48 hours as needed.    latanoprost 0.005 % ophthalmic solution Place 1 drop into both eyes nightly.    linaGLIPtin (TRADJENTA) 5 mg Tab tablet Take 5 mg by mouth once daily.    pantoprazole (PROTONIX) 40 MG tablet Take 40 mg by mouth once daily.    polyethylene glycol (GLYCOLAX) 17 gram PwPk Take 17 g by mouth once daily.    pregabalin (LYRICA) 75 MG capsule Take 75 mg by mouth 2 (two) times daily.    senna-docusate 8.6-50 mg (PERICOLACE) 8.6-50 mg per tablet Take 1 tablet by mouth 2 (two) times daily.    sucralfate (CARAFATE) 1 gram tablet Take 1 g by mouth 4 (four) times daily before meals and nightly.    timolol maleate 0.25% (TIMOPTIC) 0.25 % Drop Place 1 drop into both eyes 2 (two) times daily.     valsartan (DIOVAN) 80 MG tablet Take 80 mg by mouth once daily.    VITAMIN D2 1,250 mcg (50,000 unit) capsule Take 50,000 Units by mouth every Tuesday.       Review of patient's allergies indicates:  No Known Allergies    Past Medical History:   Diagnosis Date    Arthritis     Diabetes mellitus     Hypertension      Past Surgical History:   Procedure Laterality Date    ANTERIOR CERVICAL DISCECTOMY W/ FUSION Bilateral 5/9/2022    Procedure: DISCECTOMY, SPINE, CERVICAL, ANTERIOR APPROACH, WITH FUSION;  Surgeon: Toni Joseph MD;  Location: [facility];  Service: Neurosurgery;  Laterality: Bilateral;  ACDF C5/6     Family History    None       Tobacco Use    Smoking status: Not on file    Smokeless tobacco: Not on file   Substance and Sexual Activity    Alcohol use: Not on file    Drug use: Not on file    Sexual activity: Not on file     Review of Systems   Musculoskeletal: Positive for neck pain.   Neurological: Positive for weakness.     Objective:     Weight: 100 kg (220 lb 7.4 oz)  Body mass index is 40.32 kg/m².  Vital Signs (Most Recent):  Temp: 97.7 °F (36.5 °C) (06/02/22 0715)  Pulse: 76 (06/02/22 1058)  Resp: 16 (06/02/22 1058)  BP: (!) 170/75 (06/02/22 1058)  SpO2: 98 % (06/02/22 0715) Vital Signs (24h Range):  Temp:  [97.5 °F (36.4 °C)-98.8 °F (37.1 °C)] 97.7 °F (36.5 °C)  Pulse:  [66-76] 76  Resp:  [15-18] 16  SpO2:  [96 %-98 %] 98 %  BP: (149-183)/() 170/75                Oxygen Concentration (%):  [96] 96         Physical Exam:  Vitals reviewed.    Constitutional: She appears well-developed and well-nourished. She is not diaphoretic. No distress.     Eyes: Pupils are equal, round, and reactive to light. Conjunctivae and EOM are normal.     Cardiovascular: Normal rate, regular rhythm, normal pulses and intact distal pulses.     Abdominal: Soft. Bowel sounds are normal.     Psych/Behavior: She is alert. She is oriented to person, place, and time. She has a normal mood and affect.      Musculoskeletal:        Right Upper Extremities: Muscle strength is 5/5.        Left Upper Extremities: Muscle strength is 5/5.       Right Lower Extremities: Muscle strength is 2/5.        Left Lower Extremities: Muscle strength is 2/5.     Neurological:        DTRs: DTRs are DTRS NORMAL AND SYMMETRICnormal and symmetric.        Cranial nerves: Cranial nerve(s) II, III, IV, V, VI, VII, VIII, IX, X, XI and XII are intact.       Significant Labs:  Recent Labs   Lab 06/01/22  1653 06/02/22  0450    139   K 4.6 4.3   CO2 25 25   BUN 52.1* 47.4*   CREATININE 1.62* 1.30*   CALCIUM 9.4 9.5     Recent Labs   Lab 06/01/22  1653 06/02/22  0450   WBC 5.9 6.8   HGB 8.2* 8.1*   HCT 25.0* 25.5*    261     Recent Labs   Lab 06/01/22  1653   INR 0.98     Microbiology Results (last 7 days)     ** No results found for the last 168 hours. **          Significant Diagnostics:      Assessment/Plan:        [place] [facility] [place]  Spinal precautions  Hard cervical collar  NPO after midnight  Type and cross.  Pain control - resume patient's home medications.  SCDs  PTOT  Fall precautions  Consent on chart.  C3 through C7 PCD F tomorrow.               Active Diagnoses:    Diagnosis Date Noted POA    Neck pain [M54.2] 06/01/2022 Unknown      Problems Resolved During this Admission:       Thank you for your consult.     Yaritza Mcduffie, [facility]-BC  Neurosurgery  [facility] - 9th Floor Med Surg

## 2022-06-02 NOTE — ANESTHESIA PREPROCEDURE EVALUATION
06/02/2022  Natty Nelson is a 82 y.o., female presents for posterior cervical fusion C4-7.  The patient tolerated generally anesth for ACDF 1 month prior (see below).  Patient was admitted to internal medicine June 1 for difficulty ambulating and weakness .  Preoperative hemoglobin 8.1 patient previously taking Plavix  (off 5/25) will likely need transfusion intraoperatively.      Last 3 sets of Vitals    Vitals - 1 value per visit 6/2/2022 6/2/2022 6/3/2022   SYSTOLIC - 93 122   DIASTOLIC - 56 66   Pulse - 74 72   Temp - 98.3 97.9   Resp 18 18 18   SPO2 - 96 96   Weight (lb) - - -   Weight (kg) - - -   Height - - -   BMI (Calculated) - - -         Lab Results   Component Value Date    WBC 5.6 06/03/2022    HGB 8.1 (L) 06/03/2022    HCT 25.3 (L) 06/03/2022    MCV 99.2 (H) 06/03/2022     06/03/2022          BMP  Lab Results   Component Value Date     06/03/2022    K 4.5 06/03/2022    CO2 25 06/03/2022    BUN 40.9 (H) 06/03/2022    CREATININE 1.13 (H) 06/03/2022    CALCIUM 9.1 06/03/2022    EGFRNONAA 39 04/26/2022        CMP  Sodium Level   Date Value Ref Range Status   06/03/2022 142 136 - 145 mmol/L Final     Potassium Level   Date Value Ref Range Status   06/03/2022 4.5 3.5 - 5.1 mmol/L Final     Carbon Dioxide   Date Value Ref Range Status   06/03/2022 25 23 - 31 mmol/L Final     Blood Urea Nitrogen   Date Value Ref Range Status   06/03/2022 40.9 (H) 9.8 - 20.1 mg/dL Final     Creatinine   Date Value Ref Range Status   06/03/2022 1.13 (H) 0.55 - 1.02 mg/dL Final     Calcium Level Total   Date Value Ref Range Status   06/03/2022 9.1 8.4 - 10.2 mg/dL Final     Albumin Level   Date Value Ref Range Status   06/02/2022 2.5 (L) 3.4 - 4.8 gm/dL Final     Bilirubin Total   Date Value Ref Range Status   06/02/2022 0.2 <=1.5 mg/dL Final     Alkaline Phosphatase   Date Value Ref Range Status    06/02/2022 78 40 - 150 unit/L Final     Aspartate Aminotransferase   Date Value Ref Range Status   06/02/2022 19 5 - 34 unit/L Final     Alanine Aminotransferase   Date Value Ref Range Status   06/02/2022 31 0 - 55 unit/L Final     Estimated GFR-Non    Date Value Ref Range Status   04/26/2022 39        Lab Results   Component Value Date    HGBA1C 7.2 04/19/2022         Lab Results   Component Value Date    INR 0.98 06/01/2022    INR 1.04 05/06/2022    INR 0.98 05/05/2022    PROTIME 12.7 06/01/2022    PROTIME 13.3 05/06/2022    PROTIME 12.7 05/05/2022           Pre-op Assessment    I have reviewed the Patient Summary Reports.     I have reviewed the Nursing Notes. I have reviewed the NPO Status.   I have reviewed the Medications.     Review of Systems  Anesthesia Hx:  No problems with previous Anesthesia  Personal Hx of Anesthesia complications Denies Post-Operative Nausea/Vomiting.  Denies Difficult Intubation.  Denies Dental Injury.   Social:  Non-Smoker    Cardiovascular:   Exercise tolerance: good Hypertension  Functional Capacity 1 METS  Peripheral Arterial Disease    Endocrine:   Diabetes, type 2, using insulin  Morbid Obesity / BMI > 40      Physical Exam  General: Well nourished, Cooperative, Alert and Oriented  Limb alert LUE  Airway:  Mallampati: III   Mouth Opening: Small, but > 3cm  TM Distance: Normal  Tongue: Normal  Neck ROM: Flexion Decreased    Dental:  Dentures    Chest/Lungs:  Clear to auscultation, Normal Respiratory Rate    Heart:  Rate: Normal  Rhythm: Regular Rhythm        Anesthesia Plan  Type of Anesthesia, risks & benefits discussed:    Anesthesia Type: Gen ETT  Intra-op Monitoring Plan: Standard ASA Monitors, Art Line and Central Line  Post Op Pain Control Plan: multimodal analgesia and IV/PO Opioids PRN  Induction:  IV  Airway Plan: Direct  Informed Consent: Informed consent signed with the Patient and all parties understand the risks and agree with anesthesia plan.  All  questions answered. Patient consented to blood products? Yes  ASA Score: 3 Emergent  Day of Surgery Review of History & Physical: H&P Update referred to the surgeon/provider.  Anesthesia Plan Notes: PIV x2 & BIS if monitoring MEPs    Ready For Surgery From Anesthesia Perspective.     .

## 2022-06-02 NOTE — H&P
[facility] Hospital Medicine   History & Physical Note      Patient Name: Natty Nelson  : 1940  MRN: [id number]  Patient Class: IP- Inpatient   Admission Date: 2022   Length of Stay: 1  Admitting Physician: Dr. Singer  PCP: Primary Doctor No  Source of history: Patient, patient's family, and EMR.   Code status: Full      Chief Complaint   Neck Pain    History of Present Illness   Ms. Nelson is 81 yo female with severe cervical stenosis status post discectomy with fusion of C5-C6 on 2022 by Dr. Joseph and ongoing bilateral lower extremity weakness since 2022, CKD IIIB and other pmhx as below presents to the ED from [facility] rehab for abnormal MRI with complaints of neck pain ongoing since surgery. She was seen at [facility] ED and underwent CT Cervical Spine that showed concerns for possible bone marrow edema at C3. MRI was also obtained that did not show this however it did show postoperative changes including stenosis around C5, please note that this is all per secondary report from the ER physician. Images were not available. ED physician did speak to Neurosurgery who recommended repeating MRI Cervical Spine w/wo in AM.  Her laboratory work was unremarkable except for a mild worsening of her anemia with a hemoglobin of 8.2 (post-op Hgb 9.9). She is being admitted to the hospitalist service for further management.    Pt seen and examined this evening by myself and Dr. Singer. She states she has been unable to ambulate since 2 weeks before her surgery. She is able to bear weight with physical therapy but is unable to walk.       ROS   Except as documented, all other systems reviewed and negative     Past Medical History   Severe cervical stenosis status post diskectomy with fusion of C5-6 ([employer]) on 22 with ongoing BLE weakness  CKD stage 3B  DM type 2  Morbid obesity  Left Brachial Artery Pseudoaneurysm s/p repair of left brachial artery pseudoaneurysm with  pericardial patch angioplasty on 4/13/22  BLE PAD  Essential HTN  DM 2 with neuropathy  CAD s/p CABG,  B JODI s/p R CEA      Past Surgical History   Anterior cervical discectomy with fusion of C5/6 on May 9th 2022    Social History   Pt denies alcohol, tobacco or illicit drug use    Family History   Reviewed and negative    Allergies   Patient has no known allergies.    Home Medications     Prior to Admission medications    Medication Sig Start Date End Date Taking? Authorizing Provider   acetaminophen (TYLENOL) 325 MG tablet Take 2 tablets (650 mg total) by mouth every 8 (eight) hours as needed for Pain. 5/17/22 6/16/22  Julio Lovell MD   amLODIPine (NORVASC) 10 MG tablet Take 1 tablet (10 mg total) by mouth once daily. 5/18/22 5/18/23  Julio Lovell MD   aspirin 81 MG Chew Take 81 mg by mouth Daily.    Historical Provider   atorvastatin (LIPITOR) 80 MG tablet Take 80 mg by mouth once daily. 3/16/22   Historical Provider   baclofen (LIORESAL) 5 mg Tab tablet Take 1 tablet (5 mg total) by mouth 2 (two) times daily. 5/17/22 6/16/22  Julio Lovell MD   bisacodyL (DULCOLAX) 10 mg Supp Place 10 mg rectally every 72 hours as needed.    Historical Provider   brimonidine 0.2% (ALPHAGAN) 0.2 % Drop Place 1 drop into both eyes 2 (two) times a day.    Historical Provider   clopidogreL (PLAVIX) 75 mg tablet Take 75 mg by mouth once daily. 1/31/22   Historical Provider   hydrALAZINE (APRESOLINE) 50 MG tablet Take 1 tablet (50 mg total) by mouth every 8 (eight) hours. 5/17/22 5/17/23  Julio Lovell MD   insulin glargine (LANTUS) 100 unit/mL injection Inject 25 Units into the skin every evening.    Historical Provider   insulin regular 100 unit/mL Inj injection Inject into the skin. Sliding scale as directed prn    Historical Provider   iron/vitamin B complex (GERITOL ORAL) Take 20 mLs by mouth 2 (two) times a day.    Historical Provider   labetaloL (NORMODYNE) 200 MG tablet Take 200 mg by mouth 2 (two) times daily. 3/21/22   Historical  Provider   lactulose (CHRONULAC) 10 gram/15 mL solution Take 20 g by mouth every 48 hours as needed.    Historical Provider   latanoprost 0.005 % ophthalmic solution Place 1 drop into both eyes nightly. 3/24/22   Historical Provider   linaGLIPtin (TRADJENTA) 5 mg Tab tablet Take 5 mg by mouth once daily.    Historical Provider   pantoprazole (PROTONIX) 40 MG tablet Take 40 mg by mouth once daily.    Historical Provider   polyethylene glycol (GLYCOLAX) 17 gram PwPk Take 17 g by mouth once daily.    Historical Provider   pregabalin (LYRICA) 75 MG capsule Take 75 mg by mouth 2 (two) times daily.    Historical Provider   senna-docusate 8.6-50 mg (PERICOLACE) 8.6-50 mg per tablet Take 1 tablet by mouth 2 (two) times daily. 5/17/22 6/16/22  Julio Lovell MD   sucralfate (CARAFATE) 1 gram tablet Take 1 g by mouth 4 (four) times daily before meals and nightly.    Historical Provider   timolol maleate 0.25% (TIMOPTIC) 0.25 % Drop Place 1 drop into both eyes 2 (two) times daily.    Historical Provider   valsartan (DIOVAN) 80 MG tablet Take 80 mg by mouth once daily.    Historical Provider   VITAMIN D2 1,250 mcg (50,000 unit) capsule Take 50,000 Units by mouth every Tuesday. 3/16/22   Historical Provider   insulin detemir U-100 (LEVEMIR) 100 unit/mL injection Inject 50 Units into the skin every evening. 5/17/22 5/17/22  Julio Lovell MD        Inpatient Medications   Scheduled Meds   amLODIPine  10 mg Oral Daily    atorvastatin  80 mg Oral Daily    baclofen  5 mg Oral BID    bisacodyL  10 mg Rectal Daily    hydrALAZINE  50 mg Oral Q8H    labetaloL  200 mg Oral BID    latanoprost  1 drop Both Eyes Nightly    pantoprazole  40 mg Oral Daily    pregabalin  75 mg Oral BID    valsartan  80 mg Oral Daily     Continuous Infusions   sodium chloride 0.9% 100 mL/hr at 06/02/22 0044     PRN Meds  sodium chloride, acetaminophen, acetaminophen, aluminum-magnesium hydroxide-simethicone, bisacodyL, HYDROcodone-acetaminophen, insulin  aspart U-100, ondansetron, polyethylene glycol, prochlorperazine, senna-docusate 8.6-50 mg, trazodone    Physical Exam   Vital Signs  Temp:  [98.2 °F (36.8 °C)-98.8 °F (37.1 °C)]   Pulse:  [66-76]   Resp:  [15-18]   BP: (168-183)/()   SpO2:  [96 %-98 %]       General: Appears comfortable  HEENT: NC/AT  Neck:  No JVD, supple, normal ROM  Chest: CTABL  CVS: Regular rhythm. Normal S1/S2.  Abdomen: nondistended, normoactive BS, soft and non-tender.  MSK: No obvious deformity or joint swelling  Skin: Warm and dry  Neuro: unable to lift legs off bed (ongoing since surgery) able to dorsi and plantar flex both feet with normal strength  Psych: Cooperative    Labs     Recent Labs     06/01/22  1653   WBC 5.9   RBC 2.54*   HGB 8.2*   HCT 25.0*   MCV 98.4*   MCH 32.3*   MCHC 32.8*   RDW 13.9        No results for input(s): LACTIC in the last 72 hours.  Recent Labs     06/01/22  1653   INR 0.98     No results for input(s): HGBA1C, CHOL, TRIG, LDL, VLDL, HDL in the last 72 hours.   Recent Labs     06/01/22  1653 06/02/22  0005     --    K 4.6  --    CHLORIDE 104  --    CO2 25  --    BUN 52.1*  --    CREATININE 1.62*  --    GLUCOSE 248*  --    CALCIUM 9.4  --    ALBUMIN 2.7*  --    GLOBULIN 2.3*  --    ALKPHOS 85  --    ALT 33  --    AST 20  --    BILITOT 0.2  --    FERRITIN  --  229.41*   IRON  --  74   TIBC  --  196*     No results for input(s): BNP, CPK, TROPONINI in the last 72 hours.       Imaging     unavailable    Assessment & Plan   Neck Pain in the setting of recent cervical discectomy/fusion of C5-6  Abnormal MRI Cercival Spine  Ongoing BLE weakness 2/2 to above  Post-operative Anemia; Hx Anemia of Chronic Disease  CKD Stage IIIB, at baseline  Essential HTN  DM Type II    Other HX: CAD/CABG, JODI/ R CEA,  PAD    PLAN:  - Neurosurgery consulted from the ED, recommended repeat MRI cervical spine with and without in a.m..  - monitor H&H. No signs of active bleeding. Consider transfuse if Hgb <8 (baseline  hgb 9.5)  - Home meds reviewed and resumed  - Consult PT/OT after Nsy consultation  - PRN analgesics  - CBC, CMP in AM    I, TAMELA Tamayo have discussed this patients case with Dr. Singer who agrees with the diagnosis and treatment plan.    DVT: SCDs      __________________________________________________________________  I, Dr. Álvaro Singer assumed care of this patient.  For the patient encounter, I performed the substantive portion of the visit, I reviewed the NPPA documentation, treatment plan, and medical decision making.  I had face to face time with this patient.  I have personally reviewed the labs and test results that are presently available. I have reviewed or attempted to review medical records based upon their availability. If patient was admitted under observational status it is with my approval.      82-year-old female status post diskectomy with fusion at C5-C6 05/09/2022, has ongoing bilateral lower extremity weakness that is essentially unchanged, has continued neck pain at her skilled nursing facility so they sent her to the ER where she underwent imaging which showed some questionable stenosis/postsurgical changes in the area of C5.  She was sent here and Neurosurgery on-call recommends repeating MRI, no steroids, and will be seen by Dr. Joseph.  Currently she has bilateral lower extremity weakness 4-5 on exam, in agree with above exam.  MRI cervical spine with and without contrast ordered.    Time seen: 11PM   Álvaro Singer MD

## 2022-06-02 NOTE — PROGRESS NOTES
"Nutrition   Progress Note      Recommendations:  1. Recommend Diabetic diet once medically feasible.       Reason for Evaluation:  Identified at risk by screening criteria    Diagnosis:    1. Neck pain    2. S/P spinal fusion    3. Chest pain        Relevant Medical History:    Past Medical History:   Diagnosis Date    Arthritis     Diabetes mellitus     Hypertension          Nutrition Diet History:    Factors affecting nutritional intake: NPO    Food / Protestant / Culture Preferences:      Nutrition Prescription Ordered:    Current Diet Order: NPO    Appetite:  fair    PO intake: NPO      Labs / Medications / Procedures:    Nutrition Related Medications: Pantoprazole    Nutrition Related Labs:  6/2: H/h-8.1/25.5, Bun-47.4, Crea-1.30, Gluc-143      Anthropometrics:  Height: 5' 2" (1.575 m)  Admit Weight:  Weight: 100 kg (220 lb 7.4 oz)  Latest Weight:  100 kg (220 lb 7.4 oz)    Wt Readings from Last 5 Encounters:   06/02/22 100 kg (220 lb 7.4 oz)   05/10/22 85.7 kg (188 lb 15 oz)     IBW: 50kg  %IBW: 200%  UBW: stable  %Weight Change: n/a  Body mass index is 40.32 kg/m².  BMI classification:  Obese Grade III (BMI >/= 40)      Nutrition Narrative:  6/2: Pt NPO for possible MRI. Recommend Diabetic diet as tolerated when medically feasible to begin oral diet.     Monitoring and Evaluation:    Nutrition Monitoring and Evaluation:  food and beverage intake    Nutrition Risk:  Level of Nutrition Risk:  Low  Frequency of Follow up:  Dietitian will f/up within 7 days.          "

## 2022-06-02 NOTE — PROGRESS NOTES
[facility] Hospital Medicine Progress Note        Chief Complaint: Inpatient Follow-up for neck pain    HPI:   Ms. Nelson is 83 yo female with severe cervical stenosis status post discectomy with fusion of C5-C6 on 05/09/2022 by Dr. Joseph and ongoing bilateral lower extremity weakness since April 20, 2022, CKD IIIB and other pmhx as below presents to the ED from [facility] rehab for abnormal MRI with complaints of neck pain ongoing since surgery. She was seen at [facility] ED and underwent CT Cervical Spine that showed concerns for possible bone marrow edema at C3. MRI was also obtained that did not show this however it did show postoperative changes including stenosis around C5, please note that this is all per secondary report from the ER physician. Images were not available. ED physician did speak to Neurosurgery who recommended repeating MRI Cervical Spine w/wo in AM.  Her laboratory work was unremarkable except for a mild worsening of her anemia with a hemoglobin of 8.2 (post-op Hgb 9.9). She is being admitted to the hospitalist service for further management.     Patient admitted for severe intractable neck pain after a C5-C6 diskectomies recent procedure.  Neurosurgery has been consulted.  MRI C-spine ordered.  Neurosurgery planning for  C3 through C7 PCDF on 6/3      Interval Hx:   Patient seen and examined at bedside  Blood pressure uncontrolled.  Increase hydralazine 100 mg t.i.d., continue amlodipine 10 mg daily, continue labetalol 200 mg b.i.d..  Discontinue valsartan.  Pain control.  Labs reviewed significant for anemia at 8.1, stable.  Type and crossmatch against surgery.  Creatinine levels is improving. Hold valsartan        Objective/physical exam:  General: Appears comfortable  HEENT: NC/AT  Neck:  No JVD, supple, normal ROM  Chest: CTABL  CVS: Regular rhythm. Normal S1/S2.  Abdomen: nondistended, normoactive BS, soft and non-tender.  MSK: No obvious deformity or joint  swelling  Skin: Warm and dry  Neuro: unable to lift legs off bed (ongoing since surgery) able to dorsi and plantar flex both feet with normal strength  Psych: Cooperative    VITAL SIGNS: 24 HRS MIN & MAX LAST   Temp  Min: 97.5 °F (36.4 °C)  Max: 98.8 °F (37.1 °C) 97.7 °F (36.5 °C)   BP  Min: 149/71  Max: 183/88 (!) 154/59   Pulse  Min: 66  Max: 76  75   Resp  Min: 15  Max: 18 18   SpO2  Min: 96 %  Max: 98 % 96 %       Recent Labs   Lab 06/01/22  1653 06/02/22  0450   WBC 5.9 6.8   RBC 2.54* 2.58*   HGB 8.2* 8.1*   HCT 25.0* 25.5*   MCV 98.4* 98.8*   MCH 32.3* 31.4*   MCHC 32.8* 31.8*   RDW 13.9 13.8    261   MPV 12.4 12.2       Recent Labs   Lab 06/01/22 1653 06/02/22  0450    139   K 4.6 4.3   CO2 25 25   BUN 52.1* 47.4*   CREATININE 1.62* 1.30*   CALCIUM 9.4 9.5   ALBUMIN 2.7* 2.5*   ALKPHOS 85 78   ALT 33 31   AST 20 19   BILITOT 0.2 0.2          Microbiology Results (last 7 days)     ** No results found for the last 168 hours. **           See below for Radiology    Scheduled Med:   amLODIPine  10 mg Oral Daily    atorvastatin  80 mg Oral Daily    baclofen  5 mg Oral BID    bisacodyL  10 mg Rectal Daily    hydrALAZINE  100 mg Oral Q8H    labetaloL  200 mg Oral BID    latanoprost  1 drop Both Eyes Nightly    pantoprazole  40 mg Oral Daily    pregabalin  75 mg Oral BID        Continuous Infusions:   sodium chloride 0.9% 75 mL/hr at 06/02/22 0243        PRN Meds:  sodium chloride, acetaminophen, acetaminophen, aluminum-magnesium hydroxide-simethicone, bisacodyL, HYDROcodone-acetaminophen, insulin aspart U-100, ondansetron, polyethylene glycol, prochlorperazine, senna-docusate 8.6-50 mg, trazodone       Assessment/Plan:  HTN, Uncontrolled  Neck Pain in the setting of recent cervical discectomy/fusion of C5-6  Abnormal MRI Cercival Spine  Ongoing BLE weakness 2/2 to above  Post-operative Anemia; Hx Anemia of Chronic Disease  LALIT on CKD Stage IIIB,improving   Essential HTN  DM Type II     Other  HX: CAD/CABG, JODI/ R CEA,  PAD     PLAN:  Increase hydralazine 100 mg t.i.d., continue amlodipine 10 mg daily, continue labetalol 200 mg b.i.d..  Discontinue valsartan.  Pain control.  hgb is  8.1, but stable.  Type and crossmatch against planned surgery.  Creatinine levels is improving. Hold valsartan  F/up neuro recs - f/up mri neck  monitor H&H. No signs of active bleeding. Consider transfuse if Hgb <8 (baseline hgb 9.5)   Consult PT/OT after Nsgy consultation  PRN analgesics      Patient condition:  Stable/Fair/Guarded/ Serious/ Critical    Anticipated discharge and Disposition:      All diagnosis and differential diagnosis have been reviewed; assessment and plan has been documented; I have personally reviewed the labs and test results that are presently available; I have reviewed the patients medication list; I have reviewed the consulting providers response and recommendations. I have reviewed or attempted to review medical records based upon their availability    All of the patient's questions have been  addressed and answered. Patient's is agreeable to the above stated plan. I will continue to monitor closely and make adjustments to medical management as needed.  _____________________________________________________________________    Nutrition Status:    Radiology:  X-Ray Cervical Spine 2 or 3 Views  Narrative: EXAMINATION:  XR CERVICAL SPINE 2 OR 3 VIEWS    CLINICAL HISTORY:  Discectomy C5-6;    COMPARISON:  None.  Impression: Spot intraoperative fluoroscopic images show patient is status post ACDF at C5-6.    See operators report for full detail.    Total fluoroscopic time: 17 seconds    Total # images: 2    Electronically signed by: Ranjan Pope  Date:    05/09/2022  Time:    10:55  X-Ray Chest 1 View  Narrative: EXAMINATION:  XR CHEST 1 VIEW    CLINICAL HISTORY:  central line placement;    TECHNIQUE:  Single view of the chest    COMPARISON:  04/13/2022    FINDINGS:  Left-sided central line projects  over the mid SVC.  No focal opacification or pneumothorax.  Impression: Left-sided central line projects over the mid SVC.    Electronically signed by: Andrez Garrison  Date:    05/09/2022  Time:    10:07  SURG FL Surgery Fluoro Usage  See OP Notes for results.     IMPRESSION: See OP Notes for results.     This procedure was auto-finalized by: Virtual Radiologist      Jayme Le MD   06/02/2022

## 2022-06-02 NOTE — NURSING
[facility] at [facility]. Given to Radiology to upload images. Called 9th floor to inform, spoke to Diana.

## 2022-06-03 ENCOUNTER — ANESTHESIA (OUTPATIENT)
Dept: SURGERY | Facility: HOSPITAL | Age: 82
DRG: 471 | End: 2022-06-03
Payer: MEDICARE

## 2022-06-03 LAB
ABO + RH BLD: NORMAL
ANION GAP SERPL CALC-SCNC: 9 MEQ/L
BASOPHILS # BLD AUTO: 0.02 X10(3)/MCL (ref 0–0.2)
BASOPHILS # BLD AUTO: 0.03 X10(3)/MCL (ref 0–0.2)
BASOPHILS NFR BLD AUTO: 0.3 %
BASOPHILS NFR BLD AUTO: 0.5 %
BLD PROD TYP BPU: NORMAL
BLOOD UNIT EXPIRATION DATE: NORMAL
BLOOD UNIT TYPE CODE: 600
BLOOD UNIT TYPE CODE: 600
BLOOD UNIT TYPE CODE: 6200
BUN SERPL-MCNC: 40.9 MG/DL (ref 9.8–20.1)
CALCIUM SERPL-MCNC: 9.1 MG/DL (ref 8.4–10.2)
CHLORIDE SERPL-SCNC: 108 MMOL/L (ref 98–107)
CO2 SERPL-SCNC: 25 MMOL/L (ref 23–31)
CREAT SERPL-MCNC: 1.13 MG/DL (ref 0.55–1.02)
CREAT/UREA NIT SERPL: 36
CROSSMATCH INTERPRETATION: NORMAL
DISPENSE STATUS: NORMAL
EOSINOPHIL # BLD AUTO: 0.08 X10(3)/MCL (ref 0–0.9)
EOSINOPHIL # BLD AUTO: 0.3 X10(3)/MCL (ref 0–0.9)
EOSINOPHIL NFR BLD AUTO: 1.1 %
EOSINOPHIL NFR BLD AUTO: 5.3 %
ERYTHROCYTE [DISTWIDTH] IN BLOOD BY AUTOMATED COUNT: 14 % (ref 11.5–17)
ERYTHROCYTE [DISTWIDTH] IN BLOOD BY AUTOMATED COUNT: 18.6 % (ref 11.5–17)
GLUCOSE SERPL-MCNC: 133 MG/DL (ref 82–115)
HCT VFR BLD AUTO: 23.3 % (ref 37–47)
HCT VFR BLD AUTO: 25.3 % (ref 37–47)
HGB BLD-MCNC: 7.6 GM/DL (ref 12–16)
HGB BLD-MCNC: 8.1 GM/DL (ref 12–16)
IMM GRANULOCYTES # BLD AUTO: 0.02 X10(3)/MCL (ref 0–0.02)
IMM GRANULOCYTES # BLD AUTO: 0.04 X10(3)/MCL (ref 0–0.02)
IMM GRANULOCYTES NFR BLD AUTO: 0.4 % (ref 0–0.43)
IMM GRANULOCYTES NFR BLD AUTO: 0.6 % (ref 0–0.43)
LYMPHOCYTES # BLD AUTO: 0.86 X10(3)/MCL (ref 0.6–4.6)
LYMPHOCYTES # BLD AUTO: 1.84 X10(3)/MCL (ref 0.6–4.6)
LYMPHOCYTES NFR BLD AUTO: 12.3 %
LYMPHOCYTES NFR BLD AUTO: 32.8 %
MCH RBC QN AUTO: 30.2 PG (ref 27–31)
MCH RBC QN AUTO: 31.8 PG (ref 27–31)
MCHC RBC AUTO-ENTMCNC: 32 MG/DL (ref 33–36)
MCHC RBC AUTO-ENTMCNC: 32.6 MG/DL (ref 33–36)
MCV RBC AUTO: 92.5 FL (ref 80–94)
MCV RBC AUTO: 99.2 FL (ref 80–94)
MONOCYTES # BLD AUTO: 0.29 X10(3)/MCL (ref 0.1–1.3)
MONOCYTES # BLD AUTO: 0.74 X10(3)/MCL (ref 0.1–1.3)
MONOCYTES NFR BLD AUTO: 13.2 %
MONOCYTES NFR BLD AUTO: 4.2 %
NEUTROPHILS # BLD AUTO: 2.7 X10(3)/MCL (ref 2.1–9.2)
NEUTROPHILS # BLD AUTO: 5.7 X10(3)/MCL (ref 2.1–9.2)
NEUTROPHILS NFR BLD AUTO: 47.8 %
NEUTROPHILS NFR BLD AUTO: 81.5 %
NRBC BLD AUTO-RTO: 0 %
NRBC BLD AUTO-RTO: 0 %
PLATELET # BLD AUTO: 173 X10(3)/MCL (ref 130–400)
PLATELET # BLD AUTO: 250 X10(3)/MCL (ref 130–400)
PMV BLD AUTO: 12.2 FL (ref 9.4–12.4)
PMV BLD AUTO: 12.3 FL (ref 9.4–12.4)
POTASSIUM SERPL-SCNC: 4.5 MMOL/L (ref 3.5–5.1)
RBC # BLD AUTO: 2.52 X10(6)/MCL (ref 4.2–5.4)
RBC # BLD AUTO: 2.55 X10(6)/MCL (ref 4.2–5.4)
SODIUM SERPL-SCNC: 142 MMOL/L (ref 136–145)
UNIT NUMBER: NORMAL
WBC # SPEC AUTO: 5.6 X10(3)/MCL (ref 4.5–11.5)
WBC # SPEC AUTO: 7 X10(3)/MCL (ref 4.5–11.5)

## 2022-06-03 PROCEDURE — 11000001 HC ACUTE MED/SURG PRIVATE ROOM

## 2022-06-03 PROCEDURE — P9047 ALBUMIN (HUMAN), 25%, 50ML: HCPCS | Mod: JG

## 2022-06-03 PROCEDURE — 36430 TRANSFUSION BLD/BLD COMPNT: CPT

## 2022-06-03 PROCEDURE — 80048 BASIC METABOLIC PNL TOTAL CA: CPT | Performed by: INTERNAL MEDICINE

## 2022-06-03 PROCEDURE — 37000008 HC ANESTHESIA 1ST 15 MINUTES: Performed by: NEUROLOGICAL SURGERY

## 2022-06-03 PROCEDURE — 27201423 OPTIME MED/SURG SUP & DEVICES STERILE SUPPLY: Performed by: NEUROLOGICAL SURGERY

## 2022-06-03 PROCEDURE — 37000009 HC ANESTHESIA EA ADD 15 MINS: Performed by: NEUROLOGICAL SURGERY

## 2022-06-03 PROCEDURE — 25000003 PHARM REV CODE 250

## 2022-06-03 PROCEDURE — 63600175 PHARM REV CODE 636 W HCPCS

## 2022-06-03 PROCEDURE — 71000033 HC RECOVERY, INTIAL HOUR: Performed by: NEUROLOGICAL SURGERY

## 2022-06-03 PROCEDURE — P9016 RBC LEUKOCYTES REDUCED: HCPCS

## 2022-06-03 PROCEDURE — 63600175 PHARM REV CODE 636 W HCPCS: Performed by: NURSE PRACTITIONER

## 2022-06-03 PROCEDURE — 36000712 HC OR TIME LEV V 1ST 15 MIN: Performed by: NEUROLOGICAL SURGERY

## 2022-06-03 PROCEDURE — 71000039 HC RECOVERY, EACH ADD'L HOUR: Performed by: NEUROLOGICAL SURGERY

## 2022-06-03 PROCEDURE — 36415 COLL VENOUS BLD VENIPUNCTURE: CPT | Performed by: INTERNAL MEDICINE

## 2022-06-03 PROCEDURE — 85025 COMPLETE CBC W/AUTO DIFF WBC: CPT | Performed by: NEUROLOGICAL SURGERY

## 2022-06-03 PROCEDURE — 63600175 PHARM REV CODE 636 W HCPCS: Performed by: NEUROLOGICAL SURGERY

## 2022-06-03 PROCEDURE — P9017 PLASMA 1 DONOR FRZ W/IN 8 HR: HCPCS | Performed by: NEUROLOGICAL SURGERY

## 2022-06-03 PROCEDURE — 27000221 HC OXYGEN, UP TO 24 HOURS

## 2022-06-03 PROCEDURE — 85025 COMPLETE CBC W/AUTO DIFF WBC: CPT | Performed by: NURSE PRACTITIONER

## 2022-06-03 PROCEDURE — 63600175 PHARM REV CODE 636 W HCPCS: Performed by: ANESTHESIOLOGY

## 2022-06-03 PROCEDURE — 36415 COLL VENOUS BLD VENIPUNCTURE: CPT | Performed by: NEUROLOGICAL SURGERY

## 2022-06-03 PROCEDURE — 36000713 HC OR TIME LEV V EA ADD 15 MIN: Performed by: NEUROLOGICAL SURGERY

## 2022-06-03 PROCEDURE — 25000003 PHARM REV CODE 250: Performed by: NURSE PRACTITIONER

## 2022-06-03 PROCEDURE — 94761 N-INVAS EAR/PLS OXIMETRY MLT: CPT

## 2022-06-03 PROCEDURE — C1713 ANCHOR/SCREW BN/BN,TIS/BN: HCPCS | Performed by: NEUROLOGICAL SURGERY

## 2022-06-03 PROCEDURE — 63600175 PHARM REV CODE 636 W HCPCS: Performed by: INTERNAL MEDICINE

## 2022-06-03 PROCEDURE — 94799 UNLISTED PULMONARY SVC/PX: CPT

## 2022-06-03 PROCEDURE — C9399 UNCLASSIFIED DRUGS OR BIOLOG: HCPCS | Performed by: INTERNAL MEDICINE

## 2022-06-03 PROCEDURE — P9016 RBC LEUKOCYTES REDUCED: HCPCS | Performed by: NEUROLOGICAL SURGERY

## 2022-06-03 PROCEDURE — 25000003 PHARM REV CODE 250: Performed by: NEUROLOGICAL SURGERY

## 2022-06-03 DEVICE — IMPLANTABLE DEVICE: Type: IMPLANTABLE DEVICE | Site: SPINE CERVICAL | Status: FUNCTIONAL

## 2022-06-03 DEVICE — FILLER BONE SYN 1CC PARTIC: Type: IMPLANTABLE DEVICE | Site: SPINE CERVICAL | Status: FUNCTIONAL

## 2022-06-03 DEVICE — SCREW PROFICIENT LOCKING: Type: IMPLANTABLE DEVICE | Site: SPINE CERVICAL | Status: FUNCTIONAL

## 2022-06-03 DEVICE — SCREW POLYAXIAL CERV 3.8X12MM: Type: IMPLANTABLE DEVICE | Site: SPINE CERVICAL | Status: FUNCTIONAL

## 2022-06-03 DEVICE — SCREW POLYAXIAL CERV 3.8X14MM: Type: IMPLANTABLE DEVICE | Site: SPINE CERVICAL | Status: FUNCTIONAL

## 2022-06-03 RX ORDER — CALCIUM CHLORIDE INJECTION 100 MG/ML
INJECTION, SOLUTION INTRAVENOUS
Status: DISCONTINUED | OUTPATIENT
Start: 2022-06-03 | End: 2022-06-03

## 2022-06-03 RX ORDER — PROPOFOL 10 MG/ML
VIAL (ML) INTRAVENOUS
Status: DISCONTINUED | OUTPATIENT
Start: 2022-06-03 | End: 2022-06-03

## 2022-06-03 RX ORDER — BISACODYL 10 MG
10 SUPPOSITORY, RECTAL RECTAL DAILY
Status: DISCONTINUED | OUTPATIENT
Start: 2022-06-03 | End: 2022-06-22 | Stop reason: HOSPADM

## 2022-06-03 RX ORDER — MORPHINE SULFATE 10 MG/ML
2 INJECTION INTRAMUSCULAR; INTRAVENOUS; SUBCUTANEOUS
Status: DISCONTINUED | OUTPATIENT
Start: 2022-06-03 | End: 2022-06-08

## 2022-06-03 RX ORDER — PROCHLORPERAZINE EDISYLATE 5 MG/ML
5 INJECTION INTRAMUSCULAR; INTRAVENOUS EVERY 30 MIN PRN
Status: DISCONTINUED | OUTPATIENT
Start: 2022-06-03 | End: 2022-06-04

## 2022-06-03 RX ORDER — CEFAZOLIN SODIUM 2 G/50ML
2 SOLUTION INTRAVENOUS
Status: COMPLETED | OUTPATIENT
Start: 2022-06-03 | End: 2022-06-04

## 2022-06-03 RX ORDER — LIDOCAINE HYDROCHLORIDE 20 MG/ML
INJECTION INTRAVENOUS
Status: DISCONTINUED | OUTPATIENT
Start: 2022-06-03 | End: 2022-06-03

## 2022-06-03 RX ORDER — SODIUM CHLORIDE 9 MG/ML
INJECTION, SOLUTION INTRAVENOUS CONTINUOUS
Status: DISCONTINUED | OUTPATIENT
Start: 2022-06-03 | End: 2022-06-04

## 2022-06-03 RX ORDER — ONDANSETRON 4 MG/1
8 TABLET, ORALLY DISINTEGRATING ORAL EVERY 6 HOURS PRN
Status: CANCELLED | OUTPATIENT
Start: 2022-06-03

## 2022-06-03 RX ORDER — HYDROCODONE BITARTRATE AND ACETAMINOPHEN 500; 5 MG/1; MG/1
TABLET ORAL
Status: DISCONTINUED | OUTPATIENT
Start: 2022-06-03 | End: 2022-06-04

## 2022-06-03 RX ORDER — ALBUMIN HUMAN 250 G/1000ML
SOLUTION INTRAVENOUS
Status: COMPLETED
Start: 2022-06-03 | End: 2022-06-03

## 2022-06-03 RX ORDER — FUROSEMIDE 10 MG/ML
40 INJECTION INTRAMUSCULAR; INTRAVENOUS ONCE
Status: COMPLETED | OUTPATIENT
Start: 2022-06-03 | End: 2022-06-03

## 2022-06-03 RX ORDER — HYDROMORPHONE HYDROCHLORIDE 2 MG/ML
0.4 INJECTION, SOLUTION INTRAMUSCULAR; INTRAVENOUS; SUBCUTANEOUS EVERY 5 MIN PRN
Status: DISCONTINUED | OUTPATIENT
Start: 2022-06-03 | End: 2022-06-04

## 2022-06-03 RX ORDER — IPRATROPIUM BROMIDE AND ALBUTEROL SULFATE 2.5; .5 MG/3ML; MG/3ML
3 SOLUTION RESPIRATORY (INHALATION)
Status: DISCONTINUED | OUTPATIENT
Start: 2022-06-03 | End: 2022-06-22 | Stop reason: HOSPADM

## 2022-06-03 RX ORDER — ONDANSETRON 4 MG/1
8 TABLET, ORALLY DISINTEGRATING ORAL EVERY 6 HOURS PRN
Status: DISCONTINUED | OUTPATIENT
Start: 2022-06-03 | End: 2022-06-08

## 2022-06-03 RX ORDER — ROCURONIUM BROMIDE 10 MG/ML
INJECTION, SOLUTION INTRAVENOUS
Status: DISCONTINUED | OUTPATIENT
Start: 2022-06-03 | End: 2022-06-03

## 2022-06-03 RX ORDER — LIDOCAINE HYDROCHLORIDE 10 MG/ML
1 INJECTION, SOLUTION EPIDURAL; INFILTRATION; INTRACAUDAL; PERINEURAL ONCE
Status: CANCELLED | OUTPATIENT
Start: 2022-06-03 | End: 2022-06-03

## 2022-06-03 RX ORDER — LORAZEPAM 2 MG/ML
0.25 INJECTION INTRAMUSCULAR ONCE AS NEEDED
Status: DISCONTINUED | OUTPATIENT
Start: 2022-06-03 | End: 2022-06-04

## 2022-06-03 RX ORDER — HYDROCODONE BITARTRATE AND ACETAMINOPHEN 10; 325 MG/1; MG/1
1 TABLET ORAL EVERY 4 HOURS PRN
Status: DISCONTINUED | OUTPATIENT
Start: 2022-06-03 | End: 2022-06-04

## 2022-06-03 RX ORDER — MIDAZOLAM HYDROCHLORIDE 1 MG/ML
INJECTION INTRAMUSCULAR; INTRAVENOUS
Status: DISCONTINUED | OUTPATIENT
Start: 2022-06-03 | End: 2022-06-03

## 2022-06-03 RX ORDER — CEFAZOLIN SODIUM 1 G/3ML
INJECTION, POWDER, FOR SOLUTION INTRAMUSCULAR; INTRAVENOUS
Status: DISCONTINUED | OUTPATIENT
Start: 2022-06-03 | End: 2022-06-03

## 2022-06-03 RX ORDER — FENTANYL CITRATE 50 UG/ML
INJECTION, SOLUTION INTRAMUSCULAR; INTRAVENOUS
Status: DISCONTINUED | OUTPATIENT
Start: 2022-06-03 | End: 2022-06-03

## 2022-06-03 RX ORDER — ACETAMINOPHEN 10 MG/ML
INJECTION, SOLUTION INTRAVENOUS
Status: DISCONTINUED | OUTPATIENT
Start: 2022-06-03 | End: 2022-06-03

## 2022-06-03 RX ORDER — HYDROMORPHONE HYDROCHLORIDE 2 MG/ML
0.2 INJECTION, SOLUTION INTRAMUSCULAR; INTRAVENOUS; SUBCUTANEOUS EVERY 5 MIN PRN
Status: DISCONTINUED | OUTPATIENT
Start: 2022-06-03 | End: 2022-06-04

## 2022-06-03 RX ORDER — SODIUM CHLORIDE, SODIUM GLUCONATE, SODIUM ACETATE, POTASSIUM CHLORIDE AND MAGNESIUM CHLORIDE 30; 37; 368; 526; 502 MG/100ML; MG/100ML; MG/100ML; MG/100ML; MG/100ML
1000 INJECTION, SOLUTION INTRAVENOUS CONTINUOUS
Status: CANCELLED | OUTPATIENT
Start: 2022-06-03 | End: 2022-07-03

## 2022-06-03 RX ORDER — MIDAZOLAM HYDROCHLORIDE 1 MG/ML
0.5 INJECTION INTRAMUSCULAR; INTRAVENOUS ONCE AS NEEDED
Status: CANCELLED | OUTPATIENT
Start: 2022-06-03 | End: 2033-10-29

## 2022-06-03 RX ORDER — DEXAMETHASONE SODIUM PHOSPHATE 4 MG/ML
INJECTION, SOLUTION INTRA-ARTICULAR; INTRALESIONAL; INTRAMUSCULAR; INTRAVENOUS; SOFT TISSUE
Status: DISCONTINUED | OUTPATIENT
Start: 2022-06-03 | End: 2022-06-03

## 2022-06-03 RX ORDER — CEFAZOLIN SODIUM 1 G/3ML
INJECTION, POWDER, FOR SOLUTION INTRAMUSCULAR; INTRAVENOUS
Status: DISCONTINUED | OUTPATIENT
Start: 2022-06-03 | End: 2022-06-03 | Stop reason: HOSPADM

## 2022-06-03 RX ORDER — HYDRALAZINE HYDROCHLORIDE 20 MG/ML
10 INJECTION INTRAMUSCULAR; INTRAVENOUS EVERY 4 HOURS PRN
Status: DISCONTINUED | OUTPATIENT
Start: 2022-06-03 | End: 2022-06-06

## 2022-06-03 RX ORDER — ONDANSETRON 2 MG/ML
4 INJECTION INTRAMUSCULAR; INTRAVENOUS DAILY PRN
Status: DISCONTINUED | OUTPATIENT
Start: 2022-06-03 | End: 2022-06-08

## 2022-06-03 RX ORDER — ALBUMIN HUMAN 250 G/1000ML
SOLUTION INTRAVENOUS CONTINUOUS PRN
Status: DISCONTINUED | OUTPATIENT
Start: 2022-06-03 | End: 2022-06-03

## 2022-06-03 RX ORDER — BACITRACIN 500 [USP'U]/G
OINTMENT TOPICAL
Status: DISCONTINUED | OUTPATIENT
Start: 2022-06-03 | End: 2022-06-03 | Stop reason: HOSPADM

## 2022-06-03 RX ORDER — LIDOCAINE HYDROCHLORIDE 20 MG/ML
INJECTION, SOLUTION INFILTRATION; PERINEURAL
Status: DISPENSED
Start: 2022-06-03 | End: 2022-06-03

## 2022-06-03 RX ORDER — PROCHLORPERAZINE EDISYLATE 5 MG/ML
10 INJECTION INTRAMUSCULAR; INTRAVENOUS EVERY 6 HOURS PRN
Status: DISCONTINUED | OUTPATIENT
Start: 2022-06-03 | End: 2022-06-04

## 2022-06-03 RX ORDER — GLYCOPYRROLATE 0.2 MG/ML
INJECTION INTRAMUSCULAR; INTRAVENOUS
Status: DISCONTINUED | OUTPATIENT
Start: 2022-06-03 | End: 2022-06-03

## 2022-06-03 RX ORDER — MORPHINE SULFATE 10 MG/ML
4 INJECTION INTRAMUSCULAR; INTRAVENOUS; SUBCUTANEOUS
Status: DISCONTINUED | OUTPATIENT
Start: 2022-06-03 | End: 2022-06-08

## 2022-06-03 RX ORDER — MEPERIDINE HYDROCHLORIDE 25 MG/ML
12.5 INJECTION INTRAMUSCULAR; INTRAVENOUS; SUBCUTANEOUS EVERY 10 MIN PRN
Status: DISCONTINUED | OUTPATIENT
Start: 2022-06-03 | End: 2022-06-04

## 2022-06-03 RX ORDER — TRANEXAMIC ACID 100 MG/ML
INJECTION, SOLUTION INTRAVENOUS
Status: DISCONTINUED | OUTPATIENT
Start: 2022-06-03 | End: 2022-06-03

## 2022-06-03 RX ORDER — PROPOFOL 10 MG/ML
VIAL (ML) INTRAVENOUS CONTINUOUS PRN
Status: DISCONTINUED | OUTPATIENT
Start: 2022-06-03 | End: 2022-06-03

## 2022-06-03 RX ORDER — LIDOCAINE HYDROCHLORIDE AND EPINEPHRINE 5; 5 MG/ML; UG/ML
INJECTION, SOLUTION INFILTRATION; PERINEURAL
Status: DISCONTINUED | OUTPATIENT
Start: 2022-06-03 | End: 2022-06-03 | Stop reason: HOSPADM

## 2022-06-03 RX ORDER — SUCCINYLCHOLINE CHLORIDE 20 MG/ML
INJECTION INTRAMUSCULAR; INTRAVENOUS
Status: DISCONTINUED | OUTPATIENT
Start: 2022-06-03 | End: 2022-06-03

## 2022-06-03 RX ADMIN — REMIFENTANIL HYDROCHLORIDE 0.05 MCG/KG/MIN: 1 INJECTION, POWDER, LYOPHILIZED, FOR SOLUTION INTRAVENOUS at 07:06

## 2022-06-03 RX ADMIN — MIDAZOLAM 0.5 MG: 1 INJECTION INTRAMUSCULAR; INTRAVENOUS at 07:06

## 2022-06-03 RX ADMIN — SODIUM CHLORIDE: 9 INJECTION, SOLUTION INTRAVENOUS at 01:06

## 2022-06-03 RX ADMIN — SUCCINYLCHOLINE CHLORIDE 180 MG: 20 INJECTION, SOLUTION INTRAMUSCULAR; INTRAVENOUS at 07:06

## 2022-06-03 RX ADMIN — FENTANYL CITRATE 50 MCG: 50 INJECTION, SOLUTION INTRAMUSCULAR; INTRAVENOUS at 08:06

## 2022-06-03 RX ADMIN — INSULIN DETEMIR 8 UNITS: 100 INJECTION, SOLUTION SUBCUTANEOUS at 08:06

## 2022-06-03 RX ADMIN — CEFAZOLIN 2 G: 330 INJECTION, POWDER, FOR SOLUTION INTRAMUSCULAR; INTRAVENOUS at 08:06

## 2022-06-03 RX ADMIN — HYDROCODONE BITARTRATE AND ACETAMINOPHEN 1 TABLET: 10; 325 TABLET ORAL at 04:06

## 2022-06-03 RX ADMIN — LABETALOL HYDROCHLORIDE 200 MG: 200 TABLET, FILM COATED ORAL at 07:06

## 2022-06-03 RX ADMIN — TRANEXAMIC ACID 1000 MG: 100 INJECTION, SOLUTION INTRAVENOUS at 10:06

## 2022-06-03 RX ADMIN — HYDROMORPHONE HYDROCHLORIDE 0.4 MG: 2 INJECTION INTRAMUSCULAR; INTRAVENOUS; SUBCUTANEOUS at 11:06

## 2022-06-03 RX ADMIN — CALCIUM CHLORIDE 1 G: 100 INJECTION INTRAVENOUS; INTRAVENTRICULAR at 09:06

## 2022-06-03 RX ADMIN — ROCURONIUM BROMIDE 5 MG: 10 SOLUTION INTRAVENOUS at 07:06

## 2022-06-03 RX ADMIN — PROPOFOL 150 MG: 10 INJECTION, EMULSION INTRAVENOUS at 07:06

## 2022-06-03 RX ADMIN — FUROSEMIDE 40 MG: 10 INJECTION, SOLUTION INTRAMUSCULAR; INTRAVENOUS at 10:06

## 2022-06-03 RX ADMIN — LABETALOL HYDROCHLORIDE 200 MG: 200 TABLET, FILM COATED ORAL at 08:06

## 2022-06-03 RX ADMIN — HYDROCODONE BITARTRATE AND ACETAMINOPHEN 1 TABLET: 10; 325 TABLET ORAL at 08:06

## 2022-06-03 RX ADMIN — MORPHINE SULFATE 2 MG: 10 INJECTION INTRAVENOUS at 05:06

## 2022-06-03 RX ADMIN — INSULIN ASPART 3 UNITS: 100 INJECTION, SOLUTION INTRAVENOUS; SUBCUTANEOUS at 11:06

## 2022-06-03 RX ADMIN — HYDROMORPHONE HYDROCHLORIDE 0.4 MG: 2 INJECTION INTRAMUSCULAR; INTRAVENOUS; SUBCUTANEOUS at 12:06

## 2022-06-03 RX ADMIN — LIDOCAINE HYDROCHLORIDE 75 MG: 20 INJECTION INTRAVENOUS at 07:06

## 2022-06-03 RX ADMIN — BACLOFEN 5 MG: 5 TABLET ORAL at 08:06

## 2022-06-03 RX ADMIN — ALBUMIN (HUMAN): 12.5 SOLUTION INTRAVENOUS at 08:06

## 2022-06-03 RX ADMIN — LATANOPROST 1 DROP: 50 SOLUTION OPHTHALMIC at 09:06

## 2022-06-03 RX ADMIN — PHENYLEPHRINE HYDROCHLORIDE 40 MCG/MIN: 10 INJECTION INTRAVENOUS at 07:06

## 2022-06-03 RX ADMIN — SODIUM CHLORIDE: 0.9 INJECTION, SOLUTION INTRAVENOUS at 07:06

## 2022-06-03 RX ADMIN — Medication 100 MCG/KG/MIN: at 07:06

## 2022-06-03 RX ADMIN — CEFAZOLIN SODIUM 2 G: 2 SOLUTION INTRAVENOUS at 02:06

## 2022-06-03 RX ADMIN — CEFAZOLIN SODIUM 2 G: 2 SOLUTION INTRAVENOUS at 06:06

## 2022-06-03 RX ADMIN — FENTANYL CITRATE 50 MCG: 50 INJECTION, SOLUTION INTRAMUSCULAR; INTRAVENOUS at 07:06

## 2022-06-03 RX ADMIN — ACETAMINOPHEN 1000 MG: 10 INJECTION, SOLUTION INTRAVENOUS at 10:06

## 2022-06-03 RX ADMIN — PREGABALIN 75 MG: 75 CAPSULE ORAL at 08:06

## 2022-06-03 RX ADMIN — GLYCOPYRROLATE 0.2 MG: 0.2 INJECTION INTRAMUSCULAR; INTRAVENOUS at 07:06

## 2022-06-03 RX ADMIN — DEXAMETHASONE SODIUM PHOSPHATE 4 MG: 4 INJECTION, SOLUTION INTRA-ARTICULAR; INTRALESIONAL; INTRAMUSCULAR; INTRAVENOUS; SOFT TISSUE at 08:06

## 2022-06-03 NOTE — ANESTHESIA PROCEDURE NOTES
Intubation    Date/Time: 6/3/2022 7:57 AM  Performed by: Christian Morrison CRNA  Authorized by: Bladimir Rodriguez Jr., MD     Intubation:     Induction:  Intravenous    Intubated:  Postinduction    Mask Ventilation:  Easy mask    Attempts:  1    Attempted By:  CRNA    Method of Intubation:  Video laryngoscopy    Blade:  Glidescope 3    Laryngeal View Grade: Grade IIA - cords partially seen      Difficult Airway Encountered?: No      Complications:  None    Airway Device:  Oral endotracheal tube    Airway Device Size:  7.5    Style/Cuff Inflation:  Cuffed (inflated to minimal occlusive pressure)    Tube secured:  23    Secured at:  The lips    Placement Verified By:  Capnometry    Complicating Factors:  None    Findings Post-Intubation:  BS equal bilateral and atraumatic/condition of teeth unchanged  Notes:      MILS of the head and neck during induction and intubation

## 2022-06-03 NOTE — OP NOTE
[facility]                       [address] [place] [id number]    PATIENT NAME:      NICK REY  YOB: 1940  CSN:               [id number]  MRN:               [id number]  ADMIT DATE:        06/01/2022 16:30:00  PHYSICIAN:         Toni Joseph MD                          OPERATIVE REPORT      DATE OF SURGERY:    06/03/2022 00:00:00    SURGEON:  Toni Joseph MD    PREOPERATIVE DIAGNOSIS:  Severe stenosis from 3 down to 7, status post previous   surgery from the front.    POSTOPERATIVE DIAGNOSIS:  Severe stenosis from 3 down to 7, status post previous   surgery from the front.    PROCEDURE:  Posterior decompression and removal of pressure off the spinal cord   and fusion from 3 to C7.  The screws on the right side at C3 and C7, and on the   left side at 3, 5, and 7.  We put a screw at 5 and that was removed.  Ostia and   putty were put on the side.  There were no issues or complications from my   standpoint besides the significant blood loss that is probably because she is on   blood thinners.  We had to give her blood as well.  There were no changes in   monitoring.    DESCRIPTION OF PROCEDURE:  The patient was brought to the operating room,   intubated and Ruff placed, various lines put.  Subsequently, she was turned   prone after putting in pins and the back of the neck was sterilely prepped and   draped.  We made an incision from top of 3 down to 7, T1.  We ended up having to   give blood because she had significant amount of blood loss throughout the   case.  We had to control the blood slowly until we obtained hemostasis.  We then kept   working doing a decompression.  We took the bone at 3, 4, 5, 6 and 7 as   mentioned, very tight at 5-6 and 6-7.  Also at 3-4.  We got the pressure off the   spinal cord completely.  Once we had done that, we put an O-arm and did a spin   and put screws at 3, 5, and 7 on  the left, and on the right side we only put   screws in 3 and 7.  We had put a 5 and that was taken off.  The jovan and caps   were put and bone was packed with osteoamp  putty.  We left 2 drains with to   give blood, FFP and vitamin K, and also DDAVP and then closed the wound with 0   Vicryl, 3-0 Vicryl, running subcu.  The patient then turned supine, taken out of   pins.    I discussed the care with the daughter, who I told that even though she had   previous surgery she needed another surgery because of pressure still from the   back.  She understood and we discussed the fact that we had to give blood as   well.        ______________________________  MD DIOGO Gr/PIPER  DD:  06/03/2022  Time:  10:40AM  DT:  06/03/2022  Time:  11:10AM  Job #:  [id number]      OPERATIVE REPORT

## 2022-06-03 NOTE — BRIEF OP NOTE
[facility] - 9th Floor Med Surg  Brief Operative Note    SUMMARY     Surgery Date: 6/3/2022     Surgeon(s) and Role:     * Toni Joseph MD - Primary    Assisting Surgeon: None    Pre-op Diagnosis:  Cervical Stenosis    Post-op Diagnosis:  Post-Op Diagnosis Codes:     * Posterior cervical adenopathy [R59.0]    Procedure(s) (LRB):  FUSION, SPINE, POSTERIOR SPINAL COLUMN, CERVICAL, USING COMPUTER-ASSISTED NAVIGATION (N/A)   PCDF C3 to C7, posteriolateral fusion, DBM. O-arm    Anesthesia: Choice    Operative Findings: Dictated    Estimated Blood Loss: 1000 mL    Estimated Blood Loss has not been documented. EBL = 1300.         Specimens:   Specimen (24h ago, onward)            None          [id number]

## 2022-06-03 NOTE — PT/OT/SLP PROGRESS
Occupational Therapy      Patient Name:  Natty Nelson   MRN:  [id number]    Pt back from C3-C7 PCDF sx, now receiving 4 units of blood. Will follow up tomorrow, 06/04/2022, as appropriate.    6/3/2022

## 2022-06-03 NOTE — ANESTHESIA PROCEDURE NOTES
Central Line    Diagnosis: PCDF  Patient location during procedure: done in OR  Timeout: 6/3/2022 8:13 AM  Procedure end time: 6/3/2022 8:18 AM    Staffing  Authorizing Provider: Bladimir Rodriguez Jr., MD  Performing Provider: Bladimir Rodriguez Jr., MD    Staffing  Performed: anesthesiologist   Anesthesiologist: Bladimir Rodriguez Jr., MD  Anesthesiologist was present at the time of the procedure.  Preanesthetic Checklist  Completed: patient identified, IV checked, site marked, risks and benefits discussed, surgical consent, monitors and equipment checked, pre-op evaluation, timeout performed and anesthesia consent given  Indication   Indication: hemodynamic monitoring, vascular access, med administration     Anesthesia   general anesthesia    Central Line   Skin Prep: skin prepped with ChloraPrep, skin prep agent completely dried prior to procedure  Sterile Barriers Followed: Yes    All five maximal barriers used- gloves, gown, cap, mask, and large sterile sheet    hand hygiene performed prior to central venous catheter insertion  Location: right internal jugular.   Catheter type: triple lumen  Catheter Size: 7 Fr  Inserted Catheter Length: 16 cm  Ultrasound: vascular probe with ultrasound   Vessel Caliber: large, patent, compressibility normal  Needle advanced into vessel with real time Ultrasound guidance.  Guidewire confirmed in vessel.  sterile gel and probe cover used in ultrasound-guided central venous catheter insertion  Manometry: none  Insertion Attempts: 1   Securement:line sutured, chlorhexidine patch, sterile dressing applied and blood return through all ports    Post-Procedure        Guidewire Guidewire removed intact. Guidewire removed intact, verified with nurse.

## 2022-06-03 NOTE — PLAN OF CARE
Problem: Adult Inpatient Plan of Care  Goal: Plan of Care Review  6/3/2022 0207 by Jose Manuel Randall RN  Outcome: Ongoing, Progressing  6/3/2022 0205 by Jose Manuel Randall RN  Outcome: Ongoing, Not Progressing  Goal: Patient-Specific Goal (Individualized)  6/3/2022 0207 by Jose Manuel Randall RN  Outcome: Ongoing, Progressing  6/3/2022 0205 by Jose Manuel Randall RN  Outcome: Ongoing, Not Progressing  Goal: Absence of Hospital-Acquired Illness or Injury  6/3/2022 0207 by Jose Manuel Randall RN  Outcome: Ongoing, Progressing  6/3/2022 0205 by Jose Manuel Randall RN  Outcome: Ongoing, Not Progressing  Goal: Optimal Comfort and Wellbeing  6/3/2022 0207 by Jose Manuel Randall RN  Outcome: Ongoing, Progressing  6/3/2022 0205 by Jose Manuel Randall RN  Outcome: Ongoing, Not Progressing  Goal: Readiness for Transition of Care  6/3/2022 0207 by Jose Manuel Randall RN  Outcome: Ongoing, Progressing  6/3/2022 0205 by Jose Manuel Randall RN  Outcome: Ongoing, Not Progressing

## 2022-06-03 NOTE — CONSULTS
[facility]                       [address] [place] [id number]    PATIENT NAME:       NICK NELSON  YOB: 1940  CSN:                [id number]   MRN:                [id number]  ADMIT DATE:         06/01/2022 16:30:00  PHYSICIAN:          Toni Joseph MD                            CONSULTATION    DATE OF CONSULT:      Nick Nelson came back to the hospital with difficulty ambulating in rehab and   not improving.  She had CTs and MRIs done that showed stenosis at 3-4 but also   at 5-6 and 6-7, now we are ready for surgery of posterior cervical   decompression.  I discussed with her the findings, the risks again, what the   reason why we are doing surgery, I told her because, even despite surgery from   the front, there is still narrowing from the back.  I discussed it is better if   we do from the back and do a posterior decompression from 3 down to 7, make sure   everything is open.  The risks, benefits, consent were dictated, discussed,   including bleeding, infection, weakness, paralysis, CSF leak, how the surgery is   done.  I spent some time with her explaining the situation.  She wants me to   proceed with surgery.        ______________________________  Toni Joseph MD    IM/AQS  DD:  06/02/2022  Time:  04:47PM  DT:  06/02/2022  Time:  08:56PM  Job #:  [id number]      CONSULTATION

## 2022-06-03 NOTE — TRANSFER OF CARE
"Anesthesia Transfer of Care Note    Patient: Natty Nelson    Procedure(s) Performed: Procedure(s) (LRB):  FUSION, SPINE, POSTERIOR SPINAL COLUMN, CERVICAL, USING COMPUTER-ASSISTED NAVIGATION (N/A)    Patient location: PACU    Anesthesia Type: general    Transport from OR: Transported from OR on 2-3 L/min O2 by NC with adequate spontaneous ventilation    Post pain: adequate analgesia    Post assessment: no apparent anesthetic complications    Post vital signs: stable    Level of consciousness: responds to stimulation    Nausea/Vomiting: no nausea/vomiting    Complications: none    Transfer of care protocol was followedComments: Detailed report with handoff to licensed provider complete      Last vitals:   Visit Vitals  /66   Pulse 72   Temp 36.6 °C (97.9 °F) (Oral)   Resp 18   Ht 5' 2" (1.575 m)   Wt 100 kg (220 lb 7.4 oz)   SpO2 96%   BMI 40.32 kg/m²     "

## 2022-06-03 NOTE — PROGRESS NOTES
[facility] Hospital Medicine Progress Note        Chief Complaint: Inpatient Follow-up for neck pain    HPI:   Ms. Nelson is 83 yo female with severe cervical stenosis status post discectomy with fusion of C5-C6 on 05/09/2022 by Dr. Joseph and ongoing bilateral lower extremity weakness since April 20, 2022, CKD IIIB and other pmhx as below presents to the ED from [facility] rehab for abnormal MRI with complaints of neck pain ongoing since surgery. She was seen at [facility] ED and underwent CT Cervical Spine that showed concerns for possible bone marrow edema at C3. MRI was also obtained that did not show this however it did show postoperative changes including stenosis around C5, please note that this is all per secondary report from the ER physician. Images were not available. ED physician did speak to Neurosurgery who recommended repeating MRI Cervical Spine w/wo in AM.  Her laboratory work was unremarkable except for a mild worsening of her anemia with a hemoglobin of 8.2 (post-op Hgb 9.9). She is being admitted to the hospitalist service for further management.     Patient admitted for severe intractable neck pain after a C5-C6 diskectomies recent procedure.  Neurosurgery has been consulted.  MRI C-spine ordered.  Neurosurgery planning for  C3 through C7 PCDF on 6/3        Interval Hx:   Patient seen and examined at bedside  Overnight hypotension noted.  Will discontinue hydralazine  Blood glucose elevated water insulin detemir 8 units at bedtime.  Neurosurgery planning for surgery today.  Follow-up with recs.      Objective/physical exam:  General: Appears comfortable  HEENT: NC/AT  Neck:  No JVD, supple, normal ROM  Chest: CTABL  CVS: Regular rhythm. Normal S1/S2.  Abdomen: nondistended, normoactive BS, soft and non-tender.  MSK: No obvious deformity or joint swelling  Skin: Warm and dry  Neuro: unable to lift legs off bed (ongoing since surgery) able to dorsi and plantar flex both feet with  normal strength  Psych: Cooperative    VITAL SIGNS: 24 HRS MIN & MAX LAST   Temp  Min: 96.8 °F (36 °C)  Max: 98.3 °F (36.8 °C) 97 °F (36.1 °C) (temporal)   BP  Min: 84/49  Max: 178/79 (!) 155/73   Pulse  Min: 64  Max: 81  66   Resp  Min: 12  Max: 23 12   SpO2  Min: 96 %  Max: 100 % 100 %       Recent Labs   Lab 06/02/22  0450 06/03/22  0427 06/03/22  1125   WBC 6.8 5.6 7.0   RBC 2.58* 2.55* 2.52*   HGB 8.1* 8.1* 7.6*   HCT 25.5* 25.3* 23.3*   MCV 98.8* 99.2* 92.5   MCH 31.4* 31.8* 30.2   MCHC 31.8* 32.0* 32.6*   RDW 13.8 14.0 18.6*    250 173   MPV 12.2 12.2 12.3       Recent Labs   Lab 06/01/22  1653 06/02/22  0450 06/03/22  0427    139 142   K 4.6 4.3 4.5   CO2 25 25 25   BUN 52.1* 47.4* 40.9*   CREATININE 1.62* 1.30* 1.13*   CALCIUM 9.4 9.5 9.1   ALBUMIN 2.7* 2.5*  --    ALKPHOS 85 78  --    ALT 33 31  --    AST 20 19  --    BILITOT 0.2 0.2  --           Microbiology Results (last 7 days)     ** No results found for the last 168 hours. **           See below for Radiology    Scheduled Med:   amLODIPine  10 mg Oral Daily    atorvastatin  80 mg Oral Daily    baclofen  5 mg Oral BID    bisacodyL  10 mg Rectal Daily    bisacodyL  10 mg Rectal Daily    ceFAZolin (ANCEF) IVPB  2 g Intravenous Q8H    insulin detemir U-100  8 Units Subcutaneous QHS    labetaloL  200 mg Oral BID    latanoprost  1 drop Both Eyes Nightly    LIDOcaine HCL 20 mg/ml (2%)        pantoprazole  40 mg Oral Daily    pregabalin  75 mg Oral BID        Continuous Infusions:   sodium chloride 0.9% 100 mL/hr at 06/03/22 1309        PRN Meds:  sodium chloride, sodium chloride, sodium chloride, acetaminophen, albuterol-ipratropium, aluminum-magnesium hydroxide-simethicone, bisacodyL, HYDROcodone-acetaminophen, HYDROmorphone, HYDROmorphone, HYDROmorphone, insulin aspart U-100, lorazepam, meperidine, morphine, morphine, ondansetron, ondansetron, polyethylene glycol, prochlorperazine, prochlorperazine, senna-docusate 8.6-50 mg,  trazodone       Assessment/Plan:  HTN, controlled  - now running low  Neck Pain in the setting of recent cervical discectomy/fusion of C5-6  Abnormal MRI Cercival Spine  Ongoing BLE weakness 2/2 to above  Post-operative Anemia; Hx Anemia of Chronic Disease  LALIT on CKD Stage IIIB,improving   Essential HTN  DM Type II with hyperglycemia      Other HX: CAD/CABG, JODI/ R CEA,  PAD     PLAN:  discontinue hydralazine  Add insulin detemir 8 units at bedtime.  Neurosurgery planning for surgery today.  Follow-up with recs.  continue amlodipine 10 mg daily, continue labetalol 200 mg b.i.d..  Discontinue valsartan.  Pain control.  hgb is  8.1, but stable.  Type and crossmatch against planned surgery.  Creatinine levels is improving. Hold valsartan  F/up neuro recs - f/up mri neck  monitor H&H. No signs of active bleeding. Consider transfuse if Hgb <8 (baseline hgb 9.5)   Consult PT/OT after Nsgy consultation  PRN analgesics       Patient condition:  Stable/Fair/Guarded/ Serious/ Critical    Anticipated discharge and Disposition:      All diagnosis and differential diagnosis have been reviewed; assessment and plan has been documented; I have personally reviewed the labs and test results that are presently available; I have reviewed the patients medication list; I have reviewed the consulting providers response and recommendations. I have reviewed or attempted to review medical records based upon their availability    All of the patient's questions have been  addressed and answered. Patient's is agreeable to the above stated plan. I will continue to monitor closely and make adjustments to medical management as needed.  _____________________________________________________________________    Nutrition Status:    Radiology:  SURG FL Surgery Fluoro Usage  See OP Notes for results.     IMPRESSION: See OP Notes for results.     This procedure was auto-finalized by: Virtual Radiologist  X-Ray Chest 1 View  Narrative: EXAMINATION:  XR  CHEST 1 VIEW    CLINICAL HISTORY:  placement verification;    TECHNIQUE:  One view    COMPARISON:  May 9, 2022.    FINDINGS:  Right transjugular approach central venous catheter terminates within the distal superior vena cava.  Interval removal of the left subclavian approach central venous catheter.  Cardiopericardial silhouette appearance is similar.  Left lower lung zone some atelectasis.  No acute dense focal or segmental consolidation, congestive process, pleural effusions or pneumothorax.  Cervical spine fusions  Impression: Optimal placement of the new central line.    Electronically signed by: Yrn Mcfarland  Date:    06/03/2022  Time:    11:32      Jayme Le MD   06/03/2022

## 2022-06-03 NOTE — PT/OT/SLP PROGRESS
Physical Therapy      Patient Name:  Natty Nelson   MRN:  [id number]    Patient not seen today secondary to Blood transfusion. PT eval orders received, discussed with RN who stated patient will be receiving blood, not appropriate for mobility today. PT to follow up tomorrow.

## 2022-06-04 LAB
ABO + RH BLD: NORMAL
ABO + RH BLD: NORMAL
ABS NEUT (OLG): 7.27 X10(3)/MCL (ref 2.1–9.2)
ANION GAP SERPL CALC-SCNC: 11 MEQ/L
BLD PROD TYP BPU: NORMAL
BLD PROD TYP BPU: NORMAL
BLOOD UNIT EXPIRATION DATE: NORMAL
BLOOD UNIT EXPIRATION DATE: NORMAL
BLOOD UNIT TYPE CODE: 6200
BLOOD UNIT TYPE CODE: 6200
BUN SERPL-MCNC: 39.1 MG/DL (ref 9.8–20.1)
CALCIUM SERPL-MCNC: 9.5 MG/DL (ref 8.4–10.2)
CHLORIDE SERPL-SCNC: 105 MMOL/L (ref 98–107)
CO2 SERPL-SCNC: 26 MMOL/L (ref 23–31)
CREAT SERPL-MCNC: 1.27 MG/DL (ref 0.55–1.02)
CREAT/UREA NIT SERPL: 31
CROSSMATCH INTERPRETATION: NORMAL
CROSSMATCH INTERPRETATION: NORMAL
DISPENSE STATUS: NORMAL
DISPENSE STATUS: NORMAL
ERYTHROCYTE [DISTWIDTH] IN BLOOD BY AUTOMATED COUNT: 17.8 % (ref 11.5–17)
GLUCOSE SERPL-MCNC: 125 MG/DL (ref 82–115)
HCT VFR BLD AUTO: 38.2 % (ref 37–47)
HGB BLD-MCNC: 12.8 GM/DL (ref 12–16)
IMM GRANULOCYTES # BLD AUTO: 0.04 X10(3)/MCL (ref 0–0.02)
IMM GRANULOCYTES NFR BLD AUTO: 0.4 % (ref 0–0.43)
INR BLD: 1.12 (ref 0–1.3)
INSTRUMENT WBC (OLG): 9.2 X10(3)/MCL
LYMPHOCYTES NFR BLD MANUAL: 1.29 X10(3)/MCL
LYMPHOCYTES NFR BLD MANUAL: 14 %
MCH RBC QN AUTO: 29.6 PG (ref 27–31)
MCHC RBC AUTO-ENTMCNC: 33.5 MG/DL (ref 33–36)
MCV RBC AUTO: 88.4 FL (ref 80–94)
MONOCYTES NFR BLD MANUAL: 0.64 X10(3)/MCL (ref 0.1–1.3)
MONOCYTES NFR BLD MANUAL: 7 %
MYELOCYTES NFR BLD MANUAL: 1 %
NEUTROPHILS NFR BLD MANUAL: 78 %
NRBC BLD AUTO-RTO: 0 %
PLATELET # BLD AUTO: 145 X10(3)/MCL (ref 130–400)
PLATELET # BLD EST: ADEQUATE 10*3/UL
PMV BLD AUTO: 12.7 FL (ref 9.4–12.4)
POCT GLUCOSE: 126 MG/DL (ref 70–110)
POCT GLUCOSE: 230 MG/DL (ref 70–110)
POCT GLUCOSE: 235 MG/DL (ref 70–110)
POCT GLUCOSE: 249 MG/DL (ref 70–110)
POTASSIUM SERPL-SCNC: 4.2 MMOL/L (ref 3.5–5.1)
PROTHROMBIN TIME: 14.3 SECONDS (ref 12.5–14.5)
RBC # BLD AUTO: 4.32 X10(6)/MCL (ref 4.2–5.4)
SODIUM SERPL-SCNC: 142 MMOL/L (ref 136–145)
UNIT NUMBER: NORMAL
UNIT NUMBER: NORMAL
WBC # SPEC AUTO: 9.2 X10(3)/MCL (ref 4.5–11.5)

## 2022-06-04 PROCEDURE — 63600175 PHARM REV CODE 636 W HCPCS: Performed by: NEUROLOGICAL SURGERY

## 2022-06-04 PROCEDURE — 25000003 PHARM REV CODE 250: Performed by: NURSE PRACTITIONER

## 2022-06-04 PROCEDURE — 99900031 HC PATIENT EDUCATION (STAT)

## 2022-06-04 PROCEDURE — 63600175 PHARM REV CODE 636 W HCPCS: Performed by: NURSE PRACTITIONER

## 2022-06-04 PROCEDURE — 85610 PROTHROMBIN TIME: CPT | Performed by: NEUROLOGICAL SURGERY

## 2022-06-04 PROCEDURE — 80048 BASIC METABOLIC PNL TOTAL CA: CPT | Performed by: NEUROLOGICAL SURGERY

## 2022-06-04 PROCEDURE — 25000003 PHARM REV CODE 250: Performed by: NEUROLOGICAL SURGERY

## 2022-06-04 PROCEDURE — 94761 N-INVAS EAR/PLS OXIMETRY MLT: CPT

## 2022-06-04 PROCEDURE — P9016 RBC LEUKOCYTES REDUCED: HCPCS | Performed by: NEUROLOGICAL SURGERY

## 2022-06-04 PROCEDURE — 27000221 HC OXYGEN, UP TO 24 HOURS

## 2022-06-04 PROCEDURE — 63600175 PHARM REV CODE 636 W HCPCS: Performed by: INTERNAL MEDICINE

## 2022-06-04 PROCEDURE — 85007 BL SMEAR W/DIFF WBC COUNT: CPT | Performed by: NURSE PRACTITIONER

## 2022-06-04 PROCEDURE — C9399 UNCLASSIFIED DRUGS OR BIOLOG: HCPCS | Performed by: INTERNAL MEDICINE

## 2022-06-04 PROCEDURE — 85025 COMPLETE CBC W/AUTO DIFF WBC: CPT | Performed by: NURSE PRACTITIONER

## 2022-06-04 PROCEDURE — 36415 COLL VENOUS BLD VENIPUNCTURE: CPT | Performed by: NEUROLOGICAL SURGERY

## 2022-06-04 PROCEDURE — 94799 UNLISTED PULMONARY SVC/PX: CPT

## 2022-06-04 PROCEDURE — 11000001 HC ACUTE MED/SURG PRIVATE ROOM

## 2022-06-04 RX ORDER — HYDROMORPHONE HYDROCHLORIDE 2 MG/ML
1 INJECTION, SOLUTION INTRAMUSCULAR; INTRAVENOUS; SUBCUTANEOUS
Status: DISCONTINUED | OUTPATIENT
Start: 2022-06-04 | End: 2022-06-22 | Stop reason: HOSPADM

## 2022-06-04 RX ORDER — KETOROLAC TROMETHAMINE 30 MG/ML
30 INJECTION, SOLUTION INTRAMUSCULAR; INTRAVENOUS EVERY 8 HOURS
Status: DISCONTINUED | OUTPATIENT
Start: 2022-06-04 | End: 2022-06-05

## 2022-06-04 RX ADMIN — HYDROCODONE BITARTRATE AND ACETAMINOPHEN 1 TABLET: 10; 325 TABLET ORAL at 09:06

## 2022-06-04 RX ADMIN — BACLOFEN 5 MG: 5 TABLET ORAL at 09:06

## 2022-06-04 RX ADMIN — AMLODIPINE BESYLATE 10 MG: 5 TABLET ORAL at 09:06

## 2022-06-04 RX ADMIN — KETOROLAC TROMETHAMINE 30 MG: 30 INJECTION, SOLUTION INTRAMUSCULAR at 11:06

## 2022-06-04 RX ADMIN — LATANOPROST 1 DROP: 50 SOLUTION OPHTHALMIC at 09:06

## 2022-06-04 RX ADMIN — PANTOPRAZOLE SODIUM 40 MG: 40 TABLET, DELAYED RELEASE ORAL at 09:06

## 2022-06-04 RX ADMIN — PREGABALIN 75 MG: 75 CAPSULE ORAL at 09:06

## 2022-06-04 RX ADMIN — LABETALOL HYDROCHLORIDE 200 MG: 200 TABLET, FILM COATED ORAL at 09:06

## 2022-06-04 RX ADMIN — INSULIN ASPART 2 UNITS: 100 INJECTION, SOLUTION INTRAVENOUS; SUBCUTANEOUS at 10:06

## 2022-06-04 RX ADMIN — ATORVASTATIN CALCIUM 80 MG: 40 TABLET, FILM COATED ORAL at 09:06

## 2022-06-04 RX ADMIN — HYDROMORPHONE HYDROCHLORIDE 1 MG: 2 INJECTION, SOLUTION INTRAMUSCULAR; INTRAVENOUS; SUBCUTANEOUS at 05:06

## 2022-06-04 RX ADMIN — CEFAZOLIN SODIUM 2 G: 2 SOLUTION INTRAVENOUS at 04:06

## 2022-06-04 RX ADMIN — INSULIN DETEMIR 12 UNITS: 100 INJECTION, SOLUTION SUBCUTANEOUS at 09:06

## 2022-06-04 RX ADMIN — BISACODYL 10 MG: 10 SUPPOSITORY RECTAL at 09:06

## 2022-06-04 RX ADMIN — MORPHINE SULFATE 2 MG: 10 INJECTION INTRAVENOUS at 10:06

## 2022-06-04 RX ADMIN — KETOROLAC TROMETHAMINE 30 MG: 30 INJECTION, SOLUTION INTRAMUSCULAR at 09:06

## 2022-06-04 NOTE — PT/OT/SLP PROGRESS
Occupational Therapy      Patient Name:  Natty Nelson   MRN:  [id number]    Attempted to see pt for OT eval- pt declined 2/2 10/10 pain. Pt was premedicated prior to session. Will follow-up 6/5.    6/4/2022

## 2022-06-04 NOTE — PLAN OF CARE
Problem: Adult Inpatient Plan of Care  Goal: Plan of Care Review  Outcome: Ongoing, Progressing  Goal: Patient-Specific Goal (Individualized)  Outcome: Ongoing, Progressing  Goal: Absence of Hospital-Acquired Illness or Injury  Outcome: Ongoing, Progressing  Intervention: Identify and Manage Fall Risk  Flowsheets (Taken 6/3/2022 1957)  Safety Promotion/Fall Prevention:   assistive device/personal item within reach   Fall Risk reviewed with patient/family   instructed to call staff for mobility  Intervention: Prevent Skin Injury  Flowsheets (Taken 6/3/2022 1957)  Body Position:   left   neutral body alignment   neutral head position  Intervention: Prevent and Manage VTE (Venous Thromboembolism) Risk  Flowsheets (Taken 6/3/2022 1957)  Activity Management: Rolling - L1  Goal: Optimal Comfort and Wellbeing  Outcome: Ongoing, Progressing     Problem: Skin Injury Risk Increased  Goal: Skin Health and Integrity  Outcome: Ongoing, Progressing     Problem: Fall Injury Risk  Goal: Absence of Fall and Fall-Related Injury  Outcome: Ongoing, Progressing  Intervention: Promote Injury-Free Environment  Flowsheets (Taken 6/3/2022 1400)  Safety Promotion/Fall Prevention:   assistive device/personal item within reach   Fall Risk reviewed with patient/family   instructed to call staff for mobility

## 2022-06-04 NOTE — PROGRESS NOTES
[facility]  Hospital Medicine Progress Note        Chief Complaint: Inpatient Follow-up for neck pain    HPI:   Ms. Nelson is 83 yo female with severe cervical stenosis status post discectomy with fusion of C5-C6 on 05/09/2022 by Dr. Joseph and ongoing bilateral lower extremity weakness since April 20, 2022, CKD IIIB and other pmhx as below presents to the ED from [facility] [place] abnormal MRI with complaints of neck pain ongoing since surgery. She was seen at [facility] [place] and underwent CT Cervical Spine that showed concerns for possible bone marrow edema at C3. MRI was also obtained that did not show this however it did show postoperative changes including stenosis around C5, please note that this is all per secondary report from the ER physician. Images were not available. ED physician did speak to Neurosurgery who recommended repeating MRI Cervical Spine w/wo in AM.  Her laboratory work was unremarkable except for a mild worsening of her anemia with a hemoglobin of 8.2 (post-op Hgb 9.9). She is being admitted to the hospitalist service for further management.     Patient admitted for severe intractable neck pain after a C5-C6 diskectomies recent procedure.  Neurosurgery has been consulted.  MRI C-spine ordered.  Neurosurgery performed C3 to C7 PCDF on 6/3.         Interval Hx:   Patient seen and examined at bedside  Patient is severe pain post surgery.  Will adjust pain meds.  Creatinine is improving.  Discontinue IV fluids and begin on a diet when she can tolerate  Follow-up post transfusion hemoglobin levels  Follow-up with Neurosurgery recs.      Objective/physical exam:  General: Appears comfortable  HEENT: NC/AT  Neck:  No JVD, supple, normal ROM  Chest: CTABL  CVS: Regular rhythm. Normal S1/S2.  Abdomen: nondistended, normoactive BS, soft and non-tender.  MSK: No obvious deformity or joint swelling  Skin: Warm and dry  Neuro: unable to lift legs off bed (ongoing since surgery) able to  dorsi and plantar flex both feet with normal strength  Psych: Cooperative    VITAL SIGNS: 24 HRS MIN & MAX LAST   Temp  Min: 96.6 °F (35.9 °C)  Max: 98.5 °F (36.9 °C) 97.8 °F (36.6 °C)   BP  Min: 106/64  Max: 208/81 123/70   Pulse  Min: 71  Max: 98  74   Resp  Min: 16  Max: 20 18   SpO2  Min: 97 %  Max: 100 % 100 %       Recent Labs   Lab 06/03/22  0427 06/03/22  1125 06/04/22  1259   WBC 5.6 7.0 9.2   RBC 2.55* 2.52* 4.32   HGB 8.1* 7.6* 12.8   HCT 25.3* 23.3* 38.2   MCV 99.2* 92.5 88.4   MCH 31.8* 30.2 29.6   MCHC 32.0* 32.6* 33.5   RDW 14.0 18.6* 17.8*    173 145   MPV 12.2 12.3 12.7*       Recent Labs   Lab 06/01/22  1653 06/02/22  0450 06/03/22  0427 06/04/22  1259    139 142 142   K 4.6 4.3 4.5 4.2   CO2 25 25 25 26   BUN 52.1* 47.4* 40.9* 39.1*   CREATININE 1.62* 1.30* 1.13* 1.27*   CALCIUM 9.4 9.5 9.1 9.5   ALBUMIN 2.7* 2.5*  --   --    ALKPHOS 85 78  --   --    ALT 33 31  --   --    AST 20 19  --   --    BILITOT 0.2 0.2  --   --           Microbiology Results (last 7 days)     ** No results found for the last 168 hours. **           See below for Radiology    Scheduled Med:   amLODIPine  10 mg Oral Daily    atorvastatin  80 mg Oral Daily    baclofen  5 mg Oral BID    bisacodyL  10 mg Rectal Daily    insulin detemir U-100  12 Units Subcutaneous QHS    ketorolac  30 mg Intravenous Q8H    labetaloL  200 mg Oral BID    latanoprost  1 drop Both Eyes Nightly    pantoprazole  40 mg Oral Daily    pregabalin  75 mg Oral BID        Continuous Infusions:       PRN Meds:  acetaminophen, albuterol-ipratropium, aluminum-magnesium hydroxide-simethicone, bisacodyL, hydrALAZINE, HYDROmorphone, insulin aspart U-100, morphine, morphine, ondansetron, ondansetron, polyethylene glycol, senna-docusate 8.6-50 mg, trazodone       Assessment/Plan:  Neck Pain in the setting of recent cervical discectomy/fusion of C5-6  - s/p PCDF of c3-c7  surgery on 6/3  Abnormal MRI Cercival Spine  Ongoing BLE weakness 2/2 to  above  Post-operative Anemia; Hx Anemia of Chronic Disease  LALIT on CKD Stage IIIB,improving   Essential HTN, controlled   DM Type II with hyperglycemia      Other HX: CAD/CABG, JODI/ R CEA,  PAD     PLAN:  adjust pain meds.  Creatinine is improving.  Discontinue IV fluids and begin on a diet when she can tolerate  Follow-up post transfusion hemoglobin levels  Follow-up with Neurosurgery recs.  increase insulin detemir to 12 units at bedtime.  continue amlodipine 10 mg daily, continue labetalol 200 mg b.i.d..  Discontinue valsartan.  Hold valsartan  monitor H&H. No signs of active bleeding. Consider transfuse if Hgb <8 (baseline hgb 9.5)    PT/OT   PRN analgesics        Patient condition:  Stable/Fair/Guarded/ Serious/ Critical     Anticipated discharge and Disposition:      All diagnosis and differential diagnosis have been reviewed; assessment and plan has been documented; I have personally reviewed the labs and test results that are presently available; I have reviewed the patients medication list; I have reviewed the consulting providers response and recommendations. I have reviewed or attempted to review medical records based upon their availability    All of the patient's questions have been  addressed and answered. Patient's is agreeable to the above stated plan. I will continue to monitor closely and make adjustments to medical management as needed.  _____________________________________________________________________    Nutrition Status:    Radiology:  SURG FL Surgery Fluoro Usage  See OP Notes for results.     IMPRESSION: See OP Notes for results.     This procedure was auto-finalized by: Virtual Radiologist  X-Ray Chest 1 View  Narrative: EXAMINATION:  XR CHEST 1 VIEW    CLINICAL HISTORY:  placement verification;    TECHNIQUE:  One view    COMPARISON:  May 9, 2022.    FINDINGS:  Right transjugular approach central venous catheter terminates within the distal superior vena cava.  Interval removal of the  left subclavian approach central venous catheter.  Cardiopericardial silhouette appearance is similar.  Left lower lung zone some atelectasis.  No acute dense focal or segmental consolidation, congestive process, pleural effusions or pneumothorax.  Cervical spine fusions  Impression: Optimal placement of the new central line.    Electronically signed by: Yrn Mcfarland  Date:    06/03/2022  Time:    11:32      Jayme Le MD   06/04/2022

## 2022-06-05 LAB
ALBUMIN SERPL-MCNC: 2.9 GM/DL (ref 3.4–4.8)
ALBUMIN/GLOB SERPL: 1.5 RATIO (ref 1.1–2)
ALP SERPL-CCNC: 75 UNIT/L (ref 40–150)
ALT SERPL-CCNC: 12 UNIT/L (ref 0–55)
AST SERPL-CCNC: 28 UNIT/L (ref 5–34)
BASOPHILS # BLD AUTO: 0.04 X10(3)/MCL (ref 0–0.2)
BASOPHILS NFR BLD AUTO: 0.5 %
BILIRUBIN DIRECT+TOT PNL SERPL-MCNC: 0.5 MG/DL
BUN SERPL-MCNC: 44.3 MG/DL (ref 9.8–20.1)
CALCIUM SERPL-MCNC: 9.6 MG/DL (ref 8.4–10.2)
CHLORIDE SERPL-SCNC: 106 MMOL/L (ref 98–107)
CO2 SERPL-SCNC: 27 MMOL/L (ref 23–31)
CREAT SERPL-MCNC: 1.54 MG/DL (ref 0.55–1.02)
EOSINOPHIL # BLD AUTO: 0.28 X10(3)/MCL (ref 0–0.9)
EOSINOPHIL NFR BLD AUTO: 3.4 %
ERYTHROCYTE [DISTWIDTH] IN BLOOD BY AUTOMATED COUNT: 18 % (ref 11.5–17)
GLOBULIN SER-MCNC: 2 GM/DL (ref 2.4–3.5)
GLUCOSE SERPL-MCNC: 150 MG/DL (ref 82–115)
HCT VFR BLD AUTO: 40 % (ref 37–47)
HGB BLD-MCNC: 13.1 GM/DL (ref 12–16)
IMM GRANULOCYTES # BLD AUTO: 0.06 X10(3)/MCL (ref 0–0.02)
IMM GRANULOCYTES NFR BLD AUTO: 0.7 % (ref 0–0.43)
LYMPHOCYTES # BLD AUTO: 1.66 X10(3)/MCL (ref 0.6–4.6)
LYMPHOCYTES NFR BLD AUTO: 20.1 %
MCH RBC QN AUTO: 29.4 PG (ref 27–31)
MCHC RBC AUTO-ENTMCNC: 32.8 MG/DL (ref 33–36)
MCV RBC AUTO: 89.9 FL (ref 80–94)
MONOCYTES # BLD AUTO: 1.36 X10(3)/MCL (ref 0.1–1.3)
MONOCYTES NFR BLD AUTO: 16.5 %
NEUTROPHILS # BLD AUTO: 4.8 X10(3)/MCL (ref 2.1–9.2)
NEUTROPHILS NFR BLD AUTO: 58.8 %
NRBC BLD AUTO-RTO: 0 %
PLATELET # BLD AUTO: 144 X10(3)/MCL (ref 130–400)
PMV BLD AUTO: 12.7 FL (ref 9.4–12.4)
POCT GLUCOSE: 123 MG/DL (ref 70–110)
POCT GLUCOSE: 176 MG/DL (ref 70–110)
POCT GLUCOSE: 255 MG/DL (ref 70–110)
POCT GLUCOSE: 336 MG/DL (ref 70–110)
POTASSIUM SERPL-SCNC: 4.4 MMOL/L (ref 3.5–5.1)
PROT SERPL-MCNC: 4.9 GM/DL (ref 5.8–7.6)
RBC # BLD AUTO: 4.45 X10(6)/MCL (ref 4.2–5.4)
RBCS: NORMAL
SODIUM SERPL-SCNC: 141 MMOL/L (ref 136–145)
WBC # SPEC AUTO: 8.2 X10(3)/MCL (ref 4.5–11.5)

## 2022-06-05 PROCEDURE — 94799 UNLISTED PULMONARY SVC/PX: CPT

## 2022-06-05 PROCEDURE — 36415 COLL VENOUS BLD VENIPUNCTURE: CPT | Performed by: INTERNAL MEDICINE

## 2022-06-05 PROCEDURE — 63600175 PHARM REV CODE 636 W HCPCS: Performed by: INTERNAL MEDICINE

## 2022-06-05 PROCEDURE — 63600175 PHARM REV CODE 636 W HCPCS: Performed by: NURSE PRACTITIONER

## 2022-06-05 PROCEDURE — 85025 COMPLETE CBC W/AUTO DIFF WBC: CPT | Performed by: INTERNAL MEDICINE

## 2022-06-05 PROCEDURE — C9399 UNCLASSIFIED DRUGS OR BIOLOG: HCPCS | Performed by: INTERNAL MEDICINE

## 2022-06-05 PROCEDURE — 80053 COMPREHEN METABOLIC PANEL: CPT | Performed by: INTERNAL MEDICINE

## 2022-06-05 PROCEDURE — 25000003 PHARM REV CODE 250: Performed by: NEUROLOGICAL SURGERY

## 2022-06-05 PROCEDURE — 97166 OT EVAL MOD COMPLEX 45 MIN: CPT

## 2022-06-05 PROCEDURE — 25000003 PHARM REV CODE 250: Performed by: NURSE PRACTITIONER

## 2022-06-05 PROCEDURE — 97162 PT EVAL MOD COMPLEX 30 MIN: CPT

## 2022-06-05 PROCEDURE — 11000001 HC ACUTE MED/SURG PRIVATE ROOM

## 2022-06-05 PROCEDURE — 99900031 HC PATIENT EDUCATION (STAT)

## 2022-06-05 RX ORDER — DEXAMETHASONE SODIUM PHOSPHATE 4 MG/ML
4 INJECTION, SOLUTION INTRA-ARTICULAR; INTRALESIONAL; INTRAMUSCULAR; INTRAVENOUS; SOFT TISSUE EVERY 12 HOURS
Status: DISCONTINUED | OUTPATIENT
Start: 2022-06-05 | End: 2022-06-06

## 2022-06-05 RX ORDER — FUROSEMIDE 10 MG/ML
20 INJECTION INTRAMUSCULAR; INTRAVENOUS
Status: COMPLETED | OUTPATIENT
Start: 2022-06-05 | End: 2022-06-06

## 2022-06-05 RX ADMIN — AMLODIPINE BESYLATE 10 MG: 5 TABLET ORAL at 09:06

## 2022-06-05 RX ADMIN — DEXAMETHASONE SODIUM PHOSPHATE 4 MG: 4 INJECTION, SOLUTION INTRA-ARTICULAR; INTRALESIONAL; INTRAMUSCULAR; INTRAVENOUS; SOFT TISSUE at 09:06

## 2022-06-05 RX ADMIN — BISACODYL 10 MG: 10 SUPPOSITORY RECTAL at 09:06

## 2022-06-05 RX ADMIN — PANTOPRAZOLE SODIUM 40 MG: 40 TABLET, DELAYED RELEASE ORAL at 09:06

## 2022-06-05 RX ADMIN — FUROSEMIDE 20 MG: 10 INJECTION INTRAMUSCULAR; INTRAVENOUS at 03:06

## 2022-06-05 RX ADMIN — HYDROMORPHONE HYDROCHLORIDE 1 MG: 2 INJECTION, SOLUTION INTRAMUSCULAR; INTRAVENOUS; SUBCUTANEOUS at 09:06

## 2022-06-05 RX ADMIN — LATANOPROST 1 DROP: 50 SOLUTION OPHTHALMIC at 09:06

## 2022-06-05 RX ADMIN — LABETALOL HYDROCHLORIDE 200 MG: 200 TABLET, FILM COATED ORAL at 09:06

## 2022-06-05 RX ADMIN — INSULIN DETEMIR 12 UNITS: 100 INJECTION, SOLUTION SUBCUTANEOUS at 09:06

## 2022-06-05 RX ADMIN — INSULIN ASPART 4 UNITS: 100 INJECTION, SOLUTION INTRAVENOUS; SUBCUTANEOUS at 09:06

## 2022-06-05 RX ADMIN — BACLOFEN 5 MG: 5 TABLET ORAL at 09:06

## 2022-06-05 RX ADMIN — PREGABALIN 75 MG: 75 CAPSULE ORAL at 09:06

## 2022-06-05 RX ADMIN — HYDROMORPHONE HYDROCHLORIDE 1 MG: 2 INJECTION, SOLUTION INTRAMUSCULAR; INTRAVENOUS; SUBCUTANEOUS at 10:06

## 2022-06-05 RX ADMIN — KETOROLAC TROMETHAMINE 30 MG: 30 INJECTION, SOLUTION INTRAMUSCULAR at 05:06

## 2022-06-05 RX ADMIN — ATORVASTATIN CALCIUM 80 MG: 40 TABLET, FILM COATED ORAL at 09:06

## 2022-06-05 NOTE — PT/OT/SLP EVAL
Occupational Therapy   Evaluation    Name: Natty Nelson  MRN: [id number]  Admitting Diagnosis:  Neck pain  Recent Surgery: Procedure(s) (LRB):  FUSION, SPINE, POSTERIOR SPINAL COLUMN, CERVICAL, USING COMPUTER-ASSISTED NAVIGATION (N/A) 2 Days Post-Op    Recommendations:     Discharge Recommendations: rehabilitation facility, nursing facility, skilled  Discharge Equipment Recommendations:  walker, rolling, bedside commode  Barriers to discharge:       Assessment:     Natty Nelson is a 82 y.o. female with a medical diagnosis of neck pain.  She presents from [facility] rehab after C5-6 discectomy and fusion last month. She is now s/p C3-7 PCDF. She is currently requiring assistance with all daily activities and unable to care for herself at home. Performance deficits affecting function: impaired self care skills, impaired endurance, impaired functional mobilty, impaired balance, decreased upper extremity function, decreased lower extremity function.  She would benefit from return to rehab once medically stable to resume aggressive daily therapies to maximize functional recovery.    Rehab Prognosis: Good; patient would benefit from acute skilled OT services to address these deficits and reach maximum level of function.       Plan:     Patient to be seen 6 x/week, 5 x/week to address the above listed problems via self-care/home management, therapeutic activities, therapeutic exercises  · Plan of Care Expires:    · Plan of Care Reviewed with: patient    Subjective     Chief Complaint: neck pain  Patient/Family Comments/goals: to walk again    Occupational Profile:  Living Environment: previously living alone in in a single story home with 1 step to enter and a tub/shower combo  Previous level of function: independent prior to initial cervical sx in May 2022  Roles and Routines: ADLs/IADLs  Equipment Used at Home:  none      Pain/Comfort:  · Pain Rating 1: 0/10  · Location - Orientation 1: posterior  · Location 1: neck  · Pain  Addressed 1: Reposition, Distraction    Patients cultural, spiritual, [employer] conflicts given the current situation:      Objective:     Patient found HOB elevated with pulse ox (continuous), telemetry, SCD, peripheral IV, PureWick, oxygen upon OT entry to room.    General Precautions: Standard, fall   Orthopedic Precautions:spinal precautions (cervical)   Braces: Cervical collar (soft collar)  CardioRespiratory Status: Nasal cannula, flow 5 L/min. sp02 100%, HR 74    Occupational Performance:    Bed Mobility:    · Patient completed Rolling/Turning to Right with maximal assistance and education on log roll technique was provided    Functional Mobility/Transfers:  · Patient completed Sit <> Stand Transfer with moderate assistance and of 2 persons  with  rolling walker   · Functional Mobility: pt was able to take 1 side step along EOB with mod A x2 with RW, tactile cues for upright posture. Limited by neck pain.    Activities of Daily Living:  · Upper Body Dressing: maximal assistance .  · Lower Body Dressing: total assistance .  · Toileting: total assistance .    Physical Exam:  Upper Extremity Function:     -       Right Upper Extremity: functional distal strength, 3-/5 at shoulders. limited by neck pain.  -       Left Upper Extremity: functional distal strength, 3-/5 at shoulders. limited by neck pain.      Treatment & Education:  Initial eval performed. Education on cervical pxns provided.  Education:    Patient left HOB elevated with all lines intact and call button in reach    GOALS:   Multidisciplinary Problems     Occupational Therapy Goals        Problem: Occupational Therapy    Goal Priority Disciplines Outcome Interventions   Occupational Therapy Goal     OT, PT/OT Ongoing, Progressing    Description: LTG: Pt will perform UB ADL with Mod I from w/c by dc.    STG: to be met in 2 weeks, by 6/17  1. Pt will feed self with SBA within diet recs.  2. Pt will perform grooming EOB with SBA.  3. Pt will perform  UB dressing with min A.  4. Pt will perform toileting/bsc t/f with min A using RW.                   History:     Past Medical History:   Diagnosis Date    Arthritis     Diabetes mellitus     Hypertension        Past Surgical History:   Procedure Laterality Date    ANTERIOR CERVICAL DISCECTOMY W/ FUSION Bilateral 5/9/2022    Procedure: DISCECTOMY, SPINE, CERVICAL, ANTERIOR APPROACH, WITH FUSION;  Surgeon: Toni Joseph MD;  [facility];  Service: Neurosurgery;  Laterality: Bilateral;  ACDF C5/6       Time Tracking:     OT Date of Treatment: 06/05/22  OT Start Time: 0856  OT Stop Time: 0907  OT Total Time (min): 11 min    Billable Minutes:Evaluation mod    6/5/2022

## 2022-06-05 NOTE — PLAN OF CARE
Problem: Occupational Therapy  Goal: Occupational Therapy Goal  Description: LTG: Pt will perform UB ADL with Mod I from w/c by dc.    STG: to be met in 2 weeks, by 6/17  1. Pt will feed self with SBA within diet recs.  2. Pt will perform grooming EOB with SBA.  3. Pt will perform UB dressing with min A.  4. Pt will perform toileting/bsc t/f with min A using RW.  Outcome: Ongoing, Progressing

## 2022-06-05 NOTE — PROGRESS NOTES
[facility] Hospital Medicine Progress Note        Chief Complaint: Inpatient Follow-up for neck pain    HPI:   Ms. Nelson is 83 yo female with severe cervical stenosis status post discectomy with fusion of C5-C6 on 05/09/2022 by Dr. Joseph and ongoing bilateral lower extremity weakness since April 20, 2022, CKD IIIB and other pmhx as below [place] [facility] [place] with complaints of neck pain ongoing since surgery. She was [place] [facility] [place] and [place] CT Cervical Spine that showed concerns for possible bone marrow edema at C3. MRI was also obtained that did not show this however it did show postoperative changes including stenosis around C5, please note that this is all per secondary report from the ER physician. Images were not available. ED physician did speak to Neurosurgery who recommended repeating MRI Cervical Spine w/wo in AM.  Her laboratory work was unremarkable except for a mild worsening of her anemia with a hemoglobin of 8.2 (post-op Hgb 9.9). She is being admitted to the hospitalist service for further management.     Patient admitted for severe intractable neck pain after a C5-C6 diskectomies recent procedure.  Neurosurgery has been consulted.  MRI C-spine ordered.  Neurosurgery performed C3 to C7 PCDF on 6/3.          Interval Hx:   Patient seen and examined at bedside  Oxygen requirement is increasing on 5 L.  Keep IV Lasix 20 mg b.i.d., get a chest x-ray two views.  Hemoglobin 10.1 after multiple transfusions  Creatinine trended up slowly.  Discontinue Toradol  Add on Decadron 4 mg b.i.d.  F/up nsgy recs       Objective/physical exam:  General: Appears comfortable  HEENT: NC/AT  Neck:  No JVD, supple, normal ROM  Chest: CTABL  CVS: Regular rhythm. Normal S1/S2.  Abdomen: nondistended, normoactive BS, soft and non-tender.  MSK: No obvious deformity or joint swelling  Skin: Warm and dry  Neuro: unable to lift legs off bed (ongoing since surgery)  able to dorsi and plantar flex both feet with normal strength  Psych: Cooperative    VITAL SIGNS: 24 HRS MIN & MAX LAST   Temp  Min: 97.7 °F (36.5 °C)  Max: 98.5 °F (36.9 °C) 97.7 °F (36.5 °C)   BP  Min: 111/75  Max: 135/78 126/68   Pulse  Min: 66  Max: 76  66   Resp  Min: 18  Max: 20 20   SpO2  Min: 100 %  Max: 100 % 100 %       Recent Labs   Lab 06/03/22  1125 06/04/22  1259 06/05/22  0422   WBC 7.0 9.2 8.2   RBC 2.52* 4.32 4.45   HGB 7.6* 12.8 13.1   HCT 23.3* 38.2 40.0   MCV 92.5 88.4 89.9   MCH 30.2 29.6 29.4   MCHC 32.6* 33.5 32.8*   RDW 18.6* 17.8* 18.0*    145 144   MPV 12.3 12.7* 12.7*       Recent Labs   Lab 06/01/22  1653 06/02/22  0450 06/03/22  0427 06/04/22  1259 06/05/22  0422    139 142 142 141   K 4.6 4.3 4.5 4.2 4.4   CO2 25 25 25 26 27   BUN 52.1* 47.4* 40.9* 39.1* 44.3*   CREATININE 1.62* 1.30* 1.13* 1.27* 1.54*   CALCIUM 9.4 9.5 9.1 9.5 9.6   ALBUMIN 2.7* 2.5*  --   --  2.9*   ALKPHOS 85 78  --   --  75   ALT 33 31  --   --  12   AST 20 19  --   --  28   BILITOT 0.2 0.2  --   --  0.5          Microbiology Results (last 7 days)     ** No results found for the last 168 hours. **           See below for Radiology    Scheduled Med:   amLODIPine  10 mg Oral Daily    atorvastatin  80 mg Oral Daily    baclofen  5 mg Oral BID    bisacodyL  10 mg Rectal Daily    dexamethasone  4 mg Intravenous Q12H    furosemide (LASIX) injection  20 mg Intravenous Q12H    insulin detemir U-100  12 Units Subcutaneous QHS    labetaloL  200 mg Oral BID    latanoprost  1 drop Both Eyes Nightly    pantoprazole  40 mg Oral Daily    pregabalin  75 mg Oral BID        Continuous Infusions:       PRN Meds:  acetaminophen, albuterol-ipratropium, aluminum-magnesium hydroxide-simethicone, bisacodyL, hydrALAZINE, HYDROmorphone, insulin aspart U-100, morphine, morphine, ondansetron, ondansetron, polyethylene glycol, senna-docusate 8.6-50 mg, trazodone       Assessment/Plan:  Neck Pain in the setting of recent  cervical discectomy/fusion of C5-6  - s/p PCDF of c3-c7  surgery on 6/3  Abnormal MRI Cercival Spine  Ongoing BLE weakness 2/2 to above  Acute hypoxic resp failure likely d/t fluid overload state  Post-operative Anemia; Hx Anemia of Chronic Disease  LALIT on CKD Stage IIIB, slightly worse   Essential HTN, controlled   DM Type II with hyperglycemia      Other HX: CAD/CABG, JODI/ R CEA,  PAD     PLAN:  Oxygen requirement is increasing on 5 L.  IV Lasix 20 mg b.i.d., get a chest x-ray two views.  Hemoglobin 10.1 after multiple transfusions  Creatinine trended up slowly.  Discontinue Toradol  Add on Decadron 4 mg b.i.d.  Follow-up with Neurosurgery recs.  increase insulin detemir to 12 units at bedtime.  continue amlodipine 10 mg daily, continue labetalol 200 mg b.i.d..   Hold valsartan  monitor H&H. No signs of active bleeding. Consider transfuse if Hgb <8 (baseline hgb 9.5)    PT/OT   PRN analgesics       Patient condition:  Stable/Fair/Guarded/ Serious/ Critical    Anticipated discharge and Disposition:    Critical care diagnosis respiratory failure due to acute pulmonary edema  Critical care time greater than 35 minutes    All diagnosis and differential diagnosis have been reviewed; assessment and plan has been documented; I have personally reviewed the labs and test results that are presently available; I have reviewed the patients medication list; I have reviewed the consulting providers response and recommendations. I have reviewed or attempted to review medical records based upon their availability    All of the patient's questions have been  addressed and answered. Patient's is agreeable to the above stated plan. I will continue to monitor closely and make adjustments to medical management as needed.  _____________________________________________________________________    Nutrition Status:    Radiology:  SURG FL Surgery Fluoro Usage  See OP Notes for results.     IMPRESSION: See OP Notes for results.     This  procedure was auto-finalized by: Virtual Radiologist  X-Ray Chest 1 View  Narrative: EXAMINATION:  XR CHEST 1 VIEW    CLINICAL HISTORY:  placement verification;    TECHNIQUE:  One view    COMPARISON:  May 9, 2022.    FINDINGS:  Right transjugular approach central venous catheter terminates within the distal superior vena cava.  Interval removal of the left subclavian approach central venous catheter.  Cardiopericardial silhouette appearance is similar.  Left lower lung zone some atelectasis.  No acute dense focal or segmental consolidation, congestive process, pleural effusions or pneumothorax.  Cervical spine fusions  Impression: Optimal placement of the new central line.    Electronically signed by: Yrn Mcfarland  Date:    06/03/2022  Time:    11:32      Jayme Le MD   06/05/2022

## 2022-06-05 NOTE — PT/OT/SLP EVAL
Physical Therapy Evaluation    Patient Name:  Natty Nelson   MRN:  [id number]    Recommendations:     Discharge Recommendations:  rehabilitation facility, nursing facility, skilled   Discharge Equipment Recommendations:     Barriers to discharge: pain, severity of deficits    Assessment:     Natty Nelson is a 82 y.o. female admitted with a medical diagnosis of abnormal MRI with continued cervical pain following discectomy 5/9/22. Pt underwent C3-C7 PCDF on 6/3.  She presents with the following impairments/functional limitations:  weakness, impaired endurance, impaired functional mobilty, gait instability, impaired balance, decreased lower extremity function, pain. Pt with limited improvement in mobility following initial surgery and presents with further mobility limitations. Pain is limiting participation with therapy, however pt pre-medicated for session today and now lethargic with difficulty remaining awake and alert. Skilled PT services necessary to address deficits and improve pt tolerance to activity in order to facilitate discharge to rehab facility for more aggressive therapy services.    Rehab Prognosis: Fair; patient would benefit from acute skilled PT services to address these deficits and reach maximum level of function.    Recent Surgery: Procedure(s) (LRB):  FUSION, SPINE, POSTERIOR SPINAL COLUMN, CERVICAL, USING COMPUTER-ASSISTED NAVIGATION (N/A) 2 Days Post-Op    Plan:     During this hospitalization, patient to be seen BID to address the identified rehab impairments via gait training, therapeutic activities, therapeutic exercises, neuromuscular re-education and progress toward the following goals:    · Plan of Care Expires:  07/05/22    Subjective     Chief Complaint: pain  Patient/Family Comments/goals: to walk and return home  Pain/Comfort:  · Pain Rating 1: 7/10  · Location - Side 1: Bilateral  · Location 1: neck  · Pain Addressed 1: Pre-medicate for activity, Reposition    Patients  cultural, spiritual, Church conflicts given the current situation:      Living Environment:  Pt previously lived at home alone. Has been in inpatient rehab facility since previous surgery.  Prior to admission, patients level of function was dependent with mobility.  Equipment used at home:  .  DME owned (not currently used): unknown.  Upon discharge, patient will have assistance from unknown.    Objective:     Communicated with nurse Shanice, prior to session.  States pt had received Dilaudid approx 1 hr ago to assist with pain control. Patient found HOB elevated with cervical collar, AYLA drain  upon PT entry to room. Pt lethargic requiring max prompting to remain alert. Answered questions appropriately but falling asleep between statements. Improved slightly with rolling and movement to EOB, but limited further assessment of mobility.    General Precautions: Standard,     Orthopedic Precautions:    Braces: Cervical collar  Respiratory Status: Nasal cannula, flow 5 L/min    Exams:  · Postural Exam:  Patient maintains flexed posture with downward gaze. Attempts to correct to upright while seated EOB but limited by weakness and pain  · RLE ROM: WNL  · RLE Strength: grossly 3/5  · LLE ROM: WNL  · LLE Strength: grossly 3/5    Functional Mobility:  · Bed Mobility:     · Rolling Left:  maximal assistance  · Rolling Right: maximal assistance  · Scooting: maximal assistance  · Supine to Sit: maximal assistance  · Sit to Supine: maximal assistance  · Transfers:  Pending further assessment  · Gait: pending further assessment  · Balance: static sitting at EOB with max A. Posterior lean, unable to maintain midline despite passive correction.    Therapeutic Activities and Exercises:  Sat EOB x 5 min. Poor sitting balance due to pain and fatigue. Returned pt to supine in bed and positioned semi-cardiac position to increase postural engagement and tolerance to upright.    AM-PAC 6 CLICK MOBILITY  Total Score:9     Patient  left HOB elevated with all lines intact and call button in reach.    GOALS:   Multidisciplinary Problems     Physical Therapy Goals     Not on file                History:     Past Medical History:   Diagnosis Date    Arthritis     Diabetes mellitus     Hypertension        Past Surgical History:   Procedure Laterality Date    ANTERIOR CERVICAL DISCECTOMY W/ FUSION Bilateral 5/9/2022    Procedure: DISCECTOMY, SPINE, CERVICAL, ANTERIOR APPROACH, WITH FUSION;  Surgeon: Toni Joseph MD;  Location: [facility];  Service: Neurosurgery;  Laterality: Bilateral;  ACDF C5/6       Time Tracking:     PT Received On: 06/05/22  PT Start Time: 1040     PT Stop Time: 1055  PT Total Time (min): 15 min     Billable Minutes: Evaluation 15min      06/05/2022

## 2022-06-06 LAB
ANION GAP SERPL CALC-SCNC: 14 MEQ/L
BUN SERPL-MCNC: 49.3 MG/DL (ref 9.8–20.1)
CALCIUM SERPL-MCNC: 9.9 MG/DL (ref 8.4–10.2)
CHLORIDE SERPL-SCNC: 106 MMOL/L (ref 98–107)
CO2 SERPL-SCNC: 17 MMOL/L (ref 23–31)
CREAT SERPL-MCNC: 1.38 MG/DL (ref 0.55–1.02)
CREAT/UREA NIT SERPL: 36
GLUCOSE SERPL-MCNC: 247 MG/DL (ref 82–115)
HCT VFR BLD AUTO: 40.9 % (ref 37–47)
HGB BLD-MCNC: 13.8 GM/DL (ref 12–16)
POCT GLUCOSE: 231 MG/DL (ref 70–110)
POCT GLUCOSE: 247 MG/DL (ref 70–110)
POCT GLUCOSE: 324 MG/DL (ref 70–110)
POCT GLUCOSE: 437 MG/DL (ref 70–110)
POTASSIUM SERPL-SCNC: 5.1 MMOL/L (ref 3.5–5.1)
RBCS: NORMAL
SODIUM SERPL-SCNC: 137 MMOL/L (ref 136–145)

## 2022-06-06 PROCEDURE — 80048 BASIC METABOLIC PNL TOTAL CA: CPT | Performed by: INTERNAL MEDICINE

## 2022-06-06 PROCEDURE — 85014 HEMATOCRIT: CPT | Performed by: INTERNAL MEDICINE

## 2022-06-06 PROCEDURE — 99900035 HC TECH TIME PER 15 MIN (STAT)

## 2022-06-06 PROCEDURE — 97535 SELF CARE MNGMENT TRAINING: CPT

## 2022-06-06 PROCEDURE — C9399 UNCLASSIFIED DRUGS OR BIOLOG: HCPCS | Performed by: INTERNAL MEDICINE

## 2022-06-06 PROCEDURE — 36415 COLL VENOUS BLD VENIPUNCTURE: CPT | Performed by: INTERNAL MEDICINE

## 2022-06-06 PROCEDURE — 63600175 PHARM REV CODE 636 W HCPCS: Performed by: INTERNAL MEDICINE

## 2022-06-06 PROCEDURE — 25000003 PHARM REV CODE 250: Performed by: INTERNAL MEDICINE

## 2022-06-06 PROCEDURE — 25000003 PHARM REV CODE 250: Performed by: NEUROLOGICAL SURGERY

## 2022-06-06 PROCEDURE — 63600175 PHARM REV CODE 636 W HCPCS: Performed by: NURSE PRACTITIONER

## 2022-06-06 PROCEDURE — 97530 THERAPEUTIC ACTIVITIES: CPT | Mod: CQ

## 2022-06-06 PROCEDURE — 25000003 PHARM REV CODE 250: Performed by: NURSE PRACTITIONER

## 2022-06-06 PROCEDURE — 11000001 HC ACUTE MED/SURG PRIVATE ROOM

## 2022-06-06 RX ORDER — ATORVASTATIN CALCIUM 40 MG/1
40 TABLET, FILM COATED ORAL DAILY
Status: DISCONTINUED | OUTPATIENT
Start: 2022-06-07 | End: 2022-06-22 | Stop reason: HOSPADM

## 2022-06-06 RX ORDER — INSULIN ASPART 100 [IU]/ML
1-10 INJECTION, SOLUTION INTRAVENOUS; SUBCUTANEOUS
Status: DISCONTINUED | OUTPATIENT
Start: 2022-06-06 | End: 2022-06-22 | Stop reason: HOSPADM

## 2022-06-06 RX ORDER — FUROSEMIDE 10 MG/ML
20 INJECTION INTRAMUSCULAR; INTRAVENOUS
Status: DISCONTINUED | OUTPATIENT
Start: 2022-06-06 | End: 2022-06-06

## 2022-06-06 RX ORDER — HYDRALAZINE HYDROCHLORIDE 20 MG/ML
20 INJECTION INTRAMUSCULAR; INTRAVENOUS EVERY 4 HOURS PRN
Status: DISCONTINUED | OUTPATIENT
Start: 2022-06-06 | End: 2022-06-22 | Stop reason: HOSPADM

## 2022-06-06 RX ADMIN — HYDRALAZINE HYDROCHLORIDE 20 MG: 20 INJECTION INTRAMUSCULAR; INTRAVENOUS at 10:06

## 2022-06-06 RX ADMIN — HYDROMORPHONE HYDROCHLORIDE 1 MG: 2 INJECTION, SOLUTION INTRAMUSCULAR; INTRAVENOUS; SUBCUTANEOUS at 09:06

## 2022-06-06 RX ADMIN — AMLODIPINE BESYLATE 10 MG: 5 TABLET ORAL at 08:06

## 2022-06-06 RX ADMIN — PANTOPRAZOLE SODIUM 40 MG: 40 TABLET, DELAYED RELEASE ORAL at 08:06

## 2022-06-06 RX ADMIN — BISACODYL 10 MG: 10 SUPPOSITORY RECTAL at 08:06

## 2022-06-06 RX ADMIN — BACLOFEN 5 MG: 5 TABLET ORAL at 08:06

## 2022-06-06 RX ADMIN — DEXAMETHASONE SODIUM PHOSPHATE 4 MG: 4 INJECTION, SOLUTION INTRA-ARTICULAR; INTRALESIONAL; INTRAMUSCULAR; INTRAVENOUS; SOFT TISSUE at 08:06

## 2022-06-06 RX ADMIN — INSULIN ASPART 10 UNITS: 100 INJECTION, SOLUTION INTRAVENOUS; SUBCUTANEOUS at 06:06

## 2022-06-06 RX ADMIN — PREGABALIN 75 MG: 75 CAPSULE ORAL at 08:06

## 2022-06-06 RX ADMIN — INSULIN ASPART 4 UNITS: 100 INJECTION, SOLUTION INTRAVENOUS; SUBCUTANEOUS at 09:06

## 2022-06-06 RX ADMIN — INSULIN ASPART 6 UNITS: 100 INJECTION, SOLUTION INTRAVENOUS; SUBCUTANEOUS at 07:06

## 2022-06-06 RX ADMIN — HYDROMORPHONE HYDROCHLORIDE 1 MG: 2 INJECTION, SOLUTION INTRAMUSCULAR; INTRAVENOUS; SUBCUTANEOUS at 08:06

## 2022-06-06 RX ADMIN — LABETALOL HYDROCHLORIDE 200 MG: 200 TABLET, FILM COATED ORAL at 08:06

## 2022-06-06 RX ADMIN — INSULIN DETEMIR 20 UNITS: 100 INJECTION, SOLUTION SUBCUTANEOUS at 09:06

## 2022-06-06 RX ADMIN — PREGABALIN 75 MG: 75 CAPSULE ORAL at 09:06

## 2022-06-06 RX ADMIN — LABETALOL HYDROCHLORIDE 300 MG: 200 TABLET, FILM COATED ORAL at 09:06

## 2022-06-06 RX ADMIN — LABETALOL HYDROCHLORIDE 300 MG: 200 TABLET, FILM COATED ORAL at 01:06

## 2022-06-06 RX ADMIN — LATANOPROST 1 DROP: 50 SOLUTION OPHTHALMIC at 09:06

## 2022-06-06 RX ADMIN — SENNOSIDES AND DOCUSATE SODIUM 1 TABLET: 50; 8.6 TABLET ORAL at 08:06

## 2022-06-06 RX ADMIN — ATORVASTATIN CALCIUM 80 MG: 40 TABLET, FILM COATED ORAL at 08:06

## 2022-06-06 RX ADMIN — INSULIN ASPART 4 UNITS: 100 INJECTION, SOLUTION INTRAVENOUS; SUBCUTANEOUS at 11:06

## 2022-06-06 RX ADMIN — FUROSEMIDE 20 MG: 10 INJECTION INTRAMUSCULAR; INTRAVENOUS at 02:06

## 2022-06-06 RX ADMIN — BACLOFEN 5 MG: 5 TABLET ORAL at 09:06

## 2022-06-06 NOTE — PT/OT/SLP PROGRESS
Occupational Therapy   Treatment    Name: Natty Nelson  MRN: [id number]  Admitting Diagnosis:  S/p C3-C7 PCDF  3 Days Post-Op    Recommendations:     Discharge Recommendations: nursing facility, skilled, rehabilitation facility  Discharge Equipment Recommendations:  walker, rolling, hip kit, bedside commode  Barriers to discharge: Severity of Deficits    Assessment:     Natty Nelson is a 82 y.o. female with a medical diagnosis of S/p C3-C7 PCDF. Performance deficits affecting function are weakness, gait instability, decreased lower extremity function, impaired balance, impaired endurance, impaired sensation, orthopedic precautions, pain, impaired self care skills, impaired functional mobilty. Pt noted w/ significantly decreased ax tolerance, however w/ improved quality of movement during all mobility tasks. Attempted marching in place, however pt reports inability to sense position of LEs. OT recs upon d/c include IPR vs SNF.    Rehab Prognosis:  Good; patient would benefit from acute skilled OT services to address these deficits and reach maximum level of function.       Plan:     Patient to be seen 6 x/week, 5 x/week to address the above listed problems via self-care/home management, therapeutic activities, therapeutic exercises  · Plan of Care Expires: 06/20/22  · Plan of Care Reviewed with: patient    Subjective     Pain/Comfort:  · Pain Rating 1: 7/10  · Location 1: neck  · Pain Addressed 1: Pre-medicate for activity, Reposition    Objective:     Communicated with: RN prior to session.  Patient found HOB elevated with telemetry, PureWick, AYLA drain, SCD upon OT entry to room.    General Precautions: Standard, fall   Orthopedic Precautions:spinal precautions   Braces: Cervical collar  Respiratory Status: Room air   Vitals: 177/78, 86 bpm     Occupational Performance:     Bed Mobility:    · Patient completed Rolling/Turning to Left with  moderate assistance  · Patient completed Rolling/Turning to Right with  moderate assistance  · Patient completed Supine to Sit with maximal assistance  · Patient completed Sit to Supine with maximal assistance     Functional Mobility/Transfers:  · Patient completed Sit <> Stand Transfer with moderate assistance  with  rolling walker   · Functional Mobility: Attempted marching in place, however unsafe requiring return to seated position    Activities of Daily Living:  · Grooming: set up assistance to wash face while supported long sitting in bed  · Toileting: total assistance for vaginal hygiene after urinating. Some skin breakdown noted to B inner thighs, nurse notified      [facility] 6 Click ADL: 12    Treatment & Education:  Education on cervical pxns    Patient left HOB elevated with all lines intact, call button in reach and RN notifiedEducation:      GOALS:   Multidisciplinary Problems     Occupational Therapy Goals        Problem: Occupational Therapy    Goal Priority Disciplines Outcome Interventions   Occupational Therapy Goal     OT, PT/OT Ongoing, Progressing    Description: LTG: Pt will perform UB ADL with Mod I from w/c by dc.    STG: to be met in 2 weeks, by 6/17  1. Pt will feed self with SBA within diet recs.  2. Pt will perform grooming EOB with SBA.  3. Pt will perform UB dressing with min A.  4. Pt will perform toileting/bsc t/f with min A using RW.                   Time Tracking:     OT Date of Treatment: 06/06/22  OT Start Time: 0918  OT Stop Time: 0948  OT Total Time (min): 30 min    Billable Minutes:Self Care/Home Management 30 Minutes    OT/CLAYTON: OT     CLAYTON Visit Number: 1    6/6/2022

## 2022-06-06 NOTE — PLAN OF CARE
Problem: Adult Inpatient Plan of Care  Goal: Plan of Care Review  Outcome: Ongoing, Progressing  Goal: Patient-Specific Goal (Individualized)  Outcome: Ongoing, Progressing  Goal: Absence of Hospital-Acquired Illness or Injury  Outcome: Ongoing, Progressing  Goal: Optimal Comfort and Wellbeing  Outcome: Ongoing, Progressing  Goal: Readiness for Transition of Care  Outcome: Ongoing, Progressing     Problem: Bariatric Environmental Safety  Goal: Safety Maintained with Care  Outcome: Ongoing, Progressing     Problem: Skin Injury Risk Increased  Goal: Skin Health and Integrity  Outcome: Ongoing, Progressing     Problem: Fall Injury Risk  Goal: Absence of Fall and Fall-Related Injury  Outcome: Ongoing, Progressing     Problem: Infection  Goal: Absence of Infection Signs and Symptoms  Outcome: Ongoing, Progressing

## 2022-06-06 NOTE — PT/OT/SLP PROGRESS
Physical Therapy         Treatment        Natty Nelson   MRN: [id number]     PT Received On: 06/06/22  PT Start Time: 0919     PT Stop Time: 0950    PT Total Time (min): 31 min       Billable Minutes:  Therapeutic Activity 31  Total Minutes: 31    Treatment Type: Treatment  PT/PTA: PTA     PTA Visit Number: 1       General Precautions: Standard, fall  Orthopedic Precautions: Orthopedic Precautions : spinal precautions   Braces: Braces: Cervical collar         Subjective:  Communicated with nurse prior to session.    Pain/Comfort  Pain Rating 1: 5/10  Location 1: cervical spine  Pain Addressed 1: Pre-medicate for activity    Objective:  Patient found supine in bed, with Patient found with: AYLA Weiss drain    Functional Mobility:  Bed Mobility:   Supine to sit: Maximum Assistance   Sit to supine: Maximum Assistance   Rolling: Maximum Assistance   Scooting: Maximum Assistance    Balance:   Static Sit: FAIR-: Maintains without assist but inconsistent   Dynamic Sit:  FAIR: Cannot move trunk without losing balance  Static Stand: POOR: Needs MODERATE assist to maintain  Dynamic stand: 0: N/A    Transfer Training:  Sit to stand:Moderate Assistance x2 trials with Rolling Walker x2 trials. Pt able to maintain static standing for approx 20 seconds.      Additional Treatment:  Pt attempted standing hip flexion but with noted decreased coordination/proprioception and task was stopped.     Activity Tolerance:  Patient limited by fatigue    Patient left HOB elevated with all lines intact and call button in reach.    Assessment:  Natty Nelson is a 82 y.o. female with a medical diagnosis of <principal problem not specified>. She presents with increased transfer training ability.    Rehab potential is excellent.    Activity tolerance: Excellent    Discharge recommendations: Discharge Facility/Level of Care Needs: nursing facility, skilled, rehabilitation facility     Equipment recommendations: Equipment Needed After Discharge:  walker, rolling     GOALS:   Multidisciplinary Problems     Physical Therapy Goals     Not on file                PLAN:    Patient to be seen BID  to address the above listed problems via gait training, therapeutic activities  Plan of Care expires: 07/05/22  Plan of Care reviewed with: patient         6/6/2022

## 2022-06-06 NOTE — PROGRESS NOTES
[facility] Medicine Progress Note        Chief Complaint: Inpatient Follow-up for neck pain    HPI:   Ms. Nelson is 83 yo female with severe cervical stenosis status post discectomy with fusion of C5-C6 on 05/09/2022 by Dr. Joseph and ongoing bilateral lower extremity weakness since April 20, 2022, CKD IIIB and other pmhx as below presents to the ED from [facility] [place] for abnormal MRI with complaints of neck pain ongoing since surgery. She was seen at [facility] ED and underwent CT Cervical Spine that showed concerns for possible bone marrow edema at C3. MRI was also obtained that did not show this however it did show postoperative changes including stenosis around C5, please note that this is all per secondary report from the ER physician. Images were not available. ED physician did speak to Neurosurgery who recommended repeating MRI Cervical Spine w/wo in AM.  Her laboratory work was unremarkable except for a mild worsening of her anemia with a hemoglobin of 8.2 (post-op Hgb 9.9). She is being admitted to the hospitalist service for further management.     Patient admitted for severe intractable neck pain after a C5-C6 diskectomies recent procedure.  Neurosurgery has been consulted.  MRI C-spine ordered.  Neurosurgery performed C3 to C7 PCDF on 6/3.          Interval Hx:   Blood pressures severely elevated.  Adjust BP meds increase labetalol to 300 mg 3 times daily.  Continue amlodipine 10 mg  Use IV hydralazine with parameters documented  Creatinine is improving.  Chest x-ray reviewed negative for acute findings.  Continue with pain control, glucose control,  Appreciate neurosurgery input removing the last AYLA drain today.   informed for snf placement  Continue PT OT    Objective/physical exam:  General: Appears comfortable  HEENT: NC/AT  Neck:  No JVD, supple, normal ROM  Chest: CTABL  CVS: Regular rhythm. Normal S1/S2.  Abdomen: nondistended, normoactive BS, soft and  non-tender.  MSK: No obvious deformity or joint swelling  Skin: Warm and dry  Neuro: unable to lift legs off bed (ongoing since surgery) able to dorsi and plantar flex both feet with normal strength  Psych: Cooperative    VITAL SIGNS: 24 HRS MIN & MAX LAST   Temp  Min: 97.5 °F (36.4 °C)  Max: 98.1 °F (36.7 °C) 97.7 °F (36.5 °C)   BP  Min: 133/61  Max: 201/64 (!) 201/64   Pulse  Min: 74  Max: 95  94   Resp  Min: 16  Max: 18 18   SpO2  Min: 97 %  Max: 100 % 97 %       Recent Labs   Lab 06/03/22  1125 06/04/22  1259 06/05/22  0422 06/06/22  0758   WBC 7.0 9.2 8.2  --    RBC 2.52* 4.32 4.45  --    HGB 7.6* 12.8 13.1 13.8   HCT 23.3* 38.2 40.0 40.9   MCV 92.5 88.4 89.9  --    MCH 30.2 29.6 29.4  --    MCHC 32.6* 33.5 32.8*  --    RDW 18.6* 17.8* 18.0*  --     145 144  --    MPV 12.3 12.7* 12.7*  --        Recent Labs   Lab 06/01/22  1653 06/02/22  0450 06/03/22  0427 06/04/22  1259 06/05/22  0422 06/06/22  0619    139   < > 142 141 137   K 4.6 4.3   < > 4.2 4.4 5.1   CO2 25 25   < > 26 27 17*   BUN 52.1* 47.4*   < > 39.1* 44.3* 49.3*   CREATININE 1.62* 1.30*   < > 1.27* 1.54* 1.38*   CALCIUM 9.4 9.5   < > 9.5 9.6 9.9   ALBUMIN 2.7* 2.5*  --   --  2.9*  --    ALKPHOS 85 78  --   --  75  --    ALT 33 31  --   --  12  --    AST 20 19  --   --  28  --    BILITOT 0.2 0.2  --   --  0.5  --     < > = values in this interval not displayed.          Microbiology Results (last 7 days)     ** No results found for the last 168 hours. **           See below for Radiology    Scheduled Med:   amLODIPine  10 mg Oral Daily    [START ON 6/7/2022] atorvastatin  40 mg Oral Daily    baclofen  5 mg Oral BID    bisacodyL  10 mg Rectal Daily    dexamethasone  4 mg Intravenous Q12H    furosemide (LASIX) injection  20 mg Intravenous Q12H    insulin detemir U-100  12 Units Subcutaneous QHS    labetaloL  300 mg Oral Q8H    latanoprost  1 drop Both Eyes Nightly    pantoprazole  40 mg Oral Daily    pregabalin  75 mg Oral BID         Continuous Infusions:       PRN Meds:  acetaminophen, albuterol-ipratropium, aluminum-magnesium hydroxide-simethicone, bisacodyL, hydrALAZINE, HYDROmorphone, insulin aspart U-100, lorazepam, morphine, morphine, ondansetron, ondansetron, polyethylene glycol, senna-docusate 8.6-50 mg, trazodone       Assessment/Plan:  HTN Urgency  Neck Pain in the setting of recent cervical discectomy/fusion of C5-6  - s/p PCDF of c3-c7  surgery on 6/3  Abnormal MRI Cercival Spine  Ongoing BLE weakness 2/2 to above  Acute hypoxic resp failure likely d/t fluid overload state, resolved   Post-operative Anemia; Hx Anemia of Chronic Disease  LALIT on CKD Stage IIIB, slightly worse   Essential HTN, controlled   DM Type II with hyperglycemia      Other HX: CAD/CABG, JODI/ R CEA,  PAD     PLAN:  increase labetalol to 300 mg 3 times daily.  Continue amlodipine 10 mg  Use IV hydralazine with parameters documented  Creatinine is improving.  Continue with pain control, glucose control,  Appreciate neurosurgery input removing the last AYLA drain today.  Continue PT OT  Add on Decadron 4 mg b.i.d.  increase insulin detemir to 12 units at bedtime.  Hold valsartan  monitor H&H. No signs of active bleeding. Consider transfuse if Hgb <8 (baseline hgb 9.5)    PT/OT   PRN analgesics        Patient condition:  Stable/Fair/Guarded/ Serious/ Critical     Anticipated discharge and Disposition: informed for snf placement    Critical Care diagnoses hypertensive urgency  Critical care time greater than 35 minutes    All diagnosis and differential diagnosis have been reviewed; assessment and plan has been documented; I have personally reviewed the labs and test results that are presently available; I have reviewed the patients medication list; I have reviewed the consulting providers response and recommendations. I have reviewed or attempted to review medical records based upon their availability    All of the patient's questions have been   addressed and answered. Patient's is agreeable to the above stated plan. I will continue to monitor closely and make adjustments to medical management as needed.  _____________________________________________________________________    Nutrition Status:    Radiology:  X-Ray Chest PA And Lateral  Narrative: EXAMINATION:  XR CHEST PA AND LATERAL    CLINICAL HISTORY:  ; worsening oxygen requirement ?cause;    TECHNIQUE:  Portable AP view of the chest.    COMPARISON:  3 [facility] 2022    FINDINGS:  Lines/tubes/devices:  Life-support devices remain in similar position.    The cardiac silhouette and central vascular structures are unchanged.  The trachea is midline. No new or worsening consolidation is identified. There is no enlarging pleural effusion or convincing pneumothorax.    Regional osseous structures and extrathoracic soft tissues are similar.  Impression: No evidence of new or worsening cardiopulmonary process.    Electronically signed by: Mateo Donnelly  Date:    06/05/2022  Time:    14:13      Jayme Le MD   06/06/2022

## 2022-06-06 NOTE — PLAN OF CARE
Problem: Adult Inpatient Plan of Care  Goal: Plan of Care Review  6/6/2022 0441 by Jose Manuel Randall RN  Outcome: Ongoing, Progressing  6/6/2022 0440 by Jose Manuel Randall RN  Outcome: Ongoing, Progressing  Goal: Patient-Specific Goal (Individualized)  6/6/2022 0441 by Jose Manuel Randall RN  Outcome: Ongoing, Progressing  6/6/2022 0440 by Jose Manuel Randall RN  Outcome: Ongoing, Progressing  Goal: Absence of Hospital-Acquired Illness or Injury  6/6/2022 0441 by Jose Manuel Randall RN  Outcome: Ongoing, Progressing  6/6/2022 0440 by Jose Manuel Randall RN  Outcome: Ongoing, Progressing  Goal: Optimal Comfort and Wellbeing  6/6/2022 0441 by Jose Manuel Randall RN  Outcome: Ongoing, Progressing  6/6/2022 0440 by Jose Manuel Randall RN  Outcome: Ongoing, Progressing  Goal: Readiness for Transition of Care  6/6/2022 0441 by Jose Manuel Randall RN  Outcome: Ongoing, Progressing  6/6/2022 0440 by Joes Manuel Randall RN  Outcome: Ongoing, Progressing

## 2022-06-06 NOTE — PROGRESS NOTES
[facility] - 9th Floor Med Surg  Neurosurgery  Progress note    Consults    PCDF of C3-C7 on 06/03/2022.    Sitting up in bed.  Pleasant.  Vital signs stable  Moves all extremities.  Weak in legs as before.  Pain minimal this morning.  Surgical incision clean and dry  One AYLA still intact with minimal output.      Plan:    Soft collar in place  On Decadron  Medical management per hospitalist for diabetes and hypertension.  Pain control  SCDs  Fall precautions  PTOT    Discontinue remaining AYLA drain.                 Chief Complaint/Reason for Admission:  Increasing neck and arm pain.  Decreased mobility.  Weak lower extremities.    History of Present Illness:  This is an 82-year-old  female known to Dr. Alston.  Patient had cervical surgery approximately 1 month ago.  Presents with increasing neck pain as well as bilateral shoulder pain.  Complains that she feels her legs are weaker as well but has weak in her lower extremities since last admission.  Patient has medical history significant for diabetes, hypertension and ACDF with fusion on 05/09/2022.  Patient was at rehab receiving physical therapy.  She states she was standing with a walker and other mobilizing equipment.    Currently she is sitting up in bed with a visitor at bedside.  She is able to move both arms but has a good deal of pain to her entire arm and both hands.  She has good dexterity.  She has posterior neck pain as well.  Pain in the shoulders as well.  Her lower extremities continue to be very weak.  She can slightly lift her left leg off of the bed but not the right.  She has a good deal of edema to her lower extremities.  She can wiggle both toes.  Able to plantar and dorsiflex.  Denies paresthesias.  No bowel or bladder incontinence.  Otherwise fully oriented neurologically.    CT spine at [facility] demonstrates bone marrow edema at C3.  MRI demonstrates postoperative changes including stenosis at C5.  Again the MRI  disc is on the chart.      MRI was ordered but unable to obtain at this time due to very long line of patient waiting for both routine and stat orders.  Disc is on chart of MRI obtained at [facility] which indicates acute traumatic spondylopathy.        PTA Medications   Medication Sig    acetaminophen (TYLENOL) 325 MG tablet Take 2 tablets (650 mg total) by mouth every 8 (eight) hours as needed for Pain.    amLODIPine (NORVASC) 10 MG tablet Take 1 tablet (10 mg total) by mouth once daily.    aspirin 81 MG Chew Take 81 mg by mouth Daily.    atorvastatin (LIPITOR) 80 MG tablet Take 80 mg by mouth once daily.    baclofen (LIORESAL) 5 mg Tab tablet Take 1 tablet (5 mg total) by mouth 2 (two) times daily.    bisacodyL (DULCOLAX) 10 mg Supp Place 10 mg rectally every 72 hours as needed.    brimonidine 0.2% (ALPHAGAN) 0.2 % Drop Place 1 drop into both eyes 2 (two) times a day.    clopidogreL (PLAVIX) 75 mg tablet Take 75 mg by mouth once daily.    hydrALAZINE (APRESOLINE) 50 MG tablet Take 1 tablet (50 mg total) by mouth every 8 (eight) hours.    insulin glargine (LANTUS) 100 unit/mL injection Inject 25 Units into the skin every evening.    insulin regular 100 unit/mL Inj injection Inject into the skin. Sliding scale as directed prn    iron/vitamin B complex (GERITOL ORAL) Take 20 mLs by mouth 2 (two) times a day.    labetaloL (NORMODYNE) 200 MG tablet Take 200 mg by mouth 2 (two) times daily.    lactulose (CHRONULAC) 10 gram/15 mL solution Take 20 g by mouth every 48 hours as needed.    latanoprost 0.005 % ophthalmic solution Place 1 drop into both eyes nightly.    linaGLIPtin (TRADJENTA) 5 mg Tab tablet Take 5 mg by mouth once daily.    pantoprazole (PROTONIX) 40 MG tablet Take 40 mg by mouth once daily.    polyethylene glycol (GLYCOLAX) 17 gram PwPk Take 17 g by mouth once daily.    pregabalin (LYRICA) 75 MG capsule Take 75 mg by mouth 2 (two) times daily.    senna-docusate 8.6-50 mg (PERICOLACE) 8.6-50 mg  per tablet Take 1 tablet by mouth 2 (two) times daily.    sucralfate (CARAFATE) 1 gram tablet Take 1 g by mouth 4 (four) times daily before meals and nightly.    timolol maleate 0.25% (TIMOPTIC) 0.25 % Drop Place 1 drop into both eyes 2 (two) times daily.    valsartan (DIOVAN) 80 MG tablet Take 80 mg by mouth once daily.    VITAMIN D2 1,250 mcg (50,000 unit) capsule Take 50,000 Units by mouth every Tuesday.       Review of patient's allergies indicates:  No Known Allergies    Past Medical History:   Diagnosis Date    Arthritis     Diabetes mellitus     Hypertension      Past Surgical History:   Procedure Laterality Date    ANTERIOR CERVICAL DISCECTOMY W/ FUSION Bilateral 5/9/2022    Procedure: DISCECTOMY, SPINE, CERVICAL, ANTERIOR APPROACH, WITH FUSION;  Surgeon: Toni Joseph MD;  Location: [facility];  Service: Neurosurgery;  Laterality: Bilateral;  ACDF C5/6     Family History    None       Tobacco Use    Smoking status: Not on file    Smokeless tobacco: Not on file   Substance and Sexual Activity    Alcohol use: Not on file    Drug use: Not on file    Sexual activity: Not on file     Review of Systems   Musculoskeletal: Positive for neck pain.   Neurological: Positive for weakness.     Objective:     Weight: 100 kg (220 lb 7.4 oz)  Body mass index is 40.32 kg/m².  Vital Signs (Most Recent):  Temp: 97.7 °F (36.5 °C) (06/06/22 0722)  Pulse: 94 (06/06/22 0722)  Resp: 18 (06/06/22 0803)  BP: (!) 201/64 (06/06/22 0804)  SpO2: 97 % (06/06/22 0722) Vital Signs (24h Range):  Temp:  [97.5 °F (36.4 °C)-98.1 °F (36.7 °C)] 97.7 °F (36.5 °C)  Pulse:  [74-95] 94  Resp:  [16-18] 18  SpO2:  [97 %-100 %] 97 %  BP: (133-201)/(61-81) 201/64     Date 06/06/22 0700 - 06/07/22 0659   Shift 2198-0607 2203-3539 6036-5051 24 Hour Total   INTAKE   Shift Total(mL/kg)       OUTPUT   Drains 10   10   Shift Total(mL/kg) 10(0.1)   10(0.1)   Weight (kg) 100 100 100 100                        Physical Exam:  Vitals  reviewed.    Constitutional: She appears well-developed and well-nourished. She is not diaphoretic. No distress.     Eyes: Pupils are equal, round, and reactive to light. Conjunctivae and EOM are normal.     Cardiovascular: Normal rate, regular rhythm, normal pulses and intact distal pulses.     Abdominal: Soft. Bowel sounds are normal.     Psych/Behavior: She is alert. She is oriented to person, place, and time. She has a normal mood and affect.     Musculoskeletal:        Right Upper Extremities: Muscle strength is 5/5.        Left Upper Extremities: Muscle strength is 5/5.       Right Lower Extremities: Muscle strength is 2/5.        Left Lower Extremities: Muscle strength is 2/5.     Neurological:        DTRs: DTRs are DTRS NORMAL AND SYMMETRICnormal and symmetric.        Cranial nerves: Cranial nerve(s) II, III, IV, V, VI, VII, VIII, IX, X, XI and XII are intact.       Significant Labs:  Recent Labs   Lab 06/04/22  1259 06/05/22  0422 06/06/22  0619    141 137   K 4.2 4.4 5.1   CO2 26 27 17*   BUN 39.1* 44.3* 49.3*   CREATININE 1.27* 1.54* 1.38*   CALCIUM 9.5 9.6 9.9     Recent Labs   Lab 06/04/22  1259 06/05/22  0422 06/06/22  0758   WBC 9.2 8.2  --    HGB 12.8 13.1 13.8   HCT 38.2 40.0 40.9    144  --      Recent Labs   Lab 06/04/22  1259   INR 1.12     Microbiology Results (last 7 days)     ** No results found for the last 168 hours. **          Significant Diagnostics:                       Active Diagnoses:    Diagnosis Date Noted POA    Neck pain [M54.2] 06/01/2022 Unknown      Problems Resolved During this Admission:       Thank you for your consult.     MAKSIM Davis-BC  Neurosurgery  [facility] - 9th Floor Med Surg

## 2022-06-07 LAB
POCT GLUCOSE: 123 MG/DL (ref 70–110)
POCT GLUCOSE: 145 MG/DL (ref 70–110)
POCT GLUCOSE: 205 MG/DL (ref 70–110)
POCT GLUCOSE: 208 MG/DL (ref 70–110)
POCT GLUCOSE: 213 MG/DL (ref 70–110)
POCT GLUCOSE: 267 MG/DL (ref 70–110)
POCT GLUCOSE: 278 MG/DL (ref 70–110)

## 2022-06-07 PROCEDURE — 63600175 PHARM REV CODE 636 W HCPCS: Performed by: INTERNAL MEDICINE

## 2022-06-07 PROCEDURE — 94799 UNLISTED PULMONARY SVC/PX: CPT

## 2022-06-07 PROCEDURE — C9399 UNCLASSIFIED DRUGS OR BIOLOG: HCPCS | Performed by: INTERNAL MEDICINE

## 2022-06-07 PROCEDURE — 97530 THERAPEUTIC ACTIVITIES: CPT

## 2022-06-07 PROCEDURE — 27000221 HC OXYGEN, UP TO 24 HOURS

## 2022-06-07 PROCEDURE — 99900035 HC TECH TIME PER 15 MIN (STAT)

## 2022-06-07 PROCEDURE — 11000001 HC ACUTE MED/SURG PRIVATE ROOM

## 2022-06-07 PROCEDURE — 25000003 PHARM REV CODE 250: Performed by: NURSE PRACTITIONER

## 2022-06-07 PROCEDURE — 25000003 PHARM REV CODE 250: Performed by: INTERNAL MEDICINE

## 2022-06-07 RX ORDER — GLIPIZIDE 5 MG/1
5 TABLET, FILM COATED, EXTENDED RELEASE ORAL
Status: DISCONTINUED | OUTPATIENT
Start: 2022-06-07 | End: 2022-06-22 | Stop reason: HOSPADM

## 2022-06-07 RX ORDER — DEXAMETHASONE SODIUM PHOSPHATE 4 MG/ML
4 INJECTION, SOLUTION INTRA-ARTICULAR; INTRALESIONAL; INTRAMUSCULAR; INTRAVENOUS; SOFT TISSUE EVERY 12 HOURS
Status: DISCONTINUED | OUTPATIENT
Start: 2022-06-07 | End: 2022-06-08

## 2022-06-07 RX ORDER — FUROSEMIDE 10 MG/ML
20 INJECTION INTRAMUSCULAR; INTRAVENOUS ONCE
Status: COMPLETED | OUTPATIENT
Start: 2022-06-07 | End: 2022-06-07

## 2022-06-07 RX ADMIN — LATANOPROST 1 DROP: 50 SOLUTION OPHTHALMIC at 09:06

## 2022-06-07 RX ADMIN — PREGABALIN 75 MG: 75 CAPSULE ORAL at 10:06

## 2022-06-07 RX ADMIN — DEXAMETHASONE SODIUM PHOSPHATE 4 MG: 4 INJECTION, SOLUTION INTRA-ARTICULAR; INTRALESIONAL; INTRAMUSCULAR; INTRAVENOUS; SOFT TISSUE at 11:06

## 2022-06-07 RX ADMIN — BACLOFEN 5 MG: 5 TABLET ORAL at 10:06

## 2022-06-07 RX ADMIN — HYDROMORPHONE HYDROCHLORIDE 1 MG: 2 INJECTION, SOLUTION INTRAMUSCULAR; INTRAVENOUS; SUBCUTANEOUS at 02:06

## 2022-06-07 RX ADMIN — INSULIN ASPART 4 UNITS: 100 INJECTION, SOLUTION INTRAVENOUS; SUBCUTANEOUS at 05:06

## 2022-06-07 RX ADMIN — HYDROMORPHONE HYDROCHLORIDE 1 MG: 2 INJECTION, SOLUTION INTRAMUSCULAR; INTRAVENOUS; SUBCUTANEOUS at 10:06

## 2022-06-07 RX ADMIN — INSULIN DETEMIR 20 UNITS: 100 INJECTION, SOLUTION SUBCUTANEOUS at 09:06

## 2022-06-07 RX ADMIN — PANTOPRAZOLE SODIUM 40 MG: 40 TABLET, DELAYED RELEASE ORAL at 10:06

## 2022-06-07 RX ADMIN — PREGABALIN 75 MG: 75 CAPSULE ORAL at 09:06

## 2022-06-07 RX ADMIN — ATORVASTATIN CALCIUM 40 MG: 40 TABLET, FILM COATED ORAL at 05:06

## 2022-06-07 RX ADMIN — GLIPIZIDE 5 MG: 5 TABLET, FILM COATED, EXTENDED RELEASE ORAL at 10:06

## 2022-06-07 RX ADMIN — FUROSEMIDE 20 MG: 10 INJECTION, SOLUTION INTRAMUSCULAR; INTRAVENOUS at 10:06

## 2022-06-07 RX ADMIN — DEXAMETHASONE SODIUM PHOSPHATE 4 MG: 4 INJECTION, SOLUTION INTRA-ARTICULAR; INTRALESIONAL; INTRAMUSCULAR; INTRAVENOUS; SOFT TISSUE at 09:06

## 2022-06-07 RX ADMIN — LABETALOL HYDROCHLORIDE 300 MG: 200 TABLET, FILM COATED ORAL at 10:06

## 2022-06-07 RX ADMIN — BACLOFEN 5 MG: 5 TABLET ORAL at 09:06

## 2022-06-07 RX ADMIN — AMLODIPINE BESYLATE 10 MG: 5 TABLET ORAL at 10:06

## 2022-06-07 RX ADMIN — LABETALOL HYDROCHLORIDE 300 MG: 200 TABLET, FILM COATED ORAL at 05:06

## 2022-06-07 RX ADMIN — INSULIN ASPART 4 UNITS: 100 INJECTION, SOLUTION INTRAVENOUS; SUBCUTANEOUS at 11:06

## 2022-06-07 RX ADMIN — LABETALOL HYDROCHLORIDE 300 MG: 200 TABLET, FILM COATED ORAL at 02:06

## 2022-06-07 NOTE — PROGRESS NOTES
[facility] - 9th [facility] Med Surg  Neurosurgery  Progress note        POD# 4 PCDF of C3-C7  Sitting up in bed, NAD  She is getting a bath at time of rounds.  She continues with pain at the incision site  She continues to deny numbness and tingling bilateral UE and LE    AFVSS  Moves all extremities.  Weak in legs as before.  Pain minimal this morning.  Surgical incision clean and dry  AYLA site dry    Plan:    Soft collar in place  On Decadron  Medical management per hospitalist for diabetes and hypertension.  Pain control  SCDs and lovenox for DVT prophylaxis  Fall precautions  PT/OT           KRISTIN Deluna  Neurosurgery  [facility] - 9th [facility] Med Surg

## 2022-06-07 NOTE — ANESTHESIA POSTPROCEDURE EVALUATION
Anesthesia Post Evaluation    Patient: Natty Nelson    Procedure(s) Performed: Procedure(s) (LRB):  FUSION, SPINE, POSTERIOR SPINAL COLUMN, CERVICAL, USING COMPUTER-ASSISTED NAVIGATION (N/A)    Final Anesthesia Type: general      Patient location during evaluation: floor  Patient participation: Yes- Able to Participate  Level of consciousness: awake and alert  Post-procedure vital signs: reviewed and stable  Pain management: adequate  Airway patency: patent    PONV status at discharge: No PONV  Anesthetic complications: no      Cardiovascular status: blood pressure returned to baseline  Respiratory status: spontaneous ventilation and room air  Hydration status: euvolemic  Follow-up not needed.          Vitals Value Taken Time   /69 06/07/22 1123   Temp 36.6 °C (97.8 °F) 06/07/22 1123   Pulse 77 06/07/22 1123   Resp 18 06/07/22 1123   SpO2 97 % 06/07/22 1123         Event Time   Out of Recovery 06/03/2022 12:44:00         Pain/Marcie Score: Pain Rating Prior to Med Admin: 5 (6/7/2022 10:02 AM)  Pain Rating Post Med Admin: 0 (6/6/2022  9:51 PM)

## 2022-06-07 NOTE — PLAN OF CARE
06/07/22 0937   Discharge Assessment   Assessment Type Discharge Planning Assessment   Confirmed/corrected address, phone number and insurance Yes   Confirmed Demographics Correct on Facesheet   Source of Information family   Communicated MARIELY with patient/caregiver Date not available/Unable to determine   Reason For Admission Neck pain, transfer from [facility]   Lives With alone  (Will stay with daughter Elise Del Rio for recovery period after SNF stay)   Facility Arrived From: [facility]   Do you expect to return to your current living situation? Yes   Prior to hospitilization cognitive status: Alert/Oriented   Current cognitive status: Alert/Oriented   Walking or Climbing Stairs Difficulty ambulation difficulty, requires equipment;ambulation difficulty, assistance 1 person   Dressing/Bathing Difficulty bathing difficulty, requires equipment;bathing difficulty, assistance 1 person   Equipment Currently Used at Home bedside commode;glucometer;rollator;wheelchair   Readmission within 30 days? Yes   Do you currently have service(s) that help you manage your care at home? No   Do you take prescription medications? Yes   Do you have prescription coverage? Yes   Do you have any problems affording any of your prescribed medications? No   Is the patient taking medications as prescribed? yes   How do you get to doctors appointments? family or friend will provide   Are you on dialysis? No   Do you take coumadin? No   Discharge Plan A Rehab;Skilled Nursing Facility   Discharge Plan discussed with: Adult children;Patient   Discharge Barriers Identified None   Will send rehab referral to [facility] for re-admission. Spoke with patient and daughter Elise regarding SNF options if rehab is not appropriate, preferred choices are 1-[facility], 2- [facility], 3-[facility]. Will follow up with PT/OT recommendations.

## 2022-06-07 NOTE — PLAN OF CARE
Problem: Physical Therapy  Goal: Physical Therapy Goal  Description: Goals to be met by: 2022    Patient will increase functional independence with mobility by performin. Sit to stand transfer with Modified [place]  2. Bed to chair transfer with Modified [place] using Rolling Walker  3. Gait  x 150 feet with Modified [place] using Rolling Walker.     Outcome: Ongoing, Progressing

## 2022-06-07 NOTE — PT/OT/SLP PROGRESS
Physical Therapy Treatment    Patient Name:  Natty Nelson   MRN:  [id number]    Recommendations:     Discharge Recommendations:  rehabilitation facility, nursing facility, skilled   Discharge Equipment Recommendations: walker, rolling   Barriers to discharge: Impaired functional mobility tolerance, neck and shoulder pain    Assessment:     Natty Nelson is a 82 y.o. female admitted with a medical diagnosis of neck pain, s/p PCDF C3-C7.  She presents with the following impairments/functional limitations:  weakness, impaired endurance, impaired functional mobilty, impaired balance, decreased upper extremity function, decreased lower extremity function, pain, decreased ROM, orthopedic precautions. Patient willing to participate with PT/OT despite report of 9/10 neck and shoulder pain. She was able to sit EOB for roughly 5-7 minutes before complaining of more intense neck and shoulder pain. Poor activity tolerance observed.    Rehab Prognosis: Good; patient would benefit from acute skilled PT services to address these deficits and reach maximum level of function.    Recent Surgery: Procedure(s) (LRB):  FUSION, SPINE, POSTERIOR SPINAL COLUMN, CERVICAL, USING COMPUTER-ASSISTED NAVIGATION (N/A) 4 Days Post-Op    Plan:     During this hospitalization, patient to be seen daily to address the identified rehab impairments via gait training, therapeutic activities, therapeutic exercises, neuromuscular re-education and progress toward the following goals:    · Plan of Care Expires:  07/05/22    Subjective     Chief Complaint: neck and bilateral shoulder pain  Patient/Family Comments/goals: decrease pain, get stronger, and return home with daughters.  Pain/Comfort:  · Pain Rating 1: 9/10  · Location - Side 1: Bilateral  · Location 1: neck (, shoulders)  · Pain Addressed 1: Cessation of Activity  · Pain Rating Post-Intervention 1: 9/10      Objective:     Communicated with RN prior to session.  Patient found HOB elevated with  PureWick, peripheral IV, telemetry, pulse ox (continuous) upon PT entry to room.     General Precautions: Standard, fall   Orthopedic Precautions:spinal precautions   Braces: Cervical collar  Respiratory Status: Room air     Functional Mobility:  · Bed Mobility:     · Supine to Sit: moderate assistance  · Sit to Supine: maximal assistance  · Balance: Static sitting balance EOB required modA and limited by pain. Poor trunk control with LOB to the R and limited by pain.      AM-PAC 6 CLICK MOBILITY  Turning over in bed (including adjusting bedclothes, sheets and blankets)?: 2  Sitting down on and standing up from a chair with arms (e.g., wheelchair, bedside commode, etc.): 2  Moving from lying on back to sitting on the side of the bed?: 2  Moving to and from a bed to a chair (including a wheelchair)?: 2  Need to walk in hospital room?: 2  Climbing 3-5 steps with a railing?: 2  Basic Mobility Total Score: 12       Therapeutic Activities and Exercises:       Patient left HOB elevated with all lines intact and call button in reach..    GOALS:   Multidisciplinary Problems     Physical Therapy Goals        Problem: Physical Therapy    Goal Priority Disciplines Outcome Goal Variances Interventions   Physical Therapy Goal     PT, PT/OT Ongoing, Progressing     Description: Goals to be met by: 2022    Patient will increase functional independence with mobility by performin. Sit to stand transfer with Modified [place]  2. Bed to chair transfer with Modified [place] using Rolling Walker  3. Gait  x 150 feet with Modified [place] using Rolling Walker.                      Time Tracking:     PT Received On: 22  PT Start Time: 930     PT Stop Time: 943  PT Total Time (min): 13 min     Billable Minutes: Therapeutic Activity x 13 minutes    Treatment Type: Treatment  PT/PTA: PT     PTA Visit Number: 2     2022

## 2022-06-07 NOTE — PT/OT/SLP PROGRESS
Occupational Therapy   Treatment    Name: Natty Nelson  MRN: [id number]  Admitting Diagnosis:  S/p C3-C7 PCDF  4 Days Post-Op    Recommendations:     Discharge Recommendations: rehabilitation facility, nursing facility, skilled  Discharge Equipment Recommendations:  walker, rolling, hip kit, bedside commode  Barriers to discharge: Severity of Deficits    Assessment:     Natty Nelson is a 82 y.o. female with a medical diagnosis of S/p C3-C7 PCDF. Performance deficits affecting function are weakness, gait instability, decreased lower extremity function, impaired balance, impaired endurance, impaired sensation, impaired self care skills, pain, orthopedic precautions, impaired functional mobilty. Pt significantly limited by pain during today's OT tx. Pt sat EOB approx. 5 minutes prior to requesting returning to supine d/t increased pain to posterior cervical spine and B shoulders. Pt noted w/ kyphotic posture, forward flexed head, and increased swelling to posterior C-spine area. Pt motivated to participate in therapy, however limited d/t pain. Plan to coordinate w/ nurse to pre-medicate pt prior to OT tx tomorrow. OT recs for d/c include IPR vs SNF, as pt cont to require significant assistance since most recent spinal sx.     Rehab Prognosis:  Good; patient would benefit from acute skilled OT services to address these deficits and reach maximum level of function.       Plan:     Patient to be seen 6 x/week, 5 x/week to address the above listed problems via self-care/home management, therapeutic activities, therapeutic exercises  · Plan of Care Expires: 06/20/22  · Plan of Care Reviewed with: patient    Subjective     Pain/Comfort:  · Pain Rating 1: 10/10  · Location - Orientation 1: posterior  · Location 1: cervical spine  · Pain Addressed 1: Nurse notified, Cessation of Activity  · Pain Rating 2: 10/10  · Location - Side 2: Bilateral  · Location 2: shoulder  · Pain Addressed 2: Cessation of Activity, Nurse  notified    Objective:     Communicated with: RN prior to session.  Patient found HOB elevated with PureWick, telemetry upon OT entry to room.    General Precautions: Standard, fall   Orthopedic Precautions:spinal precautions   Braces: Cervical collar  Respiratory Status: Room air     Occupational Performance:     Bed Mobility:    · Patient completed Supine to Sit with moderate assistance and 2 persons  · Patient completed Sit to Supine with maximal assistance and 2 persons   · Pt tolerated sitting EOB for approx. 5 minutes prior to requesting returning to supine.       [facility] 6 Click ADL: 12    Treatment & Education:  Discussed performing ankle and fist pumps while in bed to assist in reducing minor swelling noted to extremities.     Patient left HOB elevated with call button in reach, RN notified and BUEs supported w/ pillowsEducation:      GOALS:   Multidisciplinary Problems     Occupational Therapy Goals        Problem: Occupational Therapy    Goal Priority Disciplines Outcome Interventions   Occupational Therapy Goal     OT, PT/OT Ongoing, Progressing    Description: LTG: Pt will perform UB ADL with Mod I from w/c by dc.    STG: to be met in 2 weeks, by 6/17  1. Pt will feed self with SBA within diet recs.  2. Pt will perform grooming EOB with SBA.  3. Pt will perform UB dressing with min A.  4. Pt will perform toileting/bsc t/f with min A using RW.                   Time Tracking:     OT Date of Treatment: 06/07/22  OT Start Time: 0930  OT Stop Time: 0944  OT Total Time (min): 14 min    Billable Minutes:Therapeutic Activity 14 minutes    OT/CLAYTON: OT     CLAYTON Visit Number: 2    6/7/2022

## 2022-06-07 NOTE — PLAN OF CARE
Problem: Adult Inpatient Plan of Care  Goal: Plan of Care Review  6/7/2022 0832 by Jose Manuel Randall RN  Outcome: Ongoing, Progressing  6/7/2022 0830 by Jose Manuel Randall RN  Outcome: Ongoing, Progressing  Goal: Patient-Specific Goal (Individualized)  6/7/2022 0832 by Jose Manuel Randall RN  Outcome: Ongoing, Progressing  6/7/2022 0830 by Jose Manuel Randall RN  Outcome: Ongoing, Progressing  Goal: Absence of Hospital-Acquired Illness or Injury  6/7/2022 0832 by Jose Manuel Randall RN  Outcome: Ongoing, Progressing  6/7/2022 0830 by Jose Manuel Randall RN  Outcome: Ongoing, Progressing  Goal: Optimal Comfort and Wellbeing  6/7/2022 0832 by Jose Manuel Randall RN  Outcome: Ongoing, Progressing  6/7/2022 0830 by Jose Manuel Randall RN  Outcome: Ongoing, Progressing  Goal: Readiness for Transition of Care  6/7/2022 0832 by Jose Manuel Randall RN  Outcome: Ongoing, Progressing  6/7/2022 0830 by Jose Manuel Randall RN  Outcome: Ongoing, Progressing

## 2022-06-07 NOTE — PROGRESS NOTES
[facility] Hospital Medicine Progress Note        Chief Complaint: Inpatient Follow-up for neck pain    HPI:   Ms. Nelson is 81 yo female with severe cervical stenosis status post discectomy with fusion of C5-C6 on 05/09/2022 by Dr. Joseph and ongoing bilateral lower extremity weakness since April 20, 2022, CKD IIIB and other pmhx as below presents to the ED from [facility] rehab for abnormal MRI with complaints of neck pain ongoing since surgery. She was seen at [facility] ED and underwent CT Cervical Spine that showed concerns for possible bone marrow edema at C3. MRI was also obtained that did not show this however it did show postoperative changes including stenosis around C5, please note that this is all per secondary report from the ER physician. Images were not available. ED physician did speak to Neurosurgery who recommended repeating MRI Cervical Spine w/wo in AM.  Her laboratory work was unremarkable except for a mild worsening of her anemia with a hemoglobin of 8.2 (post-op Hgb 9.9). She is being admitted to the hospitalist service for further management.     Patient admitted for severe intractable neck pain after a C5-C6 diskectomies recent procedure.  Neurosurgery has been consulted.  MRI C-spine ordered.  Neurosurgery performed C3 to C7 PCDF on 6/3.       Interval Hx:   Vitals are stable.  Blood glucose still elevated at on glipizide 5 mg with breakfast  Evaluate the Lasix 20 x 1 for worsening shortness of breaths when she lays flat.  Continue pain control, continue IV Decadron b.i.d.  Continue PT OT  Patient will require placement skilled nursing versus Rehab.    Objective/physical exam:  General: Appears comfortable  HEENT: NC/AT  Neck:  No JVD, supple, normal ROM  Chest: CTABL  CVS: Regular rhythm. Normal S1/S2.  Abdomen: nondistended, normoactive BS, soft and non-tender.  MSK: No obvious deformity or joint swelling  Skin: Warm and dry  Neuro: unable to lift legs off bed (ongoing  since surgery) able to dorsi and plantar flex both feet with normal strength  Psych: Cooperative    VITAL SIGNS: 24 HRS MIN & MAX LAST   Temp  Min: 97.6 °F (36.4 °C)  Max: 98.2 °F (36.8 °C) 97.8 °F (36.6 °C)   BP  Min: 148/63  Max: 178/69 (!) 162/69   Pulse  Min: 77  Max: 98  77   Resp  Min: 16  Max: 18 18   SpO2  Min: 97 %  Max: 99 % 97 %       Recent Labs   Lab 06/03/22  1125 06/04/22  1259 06/05/22  0422 06/06/22  0758   WBC 7.0 9.2 8.2  --    RBC 2.52* 4.32 4.45  --    HGB 7.6* 12.8 13.1 13.8   HCT 23.3* 38.2 40.0 40.9   MCV 92.5 88.4 89.9  --    MCH 30.2 29.6 29.4  --    MCHC 32.6* 33.5 32.8*  --    RDW 18.6* 17.8* 18.0*  --     145 144  --    MPV 12.3 12.7* 12.7*  --        Recent Labs   Lab 06/01/22  1653 06/02/22  0450 06/03/22  0427 06/04/22  1259 06/05/22  0422 06/06/22  0619    139   < > 142 141 137   K 4.6 4.3   < > 4.2 4.4 5.1   CO2 25 25   < > 26 27 17*   BUN 52.1* 47.4*   < > 39.1* 44.3* 49.3*   CREATININE 1.62* 1.30*   < > 1.27* 1.54* 1.38*   CALCIUM 9.4 9.5   < > 9.5 9.6 9.9   ALBUMIN 2.7* 2.5*  --   --  2.9*  --    ALKPHOS 85 78  --   --  75  --    ALT 33 31  --   --  12  --    AST 20 19  --   --  28  --    BILITOT 0.2 0.2  --   --  0.5  --     < > = values in this interval not displayed.          Microbiology Results (last 7 days)     ** No results found for the last 168 hours. **           See below for Radiology    Scheduled Med:   amLODIPine  10 mg Oral Daily    atorvastatin  40 mg Oral Daily    baclofen  5 mg Oral BID    bisacodyL  10 mg Rectal Daily    dexamethasone  4 mg Intravenous Q12H    glipiZIDE  5 mg Oral Daily with breakfast    insulin detemir U-100  20 Units Subcutaneous QHS    labetaloL  300 mg Oral Q8H    latanoprost  1 drop Both Eyes Nightly    pantoprazole  40 mg Oral Daily    pregabalin  75 mg Oral BID        Continuous Infusions:       PRN Meds:  acetaminophen, albuterol-ipratropium, aluminum-magnesium hydroxide-simethicone, bisacodyL, hydrALAZINE,  HYDROmorphone, insulin aspart U-100, lorazepam, morphine, morphine, ondansetron, ondansetron, polyethylene glycol, senna-docusate 8.6-50 mg, trazodone       Assessment/Plan:  AHRF ? Cause   - possible fluid overload but cxr negative  Neck Pain in the setting of recent cervical discectomy/fusion of C5-6  - s/p PCDF of c3-c7  surgery on 6/3  Abnormal MRI Cercival Spine  Ongoing BLE weakness 2/2 to above  Acute hypoxic resp failure likely d/t fluid overload state, resolved   Post-operative Anemia; Hx Anemia of Chronic Disease  LALIT on CKD Stage IIIB, slightly worse   Essential HTN, controlled   DM Type II with hyperglycemia    HTN Urgency, resolved     Other HX: CAD/CABG, JODI/ R CEA,  PAD     PLAN:  Add on glipizide 5 mg with breakfast   Lasix 20 x 1   Continue pain control, continue IV Decadron b.i.d.  Continue PT OT   labetalol to 300 mg 3 times daily.  Continue amlodipine 10 mg  Use IV hydralazine with parameters documented  Continue with pain control, glucose control,  Appreciate neurosurgery input   Continue PT OT  Hold valsartan  monitor H&H. No signs of active bleeding. Consider transfuse if Hgb <8 (baseline hgb 9.5)    PT/OT   PRN analgesics        Patient condition:  Stable/Fair/Guarded/ Serious/ Critical     Anticipated discharge and Disposition: informed for snf placement   Patient will require placement skilled nursing versus Rehab.    Critical Care diagnoses respiratory failure requiring IV Lasix  Critical care time greater than 35 minutes    All diagnosis and differential diagnosis have been reviewed; assessment and plan has been documented; I have personally reviewed the labs and test results that are presently available; I have reviewed the patients medication list; I have reviewed the consulting providers response and recommendations. I have reviewed or attempted to review medical records based upon their availability    All of the patient's questions have been  addressed and answered.  Patient's is agreeable to the above stated plan. I will continue to monitor closely and make adjustments to medical management as needed.  _____________________________________________________________________    Nutrition Status:    Radiology:  X-Ray Chest PA And Lateral  Narrative: EXAMINATION:  XR CHEST PA AND LATERAL    CLINICAL HISTORY:  ; worsening oxygen requirement ?cause;    TECHNIQUE:  Portable AP view of the chest.    COMPARISON:  3 [place] 2022    FINDINGS:  Lines/tubes/devices:  Life-support devices remain in similar position.    The cardiac silhouette and central vascular structures are unchanged.  The trachea is midline. No new or worsening consolidation is identified. There is no enlarging pleural effusion or convincing pneumothorax.    Regional osseous structures and extrathoracic soft tissues are similar.  Impression: No evidence of new or worsening cardiopulmonary process.    Electronically signed by: Mateo Donenlly  Date:    06/05/2022  Time:    14:13      Jayme Le MD   06/07/2022

## 2022-06-08 LAB
POCT GLUCOSE: 233 MG/DL (ref 70–110)
POCT GLUCOSE: 243 MG/DL (ref 70–110)
POCT GLUCOSE: 310 MG/DL (ref 70–110)
POCT GLUCOSE: 476 MG/DL (ref 70–110)

## 2022-06-08 PROCEDURE — 25000003 PHARM REV CODE 250: Performed by: NURSE PRACTITIONER

## 2022-06-08 PROCEDURE — 97116 GAIT TRAINING THERAPY: CPT | Mod: CQ

## 2022-06-08 PROCEDURE — 63600175 PHARM REV CODE 636 W HCPCS: Performed by: INTERNAL MEDICINE

## 2022-06-08 PROCEDURE — 97110 THERAPEUTIC EXERCISES: CPT | Mod: CQ

## 2022-06-08 PROCEDURE — 94799 UNLISTED PULMONARY SVC/PX: CPT

## 2022-06-08 PROCEDURE — 63600175 PHARM REV CODE 636 W HCPCS: Performed by: PHYSICIAN ASSISTANT

## 2022-06-08 PROCEDURE — 97530 THERAPEUTIC ACTIVITIES: CPT | Mod: CQ

## 2022-06-08 PROCEDURE — 11000001 HC ACUTE MED/SURG PRIVATE ROOM

## 2022-06-08 PROCEDURE — 97110 THERAPEUTIC EXERCISES: CPT

## 2022-06-08 PROCEDURE — 25000003 PHARM REV CODE 250: Performed by: INTERNAL MEDICINE

## 2022-06-08 PROCEDURE — 99900035 HC TECH TIME PER 15 MIN (STAT)

## 2022-06-08 PROCEDURE — 97530 THERAPEUTIC ACTIVITIES: CPT

## 2022-06-08 PROCEDURE — C9399 UNCLASSIFIED DRUGS OR BIOLOG: HCPCS | Performed by: INTERNAL MEDICINE

## 2022-06-08 PROCEDURE — 94761 N-INVAS EAR/PLS OXIMETRY MLT: CPT

## 2022-06-08 RX ORDER — HYDROCODONE BITARTRATE AND ACETAMINOPHEN 7.5; 325 MG/1; MG/1
1 TABLET ORAL EVERY 4 HOURS PRN
Status: DISCONTINUED | OUTPATIENT
Start: 2022-06-08 | End: 2022-06-22 | Stop reason: HOSPADM

## 2022-06-08 RX ORDER — DEXAMETHASONE 4 MG/1
4 TABLET ORAL EVERY 12 HOURS
Status: DISPENSED | OUTPATIENT
Start: 2022-06-08 | End: 2022-06-10

## 2022-06-08 RX ORDER — NIFEDIPINE 90 MG/1
90 TABLET, EXTENDED RELEASE ORAL DAILY
Status: DISCONTINUED | OUTPATIENT
Start: 2022-06-08 | End: 2022-06-22 | Stop reason: HOSPADM

## 2022-06-08 RX ORDER — LABETALOL 200 MG/1
400 TABLET, FILM COATED ORAL EVERY 8 HOURS
Status: DISCONTINUED | OUTPATIENT
Start: 2022-06-08 | End: 2022-06-22 | Stop reason: HOSPADM

## 2022-06-08 RX ORDER — DEXAMETHASONE 4 MG/1
4 TABLET ORAL DAILY
Status: COMPLETED | OUTPATIENT
Start: 2022-06-10 | End: 2022-06-11

## 2022-06-08 RX ADMIN — PREGABALIN 75 MG: 75 CAPSULE ORAL at 09:06

## 2022-06-08 RX ADMIN — BACLOFEN 5 MG: 5 TABLET ORAL at 09:06

## 2022-06-08 RX ADMIN — INSULIN ASPART 10 UNITS: 100 INJECTION, SOLUTION INTRAVENOUS; SUBCUTANEOUS at 10:06

## 2022-06-08 RX ADMIN — INSULIN ASPART 4 UNITS: 100 INJECTION, SOLUTION INTRAVENOUS; SUBCUTANEOUS at 06:06

## 2022-06-08 RX ADMIN — PANTOPRAZOLE SODIUM 40 MG: 40 TABLET, DELAYED RELEASE ORAL at 09:06

## 2022-06-08 RX ADMIN — HYDRALAZINE HYDROCHLORIDE 20 MG: 20 INJECTION INTRAMUSCULAR; INTRAVENOUS at 02:06

## 2022-06-08 RX ADMIN — DEXAMETHASONE 4 MG: 4 TABLET ORAL at 09:06

## 2022-06-08 RX ADMIN — LATANOPROST 1 DROP: 50 SOLUTION OPHTHALMIC at 09:06

## 2022-06-08 RX ADMIN — HYDROMORPHONE HYDROCHLORIDE 1 MG: 2 INJECTION, SOLUTION INTRAMUSCULAR; INTRAVENOUS; SUBCUTANEOUS at 04:06

## 2022-06-08 RX ADMIN — HYDROMORPHONE HYDROCHLORIDE 1 MG: 2 INJECTION, SOLUTION INTRAMUSCULAR; INTRAVENOUS; SUBCUTANEOUS at 12:06

## 2022-06-08 RX ADMIN — LABETALOL HYDROCHLORIDE 400 MG: 200 TABLET, FILM COATED ORAL at 10:06

## 2022-06-08 RX ADMIN — LABETALOL HYDROCHLORIDE 400 MG: 200 TABLET, FILM COATED ORAL at 03:06

## 2022-06-08 RX ADMIN — INSULIN ASPART 10 UNITS: 100 INJECTION, SOLUTION INTRAVENOUS; SUBCUTANEOUS at 04:06

## 2022-06-08 RX ADMIN — NIFEDIPINE 90 MG: 90 TABLET, FILM COATED, EXTENDED RELEASE ORAL at 09:06

## 2022-06-08 RX ADMIN — LABETALOL HYDROCHLORIDE 300 MG: 200 TABLET, FILM COATED ORAL at 06:06

## 2022-06-08 RX ADMIN — INSULIN DETEMIR 25 UNITS: 100 INJECTION, SOLUTION SUBCUTANEOUS at 09:06

## 2022-06-08 RX ADMIN — DEXAMETHASONE SODIUM PHOSPHATE 4 MG: 4 INJECTION, SOLUTION INTRA-ARTICULAR; INTRALESIONAL; INTRAMUSCULAR; INTRAVENOUS; SOFT TISSUE at 09:06

## 2022-06-08 RX ADMIN — INSULIN ASPART 8 UNITS: 100 INJECTION, SOLUTION INTRAVENOUS; SUBCUTANEOUS at 12:06

## 2022-06-08 RX ADMIN — HYDROCODONE BITARTRATE AND ACETAMINOPHEN 1 TABLET: 7.5; 325 TABLET ORAL at 10:06

## 2022-06-08 RX ADMIN — GLIPIZIDE 5 MG: 5 TABLET, FILM COATED, EXTENDED RELEASE ORAL at 09:06

## 2022-06-08 RX ADMIN — ATORVASTATIN CALCIUM 40 MG: 40 TABLET, FILM COATED ORAL at 04:06

## 2022-06-08 NOTE — PT/OT/SLP PROGRESS
Occupational Therapy   Treatment    Name: Natty Nelson  MRN: [id number]  Admitting Diagnosis:  S/p C3-C7 PCDF  5 Days Post-Op    Recommendations:     Discharge Recommendations: rehabilitation facility, nursing facility, skilled  Discharge Equipment Recommendations: TBD by next level of care  Barriers to discharge: Severity of Deficits    Assessment:     Natty Nelson is a 82 y.o. female with a medical diagnosis of S/p C3-C7 PCDF. Performance deficits affecting function are weakness, gait instability, decreased lower extremity function, impaired balance, impaired endurance, impaired sensation, orthopedic precautions, pain, impaired self care skills, impaired functional mobilty. Pt cont to be significantly limited by pain and low ax tolerance. Pt motivated to participate w/ therapy, however expresses concerns regarding pain and little progress made. OT recs for d/c include IPR vs SNF.    Rehab Prognosis:  Good; patient would benefit from acute skilled OT services to address these deficits and reach maximum level of function.       Plan:     Patient to be seen 6 x/week, 5 x/week to address the above listed problems via self-care/home management, therapeutic activities, therapeutic exercises  · Plan of Care Expires: 06/20/22  · Plan of Care Reviewed with: patient    Subjective     Pain/Comfort:  · Pain Rating 1: 5/10  · Location - Side 1: Left  · Location 1: scapula  · Pain Addressed 1: Reposition  · Pain Rating Post-Intervention 1: 5/10  · Location - Orientation 2: posterior  · Location 2: cervical spine  · Pain Addressed 2: Reposition, Distraction    Objective:     Communicated with: RN prior to session.  Patient found supine with PureWick, peripheral IV, telemetry, SCD upon OT entry to room.    General Precautions: Standard, fall   Orthopedic Precautions:spinal precautions   Braces: Cervical collar  Respiratory Status: Room air     Occupational Performance:     Bed Mobility:    · Patient completed Supine to Sit  with maximal assistance and 2 persons  · Patient completed Sit to Supine with maximal assistance and 2 persons     Functional Mobility/Transfers:  · Patient completed Sit <> Stand Transfer with moderate assistance  with  rolling walker   · Functional Mobility: Pt requests to perform sit<>stands, completing 2 trials w/ mod A and standing approx. 15 sec per trial.     [facility] 6 Click ADL: 12    Treatment & Education:  Pt performed UE strengthening while semi supine in bed. Pt performed forward/backward rolls prior to reporting pain to B shoulders, greater in L shoulder. Therapist performed AAROM to shoulders, B should flex and L should abd., 10 reps X 1 set. Pt reports pain, however tolerable.     Patient left HOB elevated with call button in reach, family present and LUE supported w/ pillowEducation:      GOALS:   Multidisciplinary Problems     Occupational Therapy Goals        Problem: Occupational Therapy    Goal Priority Disciplines Outcome Interventions   Occupational Therapy Goal     OT, PT/OT Ongoing, Progressing    Description: LTG: Pt will perform UB ADL with Mod I from w/c by dc.    STG: to be met in 2 weeks, by 6/17  1. Pt will feed self with SBA within diet recs.  2. Pt will perform grooming EOB with SBA.  3. Pt will perform UB dressing with min A.  4. Pt will perform toileting/bsc t/f with min A using RW.                   Time Tracking:     OT Date of Treatment: 06/08/22  OT Start Time: 1130  OT Stop Time: 1153  OT Total Time (min): 23 min    Billable Minutes:Therapeutic Activity 15  Therapeutic Exercise 8    OT/CLAYTON: OT     CLYATON Visit Number: 3    6/8/2022

## 2022-06-08 NOTE — PHYSICIAN QUERY
PT Name: Natty Nelson  MR #: [id number]    DOCUMENTATION CLARIFICATION      CDS/: Dee Moss RN, CDS               Contact information: [email or web address]    This form is a permanent document in the medical record.      Query Date: June 8, 2022    By submitting this query, we are merely seeking further clarification of documentation. Please utilize your independent clinical judgment when addressing the question(s) below.    The Medical Record contains the following:   Indicators  Supporting Clinical Findings Location in Medical Record   x Anemia documented --Post-operative Anemia Hosp Med Note 6/3     x H&H --H/H: 8.2/25->7.6/23.3->13.1/40 Labs 6/2->6/5     x BP                    HR --Overnight hypotension noted.  Will discontinue hydralazine Hosp med Note 6/3    GI bleeding documented     x Acute bleeding (Non GI site) --There were no issues or complications from my   standpoint besides the significant blood loss that is probably because she is on   blood thinners.    --Estimated Blood Loss: 1000 mL   --Estimated Blood Loss has not been documented. EBL = 1300   Op-Note 6/3          Brief Op-Note 6/3   x Transfusion(s) --PRBC x 6 units   --FFP x 2 units   Blood bank flowsheet    Acute/Chronic illness     x Treatments --We left 2 drains with to give blood, FFP and vitamin K, and also DDAVP    --Tranexamic Acid 1g IV intraop  --Alb 25% 100mls intraop   Op-Note 6/3      MAR and Anesthesia record 6/3    Other       Provider, please specify diagnosis or diagnoses associated with above clinical findings.     [   ] Acute blood loss anemia      [  x ] Anemia, unspecified      [   ] Other Hematological Diagnosis (please specify): _________________     [   ] Clinically Undetermined           Please document in your progress notes daily for the duration of treatment, until resolved, and include in your discharge summary.    Form No. [id number]

## 2022-06-08 NOTE — PT/OT/SLP PROGRESS
Physical Therapy         Treatment        Natty Nelson   MRN: [id number]     PT Received On: 22  PT Start Time: 1407     PT Stop Time: 1419    PT Total Time (min): 12 min       Billable Minutes:  Therapeutic Activity 12  Total Minutes: 12    Treatment Type: Treatment  PT/PTA: PTA     PTA Visit Number: 3       General Precautions: Standard, fall  Orthopedic Precautions: Orthopedic Precautions : spinal precautions   Braces:      Spiritual, Cultural Beliefs, Tenriism Practices, Values that Affect Care: no    Objective:  Patient found in bed upon entry.    Functional Mobility:  Bed Mobility:   Supine to sit: Maximum Assistance   Sit to supine: Maximum Assistance   Rolling: Maximum Assistance   Scooting: Maximum Assistance    Transitional Sit-to-stand: Mod A with RW    Static Standing: Mod A with RW   Pt stood EOB X 3 trials for approx 30 sec each trial. Pt presents with forward flexed posture. Pt also presents with increased pain in neck and shoulder  and arms.    Activity Tolerance:  Patient limited by pain    Patient left HOB elevated with call button in reach.    Assessment:  Natty Nelson is a 82 y.o. female with a medical diagnosis of <principal problem not specified>.     Rehab potential is good.    Activity tolerance: Good    Discharge recommendations: Discharge Facility/Level of Care Needs: rehabilitation facility     Equipment recommendations:       GOALS:   Multidisciplinary Problems     Physical Therapy Goals        Problem: Physical Therapy    Goal Priority Disciplines Outcome Goal Variances Interventions   Physical Therapy Goal     PT, PT/OT Ongoing, Progressing     Description: Goals to be met by: 2022    Patient will increase functional independence with mobility by performin. Sit to stand transfer with Modified [place]  2. Bed to chair transfer with Modified [place] using Rolling Walker  3. Gait  x 150 feet with Modified [place] using Rolling Walker.                       PLAN:    Patient to be seen BID  to address the above listed problems via gait training, therapeutic activities, therapeutic exercises  Plan of Care expires: 07/05/22  Plan of Care reviewed with: patient         6/8/2022

## 2022-06-08 NOTE — PLAN OF CARE
Problem: Adult Inpatient Plan of Care  Goal: Plan of Care Review  6/8/2022 0406 by Jose Manuel Randall RN  Outcome: Ongoing, Progressing  6/8/2022 0406 by Jose Manuel Randall RN  Outcome: Ongoing, Progressing  Goal: Patient-Specific Goal (Individualized)  6/8/2022 0406 by Jose Manuel Randall RN  Outcome: Ongoing, Progressing  6/8/2022 0406 by Jose Manuel Randall RN  Outcome: Ongoing, Progressing  Goal: Absence of Hospital-Acquired Illness or Injury  6/8/2022 0406 by Jose Manuel Randall RN  Outcome: Ongoing, Progressing  6/8/2022 0406 by Jose Manuel Randall RN  Outcome: Ongoing, Progressing  Goal: Optimal Comfort and Wellbeing  6/8/2022 0406 by Jose Manuel Randall RN  Outcome: Ongoing, Progressing  6/8/2022 0406 by Jose Manuel Randall RN  Outcome: Ongoing, Progressing  Goal: Readiness for Transition of Care  6/8/2022 0406 by Jose Manuel Randall RN  Outcome: Ongoing, Progressing  6/8/2022 0406 by Jose Manuel Randall RN  Outcome: Ongoing, Progressing

## 2022-06-08 NOTE — PROGRESS NOTES
[facility] Hospital Medicine Progress Note        Chief Complaint: Inpatient Follow-up for neck pain    HPI:   Ms. Nelson is 83 yo female with severe cervical stenosis status post discectomy with fusion of C5-C6 on 05/09/2022 by Dr. Joseph and ongoing bilateral lower extremity weakness since April 20, 2022, CKD IIIB and other pmhx as below presents to the ED from [facility] [place] for abnormal MRI with complaints of neck pain ongoing since surgery. She was seen at [facility] ED and underwent CT Cervical Spine that showed concerns for possible bone marrow edema at C3. MRI was also obtained that did not show this however it did show postoperative changes including stenosis around C5, please note that this is all per secondary report from the ER physician. Images were not available. ED physician did speak to Neurosurgery who recommended repeating MRI Cervical Spine w/wo in AM.  Her laboratory work was unremarkable except for a mild worsening of her anemia with a hemoglobin of 8.2 (post-op Hgb 9.9). She is being admitted to the hospitalist service for further management.     Patient admitted for severe intractable neck pain after a C5-C6 diskectomies recent procedure.  Neurosurgery has been consulted.  MRI C-spine ordered.  Neurosurgery performed C3 to C7 PCDF on 6/3.          Interval Hx:   Still in excruciating pain intermittently   Blood pressure control discontinue amlodipine and begin nifedipine 90 mg q.day  Increase labetalol to 400 mg t.i.d.  Increase insulin detemir to 25 units at bedtime.    Objective/physical exam:  General: Appears comfortable  HEENT: NC/AT  Neck:  No JVD, supple, normal ROM  Chest: CTABL  CVS: Regular rhythm. Normal S1/S2.  Abdomen: nondistended, normoactive BS, soft and non-tender.  MSK: No obvious deformity or joint swelling  Skin: Warm and dry  Neuro: unable to lift legs off bed (ongoing since surgery) able to dorsi and plantar flex both feet with normal  strength  Psych: Cooperative    VITAL SIGNS: 24 HRS MIN & MAX LAST   Temp  Min: 97.5 °F (36.4 °C)  Max: 98.4 °F (36.9 °C) 98.4 °F (36.9 °C)   BP  Min: 158/55  Max: 190/78 (!) 168/68   Pulse  Min: 73  Max: 88  84   Resp  Min: 16  Max: 18 (P) 16   SpO2  Min: 95 %  Max: 100 % 100 %       Recent Labs   Lab 06/03/22  1125 06/04/22  1259 06/05/22  0422 06/06/22  0758   WBC 7.0 9.2 8.2  --    RBC 2.52* 4.32 4.45  --    HGB 7.6* 12.8 13.1 13.8   HCT 23.3* 38.2 40.0 40.9   MCV 92.5 88.4 89.9  --    MCH 30.2 29.6 29.4  --    MCHC 32.6* 33.5 32.8*  --    RDW 18.6* 17.8* 18.0*  --     145 144  --    MPV 12.3 12.7* 12.7*  --        Recent Labs   Lab 06/01/22  1653 06/02/22  0450 06/03/22  0427 06/04/22  1259 06/05/22  0422 06/06/22  0619    139   < > 142 141 137   K 4.6 4.3   < > 4.2 4.4 5.1   CO2 25 25   < > 26 27 17*   BUN 52.1* 47.4*   < > 39.1* 44.3* 49.3*   CREATININE 1.62* 1.30*   < > 1.27* 1.54* 1.38*   CALCIUM 9.4 9.5   < > 9.5 9.6 9.9   ALBUMIN 2.7* 2.5*  --   --  2.9*  --    ALKPHOS 85 78  --   --  75  --    ALT 33 31  --   --  12  --    AST 20 19  --   --  28  --    BILITOT 0.2 0.2  --   --  0.5  --     < > = values in this interval not displayed.          Microbiology Results (last 7 days)     ** No results found for the last 168 hours. **           See below for Radiology    Scheduled Med:   atorvastatin  40 mg Oral Daily    baclofen  5 mg Oral BID    bisacodyL  10 mg Rectal Daily    dexAMETHasone  4 mg Oral Q12H    [START ON 6/10/2022] dexAMETHasone  4 mg Oral Daily    glipiZIDE  5 mg Oral Daily with breakfast    insulin detemir U-100  25 Units Subcutaneous QHS    labetaloL  400 mg Oral Q8H    latanoprost  1 drop Both Eyes Nightly    NIFEdipine  90 mg Oral Daily    pantoprazole  40 mg Oral Daily    pregabalin  75 mg Oral BID        Continuous Infusions:       PRN Meds:  acetaminophen, albuterol-ipratropium, aluminum-magnesium hydroxide-simethicone, bisacodyL, hydrALAZINE, HYDROmorphone,  insulin aspart U-100, lorazepam, polyethylene glycol, senna-docusate 8.6-50 mg, trazodone       Assessment/Plan:  HTN, Uncontrolled   Neck Pain in the setting of recent cervical discectomy/fusion of C5-6  - s/p PCDF of c3-c7  surgery on 6/3  Abnormal MRI Cercival Spine  Ongoing BLE weakness 2/2 to above  Acute hypoxic resp failure likely d/t fluid overload state, resolved   Post-operative Anemia; Hx Anemia of Chronic Disease  LALIT on CKD Stage IIIB, slightly worse   Essential HTN, controlled   DM Type II with hyperglycemia    HTN Urgency, resolved      Other HX: CAD/CABG, JODI/ R CEA,  PAD     PLAN:  discontinue amlodipine and begin nifedipine 90 mg q.day  Increase labetalol to 400 mg t.i.d.  Increase insulin detemir to 25 units at bedtime.  Add on glipizide 5 mg with breakfast  Continue pain control, continue IV Decadron b.i.d.  Continue PT OT  Use IV hydralazine with parameters documented  Appreciate neurosurgery input   Hold valsartan  monitor H&H. No signs of active bleeding. Consider transfuse if Hgb <8 (baseline hgb 9.5)    PRN analgesics        Patient condition:  Stable/Fair/Guarded/ Serious/ Critical     Anticipated discharge and Disposition: informed for snf placement   Patient will require placement skilled nursing versus Rehab.     CM working on LTAC vs TCU     All diagnosis and differential diagnosis have been reviewed; assessment and plan has been documented; I have personally reviewed the labs and test results that are presently available; I have reviewed the patients medication list; I have reviewed the consulting providers response and recommendations. I have reviewed or attempted to review medical records based upon their availability    All of the patient's questions have been  addressed and answered. Patient's is agreeable to the above stated plan. I will continue to monitor closely and make adjustments to medical management as  needed.  _____________________________________________________________________    Nutrition Status:    Radiology:  X-Ray Chest PA And Lateral  Narrative: EXAMINATION:  XR CHEST PA AND LATERAL    CLINICAL HISTORY:  ; worsening oxygen requirement ?cause;    TECHNIQUE:  Portable AP view of the chest.    COMPARISON:  3 [place] 2022    FINDINGS:  Lines/tubes/devices:  Life-support devices remain in similar position.    The cardiac silhouette and central vascular structures are unchanged.  The trachea is midline. No new or worsening consolidation is identified. There is no enlarging pleural effusion or convincing pneumothorax.    Regional osseous structures and extrathoracic soft tissues are similar.  Impression: No evidence of new or worsening cardiopulmonary process.    Electronically signed by: Mateo Donnelly  Date:    06/05/2022  Time:    14:13      Jayme Le MD   06/08/2022        Patient needs a hospital bed - this will help her a lot when she finally goes back home

## 2022-06-08 NOTE — PHYSICIAN QUERY
PT Name: Natty Nelson  MR #: [id number]  DOCUMENTATION CLARIFICATION      CDS/: Dee Moss RN, CDS               Contact information: [email or web address]    This form is a permanent document in the medical record.      Query Date: June 8, 2022    By submitting this query, we are merely seeking further clarification of documentation. Please utilize your independent clinical judgment when addressing the question(s) below.    The Medical Record contains the following:   Indicators  Supporting Clinical Findings Location in Medical Record   x PT        INR        PTT  06/01/22 16:53 06/04/22 12:59   Protime 12.7 14.3   INR 0.98 1.12   aPTT 40.0 (H)       Labs 6/1, 6/4   x Platelets  06/01 06/02 06/03 06/03 06/04 06/05    Platelets 247 261 250 173 145 144    Labs 6/1->6/5          Coagulopathy or Coagulation Defect documented     x Acute/Chronic Illness --Post-operative Anemia    --There were no issues or complications from my   standpoint besides the significant blood loss that is probably because she is on   Hosp med Not 6/3    Op-Note 6/3   x Treatment --PRBC x 6 units   --FFP x 2 units    --Tranexamic Acid 1g IV intraop  --Alb 25% 100mls intraop [employer] flowsheet      MAR and Anesthesia record 6/3     x Other --ASA 81mg PO QD  --Plavix 75mg PO QD   Home Meds per H&P     Provider, please specify diagnosis or diagnoses associated with above clinical findings.    [  x ] Abnormal Coagulation Profile     [   ] Coagulopathy Secondary to Anticoagulation Therapy     [   ] Other hematological diagnosis (please specify):_______     [  ] Clinically Undetermined     Please document in your progress notes daily for the duration of treatment until resolved, and include in your discharge summary.

## 2022-06-08 NOTE — PHYSICIAN QUERY
"PT Name: Natty Nelson  MR #: [id number]     DOCUMENTATION CLARIFICATION     CDS/: Dee Moss RN, CDS              Contact information: [email or web address]  This form is a permanent document in the medical record.    Query Date: June 8, 2022    By submitting this query, we are merely seeking further clarification of documentation.  Please utilize your independent clinical judgment when addressing the question(s) below.    The Medical Record contains the following:   Indicator Supporting Clinical Findings Location in Medical Record    Kidney (Renal) Insufficiency     x Kidney (Renal) Failure/Injury --LALIT on CKD Stage IIIB,improving  Hosp med note 6/2      Nephrotoxic Agents     x BUN/Creatinine           GFR BUN 52.1 (H) 47.4 (H) 40.9 (H) 39.1 (H) 44.3 (H) 49.3 (H)   Creatinine 1.62 (H) 1.30 (H) 1.13 (H) 1.27 (H) 1.54 (H) 1.38 (H)   eGFR 39 50 59 52 41 47    Labs 6/1->6/6    Urine: Casts         Eosinophils      Dehydration      Nausea/Vomiting      Dialysis/CRRT     x Treatment --Hold valsartan    --Discontinue IV fluids and begin on a diet when she can tolerate Hosp Md Note 6/2    Hosp med Note 6/4      Other         [facility] approved diagnostic criteria for acute kidney injury is based on KDIGO criteria:    An increase in serum creatinine > 0.3mg/dl within 48 hours  OR  Increase in serum creatinine to > 1.5x baseline, which is known or presumed to have occurred within the prior 7 days  OR  Urine volume <0.5 ml/kg/hr for 6 hours       The clinical guidelines noted above are only a system guideline. It does not replace the providers clinical judgment.     Provider, please further specify the  "LALIT" diagnosis      [    ] Unspecified Acute Kidney Failure/Injury       [   xxx ] Acute Renal Insufficiency  -             Consider if SCr rise is transient and normalizes quickly with no efforts at real resuscitation of vital signs and perfusion     [    ] Other (please specify): " _______________________________   [  ] Clinically Undetermined       Please document in your progress notes daily for the duration of treatment until resolved and include in your discharge summary.    References:   KDIGO Clinical Practice Guideline for Acute Kidney Injury. (2012, March). Retrieved October 21, 2020, from https://kdigo.org/wp-content/uploads/2016/10/KDHGY-6286-SIH-Guideline-English.pdf    BETZY Huang MD, ASHLY Edwards MD, & CORAL Kaufman MD. (1960). Renal medullary necrosis [Abstract]. The American Journal of Medicine, 29(1), 132-156. Doi:https://www.sciencedirect.com/science/article/abs/pii/4750256747755026    CORAL Matthew MD, & SUELLEN Jones MD, MS. (2020, June 18). Definition and staging of chronic kidney disease in adults ([id number] ASHLY Salinas MD, ScD & [id number] FALGUNI Menard MD, MSc, Eds.). Retrieved October 21, 2020, from https://www.[employer]/contents/definition-and-staging-of-chronic-kidney-disease-in-adults?search=ckd%20staging&source=search_result&selectedTitle=1~150&usage_type=default&display_rank=1     LEONARD Davies MD, FACP. (2015, [place] 15). Acute kidney injury revisited. Retrieved October 21, 2020, from https://acphospitalist.org/archives/2015/06/coding-acute-kidney-injury.htm    KEMI Dorman MD. (2019, July). Renal Cortical Necrosis. Retrieved October 21, 2020, from https://www."[employer]/professional/genitourinary-disorders/renovascular-disorders/renal-cortical-necrosis    Form No. 13552

## 2022-06-08 NOTE — PHYSICIAN QUERY
PT Name: Natty Nelson  MR #: [id number]     Documentation Clarification      CDS/: Dee Moss RN, CDS               Contact information: [email or web address]    This form is a permanent document in the medical record.     Query Date: June 8, 2022    By submitting this query, we are merely seeking further clarification of documentation. Please utilize your independent clinical judgment when addressing the question(s) below.    The Medical Record reflects the following:    Supporting Clinical Findings Location in Medical Record     --Severe cervical stenosis status post diskectomy with fusion of C5-6 ([employer]) on 5/9/22 with ongoing BLE weakness  --Anterior cervical discectomy with fusion of C5/6 on May 9th 2022            --severe cervical stenosis status post discectomy with fusion of C5-C6 on 05/09/2022 by Dr. Joseph and ongoing bilateral lower extremity weakness since April 20, 2022            -- complaints of neck pain ongoing since surgery    --Presents with increasing neck pain as well as bilateral shoulder pain.  Complains that she feels her legs are weaker as well but has weak in her lower extremities since last admission.  --MRI demonstrates postoperative changes including stenosis at C5  --[place] [facility] [place] indicates acute traumatic spondylopathy.    --Natty Nelson came back to the hospital with difficulty ambulating in rehab and not improving.  She had CTs and MRIs done that showed stenosis at 3-4 but also at 5-6 and 6-7, now we are ready for surgery of posterior cervical   Decompression.     Hosp med H&P 6/1                Neuro Sx c/s 6/2 (AZIZA Mcduffie NP)          NeuroSx c/s 6/2 (Dr Joseph)     --POSTOPERATIVE DIAGNOSIS:  Severe stenosis from 3 down to 7, status post previous surgery from the front.    --Procedure:           -PCDF C3 to C7     Op-Note 6/3      Brief Op-Note 6/3                                                                            Provider, please  provide diagnosis or diagnoses associated with above clinical findings.    [   ] Pseudoarthrosis   [   ] Other (please specify): ____________   [  ] Clinically undetermined    XXX

## 2022-06-08 NOTE — PT/OT/SLP PROGRESS
Physical Therapy         Treatment        Natty Nelson   MRN: [id number]     PT Received On: 06/08/22  PT Start Time: 1005     PT Stop Time: 1031    PT Total Time (min): 26 min       Billable Minutes:  Therapeutic Activity 25  Total Minutes: 25    Treatment Type: Treatment  PT/PTA: PTA     PTA Visit Number: 3       General Precautions: Standard, fall  Orthopedic Precautions: Orthopedic Precautions : spinal precautions   Braces:      Spiritual, Cultural Beliefs, Ep[employer]opalian Practices, Values that Affect Care: no    Objective:  Patient found in bed upon entry.    Functional Mobility:  Bed Mobility:   Supine to sit: Moderate Assistance   Sit to supine: Moderate Assistance   Rolling: Maximum Assistance   Scooting: Maximum Assistance     Static sitting: SBA   Pt sat EOB in prep for standing. Pt presents with increased pain in neck and arms with increase duration of sitting.    Transitional Sit-to-stand: Mod A with RW    Static Standing: Mod A   Pt stood EOB X 4 trial for approx 30 sec each trial. Pt attempted to march in place but was unable to maintain balance due to lack of coordination.      Additional Treatment:    Therapeutic Exercises   BLE: heel slides, SAQ, hip abd/add 1 X 10 reps.    Activity Tolerance:  Patient limited by pain    Patient left HOB elevated with call button in reach.    Assessment:  Natty Nelson is a 82 y.o. female with a medical diagnosis of <principal problem not specified>.     Rehab potential is excellent.    Activity tolerance: Excellent    Discharge recommendations: Discharge Facility/Level of Care Needs: rehabilitation facility     Equipment recommendations:       GOALS:   Multidisciplinary Problems     Physical Therapy Goals        Problem: Physical Therapy    Goal Priority Disciplines Outcome Goal Variances Interventions   Physical Therapy Goal     PT, PT/OT Ongoing, Progressing     Description: Goals to be met by: 07/05/2022    Patient will increase functional independence with  mobility by performin. Sit to stand transfer with Modified [place]  2. Bed to chair transfer with Modified [place] using Rolling Walker  3. Gait  x 150 feet with Modified [place] using Rolling Walker.                      PLAN:    Patient to be seen daily  to address the above listed problems via therapeutic activities, therapeutic exercises, gait training  Plan of Care expires: 22  Plan of Care reviewed with: patient         2022

## 2022-06-08 NOTE — PROGRESS NOTES
[facility] Med Surg  Neurosurgery  Progress note        POD# 5 PCDF of C3-C7  Sitting up in bed, NAD  She continues with pain at the incision site, unchanged from previous exam  She continues to deny numbness and tingling bilateral UE and LE  Soft collar in place    AFVSS  Moves all extremities. Motor exam unchanged  Surgical incision clean and dry  AYLA site dry    Plan:    Soft collar in place  On Decadron, will wean  Medical management per hospitalist for diabetes and hypertension.  Pain control  SCDs and lovenox for DVT prophylaxis  Fall precautions  PT/OT  CM working on rehab vs LTAC         KRISTIN Deluna  Neurosurgery  [facility] Med Surg

## 2022-06-09 LAB
POCT GLUCOSE: 236 MG/DL (ref 70–110)
POCT GLUCOSE: 307 MG/DL (ref 70–110)
POCT GLUCOSE: 311 MG/DL (ref 70–110)
POCT GLUCOSE: 357 MG/DL (ref 70–110)
POCT GLUCOSE: 374 MG/DL (ref 70–110)
POCT GLUCOSE: 432 MG/DL (ref 70–110)

## 2022-06-09 PROCEDURE — 94799 UNLISTED PULMONARY SVC/PX: CPT

## 2022-06-09 PROCEDURE — C9399 UNCLASSIFIED DRUGS OR BIOLOG: HCPCS | Performed by: INTERNAL MEDICINE

## 2022-06-09 PROCEDURE — 97110 THERAPEUTIC EXERCISES: CPT | Mod: CQ

## 2022-06-09 PROCEDURE — 97530 THERAPEUTIC ACTIVITIES: CPT | Mod: CQ

## 2022-06-09 PROCEDURE — 25000003 PHARM REV CODE 250: Performed by: NEUROLOGICAL SURGERY

## 2022-06-09 PROCEDURE — 25000003 PHARM REV CODE 250: Performed by: NURSE PRACTITIONER

## 2022-06-09 PROCEDURE — 63600175 PHARM REV CODE 636 W HCPCS: Performed by: INTERNAL MEDICINE

## 2022-06-09 PROCEDURE — 25000003 PHARM REV CODE 250: Performed by: INTERNAL MEDICINE

## 2022-06-09 PROCEDURE — 11000001 HC ACUTE MED/SURG PRIVATE ROOM

## 2022-06-09 PROCEDURE — 97535 SELF CARE MNGMENT TRAINING: CPT

## 2022-06-09 PROCEDURE — 99900031 HC PATIENT EDUCATION (STAT)

## 2022-06-09 PROCEDURE — 97116 GAIT TRAINING THERAPY: CPT | Mod: CQ

## 2022-06-09 PROCEDURE — 63600175 PHARM REV CODE 636 W HCPCS: Performed by: PHYSICIAN ASSISTANT

## 2022-06-09 RX ADMIN — LABETALOL HYDROCHLORIDE 400 MG: 200 TABLET, FILM COATED ORAL at 09:06

## 2022-06-09 RX ADMIN — DEXAMETHASONE 4 MG: 4 TABLET ORAL at 09:06

## 2022-06-09 RX ADMIN — NIFEDIPINE 90 MG: 90 TABLET, FILM COATED, EXTENDED RELEASE ORAL at 09:06

## 2022-06-09 RX ADMIN — PREGABALIN 75 MG: 75 CAPSULE ORAL at 09:06

## 2022-06-09 RX ADMIN — INSULIN DETEMIR 40 UNITS: 100 INJECTION, SOLUTION SUBCUTANEOUS at 09:06

## 2022-06-09 RX ADMIN — BISACODYL 10 MG: 10 SUPPOSITORY RECTAL at 09:06

## 2022-06-09 RX ADMIN — PANTOPRAZOLE SODIUM 40 MG: 40 TABLET, DELAYED RELEASE ORAL at 09:06

## 2022-06-09 RX ADMIN — BACLOFEN 5 MG: 5 TABLET ORAL at 09:06

## 2022-06-09 RX ADMIN — INSULIN ASPART 5 UNITS: 100 INJECTION, SOLUTION INTRAVENOUS; SUBCUTANEOUS at 09:06

## 2022-06-09 RX ADMIN — INSULIN ASPART 10 UNITS: 100 INJECTION, SOLUTION INTRAVENOUS; SUBCUTANEOUS at 03:06

## 2022-06-09 RX ADMIN — LABETALOL HYDROCHLORIDE 400 MG: 200 TABLET, FILM COATED ORAL at 03:06

## 2022-06-09 RX ADMIN — INSULIN ASPART 4 UNITS: 100 INJECTION, SOLUTION INTRAVENOUS; SUBCUTANEOUS at 05:06

## 2022-06-09 RX ADMIN — LATANOPROST 1 DROP: 50 SOLUTION OPHTHALMIC at 09:06

## 2022-06-09 RX ADMIN — ATORVASTATIN CALCIUM 40 MG: 40 TABLET, FILM COATED ORAL at 03:06

## 2022-06-09 RX ADMIN — HYDRALAZINE HYDROCHLORIDE 20 MG: 20 INJECTION INTRAMUSCULAR; INTRAVENOUS at 09:06

## 2022-06-09 RX ADMIN — HYDROCODONE BITARTRATE AND ACETAMINOPHEN 1 TABLET: 7.5; 325 TABLET ORAL at 03:06

## 2022-06-09 RX ADMIN — HYDROCODONE BITARTRATE AND ACETAMINOPHEN 1 TABLET: 7.5; 325 TABLET ORAL at 09:06

## 2022-06-09 RX ADMIN — LABETALOL HYDROCHLORIDE 400 MG: 200 TABLET, FILM COATED ORAL at 05:06

## 2022-06-09 RX ADMIN — GLIPIZIDE 5 MG: 5 TABLET, FILM COATED, EXTENDED RELEASE ORAL at 09:06

## 2022-06-09 NOTE — PT/OT/SLP PROGRESS
Physical Therapy         Treatment        Natty Nelson   MRN: [id number]     PT Received On: 22  PT Start Time: 1353     PT Stop Time: 1419    PT Total Time (min): 26 min       Billable Minutes:  Therapeutic Activity 16 and Therapeutic Exercise 10  Total Minutes: 26    Treatment Type: Treatment  PT/PTA: PTA     PTA Visit Number: 4       General Precautions: Standard, fall  Orthopedic Precautions: Orthopedic Precautions : spinal precautions   Braces:      Spiritual, Cultural Beliefs, Buddhist Practices, Values that Affect Care: no    Objective:  Patient found in bed upon entry.    Functional Mobility:  Bed Mobility:   Supine to sit: Moderate Assistance   Sit to supine: Moderate Assistance   Rolling: Moderate Assistance   Scooting: Moderate Assistance     Transitional Sit-to-stand: Mod A with RW    Static Standing: Mod A with RW   Pt stood EOB x 3 trials. Pt performed mini squats x 5 and 2 X 10 reps.     Additional Treatment:    Therapeutic Exercises   BLE: heel slides, SAQ, hip abd/add in supine x 15 reps with 2#    Activity Tolerance:  Patient tolerated treatment well    Patient left HOB elevated with call button in reach.    Assessment:  Natty Nelson is a 82 y.o. female with a medical diagnosis of <principal problem not specified>.     Rehab potential is excellent.    Activity tolerance: Excellent    Discharge recommendations: Discharge Facility/Level of Care Needs: rehabilitation facility     Equipment recommendations:       GOALS:   Multidisciplinary Problems     Physical Therapy Goals        Problem: Physical Therapy    Goal Priority Disciplines Outcome Goal Variances Interventions   Physical Therapy Goal     PT, PT/OT Ongoing, Progressing     Description: Goals to be met by: 2022    Patient will increase functional independence with mobility by performin. Sit to stand transfer with Modified [place]  2. Bed to chair transfer with Modified [place] using Rolling Walker  3. Gait  x  150 feet with Modified [place] using Rolling Walker.                      PLAN:    Patient to be seen BID  to address the above listed problems via gait training, therapeutic activities, therapeutic exercises  Plan of Care expires: 07/05/22  Plan of Care reviewed with: patient         6/9/2022

## 2022-06-09 NOTE — PROGRESS NOTES
[facility] Med Surg  Neurosurgery  Progress note        POD# 6 PCDF of C3-C7  Sitting up in bed, NAD  She continues with pain at the incision site, unchanged from previous exam  She continues to deny numbness and tingling bilateral UE and LE  Soft collar in place  She is working with PT, requiring max assist    AFVSS  Moves all extremities. Motor exam unchanged  Surgical incision clean and dry  AYLA site dry    Plan:    Soft collar in place  Currently weaning decadron  Medical management per hospitalist for diabetes and hypertension.  Pain control  SCDs and lovenox for DVT prophylaxis  Fall precautions  PT/OT  CM working on rehab vs LTAC         KRISTIN Deluna  Neurosurgery  [facility] Med Surg

## 2022-06-09 NOTE — PROGRESS NOTES
[facility]  Hospital Medicine Progress Note        Chief Complaint: Inpatient Follow-up for neck pain    HPI:   Ms. Nelson is an 82-year-old  female with severe cervical stenosis status post discectomy with fusion of C5-C6 on 05/09/2022 by Dr. Joseph and ongoing bilateral lower extremity weakness since April 20, 2022, who presents to the ED from [facility] [place] for abnormal MRI with complaints of neck pain ongoing since surgery. She was seen at [facility] [place] and underwent CT Cervical Spine that showed concerns for possible bone marrow edema at C3. MRI was also obtained that did not show this however it did show postoperative changes including stenosis around C5 -please note that this is all per secondary report from the ER physician. Images were not available. ED physician spoke to Neurosurgery who recommended repeating MRI Cervical Spine w/wo in AM.  Her laboratory work was unremarkable except for a mild worsening of her anemia with a hemoglobin of 8.2 (post-op Hgb 9.9). She was admitted to the hospitalist service for further management.     Patient admitted for severe intractable neck pain after a C5-C6 diskectomies recent procedure.  Neurosurgery has been consulted.  MRI C-spine ordered.  Neurosurgery performed C3 to C7 PCDF on 6/3.          Interval Hx:   Patient sitting up on the side of the bed working with therapy but requiring maximal assistance.  No acute issues reported.  Patient is afebrile, on room air, and hemodynamically stable.      Objective/physical exam:  General: Obese  female in no acute distress  HENT: normocephalic, atraumatic  Eye: PERRL, EOMI, clear conjunctiva  Neck:  Incision is clean dry and intact, AYLA drain is in place, soft collar is in place  Respiratory: clear to auscultation bilaterally  Cardiovascular: regular rate and rhythm  Gastrointestinal: non-distended, positive bowel sounds  Musculoskeletal: no gross deformity  Integumentary:  warm, dry, intact, no rashes  Neurological: cranial nerves grossly intact, unable to lift legs off bed (ongoing since surgery) able to dorsi and plantar flex both feet with normal strength  Psychiatric: cooperative    VITAL SIGNS: 24 HRS MIN & MAX LAST   Temp  Min: 97.7 °F (36.5 °C)  Max: 98.4 °F (36.9 °C) 97.7 °F (36.5 °C)   BP  Min: 131/64  Max: 171/67 (!) 161/68   Pulse  Min: 67  Max: 85  76   Resp  Min: 14  Max: 18 18   SpO2  Min: 96 %  Max: 99 % 98 %       Recent Labs   Lab 06/03/22  1125 06/04/22  1259 06/05/22  0422 06/06/22  0758   WBC 7.0 9.2 8.2  --    RBC 2.52* 4.32 4.45  --    HGB 7.6* 12.8 13.1 13.8   HCT 23.3* 38.2 40.0 40.9   MCV 92.5 88.4 89.9  --    MCH 30.2 29.6 29.4  --    MCHC 32.6* 33.5 32.8*  --    RDW 18.6* 17.8* 18.0*  --     145 144  --    MPV 12.3 12.7* 12.7*  --        Recent Labs   Lab 06/04/22  1259 06/05/22  0422 06/06/22  0619    141 137   K 4.2 4.4 5.1   CO2 26 27 17*   BUN 39.1* 44.3* 49.3*   CREATININE 1.27* 1.54* 1.38*   CALCIUM 9.5 9.6 9.9   ALBUMIN  --  2.9*  --    ALKPHOS  --  75  --    ALT  --  12  --    AST  --  28  --    BILITOT  --  0.5  --           Microbiology Results (last 7 days)     ** No results found for the last 168 hours. **           See below for Radiology    Scheduled Med:   atorvastatin  40 mg Oral Daily    baclofen  5 mg Oral BID    bisacodyL  10 mg Rectal Daily    dexAMETHasone  4 mg Oral Q12H    [START ON 6/10/2022] dexAMETHasone  4 mg Oral Daily    glipiZIDE  5 mg Oral Daily with breakfast    insulin detemir U-100  25 Units Subcutaneous QHS    labetaloL  400 mg Oral Q8H    latanoprost  1 drop Both Eyes Nightly    NIFEdipine  90 mg Oral Daily    pantoprazole  40 mg Oral Daily    pregabalin  75 mg Oral BID        Continuous Infusions:       PRN Meds:  acetaminophen, albuterol-ipratropium, aluminum-magnesium hydroxide-simethicone, bisacodyL, hydrALAZINE, HYDROcodone-acetaminophen, HYDROmorphone, insulin aspart U-100, lorazepam,  polyethylene glycol, senna-docusate 8.6-50 mg, trazodone       Assessment/Plan:  Neck Pain in the setting of recent cervical discectomy/fusion of C5-6  - s/p PCDF of C3-C7 on 06/03/2022  Ongoing BLE weakness 2/2 to above  Hypertensive urgency  Acute hypoxic respiratory failure likely secondary to volume overload, resolved   Post-operative Anemia  Hx Anemia of Chronic Disease  LALIT on CKD Stage IIIB   Non-insulin-dependent type 2 diabetes mellitus with hyperglycemia   CAD/CABG  JODI/Right CEA  PAD      Plan:  Continue pain control, physical therapy, occupational therapy, blood pressure control, diabetes control, and other supportive care  Continue weaning Decadron  Increase Levemir insulin to 40 units subcutaneously at bedtime     Patient condition:  Stable     Anticipated discharge and Disposition: Inpatient rehabilitation versus transitional care unit versus skilled nursing facility      All diagnosis and differential diagnosis have been reviewed; assessment and plan has been documented; I have personally reviewed the labs and test results that are presently available; I have reviewed the patients medication list; I have reviewed the consulting providers response and recommendations. I have reviewed or attempted to review medical records based upon their availability    All of the patient's questions have been  addressed and answered. Patient's is agreeable to the above stated plan. I will continue to monitor closely and make adjustments to medical management as needed.  _____________________________________________________________________    Nutrition Status:    Radiology:  X-Ray Chest PA And Lateral  Narrative: EXAMINATION:  XR CHEST PA AND LATERAL    CLINICAL HISTORY:  ; worsening oxygen requirement ?cause;    TECHNIQUE:  Portable AP view of the chest.    COMPARISON:  3 [place] 2022    FINDINGS:  Lines/tubes/devices:  Life-support devices remain in similar position.    The cardiac silhouette and central vascular  structures are unchanged.  The trachea is midline. No new or worsening consolidation is identified. There is no enlarging pleural effusion or convincing pneumothorax.    Regional osseous structures and extrathoracic soft tissues are similar.  Impression: No evidence of new or worsening cardiopulmonary process.    Electronically signed by: Mateo Donnelly  Date:    06/05/2022  Time:    14:13      Russ Tucker MD   06/09/2022

## 2022-06-09 NOTE — PT/OT/SLP PROGRESS
Occupational Therapy   Treatment    Name: Natty Nelson  MRN: [id number]  Admitting Diagnosis: s/p C3-C7 PCDF  6 Days Post-Op    Recommendations:     Discharge Recommendations: nursing facility, skilled, rehabilitation facility  Discharge Equipment Recommendations:  TBD by next level of care  Barriers to discharge: Severity of Deficits    Assessment:     Natty Nelson is a 82 y.o. female with a medical diagnosis of s/p C3-C7 PCDF. Performance deficits affecting function are weakness, gait instability, decreased lower extremity function, impaired balance, impaired endurance, impaired sensation, orthopedic precautions, pain, impaired self care skills, impaired functional mobilty. Pt cont to be significantly limited by pain. Pt's pain is not well controlled, impairing pt from participating in ADLs and mobility, however pt cont to be motivated to work w/ therapy. OT recs for d/c include IPR vs SNF, pending pt progress.    Rehab Prognosis:  Fair; patient would benefit from acute skilled OT services to address these deficits and reach maximum level of function.       Plan:     Patient to be seen 6 x/week, 5 x/week to address the above listed problems via self-care/home management, therapeutic activities, therapeutic exercises  · Plan of Care Expires: 06/20/22  · Plan of Care Reviewed with: patient    Subjective     Pain/Comfort:  · Pain Rating 1: 10/10  · Location - Orientation 1: posterior  · Location 1: cervical spine  · Pain Addressed 1: Reposition, Nurse notified, Cessation of Activity  · Pain Rating 2: 10/10  · Location - Side 2: Bilateral  · Location 2: shoulder  · Pain Addressed 2: Reposition, Cessation of Activity, Nurse notified    Objective:     Communicated with: RN prior to session.  Patient found supine with PureWick, telemetry, SCD upon OT entry to room.    General Precautions: Standard, fall   Orthopedic Precautions:spinal precautions   Braces: Cervical collar, soft collar OOB  Respiratory Status: Room  air     Occupational Performance:     Bed Mobility:    · Patient completed Supine to Sit with moderate assistance and 2 persons  · Patient completed Sit to Supine with moderate assistance     Activities of Daily Living:  · Grooming: Pt performed oral care and face washing while seated EOB w/ set up assistance for items. Pt able to tolerate approx. 8 minutes of ax prior to requesting to return BTB      [facility] 6 Click ADL: 12    Patient left supine with call button in reach, RN notified and BUEs supported w/ pillowsEducation:      GOALS:   Multidisciplinary Problems     Occupational Therapy Goals        Problem: Occupational Therapy    Goal Priority Disciplines Outcome Interventions   Occupational Therapy Goal     OT, PT/OT Ongoing, Progressing    Description: LTG: Pt will perform UB ADL with Mod I from w/c by dc.    STG: to be met in 2 weeks, by 6/17  1. Pt will feed self with SBA within diet recs.  2. Pt will perform grooming EOB with SBA.  3. Pt will perform UB dressing with min A.  4. Pt will perform toileting/bsc t/f with min A using RW.                   Time Tracking:     OT Date of Treatment: 06/09/22  OT Start Time: 1015  OT Stop Time: 1035  OT Total Time (min): 20 min    Billable Minutes:Self Care/Home Management 20 Minutes    OT/CLAYTON: OT     CLAYTON Visit Number: 4    6/9/2022

## 2022-06-09 NOTE — PT/OT/SLP PROGRESS
Physical Therapy         Treatment        Natty Nelson   MRN: [id number]     PT Received On: 06/09/22  PT Start Time: 0931     PT Stop Time: 0957    PT Total Time (min): 26 min       Billable Minutes:  Therapeutic Activity 10 and Therapeutic Exercise 16  Total Minutes: 26    Treatment Type: Treatment  PT/PTA: PTA     PTA Visit Number: 4       General Precautions: Standard, fall  Orthopedic Precautions: Orthopedic Precautions : spinal precautions   Braces:      Spiritual, Cultural Beliefs, Islam Practices, Values that Affect Care: no    Objective:  Patient found in bed upon entry.    Functional Mobility:  Bed Mobility:   Supine to sit: Maximum Assistance   Sit to supine: Maximum Assistance   Rolling: Maximum Assistance   Scooting: Maximum Assistance    Transitional Sit-to-stand: Mod A with RW.    Pt required assistance for placement of R foot prior to standing.    Static Standing: Mod A with RW   Pt stood EOB X 4 trials for approx 40 sec each trial. Pt was able to lift BLE but pt presents with decreased coordination and lost balance. Pt was able to  performed mini squats 2 X 5 reps.      Additional Treatment:    Therapeutic Exercises   BLE: hip flexion, LAQ, hip abd/add 2 x 15 in supine.     Activity Tolerance:  Patient tolerated treatment well    Patient left HOB elevated with call button in reach.    Assessment:  Natty Nelson is a 82 y.o. female with a medical diagnosis of C3-C7 PCDF.    Rehab potential is excellent.    Activity tolerance: Excellent    Discharge recommendations: Discharge Facility/Level of Care Needs: rehabilitation facility     Equipment recommendations:       GOALS:   Multidisciplinary Problems     Physical Therapy Goals        Problem: Physical Therapy    Goal Priority Disciplines Outcome Goal Variances Interventions   Physical Therapy Goal     PT, PT/OT Ongoing, Progressing     Description: Goals to be met by: 07/05/2022    Patient will increase functional independence with mobility by  performin. Sit to stand transfer with Modified [place]  2. Bed to chair transfer with Modified [place] using Rolling Walker  3. Gait  x 150 feet with Modified [place] using Rolling Walker.                      PLAN:    Patient to be seen BID  to address the above listed problems via gait training, therapeutic activities, therapeutic exercises  Plan of Care expires: 22  Plan of Care reviewed with: patient         2022

## 2022-06-09 NOTE — PROGRESS NOTES
"                                                                           Nutrition   Progress Note        Recommendations:  1. Continue Diabetic diet once medically feasible.   2. Trial ONS Boost Glucose Control to provide 250kcals, 14gm per serving.       Reason for Evaluation: f/u     Diagnosis:    1. Neck pain    2. S/P spinal fusion    3. Chest pain          Relevant Medical History:         Past Medical History:   Diagnosis Date    Arthritis      Diabetes mellitus      Hypertension           Nutrition Diet History:     Factors affecting nutritional intake: Diabetic     Food / Episcopalian / Culture Preferences:        Nutrition Prescription Ordered:     Current Diet Order: Diabetic     Appetite:  good     PO intake: 75%        Labs / Medications / Procedures:     Nutrition Related Medications: Pantoprazole, glipizide, detemir     Nutrition Related Labs:  6/2: H/h-8.1/25.5, Bun-47.4, Crea-1.30, Gluc-143  6/9: No updated labs        Anthropometrics:  Height: 5' 2" (1.575 m)  Admit Weight:  Weight: 100 kg (220 lb 7.4 oz)  Latest Weight:  100 kg (220 lb 7.4 oz) - pt reports inaccurate         Wt Readings from Last 5 Encounters:   06/02/22 100 kg (220 lb 7.4 oz)   05/10/22 85.7 kg (188 lb 15 oz)      IBW: 50kg  %IBW: 200%  UBW: reports 170lb  %Weight Change: n/a  Body mass index is 40.32 kg/m².  BMI classification:  Obese Grade III (BMI >/= 40)        Nutrition Narrative:  6/2: Pt NPO for possible MRI. Recommend Diabetic diet as tolerated when medically feasible to begin oral diet.   6/9: Pt reports good intake on diabetic diet, requesting ONS. States UBS ~ 170lbs and that 220lb is not accurate.      Monitoring and Evaluation:     Nutrition Monitoring and Evaluation:  food and beverage intake     Nutrition Risk:  Level of Nutrition Risk:  Low  Frequency of Follow up:  Dietitian will f/up within 7 days.                         "

## 2022-06-10 LAB
POCT GLUCOSE: 248 MG/DL (ref 70–110)
POCT GLUCOSE: 280 MG/DL (ref 70–110)
POCT GLUCOSE: 294 MG/DL (ref 70–110)
POCT GLUCOSE: 419 MG/DL (ref 70–110)

## 2022-06-10 PROCEDURE — 99900031 HC PATIENT EDUCATION (STAT)

## 2022-06-10 PROCEDURE — 63600175 PHARM REV CODE 636 W HCPCS: Performed by: PHYSICIAN ASSISTANT

## 2022-06-10 PROCEDURE — C9399 UNCLASSIFIED DRUGS OR BIOLOG: HCPCS | Performed by: INTERNAL MEDICINE

## 2022-06-10 PROCEDURE — 97535 SELF CARE MNGMENT TRAINING: CPT

## 2022-06-10 PROCEDURE — 97164 PT RE-EVAL EST PLAN CARE: CPT

## 2022-06-10 PROCEDURE — 94799 UNLISTED PULMONARY SVC/PX: CPT

## 2022-06-10 PROCEDURE — 97530 THERAPEUTIC ACTIVITIES: CPT

## 2022-06-10 PROCEDURE — 25000003 PHARM REV CODE 250: Performed by: NURSE PRACTITIONER

## 2022-06-10 PROCEDURE — 63600175 PHARM REV CODE 636 W HCPCS: Performed by: INTERNAL MEDICINE

## 2022-06-10 PROCEDURE — 11000001 HC ACUTE MED/SURG PRIVATE ROOM

## 2022-06-10 PROCEDURE — 97530 THERAPEUTIC ACTIVITIES: CPT | Mod: CQ

## 2022-06-10 PROCEDURE — 25000003 PHARM REV CODE 250: Performed by: INTERNAL MEDICINE

## 2022-06-10 RX ADMIN — INSULIN ASPART 2 UNITS: 100 INJECTION, SOLUTION INTRAVENOUS; SUBCUTANEOUS at 10:06

## 2022-06-10 RX ADMIN — DEXAMETHASONE 4 MG: 4 TABLET ORAL at 09:06

## 2022-06-10 RX ADMIN — INSULIN ASPART 4 UNITS: 100 INJECTION, SOLUTION INTRAVENOUS; SUBCUTANEOUS at 05:06

## 2022-06-10 RX ADMIN — LABETALOL HYDROCHLORIDE 400 MG: 200 TABLET, FILM COATED ORAL at 04:06

## 2022-06-10 RX ADMIN — NIFEDIPINE 90 MG: 90 TABLET, FILM COATED, EXTENDED RELEASE ORAL at 09:06

## 2022-06-10 RX ADMIN — LABETALOL HYDROCHLORIDE 400 MG: 200 TABLET, FILM COATED ORAL at 10:06

## 2022-06-10 RX ADMIN — LATANOPROST 1 DROP: 50 SOLUTION OPHTHALMIC at 09:06

## 2022-06-10 RX ADMIN — HYDROCODONE BITARTRATE AND ACETAMINOPHEN 1 TABLET: 7.5; 325 TABLET ORAL at 10:06

## 2022-06-10 RX ADMIN — HYDROCODONE BITARTRATE AND ACETAMINOPHEN 1 TABLET: 7.5; 325 TABLET ORAL at 04:06

## 2022-06-10 RX ADMIN — PANTOPRAZOLE SODIUM 40 MG: 40 TABLET, DELAYED RELEASE ORAL at 09:06

## 2022-06-10 RX ADMIN — INSULIN ASPART 10 UNITS: 100 INJECTION, SOLUTION INTRAVENOUS; SUBCUTANEOUS at 04:06

## 2022-06-10 RX ADMIN — BACLOFEN 5 MG: 5 TABLET ORAL at 09:06

## 2022-06-10 RX ADMIN — PREGABALIN 75 MG: 75 CAPSULE ORAL at 09:06

## 2022-06-10 RX ADMIN — LABETALOL HYDROCHLORIDE 400 MG: 200 TABLET, FILM COATED ORAL at 05:06

## 2022-06-10 RX ADMIN — INSULIN ASPART 10 UNITS: 100 INJECTION, SOLUTION INTRAVENOUS; SUBCUTANEOUS at 05:06

## 2022-06-10 RX ADMIN — INSULIN DETEMIR 40 UNITS: 100 INJECTION, SOLUTION SUBCUTANEOUS at 10:06

## 2022-06-10 RX ADMIN — GLIPIZIDE 5 MG: 5 TABLET, FILM COATED, EXTENDED RELEASE ORAL at 09:06

## 2022-06-10 RX ADMIN — ATORVASTATIN CALCIUM 40 MG: 40 TABLET, FILM COATED ORAL at 04:06

## 2022-06-10 RX ADMIN — HYDROCODONE BITARTRATE AND ACETAMINOPHEN 1 TABLET: 7.5; 325 TABLET ORAL at 09:06

## 2022-06-10 NOTE — PT/OT/SLP PROGRESS
Physical Therapy         Treatment        Natty Nelson   MRN: [id number]     PT Received On: 06/10/22  PT Start Time: 1336     PT Stop Time: 1353    PT Total Time (min): 17 min       Billable Minutes:  Therapeutic Activity 17  Total Minutes: 17    Treatment Type: Treatment  PT/PTA: PTA     PTA Visit Number: 1       General Precautions: Standard, fall  Orthopedic Precautions: Orthopedic Precautions : spinal precautions   Braces: Braces: Cervical collar    Spiritual, Cultural Beliefs, Gnosticism Practices, Values that Affect Care: no    Subjective:  Communicated with nurse prior to session.    Pain/Comfort  Pain Rating 1: 3/10  Location - Orientation 1: midline  Location 1: cervical spine  Pain Addressed 1: Pre-medicate for activity, Reposition, Cessation of Activity    Objective:  Patient found supine in bed, with Patient found with: cervical collar, SCD    Functional Mobility:  Bed Mobility:   Supine to sit: Moderate Assistance   Sit to supine: Moderate Assistance   Rolling: Moderate Assistance   Scooting: Maximum Assistance    Balance:   Static Sit: FAIR-: Maintains without assist but inconsistent   Dynamic Sit:  FAIR: Cannot move trunk without losing balance  Static Stand: POOR: Needs MODERATE assist to maintain  Dynamic stand: 0: N/A    Transfer Training:  Sit to stand:Moderate Assistance with Rolling Walker x3 trials with emphasis on upright posture and hip ext. Pt performed mini squats x5 reps with modA to achieve.       Additional Treatment:  Pt performed lateral stepping towards HOB with cues for RW manipulation and step pattern with noted decreased coordination in BLEs. Pt performed supine therex x10 reps including single knee to chest and hip abd/add with cues for correct technique.     Activity Tolerance:  Patient tolerated treatment well    Patient left HOB elevated with all lines intact and call button in reach.    Assessment:  Natty Nelson is a 82 y.o. female with a medical diagnosis of <principal  problem not specified>. She presents with increased endurance and standing tolerance.    Rehab potential is good.    Activity tolerance: Good    Discharge recommendations: Discharge Facility/Level of Care Needs: rehabilitation facility, nursing facility, skilled     Equipment recommendations: Equipment Needed After Discharge: walker, rolling     GOALS:   Multidisciplinary Problems     Physical Therapy Goals        Problem: Physical Therapy    Goal Priority Disciplines Outcome Goal Variances Interventions   Physical Therapy Goal     PT, PT/OT Ongoing, Progressing     Description: Goals to be met by: 2022    Patient will increase functional independence with mobility by performin. Sit to stand transfer with Modified [place]  2. Bed to chair transfer with Modified [place] using Rolling Walker  3. Gait  x 150 feet with Modified [place] using Rolling Walker.                      PLAN:    Patient to be seen daily  to address the above listed problems via therapeutic activities, therapeutic exercises  Plan of Care expires: 22  Plan of Care reviewed with: patient         6/10/2022

## 2022-06-10 NOTE — PT/OT/SLP RE-EVAL
Physical Therapy Re-evaluation    Patient Name:  Natty Nelson   MRN:  [id number]    Recommendations:     Discharge Recommendations:  rehabilitation facility   Discharge Equipment Recommendations: other (see comments) (pending progress)   Barriers to discharge: impaired functional mobility    Assessment:     Natty Nelson is a 82 y.o. female admitted with a medical diagnosis of s/p C3-7 PCDF.  She presents with the following impairments/functional limitations:  weakness, impaired endurance, impaired sensation, impaired functional mobilty, gait instability, impaired balance, pain, impaired coordination. Overall patient progressing towards functional PT goals, improving bed mobility and transfer status. Patient remains limited by impaired sensation/poor proprioception in BLEs, affecting quality and safety of standing and gait. Pt is appropriate for continued acute PT services with recommended d/c to IP rehab facility.    Rehab Prognosis:  Good; patient would benefit from acute skilled PT services to address these deficits and reach maximum level of function.      Recent Surgery: Procedure(s) (LRB):  FUSION, SPINE, POSTERIOR SPINAL COLUMN, CERVICAL, USING COMPUTER-ASSISTED NAVIGATION (N/A) 7 Days Post-Op    Plan:     During this hospitalization, patient to be seen daily (daily-BID) to address the above listed problems via gait training, therapeutic activities, therapeutic exercises, neuromuscular re-education  · Plan of Care Expires:  07/01/22   Plan of Care Reviewed with: patient    Subjective     Communicated with RN prior to session.  Patient found up in chair upon PT entry to room, agreeable to evaluation.      Chief Complaint: none stated  Patient comments/goals: to get stronger  Pain/Comfort:  · Pain Rating 1: 4/10  · Location 1: neck  · Pain Addressed 1: Pre-medicate for activity, Reposition    Patients cultural, spiritual, Amish conflicts given the current situation: no      Objective:     Patient found  with:       General Precautions: Standard, fall   Orthopedic Precautions:spinal precautions   Braces: Cervical collar  Respiratory Status: Room air    Exams:  · Sensation: -       Impaired  proprioception at bilateral great toes  · RLE ROM: WFL  · RLE Strength: grossly 4/5  · LLE ROM: WFL  · LLE Strength: grossly 4/5    Functional Mobility:  · Bed Mobility:  Supine to Sit: moderate assistance  · Transfers:  Sit to Stand:  moderate assistance and of 2 persons with rolling walker  · Gait: Pt ambulated 6 steps from chair to bed with shuffle gait and mod-maxA. Pt limited by impaired sensation, required cues for foot placement and safety    AM-PAC 6 CLICK MOBILITY  Total Score:11     Patient left HOB elevated with all lines intact and call button in reach.    GOALS:   Multidisciplinary Problems     Physical Therapy Goals        Problem: Physical Therapy    Goal Priority Disciplines Outcome Goal Variances Interventions   Physical Therapy Goal     PT, PT/OT Ongoing, Progressing     Description: Goals to be met by: 2022    Patient will increase functional independence with mobility by performin. Sit to stand transfer with Modified [place]  2. Bed to chair transfer with Modified [place] using Rolling Walker  3. Gait  x 150 feet with Modified [place] using Rolling Walker.                      History:     Past Medical History:   Diagnosis Date    Arthritis     Diabetes mellitus     Hypertension        Past Surgical History:   Procedure Laterality Date    ANTERIOR CERVICAL DISCECTOMY W/ FUSION Bilateral 2022    Procedure: DISCECTOMY, SPINE, CERVICAL, ANTERIOR APPROACH, WITH FUSION;  Surgeon: Toni Joseph MD;  Location: [facility];  Service: Neurosurgery;  Laterality: Bilateral;  ACDF C5/6       Time Tracking:     PT Received On: 06/10/22  PT Start Time: 1100     PT Stop Time: 1120  PT Total Time (min): 20 min     Billable Minutes: Re-eval 20min      06/10/2022

## 2022-06-10 NOTE — PROGRESS NOTES
[facility]  Hospital Medicine Progress Note        Chief Complaint: Inpatient follow-up on neck pain    HPI:   Ms. Nelson is an 82-year-old  female with severe cervical stenosis status post discectomy with fusion of C5-C6 on 05/09/2022 by Dr. Joseph and ongoing bilateral lower extremity weakness since April 20, 2022, who presents to the ED from [facility] rehab for abnormal MRI with complaints of neck pain ongoing since surgery. She was seen at [facility] ED and underwent CT Cervical Spine that showed concerns for possible bone marrow edema at C3. MRI was also obtained that did not show this however it did show postoperative changes including stenosis around C5 -please note that this is all per secondary report from the ER physician. Images were not available. ED physician spoke to Neurosurgery who recommended repeating MRI Cervical Spine w/wo in AM.  Her laboratory work was unremarkable except for a mild worsening of her anemia with a hemoglobin of 8.2 (post-op Hgb 9.9). She was admitted to the hospitalist service for further management.     Patient admitted for severe intractable neck pain after a C5-C6 diskectomies recent procedure.  Neurosurgery has been consulted.  MRI C-spine ordered.  Neurosurgery performed C3 to C7 PCDF on 6/3.          Interval Hx:   Patient sitting up in bed awake and alert and very pleasant.  No acute issues reported.  Patient is afebrile, on room air, and hemodynamically stable.      Objective/physical exam:  General: Obese  female in no acute distress  HENT: normocephalic, atraumatic  Eye: PERRL, EOMI, clear conjunctiva  Neck:  Incision is clean dry and intact, AYLA drain is in place, soft collar is in place  Respiratory: clear to auscultation bilaterally  Cardiovascular: regular rate and rhythm  Gastrointestinal: non-distended, positive bowel sounds  Musculoskeletal: no gross deformity  Integumentary: warm, dry, intact, no  rashes  Neurological: cranial nerves grossly intact, unable to lift legs off bed (ongoing since surgery) able to dorsi and plantar flex both feet with normal strength  Psychiatric: cooperative    VITAL SIGNS: 24 HRS MIN & MAX LAST   Temp  Min: 97.4 °F (36.3 °C)  Max: 97.9 °F (36.6 °C) 97.5 °F (36.4 °C)   BP  Min: 131/73  Max: 184/77 (!) 160/73   Pulse  Min: 69  Max: 80  77   Resp  Min: 16  Max: 20 16   SpO2  Min: 97 %  Max: 99 % 99 %       Recent Labs   Lab 06/04/22  1259 06/05/22  0422 06/06/22  0758   WBC 9.2 8.2  --    RBC 4.32 4.45  --    HGB 12.8 13.1 13.8   HCT 38.2 40.0 40.9   MCV 88.4 89.9  --    MCH 29.6 29.4  --    MCHC 33.5 32.8*  --    RDW 17.8* 18.0*  --     144  --    MPV 12.7* 12.7*  --        Recent Labs   Lab 06/04/22  1259 06/05/22  0422 06/06/22  0619    141 137   K 4.2 4.4 5.1   CO2 26 27 17*   BUN 39.1* 44.3* 49.3*   CREATININE 1.27* 1.54* 1.38*   CALCIUM 9.5 9.6 9.9   ALBUMIN  --  2.9*  --    ALKPHOS  --  75  --    ALT  --  12  --    AST  --  28  --    BILITOT  --  0.5  --           Microbiology Results (last 7 days)     ** No results found for the last 168 hours. **           See below for Radiology    Scheduled Med:   atorvastatin  40 mg Oral Daily    baclofen  5 mg Oral BID    bisacodyL  10 mg Rectal Daily    dexAMETHasone  4 mg Oral Daily    glipiZIDE  5 mg Oral Daily with breakfast    insulin detemir U-100  40 Units Subcutaneous QHS    labetaloL  400 mg Oral Q8H    latanoprost  1 drop Both Eyes Nightly    NIFEdipine  90 mg Oral Daily    pantoprazole  40 mg Oral Daily    pregabalin  75 mg Oral BID        Continuous Infusions:  None.    PRN Meds:  acetaminophen, albuterol-ipratropium, aluminum-magnesium hydroxide-simethicone, bisacodyL, hydrALAZINE, HYDROcodone-acetaminophen, HYDROmorphone, insulin aspart U-100, lorazepam, polyethylene glycol, senna-docusate 8.6-50 mg, trazodone       Assessment/Plan:  Neck Pain in the setting of recent cervical discectomy/fusion of  C5-6  - s/p PCDF of C3-C7 on 06/03/2022  Ongoing BLE weakness 2/2 to above  Essential hypertension  Acute hypoxic respiratory failure likely secondary to volume overload, resolved   LALIT on Stage IIIB CKD  Non-insulin-dependent type 2 diabetes mellitus with hyperglycemia   CAD/CABG  JODI/Right CEA  PAD      Plan:  Continue pain control, physical therapy, occupational therapy, blood pressure control, diabetes control, and other supportive care  Continue weaning Decadron  Increased Levemir insulin to 40 units subcutaneously at bedtime yesterday but patient remains hyperglycemic due to Decadron     Patient condition:  Stable     Anticipated discharge and Disposition: Inpatient rehabilitation versus transitional care unit versus skilled nursing facility      All diagnosis and differential diagnosis have been reviewed; assessment and plan has been documented; I have personally reviewed the labs and test results that are presently available; I have reviewed the patients medication list; I have reviewed the consulting providers response and recommendations. I have reviewed or attempted to review medical records based upon their availability    All of the patient's questions have been  addressed and answered. Patient's is agreeable to the above stated plan. I will continue to monitor closely and make adjustments to medical management as needed.  _____________________________________________________________________    Nutrition Status:    Radiology:  X-Ray Chest PA And Lateral  Narrative: EXAMINATION:  XR CHEST PA AND LATERAL    CLINICAL HISTORY:  ; worsening oxygen requirement ?cause;    TECHNIQUE:  Portable AP view of the chest.    COMPARISON:  [facility] 2022    FINDINGS:  Lines/tubes/devices:  Life-support devices remain in similar position.    The cardiac silhouette and central vascular structures are unchanged.  The trachea is midline. No new or worsening consolidation is identified. There is no enlarging pleural effusion or  convincing pneumothorax.    Regional osseous structures and extrathoracic soft tissues are similar.  Impression: No evidence of new or worsening cardiopulmonary process.    Electronically signed by: Mateo Donnelly  Date:    06/05/2022  Time:    14:13      Russ Tucker MD   06/10/2022

## 2022-06-10 NOTE — PLAN OF CARE
Problem: Adult Inpatient Plan of Care  Goal: Plan of Care Review  Outcome: Ongoing, Progressing  Goal: Patient-Specific Goal (Individualized)  Outcome: Ongoing, Progressing  Goal: Absence of Hospital-Acquired Illness or Injury  Outcome: Ongoing, Progressing  Goal: Readiness for Transition of Care  Outcome: Ongoing, Progressing     Problem: Bariatric Environmental Safety  Goal: Safety Maintained with Care  Outcome: Ongoing, Progressing     Problem: Skin Injury Risk Increased  Goal: Skin Health and Integrity  Outcome: Ongoing, Progressing     Problem: Fall Injury Risk  Goal: Absence of Fall and Fall-Related Injury  Outcome: Ongoing, Progressing     Problem: Infection  Goal: Absence of Infection Signs and Symptoms  Outcome: Ongoing, Progressing

## 2022-06-10 NOTE — PT/OT/SLP PROGRESS
Occupational Therapy   Treatment    Name: Natty Nelson  MRN: [id number]  Admitting Diagnosis:  S/p C3-C7 PCDF  7 Days Post-Op    Recommendations:     Discharge Recommendations: rehabilitation facility, nursing facility, skilled  Discharge Equipment Recommendations: TBD by next level of care  Barriers to discharge: Severity of Deficits    Assessment:     Natty Nelson is a 82 y.o. female with a medical diagnosis of s/p C3-C7 PCDF. Performance deficits affecting function are weakness, gait instability, decreased lower extremity function, impaired balance, impaired endurance, impaired self care skills, impaired functional mobilty, orthopedic precautions, pain, impaired coordination. Pt cont to be significantly impaired by pain, however is motivated to participate in therapy. Pt participated in t/f to bedside chair requiring max A X 2. Communicated w/ PT who assisted w/ pt BTB after approx. 30 2/2 decreased ax tolerance. OT recs for d/c include IPR vs SNF.    Rehab Prognosis:  Fair; patient would benefit from acute skilled OT services to address these deficits and reach maximum level of function.       Plan:     Patient to be seen 6 x/week, 5 x/week to address the above listed problems via self-care/home management, therapeutic activities, therapeutic exercises  · Plan of Care Expires: 06/20/22  · Plan of Care Reviewed with: patient    Subjective     Pain/Comfort:  · Pain Rating 1: 5/10  · Location - Orientation 1: posterior  · Location 1: cervical spine  · Pain Addressed 1: Reposition, Pre-medicate for activity    Objective:     Communicated with: RN, PT prior to session.  Patient found supine with PureWick, telemetry, SCD upon OT entry to room.    General Precautions: Standard, fall   Orthopedic Precautions:spinal precautions   Braces: Cervical collar, soft collar OOB  Respiratory Status: Room air     Occupational Performance:     Bed Mobility:    · Patient completed Rolling/Turning to Left with  moderate  assistance  · Patient completed Rolling/Turning to Right with moderate assistance  · Patient completed Supine to Sit with moderate assistance and 2 persons  · Patient completed Sit to Supine with moderate assistance and 2 persons     Functional Mobility/Transfers:  · Patient completed Bed <> Chair Transfer using Squat Pivot technique with maximal assistance and of 2 persons with giat belt    Activities of Daily Living:  · Toileting: total assistance to perform pericare while supine in bed after +BM      AMPAC 6 Click ADL: 12    Patient left up in chair with call button in reach, PT notifiedEducation:      GOALS:   Multidisciplinary Problems     Occupational Therapy Goals        Problem: Occupational Therapy    Goal Priority Disciplines Outcome Interventions   Occupational Therapy Goal     OT, PT/OT Ongoing, Progressing    Description: LTG: Pt will perform UB ADL with Mod I from w/c by dc.    STG: to be met in 2 weeks, by 6/17  1. Pt will feed self with SBA within diet recs.  2. Pt will perform grooming EOB with SBA.  3. Pt will perform UB dressing with min A.  4. Pt will perform toileting/bsc t/f with min A using RW.                   Time Tracking:     OT Date of Treatment: 06/10/22  OT Start Time: 1007  OT Stop Time: 1039  OT Total Time (min): 32 min    Billable Minutes:Self Care/Home Management 15  Therapeutic Activity 17    OT/CLAYTON: OT     CLAYTON Visit Number: 5    6/10/2022

## 2022-06-10 NOTE — PLAN OF CARE
Problem: Physical Therapy  Goal: Physical Therapy Goal  Description: Goals to be met by: 2022    Patient will increase functional independence with mobility by performin. Sit to stand transfer with Modified [place]  2. Bed to chair transfer with Modified [place] using Rolling Walker  3. Gait  x 150 feet with Modified [place] using Rolling Walker.     Outcome: Ongoing, Progressing

## 2022-06-10 NOTE — NURSING
Pt's bed scale is not accurate. Pt cannot stand. When PT gets pt up they need to zero out the bed scale to reset it.

## 2022-06-11 LAB
POCT GLUCOSE: 130 MG/DL (ref 70–110)
POCT GLUCOSE: 158 MG/DL (ref 70–110)
POCT GLUCOSE: 401 MG/DL (ref 70–110)
POCT GLUCOSE: 414 MG/DL (ref 70–110)

## 2022-06-11 PROCEDURE — 25000003 PHARM REV CODE 250: Performed by: INTERNAL MEDICINE

## 2022-06-11 PROCEDURE — 63600175 PHARM REV CODE 636 W HCPCS: Performed by: PHYSICIAN ASSISTANT

## 2022-06-11 PROCEDURE — 11000001 HC ACUTE MED/SURG PRIVATE ROOM

## 2022-06-11 PROCEDURE — 97530 THERAPEUTIC ACTIVITIES: CPT | Mod: CQ

## 2022-06-11 PROCEDURE — 63600175 PHARM REV CODE 636 W HCPCS: Performed by: INTERNAL MEDICINE

## 2022-06-11 PROCEDURE — 25000003 PHARM REV CODE 250: Performed by: NURSE PRACTITIONER

## 2022-06-11 PROCEDURE — C9399 UNCLASSIFIED DRUGS OR BIOLOG: HCPCS | Performed by: INTERNAL MEDICINE

## 2022-06-11 PROCEDURE — 94799 UNLISTED PULMONARY SVC/PX: CPT

## 2022-06-11 RX ADMIN — LABETALOL HYDROCHLORIDE 400 MG: 200 TABLET, FILM COATED ORAL at 09:06

## 2022-06-11 RX ADMIN — PREGABALIN 75 MG: 75 CAPSULE ORAL at 08:06

## 2022-06-11 RX ADMIN — LATANOPROST 1 DROP: 50 SOLUTION OPHTHALMIC at 09:06

## 2022-06-11 RX ADMIN — BACLOFEN 5 MG: 5 TABLET ORAL at 08:06

## 2022-06-11 RX ADMIN — HYDROCODONE BITARTRATE AND ACETAMINOPHEN 1 TABLET: 7.5; 325 TABLET ORAL at 11:06

## 2022-06-11 RX ADMIN — INSULIN DETEMIR 40 UNITS: 100 INJECTION, SOLUTION SUBCUTANEOUS at 09:06

## 2022-06-11 RX ADMIN — PREGABALIN 75 MG: 75 CAPSULE ORAL at 09:06

## 2022-06-11 RX ADMIN — INSULIN ASPART 10 UNITS: 100 INJECTION, SOLUTION INTRAVENOUS; SUBCUTANEOUS at 06:06

## 2022-06-11 RX ADMIN — NIFEDIPINE 90 MG: 90 TABLET, FILM COATED, EXTENDED RELEASE ORAL at 08:06

## 2022-06-11 RX ADMIN — ATORVASTATIN CALCIUM 40 MG: 40 TABLET, FILM COATED ORAL at 05:06

## 2022-06-11 RX ADMIN — LABETALOL HYDROCHLORIDE 400 MG: 200 TABLET, FILM COATED ORAL at 06:06

## 2022-06-11 RX ADMIN — HYDROCODONE BITARTRATE AND ACETAMINOPHEN 1 TABLET: 7.5; 325 TABLET ORAL at 03:06

## 2022-06-11 RX ADMIN — GLIPIZIDE 5 MG: 5 TABLET, FILM COATED, EXTENDED RELEASE ORAL at 08:06

## 2022-06-11 RX ADMIN — BACLOFEN 5 MG: 5 TABLET ORAL at 09:06

## 2022-06-11 RX ADMIN — DEXAMETHASONE 4 MG: 4 TABLET ORAL at 08:06

## 2022-06-11 RX ADMIN — LABETALOL HYDROCHLORIDE 400 MG: 200 TABLET, FILM COATED ORAL at 03:06

## 2022-06-11 RX ADMIN — PANTOPRAZOLE SODIUM 40 MG: 40 TABLET, DELAYED RELEASE ORAL at 08:06

## 2022-06-11 NOTE — PT/OT/SLP PROGRESS
Physical Therapy Treatment    Patient Name:  Natty Nelson   MRN:  [id number]    Recommendations:     Discharge Recommendations:  rehabilitation facility, nursing facility, skilled   Discharge Equipment Recommendations: other (see comments) (pending progress)     Subjective     Patient awake and cooperative.     Objective:     General Precautions: Standard, fall   Orthopedic Precautions:spinal precautions   Braces:    Respiratory Status: Room air     Communicated with nurse prior to treatment.     Functional Mobility:    Rolling:Minimal Assistance  Supine to sit:Moderate Assistance  Sit to stand transfer: Maximum Assistance  Bed to chair transfer:Activity did not occur  Pt stood and was able to take step to HOB with max assist x 2 and 2 step forward with bed in tow. Very ataxic and difficulty with motor planning.       AM-PAC 6 CLICK MOBILITY  Turning over in bed (including adjusting bedclothes, sheets and blankets)?: 3  Sitting down on and standing up from a chair with arms (e.g., wheelchair, bedside commode, etc.): 2  Moving from lying on back to sitting on the side of the bed?: 2  Moving to and from a bed to a chair (including a wheelchair)?: 1  Need to walk in hospital room?: 2  Climbing 3-5 steps with a railing?: 1  Basic Mobility Total Score: 11     Patient left supine with all lines intact..    GOALS:   Multidisciplinary Problems     Physical Therapy Goals        Problem: Physical Therapy    Goal Priority Disciplines Outcome Goal Variances Interventions   Physical Therapy Goal     PT, PT/OT Ongoing, Progressing     Description: Goals to be met by: 2022    Patient will increase functional independence with mobility by performin. Sit to stand transfer with Modified [place]  2. Bed to chair transfer with Modified [place] using Rolling Walker  3. Gait  x 150 feet with Modified [place] using Rolling Walker.                      Assessment:     Natty Nelson is a 82 y.o. female  admitted with a medical diagnosis of <principal problem not specified>.     Rehab Prognosis: Good; patient would benefit from acute skilled PT services to address these deficits and reach maximum level of function.    Recent Surgery: Procedure(s) (LRB):  FUSION, SPINE, POSTERIOR SPINAL COLUMN, CERVICAL, USING COMPUTER-ASSISTED NAVIGATION (N/A) 8 Days Post-Op    Plan:     During this hospitalization, patient to be seen daily to address the identified rehab impairments via gait training, therapeutic activities, therapeutic exercises and progress toward the following goals:    · Plan of Care Expires:  07/01/22    Billable Minutes     Billable Minutes: Therapeutic Activity 24    Treatment Type: Treatment  PT/PTA: PTA     PTA Visit Number: 2     06/11/2022

## 2022-06-11 NOTE — PROGRESS NOTES
S/p C3-7 PCDF by Dr. Joseph on 6/3/22  POD#8  Patient watching tv, no new complaints today.  States pain is well controlled with meds    Exam:  Posterior cervical incision is C/D/I  JONES on exam  AAOx3  Denies any radicular or paresthesia sx's in UE's or LE's     Plan:  Continue pain control  PT/OT  Awaiting rehab/SNF placement per case management

## 2022-06-11 NOTE — PROGRESS NOTES
[facility]  Hospital Medicine Progress Note        Chief Complaint: Inpatient follow-up on neck pain    HPI:   Ms. Nelson is an 82-year-old  female with severe cervical stenosis status post discectomy with fusion of C5-C6 on 05/09/2022 by Dr. Joseph and ongoing bilateral lower extremity weakness since April 20, 2022, who presents to the ED from [facility] rehab for abnormal MRI with complaints of neck pain ongoing since surgery. She was seen at [facility] ED and underwent CT Cervical Spine that showed concerns for possible bone marrow edema at C3. MRI was also obtained that did not show this however it did show postoperative changes including stenosis around C5 -please note that this is all per secondary report from the ER physician. Images were not available. ED physician spoke to Neurosurgery who recommended repeating MRI Cervical Spine w/wo in AM.  Her laboratory work was unremarkable except for a mild worsening of her anemia with a hemoglobin of 8.2 (post-op Hgb 9.9). She was admitted to the hospitalist service for further management.     Patient admitted for severe intractable neck pain after a C5-C6 diskectomies recent procedure.  Neurosurgery has been consulted.  MRI C-spine ordered.  Neurosurgery performed C3 to C7 PCDF on 6/3.          Interval Hx:   Patient is resting comfortably.  No acute issues reported.  Patient is afebrile, on room air, and hemodynamically stable.      Objective/physical exam:  General: Obese  female in no acute distress  HENT: normocephalic, atraumatic  Eye: PERRL, EOMI, clear conjunctiva  Neck:  Incision is clean dry and intact, AYLA drain is in place, soft collar is in place  Respiratory: clear to auscultation bilaterally  Cardiovascular: regular rate and rhythm  Gastrointestinal: non-distended, positive bowel sounds  Musculoskeletal: no gross deformity  Integumentary: warm, dry, intact, no rashes  Neurological: cranial nerves grossly  intact, unable to lift legs off bed (ongoing since surgery) able to dorsi and plantar flex both feet with normal strength  Psychiatric: cooperative      VITAL SIGNS: 24 HRS MIN & MAX LAST   Temp  Min: 97.6 °F (36.4 °C)  Max: 98.1 °F (36.7 °C) 98.1 °F (36.7 °C)   BP  Min: 126/66  Max: 167/74 (!) 167/74   Pulse  Min: 65  Max: 79  71   Resp  Min: 13  Max: 20 18   SpO2  Min: 98 %  Max: 99 % 99 %       Recent Labs   Lab 06/04/22  1259 06/05/22  0422 06/06/22  0758   WBC 9.2 8.2  --    RBC 4.32 4.45  --    HGB 12.8 13.1 13.8   HCT 38.2 40.0 40.9   MCV 88.4 89.9  --    MCH 29.6 29.4  --    MCHC 33.5 32.8*  --    RDW 17.8* 18.0*  --     144  --    MPV 12.7* 12.7*  --        Recent Labs   Lab 06/04/22  1259 06/05/22  0422 06/06/22  0619    141 137   K 4.2 4.4 5.1   CO2 26 27 17*   BUN 39.1* 44.3* 49.3*   CREATININE 1.27* 1.54* 1.38*   CALCIUM 9.5 9.6 9.9   ALBUMIN  --  2.9*  --    ALKPHOS  --  75  --    ALT  --  12  --    AST  --  28  --    BILITOT  --  0.5  --           Microbiology Results (last 7 days)     ** No results found for the last 168 hours. **           See below for Radiology    Scheduled Med:   atorvastatin  40 mg Oral Daily    baclofen  5 mg Oral BID    bisacodyL  10 mg Rectal Daily    glipiZIDE  5 mg Oral Daily with breakfast    insulin detemir U-100  40 Units Subcutaneous QHS    labetaloL  400 mg Oral Q8H    latanoprost  1 drop Both Eyes Nightly    NIFEdipine  90 mg Oral Daily    pantoprazole  40 mg Oral Daily    pregabalin  75 mg Oral BID        Continuous Infusions:  None.    PRN Meds:  acetaminophen, albuterol-ipratropium, aluminum-magnesium hydroxide-simethicone, bisacodyL, hydrALAZINE, HYDROcodone-acetaminophen, HYDROmorphone, insulin aspart U-100, lorazepam, polyethylene glycol, senna-docusate 8.6-50 mg, trazodone       Assessment/Plan:  Neck Pain in the setting of recent cervical discectomy/fusion of C5-6  - s/p PCDF of C3-C7 on 06/03/2022  Ongoing BLE weakness 2/2 to  above  Essential hypertension  Acute hypoxic respiratory failure likely secondary to volume overload, resolved   LALIT on Stage IIIB CKD  Non-insulin-dependent type 2 diabetes mellitus with hyperglycemia   CAD/CABG  JODI/Right CEA  PAD      Plan:  Continue pain control, physical therapy, occupational therapy, blood pressure control, diabetes control, and other supportive care  Completed Decadron taper  Titrate Levemir insulin to achieve blood glucose control     Patient condition:  Stable     Anticipated discharge and Disposition: Inpatient rehabilitation versus transitional care unit versus skilled nursing facility      All diagnosis and differential diagnosis have been reviewed; assessment and plan has been documented; I have personally reviewed the labs and test results that are presently available; I have reviewed the patients medication list; I have reviewed the consulting providers response and recommendations. I have reviewed or attempted to review medical records based upon their availability    All of the patient's questions have been  addressed and answered. Patient's is agreeable to the above stated plan. I will continue to monitor closely and make adjustments to medical management as needed.  _____________________________________________________________________    Nutrition Status:    Radiology:  X-Ray Chest PA And Lateral  Narrative: EXAMINATION:  XR CHEST PA AND LATERAL    CLINICAL HISTORY:  ; worsening oxygen requirement ?cause;    TECHNIQUE:  Portable AP view of the chest.    COMPARISON:  [facility] 2022    FINDINGS:  Lines/tubes/devices:  Life-support devices remain in similar position.    The cardiac silhouette and central vascular structures are unchanged.  The trachea is midline. No new or worsening consolidation is identified. There is no enlarging pleural effusion or convincing pneumothorax.    Regional osseous structures and extrathoracic soft tissues are similar.  Impression: No evidence of new or  worsening cardiopulmonary process.    Electronically signed by: Mateo Donnelly  Date:    06/05/2022  Time:    14:13      Russ Tucker MD   06/11/2022

## 2022-06-11 NOTE — PLAN OF CARE
Problem: Adult Inpatient Plan of Care  Goal: Plan of Care Review  Outcome: Ongoing, Progressing  Goal: Patient-Specific Goal (Individualized)  Outcome: Ongoing, Progressing  Goal: Absence of Hospital-Acquired Illness or Injury  Outcome: Ongoing, Progressing  Goal: Optimal Comfort and Wellbeing  Outcome: Ongoing, Progressing  Goal: Readiness for Transition of Care  Outcome: Ongoing, Progressing     Problem: Adult Inpatient Plan of Care  Goal: Plan of Care Review  Outcome: Ongoing, Progressing  Goal: Patient-Specific Goal (Individualized)  Outcome: Ongoing, Progressing  Goal: Absence of Hospital-Acquired Illness or Injury  Outcome: Ongoing, Progressing  Goal: Optimal Comfort and Wellbeing  Outcome: Ongoing, Progressing  Goal: Readiness for Transition of Care  Outcome: Ongoing, Progressing

## 2022-06-12 LAB
POCT GLUCOSE: 102 MG/DL (ref 70–110)
POCT GLUCOSE: 177 MG/DL (ref 70–110)
POCT GLUCOSE: 207 MG/DL (ref 70–110)
POCT GLUCOSE: 275 MG/DL (ref 70–110)

## 2022-06-12 PROCEDURE — 25000003 PHARM REV CODE 250: Performed by: NURSE PRACTITIONER

## 2022-06-12 PROCEDURE — 94761 N-INVAS EAR/PLS OXIMETRY MLT: CPT

## 2022-06-12 PROCEDURE — 11000001 HC ACUTE MED/SURG PRIVATE ROOM

## 2022-06-12 PROCEDURE — 63600175 PHARM REV CODE 636 W HCPCS: Performed by: INTERNAL MEDICINE

## 2022-06-12 PROCEDURE — 25000003 PHARM REV CODE 250: Performed by: INTERNAL MEDICINE

## 2022-06-12 RX ORDER — ENOXAPARIN SODIUM 100 MG/ML
40 INJECTION SUBCUTANEOUS EVERY 24 HOURS
Status: DISCONTINUED | OUTPATIENT
Start: 2022-06-12 | End: 2022-06-22 | Stop reason: HOSPADM

## 2022-06-12 RX ADMIN — PREGABALIN 75 MG: 75 CAPSULE ORAL at 08:06

## 2022-06-12 RX ADMIN — ATORVASTATIN CALCIUM 40 MG: 40 TABLET, FILM COATED ORAL at 04:06

## 2022-06-12 RX ADMIN — HYDROCODONE BITARTRATE AND ACETAMINOPHEN 1 TABLET: 7.5; 325 TABLET ORAL at 08:06

## 2022-06-12 RX ADMIN — INSULIN ASPART 4 UNITS: 100 INJECTION, SOLUTION INTRAVENOUS; SUBCUTANEOUS at 06:06

## 2022-06-12 RX ADMIN — NIFEDIPINE 90 MG: 90 TABLET, FILM COATED, EXTENDED RELEASE ORAL at 08:06

## 2022-06-12 RX ADMIN — PANTOPRAZOLE SODIUM 40 MG: 40 TABLET, DELAYED RELEASE ORAL at 08:06

## 2022-06-12 RX ADMIN — LABETALOL HYDROCHLORIDE 400 MG: 200 TABLET, FILM COATED ORAL at 06:06

## 2022-06-12 RX ADMIN — LATANOPROST 1 DROP: 50 SOLUTION OPHTHALMIC at 08:06

## 2022-06-12 RX ADMIN — ENOXAPARIN SODIUM 40 MG: 40 INJECTION SUBCUTANEOUS at 04:06

## 2022-06-12 RX ADMIN — GLIPIZIDE 5 MG: 5 TABLET, FILM COATED, EXTENDED RELEASE ORAL at 08:06

## 2022-06-12 RX ADMIN — LABETALOL HYDROCHLORIDE 400 MG: 200 TABLET, FILM COATED ORAL at 02:06

## 2022-06-12 RX ADMIN — BACLOFEN 5 MG: 5 TABLET ORAL at 08:06

## 2022-06-12 RX ADMIN — INSULIN ASPART 6 UNITS: 100 INJECTION, SOLUTION INTRAVENOUS; SUBCUTANEOUS at 05:06

## 2022-06-12 NOTE — PROGRESS NOTES
[facility]  Hospital Medicine Progress Note        Chief Complaint: Inpatient follow-up on neck pain    HPI:   Ms. Nelson is an 82-year-old  female with severe cervical stenosis status post discectomy with fusion of C5-C6 on 05/09/2022 by Dr. Joseph and ongoing bilateral lower extremity weakness since April 20, 2022, who presents to the ED from [facility] [place] for abnormal MRI with complaints of neck pain ongoing since surgery. She was seen at [facility] ED and underwent CT Cervical Spine that showed concerns for possible bone marrow edema at C3. MRI was also obtained that did not show this however it did show postoperative changes including stenosis around C5 -please note that this is all per secondary report from the ER physician. Images were not available. ED physician spoke to Neurosurgery who recommended repeating MRI Cervical Spine w/wo in AM.  Her laboratory work was unremarkable except for a mild worsening of her anemia with a hemoglobin of 8.2 (post-op Hgb 9.9). She was admitted to the hospitalist service for further management.     Patient admitted for severe intractable neck pain after a C5-C6 diskectomies recent procedure.  Neurosurgery has been consulted.  MRI C-spine ordered.  Neurosurgery performed C3 to C7 PCDF on 6/3.          Interval Hx:   Patient is resting comfortably with soft cervical collar on.  No acute issues reported.  Patient is afebrile, on room air, and hemodynamically stable.      Objective/physical exam:  General: Obese  female in no acute distress  HENT: normocephalic, atraumatic  Eye: PERRL, EOMI, clear conjunctiva  Neck:  Incision is clean dry and intact, AYLA drain is in place, soft collar is in place  Respiratory: clear to auscultation bilaterally  Cardiovascular: regular rate and rhythm  Gastrointestinal: non-distended, positive bowel sounds  Musculoskeletal: no gross deformity  Integumentary: warm, dry, intact, no  lisa  Neurological: cranial nerves grossly intact, unable to lift legs off bed (ongoing since surgery) able to dorsi and plantar flex both feet with normal strength  Psychiatric: cooperative    VITAL SIGNS: 24 HRS MIN & MAX LAST   Temp  Min: 97.6 °F (36.4 °C)  Max: 97.8 °F (36.6 °C) 97.6 °F (36.4 °C)   BP  Min: 132/69  Max: 167/74 (!) 150/73   Pulse  Min: 63  Max: 75  63   Resp  Min: 18  Max: 20 18   SpO2  Min: 98 %  Max: 99 % 99 %       Recent Labs   Lab 06/06/22  0758   HGB 13.8   HCT 40.9       Recent Labs   Lab 06/06/22  0619      K 5.1   CO2 17*   BUN 49.3*   CREATININE 1.38*   CALCIUM 9.9          Microbiology Results (last 7 days)     ** No results found for the last 168 hours. **           See below for Radiology    Scheduled Med:   atorvastatin  40 mg Oral Daily    baclofen  5 mg Oral BID    bisacodyL  10 mg Rectal Daily    enoxaparin  40 mg Subcutaneous Daily    glipiZIDE  5 mg Oral Daily with breakfast    insulin detemir U-100  40 Units Subcutaneous BID    labetaloL  400 mg Oral Q8H    latanoprost  1 drop Both Eyes Nightly    NIFEdipine  90 mg Oral Daily    pantoprazole  40 mg Oral Daily    pregabalin  75 mg Oral BID        Continuous Infusions:  None.    PRN Meds:  acetaminophen, albuterol-ipratropium, aluminum-magnesium hydroxide-simethicone, bisacodyL, hydrALAZINE, HYDROcodone-acetaminophen, HYDROmorphone, insulin aspart U-100, lorazepam, polyethylene glycol, senna-docusate 8.6-50 mg, trazodone       Assessment/Plan:  Neck Pain in the setting of recent cervical discectomy/fusion of C5-6  - s/p PCDF of C3-C7 on 06/03/2022  Ongoing BLE weakness 2/2 to above  Essential hypertension  Acute hypoxic respiratory failure likely secondary to volume overload, resolved   LALIT on Stage IIIB CKD  Non-insulin-dependent type 2 diabetes mellitus with hyperglycemia   CAD/CABG  JODI/Right CEA  PAD      Plan:  Continue pain control, physical therapy, occupational therapy, blood pressure control, diabetes  control, and other supportive care  Completed Decadron taper  Titrate Levemir insulin to achieve blood glucose control.  Will change to twice daily dosing.     Patient condition:  Stable     Anticipated discharge and Disposition: Inpatient rehabilitation versus transitional care unit versus skilled nursing facility      All diagnosis and differential diagnosis have been reviewed; assessment and plan has been documented; I have personally reviewed the labs and test results that are presently available; I have reviewed the patients medication list; I have reviewed the consulting providers response and recommendations. I have reviewed or attempted to review medical records based upon their availability    All of the patient's questions have been  addressed and answered. Patient's is agreeable to the above stated plan. I will continue to monitor closely and make adjustments to medical management as needed.  _____________________________________________________________________    Nutrition Status:    Radiology:  X-Ray Chest PA And Lateral  Narrative: EXAMINATION:  XR CHEST PA AND LATERAL    CLINICAL HISTORY:  ; worsening oxygen requirement ?cause;    TECHNIQUE:  Portable AP view of the chest.    COMPARISON:  [facility] 2022    FINDINGS:  Lines/tubes/devices:  Life-support devices remain in similar position.    The cardiac silhouette and central vascular structures are unchanged.  The trachea is midline. No new or worsening consolidation is identified. There is no enlarging pleural effusion or convincing pneumothorax.    Regional osseous structures and extrathoracic soft tissues are similar.  Impression: No evidence of new or worsening cardiopulmonary process.    Electronically signed by: Mateo Donnelly  Date:    06/05/2022  Time:    14:13      Russ Tucker MD   06/12/2022

## 2022-06-12 NOTE — PROGRESS NOTES
S/p C3-7 PCDF by Dr. Joseph on 6/3/22  POD#9  Patient watching tv, states her pain is now controlled and no new complaints today     Exam:  Posterior cervical incision is C/D/I  JONES on exam  AAOx3  Denies any radicular or paresthesia sx's in UE's or LE's     Plan:  Continue pain control  PT/OT  Awaiting rehab/SNF placement per case management

## 2022-06-13 LAB
POCT GLUCOSE: 121 MG/DL (ref 70–110)
POCT GLUCOSE: 145 MG/DL (ref 70–110)
POCT GLUCOSE: 175 MG/DL (ref 70–110)
POCT GLUCOSE: 223 MG/DL (ref 70–110)
POCT GLUCOSE: 244 MG/DL (ref 70–110)

## 2022-06-13 PROCEDURE — 97168 OT RE-EVAL EST PLAN CARE: CPT

## 2022-06-13 PROCEDURE — C9399 UNCLASSIFIED DRUGS OR BIOLOG: HCPCS | Performed by: INTERNAL MEDICINE

## 2022-06-13 PROCEDURE — 63600175 PHARM REV CODE 636 W HCPCS: Performed by: INTERNAL MEDICINE

## 2022-06-13 PROCEDURE — 25000003 PHARM REV CODE 250: Performed by: INTERNAL MEDICINE

## 2022-06-13 PROCEDURE — 11000001 HC ACUTE MED/SURG PRIVATE ROOM

## 2022-06-13 PROCEDURE — 97530 THERAPEUTIC ACTIVITIES: CPT

## 2022-06-13 PROCEDURE — 25000003 PHARM REV CODE 250: Performed by: NURSE PRACTITIONER

## 2022-06-13 RX ADMIN — INSULIN ASPART 2 UNITS: 100 INJECTION, SOLUTION INTRAVENOUS; SUBCUTANEOUS at 06:06

## 2022-06-13 RX ADMIN — BACLOFEN 5 MG: 5 TABLET ORAL at 10:06

## 2022-06-13 RX ADMIN — ATORVASTATIN CALCIUM 40 MG: 40 TABLET, FILM COATED ORAL at 03:06

## 2022-06-13 RX ADMIN — HYDROCODONE BITARTRATE AND ACETAMINOPHEN 1 TABLET: 7.5; 325 TABLET ORAL at 03:06

## 2022-06-13 RX ADMIN — GLIPIZIDE 5 MG: 5 TABLET, FILM COATED, EXTENDED RELEASE ORAL at 10:06

## 2022-06-13 RX ADMIN — PANTOPRAZOLE SODIUM 40 MG: 40 TABLET, DELAYED RELEASE ORAL at 10:06

## 2022-06-13 RX ADMIN — HYDROMORPHONE HYDROCHLORIDE 1 MG: 2 INJECTION, SOLUTION INTRAMUSCULAR; INTRAVENOUS; SUBCUTANEOUS at 10:06

## 2022-06-13 RX ADMIN — LABETALOL HYDROCHLORIDE 400 MG: 200 TABLET, FILM COATED ORAL at 03:06

## 2022-06-13 RX ADMIN — HYDROCODONE BITARTRATE AND ACETAMINOPHEN 1 TABLET: 7.5; 325 TABLET ORAL at 12:06

## 2022-06-13 RX ADMIN — LATANOPROST 1 DROP: 50 SOLUTION OPHTHALMIC at 09:06

## 2022-06-13 RX ADMIN — PREGABALIN 75 MG: 75 CAPSULE ORAL at 08:06

## 2022-06-13 RX ADMIN — PREGABALIN 75 MG: 75 CAPSULE ORAL at 10:06

## 2022-06-13 RX ADMIN — NIFEDIPINE 90 MG: 90 TABLET, FILM COATED, EXTENDED RELEASE ORAL at 10:06

## 2022-06-13 RX ADMIN — LABETALOL HYDROCHLORIDE 400 MG: 200 TABLET, FILM COATED ORAL at 08:06

## 2022-06-13 RX ADMIN — BACLOFEN 5 MG: 5 TABLET ORAL at 08:06

## 2022-06-13 RX ADMIN — INSULIN DETEMIR 40 UNITS: 100 INJECTION, SOLUTION SUBCUTANEOUS at 10:06

## 2022-06-13 RX ADMIN — HYDROCODONE BITARTRATE AND ACETAMINOPHEN 1 TABLET: 7.5; 325 TABLET ORAL at 05:06

## 2022-06-13 RX ADMIN — LABETALOL HYDROCHLORIDE 400 MG: 200 TABLET, FILM COATED ORAL at 05:06

## 2022-06-13 RX ADMIN — HYDROCODONE BITARTRATE AND ACETAMINOPHEN 1 TABLET: 7.5; 325 TABLET ORAL at 08:06

## 2022-06-13 RX ADMIN — INSULIN ASPART 4 UNITS: 100 INJECTION, SOLUTION INTRAVENOUS; SUBCUTANEOUS at 05:06

## 2022-06-13 NOTE — PLAN OF CARE
Updated clinicals reached out to Paty @[facility] for an updated this is the pt first choice she stated that the referral is still in review

## 2022-06-13 NOTE — PT/OT/SLP PROGRESS
Physical Therapy Treatment    Patient Name:  Natty Nelson   MRN:  [id number]    Recommendations:     Discharge Recommendations:  rehabilitation facility, nursing facility, skilled   Discharge Equipment Recommendations: other (see comments) (pending progress)   Barriers to discharge: impaired mobility tolerance    Assessment:     Natty Nelson is a 82 y.o. female admitted with a medical diagnosis of s/p PCDF.  She presents with the following impairments/functional limitations:  weakness, impaired endurance, impaired functional mobilty, gait instability, impaired balance, pain. Patient's mobility significantly affected by pain, unable to tolerate sitting EOB >5min today. Pt is appropriate for continued acute PT services to improve activity tolerance and independence with mobility.     Rehab Prognosis: Good; patient would benefit from acute skilled PT services to address these deficits and reach maximum level of function.    Recent Surgery: Procedure(s) (LRB):  FUSION, SPINE, POSTERIOR SPINAL COLUMN, CERVICAL, USING COMPUTER-ASSISTED NAVIGATION (N/A) 10 Days Post-Op    Plan:     During this hospitalization, patient to be seen daily (daily-BID) to address the identified rehab impairments via gait training, therapeutic activities, therapeutic exercises, neuromuscular re-education and progress toward the following goals:    · Plan of Care Expires:  06/30/22    Subjective     Chief Complaint: pain at surgical site  Patient/Family Comments/goals: to get stronger, reduce pain  Pain/Comfort:  · Pain Rating 1:  (Minimal pain at rest, increased significantly with sitting EOB- unable to tolerate longer than 2-3min.)  · Location 1: neck  · Pain Addressed 1: Reposition, Cessation of Activity      Objective:     Communicated with RN prior to session.  Patient found supine with central line, cervical collar, SCD upon PT entry to room.     General Precautions: Standard, fall   Orthopedic Precautions:spinal precautions   Braces:  Cervical collar  Respiratory Status: Room air     Functional Mobility:  · Bed Mobility:  Supine to Sit: moderate assistance  · Balance: Pt able to maintain static sitting balance at EOB for approx 5 min with min-modA for trunk support. Patient significantly limited by pain at surgical site. Patient repositioned appropriately to comfort with all needs met, call bell in reach.      AM-PAC 6 CLICK MOBILITY  Turning over in bed (including adjusting bedclothes, sheets and blankets)?: 3  Sitting down on and standing up from a chair with arms (e.g., wheelchair, bedside commode, etc.): 2  Moving from lying on back to sitting on the side of the bed?: 2  Moving to and from a bed to a chair (including a wheelchair)?: 2  Need to walk in hospital room?: 1  Climbing 3-5 steps with a railing?: 1  Basic Mobility Total Score: 11       Therapeutic Activities and Exercises:   Patient tolerated PT session fairly, mobilizing to EOB w/ modA, maintaining static sitting balance w/ min-modA. Patient limited by pain in posterior neck at surgical site. Upon returning to supine, PT educated patient on BLE exercises to maintain ROM and strength at bed-level.     Patient left HOB elevated with all lines intact, call button in reach and RN notified..    GOALS:   Multidisciplinary Problems     Physical Therapy Goals        Problem: Physical Therapy    Goal Priority Disciplines Outcome Goal Variances Interventions   Physical Therapy Goal     PT, PT/OT Ongoing, Progressing     Description: Goals to be met by: 2022    Patient will increase functional independence with mobility by performin. Sit to stand transfer with Modified [place]  2. Bed to chair transfer with Modified [place] using Rolling Walker  3. Gait  x 150 feet with Modified [place] using Rolling Walker.                      Time Tracking:     PT Received On: 22  PT Start Time: 1127     PT Stop Time: 1140  PT Total Time (min): 13 min     Billable Minutes:  Therapeutic Activity 13min    Treatment Type: Treatment  PT/PTA: PT     PTA Visit Number: 3     06/13/2022

## 2022-06-13 NOTE — PROGRESS NOTES
[facility]  Hospital Medicine Progress Note        Chief Complaint: Inpatient follow-up on neck pain    HPI:   Ms. Nelson is an 82-year-old  female with severe cervical stenosis status post discectomy with fusion of C5-C6 on 05/09/2022 by Dr. Joseph and ongoing bilateral lower extremity weakness since April 20, 2022, who presents to the ED from [facility] rehab for abnormal MRI with complaints of neck pain ongoing since surgery. She was seen at [facility] ED and underwent CT Cervical Spine that showed concerns for possible bone marrow edema at C3. MRI was also obtained that did not show this however it did show postoperative changes including stenosis around C5 -please note that this is all per secondary report from the ER physician. Images were not available. ED physician spoke to Neurosurgery who recommended repeating MRI Cervical Spine w/wo in AM.  Her laboratory work was unremarkable except for a mild worsening of her anemia with a hemoglobin of 8.2 (post-op Hgb 9.9). She was admitted to the hospitalist service for further management.     Patient admitted for severe intractable neck pain after a C5-C6 diskectomies recent procedure.  Neurosurgery has been consulted.  MRI C-spine ordered.  Neurosurgery performed C3 to C7 PCDF on 6/3.          Interval Hx:   Nursing staff reports patient waits until she is in severe pain before asking for analgesics otherwise no acute issues have been reported.  Patient is resting comfortably with soft cervical collar on at this time.  Patient is afebrile, on room air, and hemodynamically stable.      Objective/physical exam:  General: Obese  female in no acute distress  HENT: normocephalic, atraumatic  Eye: PERRL, EOMI, clear conjunctiva  Neck:  Incision is clean dry and intact, AYLA drain is in place, soft collar is in place  Respiratory: clear to auscultation bilaterally  Cardiovascular: regular rate and rhythm  Gastrointestinal:  non-distended, positive bowel sounds  Musculoskeletal: no gross deformity  Integumentary: warm, dry, intact, no rashes  Neurological: cranial nerves grossly intact, unable to lift legs off bed (ongoing since surgery) able to dorsi and plantar flex both feet with normal strength  Psychiatric: cooperative    VITAL SIGNS: 24 HRS MIN & MAX LAST   Temp  Min: 97.4 °F (36.3 °C)  Max: 98.1 °F (36.7 °C) 97.9 °F (36.6 °C)   BP  Min: 138/61  Max: 186/67 (!) 155/70   Pulse  Min: 70  Max: 80  70   Resp  Min: 16  Max: 20 18   SpO2  Min: 97 %  Max: 100 % 97 %       No results for input(s): WBC, RBC, HGB, HCT, MCV, MCH, MCHC, RDW, PLT, MPV, GRAN, LYMPH, MONO, BASO, NRBC in the last 168 hours.    No results for input(s): NA, K, CL, CO2, ANIONGAP, BUN, CREATININE, GLU, CALCIUM, PH, MG, ALBUMIN, PROT, ALKPHOS, ALT, AST, BILITOT in the last 168 hours.       Microbiology Results (last 7 days)     ** No results found for the last 168 hours. **           See below for Radiology    Scheduled Med:   atorvastatin  40 mg Oral Daily    baclofen  5 mg Oral BID    bisacodyL  10 mg Rectal Daily    enoxaparin  40 mg Subcutaneous Daily    glipiZIDE  5 mg Oral Daily with breakfast    insulin detemir U-100  40 Units Subcutaneous BID    labetaloL  400 mg Oral Q8H    latanoprost  1 drop Both Eyes Nightly    NIFEdipine  90 mg Oral Daily    pantoprazole  40 mg Oral Daily    pregabalin  75 mg Oral BID        Continuous Infusions:  None.    PRN Meds:  acetaminophen, albuterol-ipratropium, aluminum-magnesium hydroxide-simethicone, bisacodyL, hydrALAZINE, HYDROcodone-acetaminophen, HYDROmorphone, insulin aspart U-100, lorazepam, polyethylene glycol, senna-docusate 8.6-50 mg, trazodone       Assessment/Plan:  Neck Pain in the setting of recent cervical discectomy/fusion of C5-6  - s/p PCDF of C3-C7 on 06/03/2022  Ongoing BLE weakness 2/2 to above  Essential hypertension  Acute hypoxic respiratory failure likely secondary to volume overload,  resolved   LALIT on Stage IIIB CKD  Non-insulin-dependent type 2 diabetes mellitus with hyperglycemia   CAD/CABG  JODI/Right CEA  PAD      Plan:  Continue pain control, physical therapy, occupational therapy, blood pressure control, diabetes control, and other supportive care  Completed Decadron taper  Titrate Levemir insulin to achieve blood glucose control.  Changed to twice daily dosing yesterday.     Patient condition:  Stable     Anticipated discharge and Disposition:   Inpatient rehabilitation versus transitional care unit versus skilled nursing facility      All diagnosis and differential diagnosis have been reviewed; assessment and plan has been documented; I have personally reviewed the labs and test results that are presently available; I have reviewed the patients medication list; I have reviewed the consulting providers response and recommendations. I have reviewed or attempted to review medical records based upon their availability    All of the patient's questions have been  addressed and answered. Patient's is agreeable to the above stated plan. I will continue to monitor closely and make adjustments to medical management as needed.  _____________________________________________________________________    Nutrition Status:    Radiology:  X-Ray Chest PA And Lateral  Narrative: EXAMINATION:  XR CHEST PA AND LATERAL    CLINICAL HISTORY:  ; worsening oxygen requirement ?cause;    TECHNIQUE:  Portable AP view of the chest.    COMPARISON:  [facility] 2022    FINDINGS:  Lines/tubes/devices:  Life-support devices remain in similar position.    The cardiac silhouette and central vascular structures are unchanged.  The trachea is midline. No new or worsening consolidation is identified. There is no enlarging pleural effusion or convincing pneumothorax.    Regional osseous structures and extrathoracic soft tissues are similar.  Impression: No evidence of new or worsening cardiopulmonary process.    Electronically  signed by: Mateo Donnelly  Date:    06/05/2022  Time:    14:13      Russ Tucker MD   06/13/2022

## 2022-06-13 NOTE — PT/OT/SLP RE-EVAL
Occupational Therapy   Re-evaluation    Name: Natty Nelson  MRN: [id number]  Admitting Diagnosis:  S/p C3-C7 PCDF  Recent Surgery: Procedure(s) (LRB):  FUSION, SPINE, POSTERIOR SPINAL COLUMN, CERVICAL, USING COMPUTER-ASSISTED NAVIGATION (N/A) 10 Days Post-Op    Recommendations:     Discharge Recommendations: nursing facility, skilled, rehabilitation facility  Discharge Equipment Recommendations:   TBD by next level of care  Barriers to discharge:   Severity of Deficits    Assessment:     Natty Nelson is a 82 y.o. female with a medical diagnosis of s/p C3-C7 PCDF. Performance deficits affecting function are weakness, gait instability, decreased lower extremity function, impaired balance, impaired endurance, orthopedic precautions, pain, impaired self care skills, impaired functional mobilty.  Pt is making poor progress toward OT goals. Pt is always motivated, however cont to be significantly limited by pain to posterior c-spine area and B shoulders. Pt's pain is not well controlled, and pt noted to be tearful. Pt has demonstrated decreased ax tolerance since initial eval, and requires greater assistance since initial eval. OT recs for d/c include IPR vs SNF, pending pt progress.     Rehab Prognosis:  Fair; patient would benefit from acute skilled OT services to address these deficits and reach maximum level of function.       Plan:     Patient to be seen 5 x/week to address the above listed problems via self-care/home management, therapeutic activities, therapeutic exercises  · Plan of Care Expires: 06/27/22  · Plan of Care Reviewed with: patient    Subjective     Chief Complaint: Pain  Patient/Family stated goals: To get well  Communicated with: RN prior to session.  Pain/Comfort:  · Pain Rating 1: 10/10  · Location - Orientation 1: posterior  · Location 1: cervical spine  · Pain Addressed 1: Reposition, Cessation of Activity, Nurse notified    Objective:     Communicated with: RN prior to session.  Patient  found supine with: SCD upon OT entry to room.    General Precautions: Standard, fall   Orthopedic Precautions:spinal precautions   Braces: Cervical collar  Respiratory Status: Room air    Occupational Performance:    Bed Mobility:    · Patient completed Supine to Sit with maximal assistance and 2 persons; pt unable to tolerate sitting EOB for greater that 2 minutes, requesting to return to supine  · Patient completed Sit to Supine with maximal assistance and 2 persons    Functional Mobility/Transfers:  · Not performed 2/2 pain    Activities of Daily Living:  · Not performed 2/2 pain    Cognitive/Visual Perceptual:  Cognitive/Psychosocial Skills:     -       Oriented to: Person, Place, Time and Situation   -       Follows Commands/attention:Follows two-step commands  -       Safety awareness/insight to disability: impaired     Physical Exam:  Balance: -       min-mod A for static sitting balance 2/2 pain  Sensation:    -       Intact  light/touch BUEs  Upper Extremity Range of Motion:     -       Right Upper Extremity: WNL, however pt reports pain w/ should flex at 90 degrees  -       Left Upper Extremity: WNL, however pt reports pain w/ should flex at 90 degrees  Upper Extremity Strength: -       Right Upper Extremity: 3+/5  -       Left Upper Extremity: 3+/5    AMPAC 6 Click:  AMPAC Total Score: 11Education:      Patient left supine with call button in reach and RN notified    GOALS:   Multidisciplinary Problems     Occupational Therapy Goals        Problem: Occupational Therapy    Goal Priority Disciplines Outcome Interventions   Occupational Therapy Goal     OT, PT/OT Ongoing, Progressing    Description: LTG: Pt will perform ADL with Mod I from w/c by dc.    STG: to be met in 2 weeks, by 6/27  1. Pt will feed self with SBA within diet recs. Met as of 06/13  2. Pt will perform grooming EOB with SBA, progressing cont  3. Pt will perform UB dressing with min A, progressing cont  4. Pt will perform toileting/bsc t/f  with mod A, modified, progressing cont  5. Pt will tolerate EOB ax x5 minutes w/ SBA, progressing cont                   History:     Past Medical History:   Diagnosis Date    Arthritis     Diabetes mellitus     Hypertension        Past Surgical History:   Procedure Laterality Date    ANTERIOR CERVICAL DISCECTOMY W/ FUSION Bilateral 5/9/2022    Procedure: DISCECTOMY, SPINE, CERVICAL, ANTERIOR APPROACH, WITH FUSION;  Surgeon: Toni Joseph MD;  Location: [facility];  Service: Neurosurgery;  Laterality: Bilateral;  ACDF C5/6       Time Tracking:     OT Date of Treatment: 06/13/22  OT Start Time: 1127  OT Stop Time: 1137  OT Total Time (min): 10 min    Billable Minutes:Re-eval 10 Minutes    6/13/2022

## 2022-06-13 NOTE — PROGRESS NOTES
[facility] - 9th Floor Med Surg  Neurosurgery  Progress note        POD# 10 PCDF of C3-C7  Sitting up in bed, NAD  Her back pain is controlled  She continues to deny numbness and tingling bilateral UE and LE  Soft collar in place    AFVSS  Moves all extremities. Motor exam unchanged  Surgical incision clean and dry  AYLA site dry    Plan:    Soft collar in place  Medical management per hospitalist for diabetes and hypertension.  Pain control  SCDs and lovenox for DVT prophylaxis  Fall precautions  PT/OT  CM working on placement; ok to transfer when accepted       KRISTIN Deluna  Neurosurgery  [facility] - 9th Floor Med Surg

## 2022-06-13 NOTE — PLAN OF CARE
Problem: Occupational Therapy  Goal: Occupational Therapy Goal  Description: LTG: Pt will perform ADL with Mod I from w/c by dc.    STG: to be met in 2 weeks, by 6/27  1. Pt will feed self with SBA within diet recs. Met as of 06/13  2. Pt will perform grooming EOB with SBA, progressing cont  3. Pt will perform UB dressing with min A, progressing cont  4. Pt will perform toileting/bsc t/f with mod A, modified, progressing cont  5. Pt will tolerate EOB ax x5 minutes w/ SBA, progressing cont  Outcome: Ongoing, Progressing   POC Updated

## 2022-06-14 LAB
POCT GLUCOSE: 159 MG/DL (ref 70–110)
POCT GLUCOSE: 226 MG/DL (ref 70–110)
POCT GLUCOSE: 264 MG/DL (ref 70–110)
POCT GLUCOSE: 68 MG/DL (ref 70–110)

## 2022-06-14 PROCEDURE — C9399 UNCLASSIFIED DRUGS OR BIOLOG: HCPCS | Performed by: INTERNAL MEDICINE

## 2022-06-14 PROCEDURE — 25000003 PHARM REV CODE 250: Performed by: INTERNAL MEDICINE

## 2022-06-14 PROCEDURE — 63600175 PHARM REV CODE 636 W HCPCS: Performed by: INTERNAL MEDICINE

## 2022-06-14 PROCEDURE — 25500020 PHARM REV CODE 255: Performed by: INTERNAL MEDICINE

## 2022-06-14 PROCEDURE — 97530 THERAPEUTIC ACTIVITIES: CPT

## 2022-06-14 PROCEDURE — 25000003 PHARM REV CODE 250: Performed by: NURSE PRACTITIONER

## 2022-06-14 PROCEDURE — 97110 THERAPEUTIC EXERCISES: CPT

## 2022-06-14 PROCEDURE — 11000001 HC ACUTE MED/SURG PRIVATE ROOM

## 2022-06-14 PROCEDURE — A9577 INJ MULTIHANCE: HCPCS | Performed by: INTERNAL MEDICINE

## 2022-06-14 RX ADMIN — PREGABALIN 75 MG: 75 CAPSULE ORAL at 09:06

## 2022-06-14 RX ADMIN — NIFEDIPINE 90 MG: 90 TABLET, FILM COATED, EXTENDED RELEASE ORAL at 09:06

## 2022-06-14 RX ADMIN — LATANOPROST 1 DROP: 50 SOLUTION OPHTHALMIC at 08:06

## 2022-06-14 RX ADMIN — HYDROCODONE BITARTRATE AND ACETAMINOPHEN 1 TABLET: 7.5; 325 TABLET ORAL at 05:06

## 2022-06-14 RX ADMIN — GADOBENATE DIMEGLUMINE 20 ML: 529 INJECTION, SOLUTION INTRAVENOUS at 11:06

## 2022-06-14 RX ADMIN — GLIPIZIDE 5 MG: 5 TABLET, FILM COATED, EXTENDED RELEASE ORAL at 09:06

## 2022-06-14 RX ADMIN — INSULIN DETEMIR 40 UNITS: 100 INJECTION, SOLUTION SUBCUTANEOUS at 09:06

## 2022-06-14 RX ADMIN — PREGABALIN 75 MG: 75 CAPSULE ORAL at 08:06

## 2022-06-14 RX ADMIN — HYDROCODONE BITARTRATE AND ACETAMINOPHEN 1 TABLET: 7.5; 325 TABLET ORAL at 01:06

## 2022-06-14 RX ADMIN — ATORVASTATIN CALCIUM 40 MG: 40 TABLET, FILM COATED ORAL at 05:06

## 2022-06-14 RX ADMIN — PANTOPRAZOLE SODIUM 40 MG: 40 TABLET, DELAYED RELEASE ORAL at 09:06

## 2022-06-14 RX ADMIN — LABETALOL HYDROCHLORIDE 400 MG: 200 TABLET, FILM COATED ORAL at 02:06

## 2022-06-14 RX ADMIN — ENOXAPARIN SODIUM 40 MG: 40 INJECTION SUBCUTANEOUS at 05:06

## 2022-06-14 RX ADMIN — LABETALOL HYDROCHLORIDE 400 MG: 200 TABLET, FILM COATED ORAL at 05:06

## 2022-06-14 RX ADMIN — HYDROCODONE BITARTRATE AND ACETAMINOPHEN 1 TABLET: 7.5; 325 TABLET ORAL at 10:06

## 2022-06-14 RX ADMIN — HYDROMORPHONE HYDROCHLORIDE 1 MG: 2 INJECTION, SOLUTION INTRAMUSCULAR; INTRAVENOUS; SUBCUTANEOUS at 02:06

## 2022-06-14 RX ADMIN — LABETALOL HYDROCHLORIDE 400 MG: 200 TABLET, FILM COATED ORAL at 08:06

## 2022-06-14 RX ADMIN — TRAZODONE HYDROCHLORIDE 25 MG: 50 TABLET ORAL at 08:06

## 2022-06-14 RX ADMIN — INSULIN ASPART 4 UNITS: 100 INJECTION, SOLUTION INTRAVENOUS; SUBCUTANEOUS at 07:06

## 2022-06-14 RX ADMIN — BACLOFEN 5 MG: 5 TABLET ORAL at 08:06

## 2022-06-14 RX ADMIN — BACLOFEN 5 MG: 5 TABLET ORAL at 09:06

## 2022-06-14 RX ADMIN — SENNOSIDES AND DOCUSATE SODIUM 1 TABLET: 50; 8.6 TABLET ORAL at 08:06

## 2022-06-14 RX ADMIN — LORAZEPAM 1 MG: 2 INJECTION INTRAMUSCULAR; INTRAVENOUS at 08:06

## 2022-06-14 NOTE — PT/OT/SLP PROGRESS
Physical Therapy  Treatment    Natty Nelson   MRN: [id number]   Admitting Diagnosis: <principal problem not specified>    PT Received On: 06/14/22  PT Start Time: 1400     PT Stop Time: 1415    PT Total Time (min): 15 min       Billable Minutes:  Therapeutic Exercise 15min    Treatment Type: Treatment  PT/PTA: PT     PTA Visit Number: 3       General Precautions: Standard, fall  Orthopedic Precautions: spinal precautions   Braces: Cervical collar  Respiratory Status: Room air    Spiritual, Cultural Beliefs, Amish Practices, Values that Affect Care: no    Subjective:  Communicated with RN prior to session.  Patient in pain following OT session, requested not to mobilize, patient receiving pain medication from RN upon arrival.     Pain/Comfort  Pain Rating 1:  (RN present in room administering medication)  Pain Addressed 1: Pre-medicate for activity, Reposition    Objective:   Patient found with: central line, cervical collar, peripheral IV, telemetry, pulse ox (continuous)    Functional Mobility:  Bed Mobility:    PT assisted RN with rolling patient to examine surgical site and take photos, mod-maxA for rolling to R.           Therapeutic Activities and Exercises:  Patient participated in LE exercises at bed level: ankle pumps, heel slides, glute squeezes x20 each. Pt educated on importance of continuing to exercise LEs in bed.      AM-PAC 6 CLICK MOBILITY  How much help from another person does this patient currently need?   1 = Unable, Total/Dependent Assistance  2 = A lot, Maximum/Moderate Assistance  3 = A little, Minimum/Contact Guard/Supervision  4 = None, Modified [place]/Independent    Turning over in bed (including adjusting bedclothes, sheets and blankets)?: 2  Sitting down on and standing up from a chair with arms (e.g., wheelchair, bedside commode, etc.): 2  Moving from lying on back to sitting on the side of the bed?: 2  Moving to and from a bed to a chair (including a wheelchair)?: 2  Need to  walk in hospital room?: 1  Climbing 3-5 steps with a railing?: 1  Basic Mobility Total Score: 10    AM-PAC Raw Score CMS G-Code Modifier Level of Impairment Assistance   6 % Total / Unable   7 - 9 CM 80 - 100% Maximal Assist   10 - 14 CL 60 - 80% Moderate Assist   15 - 19 CK 40 - 60% Moderate Assist   20 - 22 CJ 20 - 40% Minimal Assist   23 CI 1-20% SBA / CGA   24 CH 0% Independent/ Mod I     Patient left supine with all lines intact, call button in reach and RN notified.    Assessment:  Natty Nelson is a 82 y.o. female with a medical diagnosis of s/p C3-7 PCDF and presents with neck pain and weakness.    Rehab identified problem list/impairments: Rehab identified problem list/impairments: weakness, impaired endurance, impaired sensation, impaired functional mobilty, gait instability, pain, orthopedic precautions    Rehab potential is good.    Activity tolerance: Fair, patient very motivated, limited by pain    Discharge recommendations: Discharge Facility/Level of Care Needs: rehabilitation facility     Barriers to discharge:  impaired functional mobility.    Equipment recommendations: Equipment Needed After Discharge: other (see comments) (pending progress)     GOALS:   Multidisciplinary Problems     Physical Therapy Goals        Problem: Physical Therapy    Goal Priority Disciplines Outcome Goal Variances Interventions   Physical Therapy Goal     PT, PT/OT Ongoing, Progressing     Description: Goals to be met by: 2022    Patient will increase functional independence with mobility by performin. Sit to stand transfer with Modified [place]  2. Bed to chair transfer with Modified [place] using Rolling Walker  3. Gait  x 150 feet with Modified [place] using Rolling Walker.                      PLAN:    Patient to be seen daily  to address the above listed problems via gait training, therapeutic activities, therapeutic exercises, neuromuscular re-education  Plan of Care expires:  07/01/22  Plan of Care reviewed with: patient         06/14/2022

## 2022-06-14 NOTE — NURSING
Pt has been complaining of extreme pain to her back. While working with PT and while laying flat, pt complains of sharp pain starting at the base of her cervical incision to the top of her incision as well as radiating pain to her left arm. Edema and redness is also noted around incision site.

## 2022-06-14 NOTE — PLAN OF CARE
Clinical updates sent to [facility]

## 2022-06-14 NOTE — PT/OT/SLP PROGRESS
"Occupational Therapy   Treatment    Name: Natty Nelson  MRN: [id number]  Admitting Diagnosis:  S/p C3-C7 PCDF  11 Days Post-Op    Recommendations:     Discharge Recommendations: nursing facility, skilled, rehabilitation facility  Discharge Equipment Recommendations:   TBD by next level of care  Barriers to discharge:   Severity of Deficits    Assessment:     Natty Nelson is a 82 y.o. female with a medical diagnosis of s/p C3-C7 PCDF. Performance deficits affecting function are weakness, gait instability, decreased upper extremity function, decreased lower extremity function, impaired balance, impaired endurance, orthopedic precautions, pain, impaired self care skills, impaired functional mobilty. Pt making poor progress toward OT goals. Pt cont to be limited by significant pain. Pt motivated to participate in therapy, however cannot tolerate any mobility 2/2 pain. Pt describes pain as sharp shooting to posterior cervical spine area and L scapula. Pt states, "something feels hard, like a blockage" when describing pain to L scapula. Pt noted w/ edema and redness surrounding incision site. Pt participated in sitting EOB for ax tolerance, able to tolerate only 1 min prior to requesting to return to supine. Pt tearful and crying. Discussed above assessment w/ RN and NSGY PA. Additionally, pt noted w/ impaired wrist and digit ext to BUEs, however greater impairments noted to LUE. OT recs for d/c includes IPR vs SNF, however until pt's pain can be controlled in order to progress ax and functional performance w/ therapy, pt would benefit from SNF.     Rehab Prognosis:  Poor; patient would benefit from acute skilled OT services to address these deficits and reach maximum level of function.       Plan:     Patient to be seen 5 x/week to address the above listed problems via self-care/home management, therapeutic activities, therapeutic exercises  · Plan of Care Expires: 06/27/22  · Plan of Care Reviewed with: " patient    Subjective     Pain/Comfort:  Pain Rating 1: 10/10  Location - Side 1: Left  Location 1: scapula  Pain Addressed 1: Nurse notified, Cessation of Activity, Reposition  Pain Rating Post-Intervention 1: 10/10  Location - Orientation 2: posterior  Location 2: cervical spine  Pain Addressed 2: Nurse notified, Cessation of Activity, Reposition    Objective:     Communicated with: RN prior to session.  Patient found supine with SCD upon OT entry to room.    General Precautions: Standard, fall   Orthopedic Precautions:spinal precautions   Braces: Cervical collar  Respiratory Status: Room air     Occupational Performance:     Bed Mobility:    · Patient completed Supine to Sit with maximal assistance and X 2 persons; pt tolerates sitting EOB approx. 1 min prior to returning to supine d/t pain  · Patient completed Sit to Supine with maximal assistance and X 2 persons     AMPA 6 Click ADL: 11    Treatment & Education:  Pt participated in AAROM to BUEs w/ semi-supine in bed, 10 reps X 1 set, all joints in all planes. Pt noted w/ inability to actively perform wrist and digit extension, however can achieve full PROM.     Patient left supine with call button in reach and RN notifiedEducation:      GOALS:   Multidisciplinary Problems     Occupational Therapy Goals        Problem: Occupational Therapy    Goal Priority Disciplines Outcome Interventions   Occupational Therapy Goal     OT, PT/OT Ongoing, Progressing    Description: LTG: Pt will perform ADL with Mod I from w/c by dc.    STG: to be met in 2 weeks, by 6/27  1. Pt will feed self with SBA within diet recs. Met as of 06/13  2. Pt will perform grooming EOB with SBA, progressing cont  3. Pt will perform UB dressing with min A, progressing cont  4. Pt will perform toileting/bsc t/f with mod A, modified, progressing cont  5. Pt will tolerate EOB ax x5 minutes w/ SBA, progressing cont                   Time Tracking:     OT Date of Treatment: 06/14/22  OT Start Time:  1349  OT Stop Time: 1412  OT Total Time (min): 23 min    Billable Minutes:Therapeutic Activity 10  Therapeutic Exercise 13    OT/CLAYTON: OT     CLAYTON Visit Number: 1    6/14/2022

## 2022-06-15 LAB
POCT GLUCOSE: 203 MG/DL (ref 70–110)
POCT GLUCOSE: 272 MG/DL (ref 70–110)
POCT GLUCOSE: 284 MG/DL (ref 70–110)
POCT GLUCOSE: 59 MG/DL (ref 70–110)
POCT GLUCOSE: 78 MG/DL (ref 70–110)

## 2022-06-15 PROCEDURE — 11000001 HC ACUTE MED/SURG PRIVATE ROOM

## 2022-06-15 PROCEDURE — 25000003 PHARM REV CODE 250: Performed by: INTERNAL MEDICINE

## 2022-06-15 PROCEDURE — C9399 UNCLASSIFIED DRUGS OR BIOLOG: HCPCS | Performed by: INTERNAL MEDICINE

## 2022-06-15 PROCEDURE — 25000003 PHARM REV CODE 250: Performed by: NURSE PRACTITIONER

## 2022-06-15 PROCEDURE — 63600175 PHARM REV CODE 636 W HCPCS: Performed by: INTERNAL MEDICINE

## 2022-06-15 PROCEDURE — 97110 THERAPEUTIC EXERCISES: CPT

## 2022-06-15 PROCEDURE — 97530 THERAPEUTIC ACTIVITIES: CPT

## 2022-06-15 PROCEDURE — 25000003 PHARM REV CODE 250: Performed by: NEUROLOGICAL SURGERY

## 2022-06-15 RX ADMIN — BACLOFEN 5 MG: 5 TABLET ORAL at 09:06

## 2022-06-15 RX ADMIN — PREGABALIN 75 MG: 75 CAPSULE ORAL at 09:06

## 2022-06-15 RX ADMIN — HYDROCODONE BITARTRATE AND ACETAMINOPHEN 1 TABLET: 7.5; 325 TABLET ORAL at 03:06

## 2022-06-15 RX ADMIN — HYDROCODONE BITARTRATE AND ACETAMINOPHEN 1 TABLET: 7.5; 325 TABLET ORAL at 07:06

## 2022-06-15 RX ADMIN — LABETALOL HYDROCHLORIDE 400 MG: 200 TABLET, FILM COATED ORAL at 02:06

## 2022-06-15 RX ADMIN — HYDROCODONE BITARTRATE AND ACETAMINOPHEN 1 TABLET: 7.5; 325 TABLET ORAL at 11:06

## 2022-06-15 RX ADMIN — LABETALOL HYDROCHLORIDE 400 MG: 200 TABLET, FILM COATED ORAL at 05:06

## 2022-06-15 RX ADMIN — HYDROCODONE BITARTRATE AND ACETAMINOPHEN 1 TABLET: 7.5; 325 TABLET ORAL at 01:06

## 2022-06-15 RX ADMIN — INSULIN ASPART 3 UNITS: 100 INJECTION, SOLUTION INTRAVENOUS; SUBCUTANEOUS at 10:06

## 2022-06-15 RX ADMIN — PANTOPRAZOLE SODIUM 40 MG: 40 TABLET, DELAYED RELEASE ORAL at 08:06

## 2022-06-15 RX ADMIN — INSULIN ASPART 4 UNITS: 100 INJECTION, SOLUTION INTRAVENOUS; SUBCUTANEOUS at 11:06

## 2022-06-15 RX ADMIN — PREGABALIN 75 MG: 75 CAPSULE ORAL at 08:06

## 2022-06-15 RX ADMIN — ENOXAPARIN SODIUM 40 MG: 40 INJECTION SUBCUTANEOUS at 04:06

## 2022-06-15 RX ADMIN — BACLOFEN 5 MG: 5 TABLET ORAL at 08:06

## 2022-06-15 RX ADMIN — LABETALOL HYDROCHLORIDE 400 MG: 200 TABLET, FILM COATED ORAL at 10:06

## 2022-06-15 RX ADMIN — NIFEDIPINE 90 MG: 90 TABLET, FILM COATED, EXTENDED RELEASE ORAL at 08:06

## 2022-06-15 RX ADMIN — GLIPIZIDE 5 MG: 5 TABLET, FILM COATED, EXTENDED RELEASE ORAL at 08:06

## 2022-06-15 RX ADMIN — LATANOPROST 1 DROP: 50 SOLUTION OPHTHALMIC at 09:06

## 2022-06-15 RX ADMIN — BISACODYL 10 MG: 10 SUPPOSITORY RECTAL at 08:06

## 2022-06-15 RX ADMIN — INSULIN DETEMIR 55 UNITS: 100 INJECTION, SOLUTION SUBCUTANEOUS at 10:06

## 2022-06-15 RX ADMIN — ATORVASTATIN CALCIUM 40 MG: 40 TABLET, FILM COATED ORAL at 04:06

## 2022-06-15 RX ADMIN — INSULIN ASPART 6 UNITS: 100 INJECTION, SOLUTION INTRAVENOUS; SUBCUTANEOUS at 04:06

## 2022-06-15 NOTE — PT/OT/SLP PROGRESS
Occupational Therapy   Treatment    Name: Natty Nelson  MRN: [id number]  Admitting Diagnosis:  S/p C3-C7 PCDF  12 Days Post-Op    Recommendations:     Discharge Recommendations: nursing facility, skilled, rehabilitation facility  Discharge Equipment Recommendations:  TBD by next level of care  Barriers to discharge:  Severity of Deficits    Assessment:     Natty Nelson is a 82 y.o. female with a medical diagnosis of S/p C3-C7 PCDF. Performance deficits affecting function are weakness, gait instability, impaired endurance, impaired balance, decreased lower extremity function, impaired coordination, impaired self care skills, pain, orthopedic precautions, impaired functional mobilty. Pt cont to be limited by pain, unable to tolerate any mobility. Pt able to sit EOB approx. 30 sec prior to returning to supine d/t pain. Pt tearful during session. Tx limited to bed level UE exercises, in which pt tolerated well. Discussed assessment w/ RN. OT recs for d/c include IPR vs SNF.    Rehab Prognosis:  Poor; patient would benefit from acute skilled OT services to address these deficits and reach maximum level of function.       Plan:     Patient to be seen 5 x/week to address the above listed problems via self-care/home management, therapeutic activities, therapeutic exercises  · Plan of Care Expires: 06/27/22  · Plan of Care Reviewed with: patient    Subjective     Pain/Comfort:  · Pain Rating 1: 10/10  · Location - Side 1: Left  · Location 1: scalp  · Pain Addressed 1: Reposition, Nurse notified, Cessation of Activity  · Pain Rating 2: 10/10  · Location - Orientation 2: posterior  · Location 2: cervical spine  · Pain Addressed 2: Reposition, Nurse notified, Cessation of Activity    Objective:     Communicated with: RN prior to session.  Patient found supine with ESTELLE Weiss, brief upon OT entry to room.    General Precautions: Standard, fall   Orthopedic Precautions:spinal precautions   Braces:  soft cervical  collar  Respiratory Status: Room air     Occupational Performance:     Bed Mobility:    · Patient completed Rolling/Turning to Right with moderate assistance to remove soiled brief. Pt unable to tolerate turning to L side 2/2 pain  · Patient completed Supine to Sit with maximal assistance and 2 persons. Pt able to tolerate approx. 30 sec sitting EOB prior to returning to supine  · Patient completed Sit to Supine with maximal assistance and 2 persons     Activities of Daily Living:   · Toilet: Pt noted w/ urine soiled brief, requiring total A to doff and perform perineal hygiene. Discussed w/ RN to keep briefs doffed for maintenance of skin integrity.    [facility] 6 Click ADL: 11    Treatment & Education:  Pt performed UE strengthening at bed level. Pt performed AAROM to L digits and wrist in flex/ext, as pt demonstrates inability to achieve full ROM in extension, 10 reps X 1 set. Pt performed AROM to R digits and wrist in flex/ext, and B elbow and shoulder flex/ext, 10 reps X 1 set. Did not exceed 90 degress of shoulder flex 2/2 cervical pxns. Pt reports tolerable pain during exercises.     Patient left supine with call button in reach and RN notifiedEducation:      GOALS:   Multidisciplinary Problems     Occupational Therapy Goals        Problem: Occupational Therapy    Goal Priority Disciplines Outcome Interventions   Occupational Therapy Goal     OT, PT/OT Ongoing, Progressing    Description: LTG: Pt will perform ADL with Mod I from w/c by dc.    STG: to be met in 2 weeks, by 6/27  1. Pt will feed self with SBA within diet recs. Met as of 06/13  2. Pt will perform grooming EOB with SBA, progressing cont  3. Pt will perform UB dressing with min A, progressing cont  4. Pt will perform toileting/bsc t/f with mod A, modified, progressing cont  5. Pt will tolerate EOB ax x5 minutes w/ SBA, progressing cont                   Time Tracking:     OT Date of Treatment: 06/15/22  OT Start Time: 0951  OT Stop Time: 1016  OT  Total Time (min): 25 min    Billable Minutes:Therapeutic Activity 10  Therapeutic Exercise 15    OT/CLAYTON: OT     CLAYTON Visit Number: 2    6/15/2022

## 2022-06-15 NOTE — PT/OT/SLP PROGRESS
"Physical Therapy Treatment    Patient Name:  Natty Nelson   MRN:  [id number]    Recommendations:     Discharge Recommendations:  rehabilitation facility, nursing facility, skilled   Discharge Equipment Recommendations:  (TBD)   Barriers to discharge: impaired functional mobility, pain    Assessment:     Natty Nelson is a 82 y.o. female admitted with a medical diagnosis of s/p C3-7 PCDF.  She presents with the following impairments/functional limitations:  weakness, impaired endurance, gait instability, impaired balance, decreased lower extremity function, decreased upper extremity function, pain, orthopedic precautions. Patient willing to participate with PT today. Upon PT arrival, patient denied pain in neck. Patient wanted to attempt sitting EOB today but during the initial process she complained of excruciating pain and began to cry prompting repositioning back to supine. Patient has a willing and determined attitude to get better but states she doesn't understand why she is hurting so much. Patient appeared to be in less pain following repositioning to supine and agreed to participate in lower extremity exercises stating, " I have to get better".    Rehab Prognosis: Good; patient would benefit from acute skilled PT services to address these deficits and reach maximum level of function.    Recent Surgery: Procedure(s) (LRB):  FUSION, SPINE, POSTERIOR SPINAL COLUMN, CERVICAL, USING COMPUTER-ASSISTED NAVIGATION (N/A) 12 Days Post-Op    Plan:     During this hospitalization, patient to be seen daily to address the identified rehab impairments via gait training, therapeutic activities, therapeutic exercises, neuromuscular re-education and progress toward the following goals:    · Plan of Care Expires:  07/05/22    Subjective     Chief Complaint: pain with attempting to sit EOB; "I don't understand why I am hurting so much"  Patient/Family Comments/goals: get stronger, return home  Pain/Comfort:  · Pain Rating 1: " 0/10 (patient stated that she did not have any pain upon PT arrival.)  · Pain Rating 2: other (see comments) (Upon attempting to sit EOB, patient reported excruciating neck and shoulder pain prompting repositioning back to supine.)  · Location 2:  (posterior neck and bilateral shoulders)  · Pain Addressed 2: Reposition, Nurse notified  · Pain Rating Post-Intervention 2:  (no pain rating provided but patient noticeably in less pain)      Objective:     Communicated with RN prior to session.  Patient found HOB elevated with SCD, PureWick, telemetry, central line, peripheral IV upon PT entry to room.     General Precautions: Standard, fall   Orthopedic Precautions:spinal precautions   Braces: Cervical collar  Respiratory Status: Room air     Functional Mobility:  · Bed Mobility:     · Supine to Sit: moderate assistance. Increased trunk activation initiating activity but unable to complete due to increased pain.      AM-PAC 6 CLICK MOBILITY  Turning over in bed (including adjusting bedclothes, sheets and blankets)?: 2  Sitting down on and standing up from a chair with arms (e.g., wheelchair, bedside commode, etc.): 2  Moving from lying on back to sitting on the side of the bed?: 2  Moving to and from a bed to a chair (including a wheelchair)?: 2  Need to walk in hospital room?: 2  Climbing 3-5 steps with a railing?: 1  Basic Mobility Total Score: 11       Therapeutic Activities and Exercises:  Patient performed the following activities in supine: ankle pumps 20 reps x 1; bilateral heel slides 15 reps x 1; active assisted leg press 15 reps x 1; supine hip abduction 10 reps x 1 and 5 reps x 1; hip adduction (pillow squeezes) 15 reps x 1. Patient tolerated all exercises well and with no complaints of pain.     Patient left HOB elevated with all lines intact, call button in reach and RN present..    GOALS:   Multidisciplinary Problems     Physical Therapy Goals        Problem: Physical Therapy    Goal Priority Disciplines  Outcome Goal Variances Interventions   Physical Therapy Goal     PT, PT/OT Ongoing, Progressing     Description: Goals to be met by: 2022    Patient will increase functional independence with mobility by performin. Sit to stand transfer with Modified [place]  2. Bed to chair transfer with Modified [place] using Rolling Walker  3. Gait  x 150 feet with Modified [place] using Rolling Walker.                      Time Tracking:     PT Received On: 06/15/22  PT Start Time: 1400     PT Stop Time: 1415  PT Total Time (min): 15 min     Billable Minutes: Therapeutic Exercises x 15 minutes    Treatment Type: Treatment  PT/PTA: PT     PTA Visit Number: 4     06/15/2022

## 2022-06-15 NOTE — PROGRESS NOTES
[facility] - 9th [facility] Med Surg  Neurosurgery  Progress note        POD# 12 PCDF of C3-C7  Sitting up in bed, NAD  Patient did have issues with pain control in upper neck and traps yesterday per nurse report; at time of rounds, patient states pain is controlled  She continues to deny numbness and tingling bilateral UE and LE  PT states she is globally weak and did have issues with sitting up yesterday    AFVSS  Moves all extremities. Motor exam unchanged  Surgical incision clean and dry  AYLA site dry    Plan:    Continue soft collar when OOB  Medical management per hospitalist for diabetes and hypertension.  Pain control  SCDs and lovenox for DVT prophylaxis  Fall precautions  PT/OT  CM working on placement; ok to transfer when accepted       KRISTIN Deluna  Neurosurgery  [facility] - 9th Floor Med Surg

## 2022-06-15 NOTE — PLAN OF CARE
rec'vd a cb from [facility] @[facility] stated someone would be coming out tomorrow @7am to access the pt will update cm

## 2022-06-15 NOTE — PROGRESS NOTES
[facility]  Hospital Medicine Progress Note        Chief Complaint: Inpatient follow-up on neck pain    HPI:   Ms. Nelson is an 82-year-old  female with severe cervical stenosis status post discectomy with fusion of C5-C6 on 05/09/2022 by Dr. Joseph and ongoing bilateral lower extremity weakness since April 20, 2022, who presents to the ED from [facility] rehab for abnormal MRI with complaints of neck pain ongoing since surgery. She was seen at [facility] ED and underwent CT Cervical Spine that showed concerns for possible bone marrow edema at C3. MRI was also obtained that did not show this however it did show postoperative changes including stenosis around C5 -please note that this is all per secondary report from the ER physician. Images were not available. ED physician spoke to Neurosurgery who recommended repeating MRI Cervical Spine w/wo in AM.  Her laboratory work was unremarkable except for a mild worsening of her anemia with a hemoglobin of 8.2 (post-op Hgb 9.9). She was admitted to the hospitalist service for further management.     Patient admitted for severe intractable neck pain after a C5-C6 diskectomies recent procedure.  Neurosurgery has been consulted.  MRI C-spine ordered.  Neurosurgery performed C3 to C7 PCDF on 6/3.     Interval Hx:   Patient is resting comfortably with soft cervical collar off at this time.  Patient is afebrile, on room air, and hemodynamically stable.  No new issues reported.      Objective/physical exam:  General: Obese  female in no acute distress  HENT: normocephalic, atraumatic  Eye: PERRL, EOMI, clear conjunctiva  Neck:  Incision is clean dry and intact, AYLA drain is in place, soft collar is in place  Respiratory: clear to auscultation bilaterally  Cardiovascular: regular rate and rhythm  Gastrointestinal: non-distended, positive bowel sounds  Musculoskeletal: no gross deformity  Integumentary: warm, dry, intact, no  rashes  Neurological: cranial nerves grossly intact, unable to lift legs off bed (ongoing since surgery) able to dorsi and plantar flex both feet with normal strength  Psychiatric: cooperative    VITAL SIGNS: 24 HRS MIN & MAX LAST   Temp  Min: 97.6 °F (36.4 °C)  Max: 98.2 °F (36.8 °C) 98.2 °F (36.8 °C)   BP  Min: 146/67  Max: 172/80 (!) 160/64   Pulse  Min: 60  Max: 73  73   Resp  Min: 16  Max: 20 18   SpO2  Min: 87 %  Max: 99 % 98 %       No results for input(s): WBC, RBC, HGB, HCT, MCV, MCH, MCHC, RDW, PLT, MPV, GRAN, LYMPH, MONO, BASO, NRBC in the last 168 hours.    No results for input(s): NA, K, CL, CO2, ANIONGAP, BUN, CREATININE, GLU, CALCIUM, PH, MG, ALBUMIN, PROT, ALKPHOS, ALT, AST, BILITOT in the last 168 hours.       Microbiology Results (last 7 days)     ** No results found for the last 168 hours. **           See below for Radiology    Scheduled Med:   atorvastatin  40 mg Oral Daily    baclofen  5 mg Oral BID    bisacodyL  10 mg Rectal Daily    enoxaparin  40 mg Subcutaneous Daily    glipiZIDE  5 mg Oral Daily with breakfast    insulin detemir U-100  55 Units Subcutaneous BID    labetaloL  400 mg Oral Q8H    latanoprost  1 drop Both Eyes Nightly    NIFEdipine  90 mg Oral Daily    pantoprazole  40 mg Oral Daily    pregabalin  75 mg Oral BID        Continuous Infusions:  None.    PRN Meds:  acetaminophen, albuterol-ipratropium, aluminum-magnesium hydroxide-simethicone, bisacodyL, hydrALAZINE, HYDROcodone-acetaminophen, HYDROmorphone, insulin aspart U-100, polyethylene glycol, senna-docusate 8.6-50 mg, trazodone       Assessment/Plan:  Neck Pain in the setting of recent cervical discectomy/fusion of C5-6  - s/p PCDF of C3-C7 on 06/03/2022  Ongoing BLE weakness 2/2 to above  Essential hypertension  Acute hypoxic respiratory failure likely secondary to volume overload, resolved   LALIT on Stage IIIB CKD  Non-insulin-dependent type 2 diabetes mellitus with hyperglycemia   CAD/CABG  JODI/Right  CEA  PAD      Plan:  Continue pain control, physical therapy, occupational therapy, blood pressure control, diabetes control, and other supportive care  Completed Decadron taper  Titrate Levemir insulin to achieve blood glucose control.     Patient condition:  Stable     Anticipated discharge and Disposition:   Inpatient rehabilitation at [facility]      All diagnosis and differential diagnosis have been reviewed; assessment and plan has been documented; I have personally reviewed the labs and test results that are presently available; I have reviewed the patients medication list; I have reviewed the consulting providers response and recommendations. I have reviewed or attempted to review medical records based upon their availability    All of the patient's questions have been  addressed and answered. Patient's is agreeable to the above stated plan. I will continue to monitor closely and make adjustments to medical management as needed.  _____________________________________________________________________    Nutrition Status:    Radiology:  MRI Cervical Spine W WO Cont  Narrative: EXAMINATION:  MRI CERVICAL SPINE W WO CONTRAST    CLINICAL HISTORY:  Neck pain, acute, prior cervical surgery;    TECHNIQUE:  Multiplanar, multisequence MR imaging of the cervical spine with and without contrast.    COMPARISON:  MRI cervical spine dated 05/05/2022    FINDINGS:  There are postoperative changes of the cervical spine with posterior spinal fusion hardware extending from C4 through C7 and laminectomies at C3 through C7.  There is anterior fusion hardware at C5-C6 with disc spacer in place.  The remaining vertebral body heights are preserved.  The bone marrow is otherwise normal in signal.    Evaluation of the cord is limited secondary to artifact from the hardware and motion without definite new cord signal abnormality.  There is circumferential epidural enhancement throughout the cervical spine, measuring up to 3 mm in thickness  dorsally at the level of C2-C3, best visualized on sagittal images.    There is a heterogeneous collection in the posterior paraspinal soft tissues, largest at the level of C2-C3, measuring approximately 2 x 2.6 x 3.4 cm in size, with extension into the dorsal epidural space, and spinal canal stenosis with deformity of the central cord (series 6, image 16).  Heterogeneous fluid collection extends inferiorly along the posterior epidural space, measuring approximately 2.9 cm in length (series 4, image 7).    An additional more superficial fluid collection in the posterior paraspinal soft tissues measures approximately 2.5 x 1.8 x 3.2 cm in size at the level of C7-T1 (series 6, image 26).    Disc level analysis as follows:    C2-C3: Posterior disc osteophyte complex and circumferential epidural enhancement with moderate narrowing of the spinal canal and complete effacement of the CSF space.  Mild bilateral neural foraminal stenosis secondary to uncovertebral and facet hypertrophy.    C3-C4: Postoperative changes with posterior disc osteophyte complex, epidural enhancement and dorsal epidural fluid with moderate to severe narrowing of the spinal canal with effacement of the CSF space and deformity of the cord.  There is moderate right neural foraminal stenosis.  The left neural foramina is obscured by motion artifact.    C4-C5: Postoperative changes with circumferential epidural enhancement, dorsal epidural fluid and moderate narrowing of the spinal canal with deformity of the dorsal cord.  Mild to moderate bilateral neural foraminal stenosis secondary to uncovertebral and facet hypertrophy.    C5-C6: Postoperative changes with circumferential epidural enhancement and dorsal epidural fluid and moderate narrowing of the spinal canal.  Limited evaluation of the neural foramina.    C6-C7: Postoperative changes with posterior disc osteophyte complex, circumferential epidural enhancement and dorsal epidural fluid with  moderate narrowing of the canal and partial effacement of the CSF space.  Limited evaluation of the neural foramina.    C7-T1: Postoperative changes.  No disc herniation.  No significant spinal canal stenosis.  Mild bilateral neural foraminal stenosis secondary to uncovertebral and facet hypertrophy.  Impression: 1. Postoperative changes of the cervical spine with diffuse circumferential epidural enhancement and heterogeneous rim enhancing fluid in the laminectomy beds at C3-C6.  2. Moderate to severe canal stenosis at C3-C4, moderate at C2-C3 and C4-C7.  3. Multilevel neural foraminal stenoses as described.  4. Limited evaluation of the cord without definite new signal abnormality.  No major change from the [facility] interpretation.    Electronically signed by: Janell Cormier  Date:    06/15/2022  Time:    09:02      Russ Tucker MD   06/15/2022

## 2022-06-15 NOTE — PLAN OF CARE
[facility] was told she's in a meeting spoke with Paty @[facility] sated the referral is still in review will f/u

## 2022-06-16 LAB
POCT GLUCOSE: 111 MG/DL (ref 70–110)
POCT GLUCOSE: 145 MG/DL (ref 70–110)
POCT GLUCOSE: 180 MG/DL (ref 70–110)
POCT GLUCOSE: 183 MG/DL (ref 70–110)
POCT GLUCOSE: 90 MG/DL (ref 70–110)

## 2022-06-16 PROCEDURE — 25000003 PHARM REV CODE 250: Performed by: NURSE PRACTITIONER

## 2022-06-16 PROCEDURE — 97164 PT RE-EVAL EST PLAN CARE: CPT

## 2022-06-16 PROCEDURE — 25000003 PHARM REV CODE 250: Performed by: INTERNAL MEDICINE

## 2022-06-16 PROCEDURE — 97530 THERAPEUTIC ACTIVITIES: CPT | Mod: CO

## 2022-06-16 PROCEDURE — 11000001 HC ACUTE MED/SURG PRIVATE ROOM

## 2022-06-16 PROCEDURE — C9399 UNCLASSIFIED DRUGS OR BIOLOG: HCPCS | Performed by: INTERNAL MEDICINE

## 2022-06-16 PROCEDURE — 63600175 PHARM REV CODE 636 W HCPCS: Performed by: INTERNAL MEDICINE

## 2022-06-16 PROCEDURE — 97530 THERAPEUTIC ACTIVITIES: CPT

## 2022-06-16 RX ADMIN — NIFEDIPINE 90 MG: 90 TABLET, FILM COATED, EXTENDED RELEASE ORAL at 08:06

## 2022-06-16 RX ADMIN — HYDROCODONE BITARTRATE AND ACETAMINOPHEN 1 TABLET: 7.5; 325 TABLET ORAL at 09:06

## 2022-06-16 RX ADMIN — BACLOFEN 5 MG: 5 TABLET ORAL at 09:06

## 2022-06-16 RX ADMIN — LATANOPROST 1 DROP: 50 SOLUTION OPHTHALMIC at 09:06

## 2022-06-16 RX ADMIN — LABETALOL HYDROCHLORIDE 400 MG: 200 TABLET, FILM COATED ORAL at 01:06

## 2022-06-16 RX ADMIN — PREGABALIN 75 MG: 75 CAPSULE ORAL at 09:06

## 2022-06-16 RX ADMIN — PANTOPRAZOLE SODIUM 40 MG: 40 TABLET, DELAYED RELEASE ORAL at 08:06

## 2022-06-16 RX ADMIN — LABETALOL HYDROCHLORIDE 400 MG: 200 TABLET, FILM COATED ORAL at 05:06

## 2022-06-16 RX ADMIN — INSULIN DETEMIR 55 UNITS: 100 INJECTION, SOLUTION SUBCUTANEOUS at 08:06

## 2022-06-16 RX ADMIN — ATORVASTATIN CALCIUM 40 MG: 40 TABLET, FILM COATED ORAL at 04:06

## 2022-06-16 RX ADMIN — PREGABALIN 75 MG: 75 CAPSULE ORAL at 08:06

## 2022-06-16 RX ADMIN — GLIPIZIDE 5 MG: 5 TABLET, FILM COATED, EXTENDED RELEASE ORAL at 08:06

## 2022-06-16 RX ADMIN — ENOXAPARIN SODIUM 40 MG: 40 INJECTION SUBCUTANEOUS at 04:06

## 2022-06-16 RX ADMIN — LABETALOL HYDROCHLORIDE 400 MG: 200 TABLET, FILM COATED ORAL at 09:06

## 2022-06-16 RX ADMIN — INSULIN DETEMIR 55 UNITS: 100 INJECTION, SOLUTION SUBCUTANEOUS at 09:06

## 2022-06-16 RX ADMIN — BACLOFEN 5 MG: 5 TABLET ORAL at 08:06

## 2022-06-16 NOTE — PROGRESS NOTES
[facility] - [facility] Med Surg  Neurosurgery  Progress note        POD# 13 PCDF of C3-C7    Physical therapy has patient sitting up in chair on side of bed.  Soft collar in place.  Surgical incisions are clean and dry.  Continues with pain in neck and traps.  No paresthesias.  Motor unchanged.  AFVSS  Moves all extremities. Motor exam unchanged  Surgical incision clean and dry      Plan:    Continue soft collar when OOB  Medical management per hospitalist for diabetes and hypertension.  Pain control  SCDs and lovenox for DVT prophylaxis  Fall precautions  PT/OT  CM working on placement; ok to transfer when accepted    MRI personally reviewed by Dr. Joseph.  Mild to moderate stenosis.  No acute changes.  Patient can be transferred to rehab.  There will be no more surgical interventions.           Yaritza Mcduffie, [facility]-BC  Neurosurgery  [facility]  Neurosurgery  Progress Note    Subjective:       Post-Op Info:  Procedure(s) (LRB):  FUSION, SPINE, POSTERIOR SPINAL COLUMN, CERVICAL, USING COMPUTER-ASSISTED NAVIGATION (N/A)   13 Days Post-Op      Medications:  Continuous Infusions:  Scheduled Meds:   atorvastatin  40 mg Oral Daily    baclofen  5 mg Oral BID    bisacodyL  10 mg Rectal Daily    enoxaparin  40 mg Subcutaneous Daily    glipiZIDE  5 mg Oral Daily with breakfast    insulin detemir U-100  55 Units Subcutaneous BID    labetaloL  400 mg Oral Q8H    latanoprost  1 drop Both Eyes Nightly    NIFEdipine  90 mg Oral Daily    pantoprazole  40 mg Oral Daily    pregabalin  75 mg Oral BID     PRN Meds:acetaminophen, albuterol-ipratropium, aluminum-magnesium hydroxide-simethicone, bisacodyL, hydrALAZINE, HYDROcodone-acetaminophen, HYDROmorphone, insulin aspart U-100, polyethylene glycol, senna-docusate 8.6-50 mg, trazodone     Review of Systems  Objective:     Weight: 100 kg (220 lb 7.4 oz)  Body mass index is  40.32 kg/m².  Vital Signs (Most Recent):  Temp: 97.9 °F (36.6 °C) (06/16/22 0727)  Pulse: 73 (06/16/22 0727)  Resp: 18 (06/16/22 0917)  BP: (!) 147/73 (06/16/22 0727)  SpO2: 100 % (06/16/22 0727) Vital Signs (24h Range):  Temp:  [97.4 °F (36.3 °C)-98.7 °F (37.1 °C)] 97.9 °F (36.6 °C)  Pulse:  [66-79] 73  Resp:  [17-19] 18  SpO2:  [98 %-100 %] 100 %  BP: (116-160)/(51-73) 147/73                          Neurosurgery Physical Exam    Significant Labs:  No results for input(s): GLU, NA, K, CL, CO2, BUN, CREATININE, CALCIUM, MG in the last 48 hours.  No results for input(s): WBC, HGB, HCT, PLT in the last 48 hours.  No results for input(s): LABPT, INR, APTT in the last 48 hours.  Microbiology Results (last 7 days)     ** No results found for the last 168 hours. **          Significant Diagnostics:      Assessment/Plan:     Active Diagnoses:    Diagnosis Date Noted POA    Neck pain [M54.2] 06/01/2022 Unknown      Problems Resolved During this Admission:       Yaritza Mcduffie, [facility]-BC  Neurosurgery  [facility] - 9th Floor Med Surg

## 2022-06-16 NOTE — PLAN OF CARE
rec'vd a cb from [facility] stated she couldn't accept the pt will reach out to [facility] for a update

## 2022-06-16 NOTE — PT/OT/SLP RE-EVAL
Physical Therapy Re-evaluation    Patient Name:  Natty Nelson   MRN:  [id number]    Recommendations:     Discharge Recommendations:  rehabilitation facility   Discharge Equipment Recommendations: walker, rolling, wheelchair (TBD based on progress)   Barriers to discharge: impaired mobility    Assessment:     Natty Nelson is a 82 y.o. female admitted with a medical diagnosis of s/p C3-7 PCDF.  She presents with the following impairments/functional limitations:  weakness, impaired endurance, impaired functional mobilty, impaired sensation, gait instability, impaired balance, decreased lower extremity function, decreased safety awareness, pain. Patient tolerated PT re-evaluation fairly, very limited by pain in sitting upright/EOB position. Pt tolerated stand pivot transfer to chair w/ maxA, noted good activation of BLEs, however impaired sensation and coordination affecting quality of movement. Pt is appropriate for continued acute PT services with recommended d/c to IP rehab. PT discussed patient's pain as limiting factor with NP.     Rehab Prognosis:  Good; patient would benefit from acute skilled PT services to address these deficits and reach maximum level of function.      Recent Surgery: Procedure(s) (LRB):  FUSION, SPINE, POSTERIOR SPINAL COLUMN, CERVICAL, USING COMPUTER-ASSISTED NAVIGATION (N/A) 13 Days Post-Op    Plan:     During this hospitalization, patient to be seen daily to address the above listed problems via gait training, therapeutic activities, therapeutic exercises, neuromuscular re-education  · Plan of Care Expires:  07/01/22   Plan of Care Reviewed with: patient    Subjective     Communicated with RN prior to session.  Patient found HOB elevated with central line, cervical collar, peripheral IV, telemetry, pulse ox (continuous) upon PT entry to room, agreeable to evaluation.      Chief Complaint: none stated  Patient comments/goals: to get stronger  Pain/Comfort:  · Pain Rating 1:  (Patient  with no c/o pain at rest, however with transition to sitting EOB, patient with severe pain 10/10 in neck and radiating to LUE, improved with return to reclined position.)  · Pain Addressed 1: Pre-medicate for activity, Reposition, Nurse notified    Patients cultural, spiritual, Quaker conflicts given the current situation: no      Objective:     Patient found with: central line, cervical collar, peripheral IV, telemetry, pulse ox (continuous)     General Precautions: Standard, fall   Orthopedic Precautions:spinal precautions   Braces: Cervical collar  Respiratory Status: Room air    Exams:  · Sensation: -       Impaired  light touch and proprioception to BLEs.  · RLE ROM: WFL  · RLE Strength: grossly 4/5  · LLE ROM: WFL  · LLE Strength: grossly 4/5    Functional Mobility:  · Bed Mobility:  Supine to Sit: moderate assistance  · Transfers:  Sit to Stand:  moderate assistance with no AD  · Bed to Chair: maximal assistance with  no AD  using  Stand Pivot    AM-PAC 6 CLICK MOBILITY  Total Score:10       Therapeutic Activities and Exercises:   PT returned to patient's room approx. 10min later, patient found to have urinary incontinence with misplaced Pure-wick. PT assisted CNA with stand pivot transfer back to bed- maxA required, for patient to be cleaned. Patient left supine w/ HOB elevated, CNA present, all needs met.     Patient left HOB elevated with all lines intact, call button in reach and CNA present.    GOALS:   Multidisciplinary Problems     Physical Therapy Goals        Problem: Physical Therapy    Goal Priority Disciplines Outcome Goal Variances Interventions   Physical Therapy Goal     PT, PT/OT Ongoing, Progressing     Description: Goals to be met by: 2022    Patient will increase functional independence with mobility by performin. Sit to stand transfer with Modified [place]  2. Bed to chair transfer with Modified [place] using Rolling Walker  3. Gait  x 150 feet with Modified  [place] using Rolling Walker.                      History:     Past Medical History:   Diagnosis Date    Arthritis     Diabetes mellitus     Hypertension        Past Surgical History:   Procedure Laterality Date    ANTERIOR CERVICAL DISCECTOMY W/ FUSION Bilateral 5/9/2022    Procedure: DISCECTOMY, SPINE, CERVICAL, ANTERIOR APPROACH, WITH FUSION;  Surgeon: Toni Joseph MD;  Location: [facility];  Service: Neurosurgery;  Laterality: Bilateral;  ACDF C5/6       Time Tracking:     PT Received On: 06/16/22  PT Start Time: 0935     PT Stop Time: 1005  PT Total Time (min): 30 min     Billable Minutes: Re-eval 20 and Therapeutic Activity 10      06/16/2022

## 2022-06-16 NOTE — PT/OT/SLP PROGRESS
Occupational Therapy  Treatment    Natty Nelson   MRN: [id number]   Admitting Diagnosis: <principal problem not specified>    OT Date of Treatment: 06/16/22   OT Start Time: 1507  OT Stop Time: 1522  OT Total Time (min): 15 min     Billable Minutes:  Therapeutic Activity 15  Total Minutes: 15     OT/CLAYTON: CLAYTON     CLAYTON Visit Number: 3    General Precautions: Standard, fall (spinal)  Orthopedic Precautions: spinal precautions  Braces: Cervical collar    Spiritual, Cultural Beliefs, Nondenominational Practices, Values that Affect Care: no    Subjective:  Communicated with RN prior to session.    Pain/Comfort  Pain Rating 1: 10/10  Location - Orientation 1: posterior  Location 1: cervical spine  Pain Addressed 1: Pre-medicate for activity, Cessation of Activity    Objective:  Patient found with: telemetry    Functional Mobility:  Bed Mobility:   Supine to sit: Maximum Assistance   Sit to supine: Maximum Assistance   Rolling: Moderate Assistance   Scooting: Maximum Assistance    Balance:   Static Sit: POOR: Needs MODERATE assist to maintain  Dynamic Sit:  FAIR: Cannot move trunk without losing balance    Additional Treatment:  Sitting tolerance approx 2 mins, patient reports 10/10 pain with sitting edge of bed.  Patient returned to supine position and positioned for comfort.  Nurse notified.    Patient left HOB elevated with call button in reach    ASSESSMENT:  Natty Nelson is a 82 y.o. female with a medical diagnosis of spinal fusion, neck pain and presents with decreased functional mobility, decreased ADL skills and decreased activity tolerance.    Rehab potential is good    Activity tolerance: Fair    Discharge recommendations: rehabilitation facility     Equipment recommendations: walker, rolling, wheelchair     GOALS:   Multidisciplinary Problems     Occupational Therapy Goals        Problem: Occupational Therapy    Goal Priority Disciplines Outcome Interventions   Occupational Therapy Goal     OT, PT/OT Ongoing,  Progressing    Description: LTG: Pt will perform ADL with Mod I from w/c by dc.    STG: to be met in 2 weeks, by 6/27  1. Pt will feed self with SBA within diet recs. Met as of 06/13  2. Pt will perform grooming EOB with SBA, progressing cont  3. Pt will perform UB dressing with min A, progressing cont  4. Pt will perform toileting/bsc t/f with mod A, modified, progressing cont  5. Pt will tolerate EOB ax x5 minutes w/ SBA, progressing cont                   Plan:  Patient to be seen 5 x/week to address the above listed problems via self-care/home management, therapeutic activities, therapeutic exercises  Plan of Care expires: 06/27/22  Plan of Care reviewed with: patient         06/16/2022

## 2022-06-16 NOTE — PROGRESS NOTES
[facility]  Hospital Medicine Progress Note        Chief Complaint: Inpatient follow-up on neck pain     HPI:   Ms. Nelson is an 82-year-old  female with severe cervical stenosis status post discectomy with fusion of C5-C6 on 05/09/2022 by Dr. Joseph and ongoing bilateral lower extremity weakness since April 20, 2022, who presents to the ED from [facility] rehab for abnormal MRI with complaints of neck pain ongoing since surgery. She was seen at [facility] ED and underwent CT Cervical Spine that showed concerns for possible bone marrow edema at C3. MRI was also obtained that did not show this however it did show postoperative changes including stenosis around C5 -please note that this is all per secondary report from the ER physician. Images were not available. ED physician spoke to Neurosurgery who recommended repeating MRI Cervical Spine w/wo in AM.  Her laboratory work was unremarkable except for a mild worsening of her anemia with a hemoglobin of 8.2 (post-op Hgb 9.9). She was admitted to the hospitalist service for further management.     Patient admitted for severe intractable neck pain after a C5-C6 diskectomies recent procedure.  Neurosurgery has been consulted.  MRI C-spine ordered.  Neurosurgery performed C3 to C7 PCDF on 6/3.      Interval Hx:   Patient is resting comfortably with soft cervical collar off at this time.  Patient is afebrile, on room air, and hemodynamically stable.  No new issues reported.   sleeping  But arousable   Mri c spine result resulted on 6/15- will touch base with NSGY if any new recs      Objective/physical exam:  General: Obese  female in no acute distress  HENT: normocephalic, atraumatic  Eye: PERRL, EOMI, clear conjunctiva  Neck:  Incision is clean dry and intact, AYLA drain is in place, soft collar is in place  Respiratory: clear to auscultation bilaterally  Cardiovascular: regular rate and rhythm  Gastrointestinal: non-distended,  positive bowel sounds  Musculoskeletal: no gross deformity  Integumentary: warm, dry, intact, no rashes  Neurological: cranial nerves grossly intact, unable to lift legs off bed (ongoing since surgery) able to dorsi and plantar flex both feet with normal strength  Psychiatric: cooperative      Assessment/Plan:  Neck Pain in the setting of recent cervical discectomy/fusion of C5-6  - s/p PCDF of C3-C7 on 06/03/2022  Ongoing BLE weakness 2/2 to above  Essential hypertension  Acute hypoxic respiratory failure likely secondary to volume overload, resolved   LALIT on Stage IIIB CKD  Non-insulin-dependent type 2 diabetes mellitus with hyperglycemia   CAD/CABG  JODI/Right CEA  PAD       Plan:  Continue pain control, physical therapy, occupational therapy, blood pressure control, diabetes control, and other supportive care  Completed Decadron taper  Titrate Levemir insulin to achieve blood glucose control.     Patient condition:  Stable     Anticipated discharge and Disposition:   Inpatient rehabilitation at [facility]      VITAL SIGNS: 24 HRS MIN & MAX LAST   Temp  Min: 97.4 °F (36.3 °C)  Max: 98.7 °F (37.1 °C) 97.7 °F (36.5 °C)   BP  Min: 116/51  Max: 155/71 (!) 155/71   Pulse  Min: 66  Max: 79  70   Resp  Min: 17  Max: 19 18   SpO2  Min: 98 %  Max: 100 % 98 %       No results for input(s): WBC, RBC, HGB, HCT, MCV, MCH, MCHC, RDW, PLT, MPV, GRAN, LYMPH, MONO, BASO, NRBC in the last 168 hours.    No results for input(s): NA, K, CL, CO2, ANIONGAP, BUN, CREATININE, GLU, CALCIUM, PH, MG, ALBUMIN, PROT, ALKPHOS, ALT, AST, BILITOT in the last 168 hours.       Microbiology Results (last 7 days)     ** No results found for the last 168 hours. **           See below for Radiology    Scheduled Med:   atorvastatin  40 mg Oral Daily    baclofen  5 mg Oral BID    bisacodyL  10 mg Rectal Daily    enoxaparin  40 mg Subcutaneous Daily    glipiZIDE  5 mg Oral Daily with breakfast    insulin detemir U-100  55 Units Subcutaneous BID     labetaloL  400 mg Oral Q8H    latanoprost  1 drop Both Eyes Nightly    NIFEdipine  90 mg Oral Daily    pantoprazole  40 mg Oral Daily    pregabalin  75 mg Oral BID        Continuous Infusions:       PRN Meds:  acetaminophen, albuterol-ipratropium, aluminum-magnesium hydroxide-simethicone, bisacodyL, hydrALAZINE, HYDROcodone-acetaminophen, HYDROmorphone, insulin aspart U-100, polyethylene glycol, senna-docusate 8.6-50 mg, trazodone         Patient condition:  Stable/Fair/Guarded/ Serious/ Critical    Anticipated discharge and Disposition:      All diagnosis and differential diagnosis have been reviewed; assessment and plan has been documented; I have personally reviewed the labs and test results that are presently available; I have reviewed the patients medication list; I have reviewed the consulting providers response and recommendations. I have reviewed or attempted to review medical records based upon their availability    All of the patient's questions have been  addressed and answered. Patient's is agreeable to the above stated plan. I will continue to monitor closely and make adjustments to medical management as needed.  _____________________________________________________________________    Nutrition Status:    Radiology:  MRI Cervical Spine W WO Cont  Narrative: EXAMINATION:  MRI CERVICAL SPINE W WO CONTRAST    CLINICAL HISTORY:  Neck pain, acute, prior cervical surgery;    TECHNIQUE:  Multiplanar, multisequence MR imaging of the cervical spine with and without contrast.    COMPARISON:  MRI cervical spine dated 05/05/2022    FINDINGS:  There are postoperative changes of the cervical spine with posterior spinal fusion hardware extending from C4 through C7 and laminectomies at C3 through C7.  There is anterior fusion hardware at C5-C6 with disc spacer in place.  The remaining vertebral body heights are preserved.  The bone marrow is otherwise normal in signal.    Evaluation of the cord is limited secondary  to artifact from the hardware and motion without definite new cord signal abnormality.  There is circumferential epidural enhancement throughout the cervical spine, measuring up to 3 mm in thickness dorsally at the level of C2-C3, best visualized on sagittal images.    There is a heterogeneous collection in the posterior paraspinal soft tissues, largest at the level of C2-C3, measuring approximately 2 x 2.6 x 3.4 cm in size, with extension into the dorsal epidural space, and spinal canal stenosis with deformity of the central cord (series 6, image 16).  Heterogeneous fluid collection extends inferiorly along the posterior epidural space, measuring approximately 2.9 cm in length (series 4, image 7).    An additional more superficial fluid collection in the posterior paraspinal soft tissues measures approximately 2.5 x 1.8 x 3.2 cm in size at the level of C7-T1 (series 6, image 26).    Disc level analysis as follows:    C2-C3: Posterior disc osteophyte complex and circumferential epidural enhancement with moderate narrowing of the spinal canal and complete effacement of the CSF space.  Mild bilateral neural foraminal stenosis secondary to uncovertebral and facet hypertrophy.    C3-C4: Postoperative changes with posterior disc osteophyte complex, epidural enhancement and dorsal epidural fluid with moderate to severe narrowing of the spinal canal with effacement of the CSF space and deformity of the cord.  There is moderate right neural foraminal stenosis.  The left neural foramina is obscured by motion artifact.    C4-C5: Postoperative changes with circumferential epidural enhancement, dorsal epidural fluid and moderate narrowing of the spinal canal with deformity of the dorsal cord.  Mild to moderate bilateral neural foraminal stenosis secondary to uncovertebral and facet hypertrophy.    C5-C6: Postoperative changes with circumferential epidural enhancement and dorsal epidural fluid and moderate narrowing of the  spinal canal.  Limited evaluation of the neural foramina.    C6-C7: Postoperative changes with posterior disc osteophyte complex, circumferential epidural enhancement and dorsal epidural fluid with moderate narrowing of the canal and partial effacement of the CSF space.  Limited evaluation of the neural foramina.    C7-T1: Postoperative changes.  No disc herniation.  No significant spinal canal stenosis.  Mild bilateral neural foraminal stenosis secondary to uncovertebral and facet hypertrophy.  Impression: 1. Postoperative changes of the cervical spine with diffuse circumferential epidural enhancement and heterogeneous rim enhancing fluid in the laminectomy beds at C3-C6.  2. Moderate to severe canal stenosis at C3-C4, moderate at C2-C3 and C4-C7.  3. Multilevel neural foraminal stenoses as described.  4. Limited evaluation of the cord without definite new signal abnormality.  No major change from the [facility] interpretation.    Electronically signed by: Janell Cormier  Date:    06/15/2022  Time:    09:02      Jayme Le MD   06/16/2022

## 2022-06-17 LAB
POCT GLUCOSE: 118 MG/DL (ref 70–110)
POCT GLUCOSE: 129 MG/DL (ref 70–110)
POCT GLUCOSE: 181 MG/DL (ref 70–110)
POCT GLUCOSE: 258 MG/DL (ref 70–110)
POCT GLUCOSE: 48 MG/DL (ref 70–110)

## 2022-06-17 PROCEDURE — 11000001 HC ACUTE MED/SURG PRIVATE ROOM

## 2022-06-17 PROCEDURE — 63600175 PHARM REV CODE 636 W HCPCS: Performed by: INTERNAL MEDICINE

## 2022-06-17 PROCEDURE — 97110 THERAPEUTIC EXERCISES: CPT

## 2022-06-17 PROCEDURE — 97535 SELF CARE MNGMENT TRAINING: CPT

## 2022-06-17 PROCEDURE — 25000003 PHARM REV CODE 250: Performed by: INTERNAL MEDICINE

## 2022-06-17 PROCEDURE — 94761 N-INVAS EAR/PLS OXIMETRY MLT: CPT

## 2022-06-17 PROCEDURE — C9399 UNCLASSIFIED DRUGS OR BIOLOG: HCPCS | Performed by: INTERNAL MEDICINE

## 2022-06-17 PROCEDURE — 25000003 PHARM REV CODE 250: Performed by: NURSE PRACTITIONER

## 2022-06-17 RX ORDER — KETOROLAC TROMETHAMINE 30 MG/ML
15 INJECTION, SOLUTION INTRAMUSCULAR; INTRAVENOUS EVERY 6 HOURS
Status: COMPLETED | OUTPATIENT
Start: 2022-06-17 | End: 2022-06-20

## 2022-06-17 RX ORDER — METHOCARBAMOL 100 MG/ML
1000 INJECTION, SOLUTION INTRAMUSCULAR; INTRAVENOUS EVERY 8 HOURS
Status: DISCONTINUED | OUTPATIENT
Start: 2022-06-17 | End: 2022-06-19

## 2022-06-17 RX ORDER — METHOCARBAMOL 100 MG/ML
500 INJECTION, SOLUTION INTRAMUSCULAR; INTRAVENOUS 2 TIMES DAILY
Status: DISCONTINUED | OUTPATIENT
Start: 2022-06-17 | End: 2022-06-17

## 2022-06-17 RX ORDER — BACLOFEN 5 MG/1
5 TABLET ORAL 3 TIMES DAILY
Status: DISCONTINUED | OUTPATIENT
Start: 2022-06-17 | End: 2022-06-22 | Stop reason: HOSPADM

## 2022-06-17 RX ADMIN — LABETALOL HYDROCHLORIDE 400 MG: 200 TABLET, FILM COATED ORAL at 06:06

## 2022-06-17 RX ADMIN — INSULIN ASPART 2 UNITS: 100 INJECTION, SOLUTION INTRAVENOUS; SUBCUTANEOUS at 05:06

## 2022-06-17 RX ADMIN — BACLOFEN 5 MG: 5 TABLET ORAL at 03:06

## 2022-06-17 RX ADMIN — GLIPIZIDE 5 MG: 5 TABLET, FILM COATED, EXTENDED RELEASE ORAL at 09:06

## 2022-06-17 RX ADMIN — ENOXAPARIN SODIUM 40 MG: 40 INJECTION SUBCUTANEOUS at 05:06

## 2022-06-17 RX ADMIN — METHOCARBAMOL 500 MG: 100 INJECTION, SOLUTION INTRAMUSCULAR; INTRAVENOUS at 11:06

## 2022-06-17 RX ADMIN — BACLOFEN 5 MG: 5 TABLET ORAL at 09:06

## 2022-06-17 RX ADMIN — LATANOPROST 1 DROP: 50 SOLUTION OPHTHALMIC at 09:06

## 2022-06-17 RX ADMIN — PREGABALIN 75 MG: 75 CAPSULE ORAL at 09:06

## 2022-06-17 RX ADMIN — METHOCARBAMOL 1000 MG: 100 INJECTION, SOLUTION INTRAMUSCULAR; INTRAVENOUS at 03:06

## 2022-06-17 RX ADMIN — HYDROCODONE BITARTRATE AND ACETAMINOPHEN 1 TABLET: 7.5; 325 TABLET ORAL at 09:06

## 2022-06-17 RX ADMIN — METHOCARBAMOL 1000 MG: 100 INJECTION, SOLUTION INTRAMUSCULAR; INTRAVENOUS at 09:06

## 2022-06-17 RX ADMIN — KETOROLAC TROMETHAMINE 15 MG: 30 INJECTION, SOLUTION INTRAMUSCULAR at 11:06

## 2022-06-17 RX ADMIN — NIFEDIPINE 90 MG: 90 TABLET, FILM COATED, EXTENDED RELEASE ORAL at 09:06

## 2022-06-17 RX ADMIN — PANTOPRAZOLE SODIUM 40 MG: 40 TABLET, DELAYED RELEASE ORAL at 09:06

## 2022-06-17 RX ADMIN — INSULIN ASPART 3 UNITS: 100 INJECTION, SOLUTION INTRAVENOUS; SUBCUTANEOUS at 09:06

## 2022-06-17 RX ADMIN — ATORVASTATIN CALCIUM 40 MG: 40 TABLET, FILM COATED ORAL at 05:06

## 2022-06-17 RX ADMIN — LABETALOL HYDROCHLORIDE 400 MG: 200 TABLET, FILM COATED ORAL at 09:06

## 2022-06-17 RX ADMIN — INSULIN DETEMIR 55 UNITS: 100 INJECTION, SOLUTION SUBCUTANEOUS at 09:06

## 2022-06-17 RX ADMIN — LABETALOL HYDROCHLORIDE 400 MG: 200 TABLET, FILM COATED ORAL at 03:06

## 2022-06-17 RX ADMIN — KETOROLAC TROMETHAMINE 15 MG: 30 INJECTION, SOLUTION INTRAMUSCULAR at 05:06

## 2022-06-17 NOTE — PLAN OF CARE
[facility] awaiting a response

## 2022-06-17 NOTE — PT/OT/SLP PROGRESS
Occupational Therapy   Treatment    Name: Natty Nelson  MRN: [id number]  Admitting Diagnosis:  S/p C3-C7 PCDF  14 Days Post-Op    Recommendations:     Discharge Recommendations: nursing facility, skilled, rehabilitation facility  Discharge Equipment Recommendations:   TBD by next level of care  Barriers to discharge:   Severity of deficits    Assessment:     Natty Nelson is a 82 y.o. female with a medical diagnosis of S/p C3-C7 PCDF. Performance deficits affecting function are weakness, gait instability, decreased upper extremity function, impaired endurance, impaired balance, decreased lower extremity function, impaired coordination, decreased safety awareness, impaired sensation, orthopedic precautions, pain, impaired self care skills, impaired functional mobilty. Pt making poor progress toward OT goals. Pt cont to be limited by pain. Upon arrival, pt tearful d/t pain in cervical spine and L scapular area. Pt now noted w/ impaired ability to achieve full ROM in extension to digits and wrist to RUE, as well as LUE. OT recs for d/c include IPR vs SNF. If pt's pain can be better controlled, pt would benefit from IPR. Otherwise, pt would require SNF.    Rehab Prognosis:  Poor; patient would benefit from acute skilled OT services to address these deficits and reach maximum level of function.       Plan:     Patient to be seen 5 x/week to address the above listed problems via self-care/home management, therapeutic activities, therapeutic exercises  · Plan of Care Expires: 06/27/22  · Plan of Care Reviewed with: patient    Subjective     Pain/Comfort:  · Pain Rating 1: 10/10  · Location - Side 1: Left  · Location 1: scapula  · Pain Addressed 1: Nurse notified, Distraction, Reposition  · Pain Rating 2: 10/10  · Location - Orientation 2: posterior  · Location 2: cervical spine  · Pain Addressed 2: Nurse notified, Distraction, Reposition    Objective:     Communicated with: RN prior to session.  Patient found supine  with Isela, telemetry, SCD, brief upon OT entry to room.    General Precautions: Standard, fall   Orthopedic Precautions:spinal precautions   Braces: Cervical collar  Respiratory Status: Room air     Occupational Performance:     Activities of Daily Living:  · Toileting: total assistance to doff brief, which was saturated w/ urine, and perform pericare. Pt noted w/ indentions to inner thighs, some redness but no breakdown. OT cont to rec no briefs in bed to maintain skin integrity. Discussed w/ CNA as well   · Pt reports difficulty feeding self 2/2 weakness. Pt observed w/ ability to perform hand to mouth excursion w/ spoon and drink, no difficulty w/ coordination, however issues likely related to weakness and pain. Discussed compensatory techniques including alternating between RUE and LUE in event of fatigue/increased pain, as well as positioning pillows under UEs for support. Discussed w/ CNA and RN that pt may need assist feeds 2/2 muscular fatigue and pain, however pt is agreeable to cont feeding self as much as possible prior to calling for assistance.       [facility] 6 Click ADL: 11    Treatment & Education:  Pt performed AAROM to B digits and wrist in flex/ext, and AROM to elbow and shoulder in flex/ext, 10 reps X 1 set. Pt tolerated well.    Patient left HOB elevated with call button in reach and RN notifiedEducation:      GOALS:   Multidisciplinary Problems     Occupational Therapy Goals        Problem: Occupational Therapy    Goal Priority Disciplines Outcome Interventions   Occupational Therapy Goal     OT, PT/OT Ongoing, Progressing    Description: LTG: Pt will perform ADL with Mod I from w/c by dc.    STG: to be met in 2 weeks, by 6/27  1. Pt will feed self with SBA within diet recs. Met as of 06/13  2. Pt will perform grooming EOB with SBA, progressing cont  3. Pt will perform UB dressing with min A, progressing cont  4. Pt will perform toileting/bsc t/f with mod A, modified, progressing cont  5. Pt  will tolerate EOB ax x5 minutes w/ SBA, progressing cont                   Time Tracking:     OT Date of Treatment: 06/17/22  OT Start Time: 1002  OT Stop Time: 1035  OT Total Time (min): 33 min    Billable Minutes:Self Care/Home Management 20  Therapeutic Exercise 13    OT/CLAYTON: OT     CLAYTON Visit Number: 4    6/17/2022

## 2022-06-17 NOTE — PT/OT/SLP PROGRESS
Physical Therapy Treatment    Patient Name:  Natty Nelson   MRN:  [id number]    Recommendations:     Discharge Recommendations:  rehabilitation facility, nursing facility, skilled   Discharge Equipment Recommendations: walker, rolling   Barriers to discharge:  Pain; impaired functional mobility    Assessment:     Natty Nelson is a 82 y.o. female admitted with a medical diagnosis of neck pain, s/p PCDF C3-C7.  She presents with the following impairments/functional limitations:  impaired endurance, weakness, impaired functional mobilty, gait instability, impaired balance, decreased lower extremity function, decreased upper extremity function, pain, orthopedic precautions. Performed BLE therapeutic exercises in supine due to increased pain reported with previous participation with OT. Patient denied pain before, during, and after PT treatment. Tolerated session well.     Rehab Prognosis: Good; patient would benefit from acute skilled PT services to address these deficits and reach maximum level of function.    Recent Surgery: Procedure(s) (LRB):  FUSION, SPINE, POSTERIOR SPINAL COLUMN, CERVICAL, USING COMPUTER-ASSISTED NAVIGATION (N/A) 14 Days Post-Op    Plan:     During this hospitalization, patient to be seen daily to address the identified rehab impairments via gait training, therapeutic activities, therapeutic exercises, neuromuscular re-education and progress toward the following goals:    Plan of Care Expires:  06/18/22    Subjective     Chief Complaint: None currently  Patient/Family Comments/goals: get stronger; return home  Pain/Comfort:  Pain Rating 1: 0/10      Objective:     Communicated with RN and OT prior to session.  Patient found HOB elevated with PureWick, telemetry, SCD, peripheral IV upon PT entry to room.     General Precautions: Standard, fall   Orthopedic Precautions:spinal precautions   Braces: Cervical collar  Respiratory Status: Room air     Functional Mobility:  Did not observed  functional mobility due to increased pain with OT participation.      AM-PAC 6 CLICK MOBILITY  Turning over in bed (including adjusting bedclothes, sheets and blankets)?: 2  Sitting down on and standing up from a chair with arms (e.g., wheelchair, bedside commode, etc.): 2  Moving from lying on back to sitting on the side of the bed?: 2  Moving to and from a bed to a chair (including a wheelchair)?: 2  Need to walk in hospital room?: 2  Climbing 3-5 steps with a railing?: 1  Basic Mobility Total Score: 11       Therapeutic Activities and Exercises:   Patient performed the following exercises supine: resisted leg press 15 reps x 2, resisted DF/PF 20 reps x 1, hip abduction and resisted hip abduction 20 reps x 1, supine active assisted hip flexion with controlled eccentric contraction 10 reps x 1, heel slides 20 reps x 1.    Patient left HOB elevated with call button in reach. Reapplied SCDs and notified nurse to reapply PureWick.    GOALS:   Multidisciplinary Problems       Physical Therapy Goals          Problem: Physical Therapy    Goal Priority Disciplines Outcome Goal Variances Interventions   Physical Therapy Goal     PT, PT/OT Ongoing, Progressing     Description: Goals to be met by: 2022    Patient will increase functional independence with mobility by performin. Sit to stand transfer with Modified [place]  2. Bed to chair transfer with Modified [place] using Rolling Walker  3. Gait  x 150 feet with Modified [place] using Rolling Walker.                          Time Tracking:     PT Received On: 22  PT Start Time: 1040     PT Stop Time: 1105  PT Total Time (min): 25 min     Billable Minutes: Therapeutic Exercise 25min    Treatment Type: Treatment  PT/PTA: PT     PTA Visit Number: 1     Note signed by supervising PT: Kathy Julio,PT.     2022

## 2022-06-17 NOTE — PROGRESS NOTES
[facility]  Hospital Medicine Progress Note        Chief Complaint: Inpatient follow-up on neck pain     HPI:   Ms. Nelson is an 82-year-old  female with severe cervical stenosis status post discectomy with fusion of C5-C6 on 05/09/2022 by Dr. Joseph and ongoing bilateral lower extremity weakness since April 20, 2022, who presents to the ED from [facility] rehab for abnormal MRI with complaints of neck pain ongoing since surgery. She was seen at [facility] ED and underwent CT Cervical Spine that showed concerns for possible bone marrow edema at C3. MRI was also obtained that did not show this however it did show postoperative changes including stenosis around C5 -please note that this is all per secondary report from the ER physician. Images were not available. ED physician spoke to Neurosurgery who recommended repeating MRI Cervical Spine w/wo in AM.  Her laboratory work was unremarkable except for a mild worsening of her anemia with a hemoglobin of 8.2 (post-op Hgb 9.9). She was admitted to the hospitalist service for further management.     Patient admitted for severe intractable neck pain after a C5-C6 diskectomies recent procedure.  Neurosurgery has been consulted.  MRI C-spine ordered.  Neurosurgery performed C3 to C7 PCDF on 6/3. NSGY has signed off- no more surgical interventions      Interval Hx:   Adjust pain meds, muscle relaxants       Objective/physical exam:  General: Obese  female in no acute distress  HENT: normocephalic, atraumatic  Eye: PERRL, EOMI, clear conjunctiva  Neck:  Incision is clean dry and intact, AYLA drain is in place, soft collar is in place  Respiratory: clear to auscultation bilaterally  Cardiovascular: regular rate and rhythm  Gastrointestinal: non-distended, positive bowel sounds  Musculoskeletal: no gross deformity  Integumentary: warm, dry, intact, no rashes  Neurological: cranial nerves grossly intact, unable to lift legs off bed  (ongoing since surgery) able to dorsi and plantar flex both feet with normal strength  Psychiatric: cooperative        Assessment/Plan:  Neck Pain in the setting of recent cervical discectomy/fusion of C5-6  - s/p PCDF of C3-C7 on 06/03/2022  Ongoing BLE weakness 2/2 to above  Essential hypertension  Acute hypoxic respiratory failure likely secondary to volume overload, resolved   LALIT on Stage IIIB CKD  Non-insulin-dependent type 2 diabetes mellitus with hyperglycemia   CAD/CABG  JODI/Right CEA  PAD        Plan:  Adjust muscle relaxants   Adjust pain meds   Continue pain control, physical therapy, occupational therapy, blood pressure control, diabetes control, and other supportive care  Completed Decadron taper  Titrate Levemir insulin to achieve blood glucose control.     Patient condition:  Stable     Anticipated discharge and Disposition:   [facility]    VITAL SIGNS: 24 HRS MIN & MAX LAST   Temp  Min: 97.7 °F (36.5 °C)  Max: 99 °F (37.2 °C) 99 °F (37.2 °C)   BP  Min: 122/57  Max: 155/69 (!) 146/68   Pulse  Min: 67  Max: 81  69   Resp  Min: 16  Max: 20 18   SpO2  Min: 97 %  Max: 98 % 98 %       No results for input(s): WBC, RBC, HGB, HCT, MCV, MCH, MCHC, RDW, PLT, MPV, GRAN, LYMPH, MONO, BASO, NRBC in the last 168 hours.    No results for input(s): NA, K, CL, CO2, ANIONGAP, BUN, CREATININE, GLU, CALCIUM, PH, MG, ALBUMIN, PROT, ALKPHOS, ALT, AST, BILITOT in the last 168 hours.       Microbiology Results (last 7 days)     ** No results found for the last 168 hours. **           See below for Radiology    Scheduled Med:   atorvastatin  40 mg Oral Daily    baclofen  5 mg Oral TID    bisacodyL  10 mg Rectal Daily    enoxaparin  40 mg Subcutaneous Daily    glipiZIDE  5 mg Oral Daily with breakfast    insulin detemir U-100  55 Units Subcutaneous BID    ketorolac  15 mg Intravenous Q6H    labetaloL  400 mg Oral Q8H    latanoprost  1 drop Both Eyes Nightly    methocarbamoL  1,000 mg  Intravenous Q8H    NIFEdipine  90 mg Oral Daily    pantoprazole  40 mg Oral Daily    pregabalin  75 mg Oral BID        Continuous Infusions:       PRN Meds:  acetaminophen, albuterol-ipratropium, aluminum-magnesium hydroxide-simethicone, bisacodyL, hydrALAZINE, HYDROcodone-acetaminophen, HYDROmorphone, insulin aspart U-100, polyethylene glycol, senna-docusate 8.6-50 mg, trazodone     Patient condition:  Stable/Fair/Guarded/ Serious/ Critical    Anticipated discharge and Disposition:      All diagnosis and differential diagnosis have been reviewed; assessment and plan has been documented; I have personally reviewed the labs and test results that are presently available; I have reviewed the patients medication list; I have reviewed the consulting providers response and recommendations. I have reviewed or attempted to review medical records based upon their availability    All of the patient's questions have been  addressed and answered. Patient's is agreeable to the above stated plan. I will continue to monitor closely and make adjustments to medical management as needed.  _____________________________________________________________________    Nutrition Status:    Radiology:  MRI Cervical Spine W WO Cont  Narrative: EXAMINATION:  MRI CERVICAL SPINE W WO CONTRAST    CLINICAL HISTORY:  Neck pain, acute, prior cervical surgery;    TECHNIQUE:  Multiplanar, multisequence MR imaging of the cervical spine with and without contrast.    COMPARISON:  MRI cervical spine dated 05/05/2022    FINDINGS:  There are postoperative changes of the cervical spine with posterior spinal fusion hardware extending from C4 through C7 and laminectomies at C3 through C7.  There is anterior fusion hardware at C5-C6 with disc spacer in place.  The remaining vertebral body heights are preserved.  The bone marrow is otherwise normal in signal.    Evaluation of the cord is limited secondary to artifact from the hardware and motion without  definite new cord signal abnormality.  There is circumferential epidural enhancement throughout the cervical spine, measuring up to 3 mm in thickness dorsally at the level of C2-C3, best visualized on sagittal images.    There is a heterogeneous collection in the posterior paraspinal soft tissues, largest at the level of C2-C3, measuring approximately 2 x 2.6 x 3.4 cm in size, with extension into the dorsal epidural space, and spinal canal stenosis with deformity of the central cord (series 6, image 16).  Heterogeneous fluid collection extends inferiorly along the posterior epidural space, measuring approximately 2.9 cm in length (series 4, image 7).    An additional more superficial fluid collection in the posterior paraspinal soft tissues measures approximately 2.5 x 1.8 x 3.2 cm in size at the level of C7-T1 (series 6, image 26).    Disc level analysis as follows:    C2-C3: Posterior disc osteophyte complex and circumferential epidural enhancement with moderate narrowing of the spinal canal and complete effacement of the CSF space.  Mild bilateral neural foraminal stenosis secondary to uncovertebral and facet hypertrophy.    C3-C4: Postoperative changes with posterior disc osteophyte complex, epidural enhancement and dorsal epidural fluid with moderate to severe narrowing of the spinal canal with effacement of the CSF space and deformity of the cord.  There is moderate right neural foraminal stenosis.  The left neural foramina is obscured by motion artifact.    C4-C5: Postoperative changes with circumferential epidural enhancement, dorsal epidural fluid and moderate narrowing of the spinal canal with deformity of the dorsal cord.  Mild to moderate bilateral neural foraminal stenosis secondary to uncovertebral and facet hypertrophy.    C5-C6: Postoperative changes with circumferential epidural enhancement and dorsal epidural fluid and moderate narrowing of the spinal canal.  Limited evaluation of the neural  foramina.    C6-C7: Postoperative changes with posterior disc osteophyte complex, circumferential epidural enhancement and dorsal epidural fluid with moderate narrowing of the canal and partial effacement of the CSF space.  Limited evaluation of the neural foramina.    C7-T1: Postoperative changes.  No disc herniation.  No significant spinal canal stenosis.  Mild bilateral neural foraminal stenosis secondary to uncovertebral and facet hypertrophy.  Impression: 1. Postoperative changes of the cervical spine with diffuse circumferential epidural enhancement and heterogeneous rim enhancing fluid in the laminectomy beds at C3-C6.  2. Moderate to severe canal stenosis at C3-C4, moderate at C2-C3 and C4-C7.  3. Multilevel neural foraminal stenoses as described.  4. Limited evaluation of the cord without definite new signal abnormality.  No major change from the [facility] interpretation.    Electronically signed by: Janell Cormier  Date:    06/15/2022  Time:    09:02      Jayme Le MD   06/17/2022

## 2022-06-17 NOTE — PROGRESS NOTES
(+) pain over the shoulders.  Moves both arms and legs-the legs are better than before the last surgery.  I reviewed the MRI myself and I do not see any severe stenosis.  She need pain control and muscle relexant.     Patient requests all Lab and Radiology Results on their Discharge Instructions

## 2022-06-18 LAB
POCT GLUCOSE: 123 MG/DL (ref 70–110)
POCT GLUCOSE: 181 MG/DL (ref 70–110)
POCT GLUCOSE: 274 MG/DL (ref 70–110)

## 2022-06-18 PROCEDURE — 63600175 PHARM REV CODE 636 W HCPCS: Performed by: INTERNAL MEDICINE

## 2022-06-18 PROCEDURE — 97535 SELF CARE MNGMENT TRAINING: CPT | Mod: CO

## 2022-06-18 PROCEDURE — 25000003 PHARM REV CODE 250: Performed by: NURSE PRACTITIONER

## 2022-06-18 PROCEDURE — 25000003 PHARM REV CODE 250: Performed by: INTERNAL MEDICINE

## 2022-06-18 PROCEDURE — 25000003 PHARM REV CODE 250: Performed by: NEUROLOGICAL SURGERY

## 2022-06-18 PROCEDURE — C9399 UNCLASSIFIED DRUGS OR BIOLOG: HCPCS | Performed by: INTERNAL MEDICINE

## 2022-06-18 PROCEDURE — 11000001 HC ACUTE MED/SURG PRIVATE ROOM

## 2022-06-18 RX ORDER — GABAPENTIN 300 MG/1
300 CAPSULE ORAL NIGHTLY
Status: DISCONTINUED | OUTPATIENT
Start: 2022-06-18 | End: 2022-06-18

## 2022-06-18 RX ORDER — PREGABALIN 150 MG/1
150 CAPSULE ORAL 2 TIMES DAILY
Status: DISCONTINUED | OUTPATIENT
Start: 2022-06-18 | End: 2022-06-22 | Stop reason: HOSPADM

## 2022-06-18 RX ADMIN — GLIPIZIDE 5 MG: 5 TABLET, FILM COATED, EXTENDED RELEASE ORAL at 09:06

## 2022-06-18 RX ADMIN — ENOXAPARIN SODIUM 40 MG: 40 INJECTION SUBCUTANEOUS at 05:06

## 2022-06-18 RX ADMIN — INSULIN ASPART 1 UNITS: 100 INJECTION, SOLUTION INTRAVENOUS; SUBCUTANEOUS at 10:06

## 2022-06-18 RX ADMIN — KETOROLAC TROMETHAMINE 15 MG: 30 INJECTION, SOLUTION INTRAMUSCULAR at 11:06

## 2022-06-18 RX ADMIN — INSULIN DETEMIR 55 UNITS: 100 INJECTION, SOLUTION SUBCUTANEOUS at 10:06

## 2022-06-18 RX ADMIN — BISACODYL 10 MG: 10 SUPPOSITORY RECTAL at 09:06

## 2022-06-18 RX ADMIN — NIFEDIPINE 90 MG: 90 TABLET, FILM COATED, EXTENDED RELEASE ORAL at 09:06

## 2022-06-18 RX ADMIN — LATANOPROST 1 DROP: 50 SOLUTION OPHTHALMIC at 10:06

## 2022-06-18 RX ADMIN — PREGABALIN 75 MG: 75 CAPSULE ORAL at 09:06

## 2022-06-18 RX ADMIN — INSULIN ASPART 6 UNITS: 100 INJECTION, SOLUTION INTRAVENOUS; SUBCUTANEOUS at 05:06

## 2022-06-18 RX ADMIN — BACLOFEN 5 MG: 5 TABLET ORAL at 02:06

## 2022-06-18 RX ADMIN — HYDROMORPHONE HYDROCHLORIDE 1 MG: 2 INJECTION, SOLUTION INTRAMUSCULAR; INTRAVENOUS; SUBCUTANEOUS at 09:06

## 2022-06-18 RX ADMIN — LABETALOL HYDROCHLORIDE 400 MG: 200 TABLET, FILM COATED ORAL at 10:06

## 2022-06-18 RX ADMIN — METHOCARBAMOL 1000 MG: 100 INJECTION, SOLUTION INTRAMUSCULAR; INTRAVENOUS at 10:06

## 2022-06-18 RX ADMIN — KETOROLAC TROMETHAMINE 15 MG: 30 INJECTION, SOLUTION INTRAMUSCULAR at 05:06

## 2022-06-18 RX ADMIN — ATORVASTATIN CALCIUM 40 MG: 40 TABLET, FILM COATED ORAL at 05:06

## 2022-06-18 RX ADMIN — PANTOPRAZOLE SODIUM 40 MG: 40 TABLET, DELAYED RELEASE ORAL at 09:06

## 2022-06-18 RX ADMIN — INSULIN ASPART 2 UNITS: 100 INJECTION, SOLUTION INTRAVENOUS; SUBCUTANEOUS at 11:06

## 2022-06-18 RX ADMIN — METHOCARBAMOL 1000 MG: 100 INJECTION, SOLUTION INTRAMUSCULAR; INTRAVENOUS at 02:06

## 2022-06-18 RX ADMIN — PREGABALIN 150 MG: 150 CAPSULE ORAL at 10:06

## 2022-06-18 RX ADMIN — BACLOFEN 5 MG: 5 TABLET ORAL at 09:06

## 2022-06-18 RX ADMIN — KETOROLAC TROMETHAMINE 15 MG: 30 INJECTION, SOLUTION INTRAMUSCULAR at 06:06

## 2022-06-18 RX ADMIN — BACLOFEN 5 MG: 5 TABLET ORAL at 10:06

## 2022-06-18 RX ADMIN — INSULIN DETEMIR 55 UNITS: 100 INJECTION, SOLUTION SUBCUTANEOUS at 09:06

## 2022-06-18 RX ADMIN — LABETALOL HYDROCHLORIDE 400 MG: 200 TABLET, FILM COATED ORAL at 06:06

## 2022-06-18 RX ADMIN — LABETALOL HYDROCHLORIDE 400 MG: 200 TABLET, FILM COATED ORAL at 02:06

## 2022-06-18 RX ADMIN — METHOCARBAMOL 1000 MG: 100 INJECTION, SOLUTION INTRAMUSCULAR; INTRAVENOUS at 06:06

## 2022-06-18 NOTE — PROGRESS NOTES
[facility]  Hospital Medicine Progress Note        Chief Complaint: Inpatient follow-up on neck pain     HPI:   Ms. Nelson is an 82-year-old  female with severe cervical stenosis status post discectomy with fusion of C5-C6 on 05/09/2022 by Dr. Joseph and ongoing bilateral lower extremity weakness since April 20, 2022, who presents to the ED from [facility] rehab for abnormal MRI with complaints of neck pain ongoing since surgery. She was seen at [facility] ED and underwent CT Cervical Spine that showed concerns for possible bone marrow edema at C3. MRI was also obtained that did not show this however it did show postoperative changes including stenosis around C5 -please note that this is all per secondary report from the ER physician. Images were not available. ED physician spoke to Neurosurgery who recommended repeating MRI Cervical Spine w/wo in AM.  Her laboratory work was unremarkable except for a mild worsening of her anemia with a hemoglobin of 8.2 (post-op Hgb 9.9). She was admitted to the hospitalist service for further management.     Patient admitted for severe intractable neck pain after a C5-C6 diskectomies recent procedure.  Neurosurgery has been consulted.  MRI C-spine ordered.  Neurosurgery performed C3 to C7 PCDF on 6/3. NSGY has signed off- no more surgical interventions      Interval Hx:   Patient continues to complain of intermittent severe pain that jumps at her  Continue to adjust pain and muscle relaxants.          Objective/physical exam:  General: Obese  female in no acute distress  HENT: normocephalic, atraumatic  Eye: PERRL, EOMI, clear conjunctiva  Neck:  Incision is clean dry and intact, AYLA drain is in place, soft collar is in place  Respiratory: clear to auscultation bilaterally  Cardiovascular: regular rate and rhythm  Gastrointestinal: non-distended, positive bowel sounds  Musculoskeletal: no gross deformity  Integumentary: warm, dry,  intact, no rashes  Neurological: cranial nerves grossly intact, unable to lift legs off bed (ongoing since surgery) able to dorsi and plantar flex both feet with normal strength  Psychiatric: cooperative        Assessment/Plan:  Neck Pain in the setting of recent cervical discectomy/fusion of C5-6  - s/p PCDF of C3-C7 on 06/03/2022  Ongoing BLE weakness 2/2 to above  Essential hypertension  Acute hypoxic respiratory failure likely secondary to volume overload, resolved   LALIT on Stage IIIB CKD  Non-insulin-dependent type 2 diabetes mellitus with hyperglycemia   CAD/CABG  JODI/Right CEA  PAD        Plan:  Add on gabapentin 300mg at bedtime , titrate up slowly   Adjust muscle relaxants   Adjust pain meds   Continue pain control, physical therapy, occupational therapy, blood pressure control, diabetes control, and other supportive care  Completed Decadron taper  Titrate Levemir insulin to achieve blood glucose control.     Patient condition:  Stable     Anticipated discharge and Disposition:   Inpatient rehabilitation [place] [facility]    VITAL SIGNS: 24 HRS MIN & MAX LAST   Temp  Min: 97.7 °F (36.5 °C)  Max: 99 °F (37.2 °C) 97.7 °F (36.5 °C)   BP  Min: 143/74  Max: 153/67 (!) 143/74   Pulse  Min: 67  Max: 73  69   Resp  Min: 18  Max: 18 18   SpO2  Min: 97 %  Max: 100 % 99 %       No results for input(s): WBC, RBC, HGB, HCT, MCV, MCH, MCHC, RDW, PLT, MPV, GRAN, LYMPH, MONO, BASO, NRBC in the last 168 hours.    No results for input(s): NA, K, CL, CO2, ANIONGAP, BUN, CREATININE, GLU, CALCIUM, PH, MG, ALBUMIN, PROT, ALKPHOS, ALT, AST, BILITOT in the last 168 hours.       Microbiology Results (last 7 days)     ** No results found for the last 168 hours. **           See below for Radiology    Scheduled Med:   atorvastatin  40 mg Oral Daily    baclofen  5 mg Oral TID    bisacodyL  10 mg Rectal Daily    enoxaparin  40 mg Subcutaneous Daily    glipiZIDE  5 mg Oral Daily with breakfast    insulin detemir U-100  55 Units Subcutaneous  BID    ketorolac  15 mg Intravenous Q6H    labetaloL  400 mg Oral Q8H    latanoprost  1 drop Both Eyes Nightly    methocarbamoL  1,000 mg Intravenous Q8H    NIFEdipine  90 mg Oral Daily    pantoprazole  40 mg Oral Daily    pregabalin  75 mg Oral BID        Continuous Infusions:       PRN Meds:  acetaminophen, albuterol-ipratropium, aluminum-magnesium hydroxide-simethicone, bisacodyL, hydrALAZINE, HYDROcodone-acetaminophen, HYDROmorphone, insulin aspart U-100, polyethylene glycol, senna-docusate 8.6-50 mg, trazodone         Patient condition:  Stable/Fair/Guarded/ Serious/ Critical    Anticipated discharge and Disposition:      All diagnosis and differential diagnosis have been reviewed; assessment and plan has been documented; I have personally reviewed the labs and test results that are presently available; I have reviewed the patients medication list; I have reviewed the consulting providers response and recommendations. I have reviewed or attempted to review medical records based upon their availability    All of the patient's questions have been  addressed and answered. Patient's is agreeable to the above stated plan. I will continue to monitor closely and make adjustments to medical management as needed.  _____________________________________________________________________    Nutrition Status:    Radiology:  MRI Cervical Spine W WO Cont  Narrative: EXAMINATION:  MRI CERVICAL SPINE W WO CONTRAST    CLINICAL HISTORY:  Neck pain, acute, prior cervical surgery;    TECHNIQUE:  Multiplanar, multisequence MR imaging of the cervical spine with and without contrast.    COMPARISON:  MRI cervical spine dated 05/05/2022    FINDINGS:  There are postoperative changes of the cervical spine with posterior spinal fusion hardware extending from C4 through C7 and laminectomies at C3 through C7.  There is anterior fusion hardware at C5-C6 with disc spacer in place.  The remaining vertebral body heights are preserved.  The  bone marrow is otherwise normal in signal.    Evaluation of the cord is limited secondary to artifact from the hardware and motion without definite new cord signal abnormality.  There is circumferential epidural enhancement throughout the cervical spine, measuring up to 3 mm in thickness dorsally at the level of C2-C3, best visualized on sagittal images.    There is a heterogeneous collection in the posterior paraspinal soft tissues, largest at the level of C2-C3, measuring approximately 2 x 2.6 x 3.4 cm in size, with extension into the dorsal epidural space, and spinal canal stenosis with deformity of the central cord (series 6, image 16).  Heterogeneous fluid collection extends inferiorly along the posterior epidural space, measuring approximately 2.9 cm in length (series 4, image 7).    An additional more superficial fluid collection in the posterior paraspinal soft tissues measures approximately 2.5 x 1.8 x 3.2 cm in size at the level of C7-T1 (series 6, image 26).    Disc level analysis as follows:    C2-C3: Posterior disc osteophyte complex and circumferential epidural enhancement with moderate narrowing of the spinal canal and complete effacement of the CSF space.  Mild bilateral neural foraminal stenosis secondary to uncovertebral and facet hypertrophy.    C3-C4: Postoperative changes with posterior disc osteophyte complex, epidural enhancement and dorsal epidural fluid with moderate to severe narrowing of the spinal canal with effacement of the CSF space and deformity of the cord.  There is moderate right neural foraminal stenosis.  The left neural foramina is obscured by motion artifact.    C4-C5: Postoperative changes with circumferential epidural enhancement, dorsal epidural fluid and moderate narrowing of the spinal canal with deformity of the dorsal cord.  Mild to moderate bilateral neural foraminal stenosis secondary to uncovertebral and facet hypertrophy.    C5-C6: Postoperative changes with  circumferential epidural enhancement and dorsal epidural fluid and moderate narrowing of the spinal canal.  Limited evaluation of the neural foramina.    C6-C7: Postoperative changes with posterior disc osteophyte complex, circumferential epidural enhancement and dorsal epidural fluid with moderate narrowing of the canal and partial effacement of the CSF space.  Limited evaluation of the neural foramina.    C7-T1: Postoperative changes.  No disc herniation.  No significant spinal canal stenosis.  Mild bilateral neural foraminal stenosis secondary to uncovertebral and facet hypertrophy.  Impression: 1. Postoperative changes of the cervical spine with diffuse circumferential epidural enhancement and heterogeneous rim enhancing fluid in the laminectomy beds at C3-C6.  2. Moderate to severe canal stenosis at C3-C4, moderate at C2-C3 and C4-C7.  3. Multilevel neural foraminal stenoses as described.  4. Limited evaluation of the cord without definite new signal abnormality.  No major change from the [facility] interpretation.    Electronically signed by: Janell Cormier  Date:    06/15/2022  Time:    09:02      Jayme Le MD   06/18/2022

## 2022-06-18 NOTE — PT/OT/SLP PROGRESS
Occupational Therapy   Treatment    Name: Natty Nelson  MRN: [id number]  Admitting Diagnosis:  Neck Pain  15 Days Post-Op    Recommendations:     Discharge Recommendations: nursing facility, skilled, rehabilitation facility  Discharge Equipment Recommendations:   (TBD by next level of care)  Barriers to discharge:       Assessment:     Natty Nelson is a 82 y.o. female with a medical diagnosis of neck pain. Performance deficits affecting function are weakness, impaired endurance, impaired self care skills, impaired functional mobilty, impaired balance, decreased coordination, decreased upper extremity function, pain, impaired coordination, impaired fine motor, orthopedic precautions.     Rehab Prognosis:  Good; patient would benefit from acute skilled OT services to address these deficits and reach maximum level of function.       Plan:     Patient to be seen 5 x/week to address the above listed problems via self-care/home management, therapeutic activities, therapeutic exercises  · Plan of Care Expires: 06/27/22  · Plan of Care Reviewed with: patient    Subjective     Pain/Comfort:  · Pain Rating 1: 10/10  · Location - Side 1: Bilateral  · Location 1: neck  · Pain Addressed 1: Reposition, Cessation of Activity    Objective:     Communicated with: RN prior to session.  Patient found supine with   upon OT entry to room.    General Precautions: Standard, fall   Orthopedic Precautions:spinal precautions   Braces: Cervical collar  Respiratory Status: Room air     Occupational Performance:     Bed Mobility:    · Patient completed Rolling/Turning to Left with  maximal assistance  · Patient completed Scooting/Bridging with maximal assistance  · Patient completed Supine to Sit with maximal assistance  · Patient completed Sit to Supine with maximal assistance     Functional Mobility/Transfers:   Pt sat EOB ~ 1 minute before complaints of 10/10 neck pain requesting to return to supine; Pt required Max A for functional  mobility/    Activities of Daily Living:  · Feeding: Pt required Max A to manage packages and containers, however pt able to bring cup to mouth with SBA.  · Grooming: Pt performed oral care with Min A and setup to prep toothbrush; Pt able to wipe face/neck with washcloth.      AMPAC 6 Click ADL:      Treatment & Education:  POC.    Patient left supine with all lines intact and call button in reachEducation:      GOALS:   Multidisciplinary Problems     Occupational Therapy Goals        Problem: Occupational Therapy    Goal Priority Disciplines Outcome Interventions   Occupational Therapy Goal     OT, PT/OT Ongoing, Progressing    Description: LTG: Pt will perform ADL with Mod I from w/c by dc.    STG: to be met in 2 weeks, by 6/27  1. Pt will feed self with SBA within diet recs. Met as of 06/13  2. Pt will perform grooming EOB with SBA, progressing cont  3. Pt will perform UB dressing with min A, progressing cont  4. Pt will perform toileting/bsc t/f with mod A, modified, progressing cont  5. Pt will tolerate EOB ax x5 minutes w/ SBA, progressing cont                   Time Tracking:     OT Date of Treatment: 06/18/22  OT Start Time: 1129  OT Stop Time: 1150  OT Total Time (min): 21 min    Billable Minutes:Self Care/Home Management .    OT/CLAYTON: CLAYTON     CLAYTON Visit Number: 5    6/18/2022

## 2022-06-18 NOTE — PLAN OF CARE
Patient complained of severe pain at shift onset. Medicated with dilaudid IV x1.   Continue to adjust pain and muscle relaxants.POC reviewed with patient.

## 2022-06-19 LAB
POCT GLUCOSE: 179 MG/DL (ref 70–110)
POCT GLUCOSE: 210 MG/DL (ref 70–110)
POCT GLUCOSE: 277 MG/DL (ref 70–110)
POCT GLUCOSE: 87 MG/DL (ref 70–110)
POCT GLUCOSE: 93 MG/DL (ref 70–110)

## 2022-06-19 PROCEDURE — 25000003 PHARM REV CODE 250: Performed by: NURSE PRACTITIONER

## 2022-06-19 PROCEDURE — C9399 UNCLASSIFIED DRUGS OR BIOLOG: HCPCS | Performed by: INTERNAL MEDICINE

## 2022-06-19 PROCEDURE — 63600175 PHARM REV CODE 636 W HCPCS: Performed by: INTERNAL MEDICINE

## 2022-06-19 PROCEDURE — 11000001 HC ACUTE MED/SURG PRIVATE ROOM

## 2022-06-19 PROCEDURE — 25000003 PHARM REV CODE 250: Performed by: INTERNAL MEDICINE

## 2022-06-19 PROCEDURE — 94761 N-INVAS EAR/PLS OXIMETRY MLT: CPT

## 2022-06-19 RX ORDER — METHOCARBAMOL 500 MG/1
1000 TABLET, FILM COATED ORAL EVERY 8 HOURS
Status: DISCONTINUED | OUTPATIENT
Start: 2022-06-19 | End: 2022-06-22 | Stop reason: HOSPADM

## 2022-06-19 RX ADMIN — PANTOPRAZOLE SODIUM 40 MG: 40 TABLET, DELAYED RELEASE ORAL at 09:06

## 2022-06-19 RX ADMIN — METHOCARBAMOL 1000 MG: 500 TABLET ORAL at 02:06

## 2022-06-19 RX ADMIN — BACLOFEN 5 MG: 5 TABLET ORAL at 09:06

## 2022-06-19 RX ADMIN — NIFEDIPINE 90 MG: 90 TABLET, FILM COATED, EXTENDED RELEASE ORAL at 09:06

## 2022-06-19 RX ADMIN — LABETALOL HYDROCHLORIDE 400 MG: 200 TABLET, FILM COATED ORAL at 05:06

## 2022-06-19 RX ADMIN — PREGABALIN 150 MG: 150 CAPSULE ORAL at 09:06

## 2022-06-19 RX ADMIN — LABETALOL HYDROCHLORIDE 400 MG: 200 TABLET, FILM COATED ORAL at 09:06

## 2022-06-19 RX ADMIN — KETOROLAC TROMETHAMINE 15 MG: 30 INJECTION, SOLUTION INTRAMUSCULAR at 05:06

## 2022-06-19 RX ADMIN — KETOROLAC TROMETHAMINE 15 MG: 30 INJECTION, SOLUTION INTRAMUSCULAR at 06:06

## 2022-06-19 RX ADMIN — INSULIN ASPART 3 UNITS: 100 INJECTION, SOLUTION INTRAVENOUS; SUBCUTANEOUS at 09:06

## 2022-06-19 RX ADMIN — INSULIN DETEMIR 55 UNITS: 100 INJECTION, SOLUTION SUBCUTANEOUS at 09:06

## 2022-06-19 RX ADMIN — KETOROLAC TROMETHAMINE 15 MG: 30 INJECTION, SOLUTION INTRAMUSCULAR at 11:06

## 2022-06-19 RX ADMIN — LATANOPROST 1 DROP: 50 SOLUTION OPHTHALMIC at 09:06

## 2022-06-19 RX ADMIN — METHOCARBAMOL 1000 MG: 500 TABLET ORAL at 05:06

## 2022-06-19 RX ADMIN — METHOCARBAMOL 1000 MG: 500 TABLET ORAL at 09:06

## 2022-06-19 RX ADMIN — ENOXAPARIN SODIUM 40 MG: 40 INJECTION SUBCUTANEOUS at 04:06

## 2022-06-19 RX ADMIN — BACLOFEN 5 MG: 5 TABLET ORAL at 02:06

## 2022-06-19 RX ADMIN — KETOROLAC TROMETHAMINE 15 MG: 30 INJECTION, SOLUTION INTRAMUSCULAR at 12:06

## 2022-06-19 RX ADMIN — LABETALOL HYDROCHLORIDE 400 MG: 200 TABLET, FILM COATED ORAL at 02:06

## 2022-06-19 RX ADMIN — ATORVASTATIN CALCIUM 40 MG: 40 TABLET, FILM COATED ORAL at 04:06

## 2022-06-19 RX ADMIN — GLIPIZIDE 5 MG: 5 TABLET, FILM COATED, EXTENDED RELEASE ORAL at 11:06

## 2022-06-19 RX ADMIN — INSULIN ASPART 4 UNITS: 100 INJECTION, SOLUTION INTRAVENOUS; SUBCUTANEOUS at 04:06

## 2022-06-19 NOTE — PROGRESS NOTES
[facility]  Hospital Medicine Progress Note        Chief Complaint: Inpatient follow-up on neck pain     HPI:   Ms. Nelson is an 82-year-old  female with severe cervical stenosis status post discectomy with fusion of C5-C6 on 05/09/2022 by Dr. Joseph and ongoing bilateral lower extremity weakness since April 20, 2022, who presents to the ED from [facility] rehab for abnormal MRI with complaints of neck pain ongoing since surgery. She was seen at [facility] ED and underwent CT Cervical Spine that showed concerns for possible bone marrow edema at C3. MRI was also obtained that did not show this however it did show postoperative changes including stenosis around C5 -please note that this is all per secondary report from the ER physician. Images were not available. ED physician spoke to Neurosurgery who recommended repeating MRI Cervical Spine w/wo in AM.  Her laboratory work was unremarkable except for a mild worsening of her anemia with a hemoglobin of 8.2 (post-op Hgb 9.9). She was admitted to the hospitalist service for further management.     Patient admitted for severe intractable neck pain after a C5-C6 diskectomies recent procedure.  Neurosurgery has been consulted.  MRI C-spine ordered.  Neurosurgery performed C3 to C7 PCDF on 6/3. NSGY has signed off- no more surgical interventions      Interval Hx:   Pain control is improving   Continue to adjust pain and muscle relaxants.       Objective/physical exam:  General: Obese  female in no acute distress  HENT: normocephalic, atraumatic  Eye: PERRL, EOMI, clear conjunctiva  Neck:  Incision is clean dry and intact, AYLA drain is in place, soft collar is in place  Respiratory: clear to auscultation bilaterally  Cardiovascular: regular rate and rhythm  Gastrointestinal: non-distended, positive bowel sounds  Musculoskeletal: no gross deformity  Integumentary: warm, dry, intact, no rashes  Neurological: cranial nerves grossly  intact, unable to lift legs off bed (ongoing since surgery) able to dorsi and plantar flex both feet with normal strength  Psychiatric: cooperative        Assessment/Plan:  Neck Pain in the setting of recent cervical discectomy/fusion of C5-6  - s/p PCDF of C3-C7 on 06/03/2022  Ongoing BLE weakness 2/2 to above  Essential hypertension  Acute hypoxic respiratory failure likely secondary to volume overload, resolved   LALIT on Stage IIIB CKD  Non-insulin-dependent type 2 diabetes mellitus with hyperglycemia   CAD/CABG  JODI/Right CEA  PAD        Plan:  continue gabapentin 300mg at bedtime , titrate up slowly   Adjust muscle relaxants   Adjust pain meds   Continue pain control, physical therapy, occupational therapy, blood pressure control, diabetes control, and other supportive care  Completed Decadron taper  Titrate Levemir insulin to achieve blood glucose control.     Patient condition:  Stable     Anticipated discharge and Disposition:   [place] [facility]    VITAL SIGNS: 24 HRS MIN & MAX LAST   Temp  Min: 97.5 °F (36.4 °C)  Max: 98.5 °F (36.9 °C) 97.5 °F (36.4 °C)   BP  Min: 113/62  Max: 137/64 132/77   Pulse  Min: 61  Max: 70  68   Resp  Min: 18  Max: 18 18   SpO2  Min: 96 %  Max: 100 % 100 %       No results for input(s): WBC, RBC, HGB, HCT, MCV, MCH, MCHC, RDW, PLT, MPV, GRAN, LYMPH, MONO, BASO, NRBC in the last 168 hours.    No results for input(s): NA, K, CL, CO2, ANIONGAP, BUN, CREATININE, GLU, CALCIUM, PH, MG, ALBUMIN, PROT, ALKPHOS, ALT, AST, BILITOT in the last 168 hours.       Microbiology Results (last 7 days)     ** No results found for the last 168 hours. **           See below for Radiology    Scheduled Med:   atorvastatin  40 mg Oral Daily    baclofen  5 mg Oral TID    bisacodyL  10 mg Rectal Daily    enoxaparin  40 mg Subcutaneous Daily    glipiZIDE  5 mg Oral Daily with breakfast    insulin detemir U-100  55 Units Subcutaneous BID    ketorolac  15 mg Intravenous Q6H    labetaloL   400 mg Oral Q8H    latanoprost  1 drop Both Eyes Nightly    methocarbamoL  1,000 mg Oral Q8H    NIFEdipine  90 mg Oral Daily    pantoprazole  40 mg Oral Daily    pregabalin  150 mg Oral BID        Continuous Infusions:       PRN Meds:  acetaminophen, albuterol-ipratropium, aluminum-magnesium hydroxide-simethicone, bisacodyL, hydrALAZINE, HYDROcodone-acetaminophen, HYDROmorphone, insulin aspart U-100, polyethylene glycol, senna-docusate 8.6-50 mg, trazodone       Patient condition:  Stable/Fair/Guarded/ Serious/ Critical    Anticipated discharge and Disposition:      All diagnosis and differential diagnosis have been reviewed; assessment and plan has been documented; I have personally reviewed the labs and test results that are presently available; I have reviewed the patients medication list; I have reviewed the consulting providers response and recommendations. I have reviewed or attempted to review medical records based upon their availability    All of the patient's questions have been  addressed and answered. Patient's is agreeable to the above stated plan. I will continue to monitor closely and make adjustments to medical management as needed.  _____________________________________________________________________    Nutrition Status:    Radiology:  MRI Cervical Spine W WO Cont  Narrative: EXAMINATION:  MRI CERVICAL SPINE W WO CONTRAST    CLINICAL HISTORY:  Neck pain, acute, prior cervical surgery;    TECHNIQUE:  Multiplanar, multisequence MR imaging of the cervical spine with and without contrast.    COMPARISON:  MRI cervical spine dated 05/05/2022    FINDINGS:  There are postoperative changes of the cervical spine with posterior spinal fusion hardware extending from C4 through C7 and laminectomies at C3 through C7.  There is anterior fusion hardware at C5-C6 with disc spacer in place.  The remaining vertebral body heights are preserved.  The bone marrow is otherwise normal in signal.    Evaluation of the  cord is limited secondary to artifact from the hardware and motion without definite new cord signal abnormality.  There is circumferential epidural enhancement throughout the cervical spine, measuring up to 3 mm in thickness dorsally at the level of C2-C3, best visualized on sagittal images.    There is a heterogeneous collection in the posterior paraspinal soft tissues, largest at the level of C2-C3, measuring approximately 2 x 2.6 x 3.4 cm in size, with extension into the dorsal epidural space, and spinal canal stenosis with deformity of the central cord (series 6, image 16).  Heterogeneous fluid collection extends inferiorly along the posterior epidural space, measuring approximately 2.9 cm in length (series 4, image 7).    An additional more superficial fluid collection in the posterior paraspinal soft tissues measures approximately 2.5 x 1.8 x 3.2 cm in size at the level of C7-T1 (series 6, image 26).    Disc level analysis as follows:    C2-C3: Posterior disc osteophyte complex and circumferential epidural enhancement with moderate narrowing of the spinal canal and complete effacement of the CSF space.  Mild bilateral neural foraminal stenosis secondary to uncovertebral and facet hypertrophy.    C3-C4: Postoperative changes with posterior disc osteophyte complex, epidural enhancement and dorsal epidural fluid with moderate to severe narrowing of the spinal canal with effacement of the CSF space and deformity of the cord.  There is moderate right neural foraminal stenosis.  The left neural foramina is obscured by motion artifact.    C4-C5: Postoperative changes with circumferential epidural enhancement, dorsal epidural fluid and moderate narrowing of the spinal canal with deformity of the dorsal cord.  Mild to moderate bilateral neural foraminal stenosis secondary to uncovertebral and facet hypertrophy.    C5-C6: Postoperative changes with circumferential epidural enhancement and dorsal epidural fluid and  moderate narrowing of the spinal canal.  Limited evaluation of the neural foramina.    C6-C7: Postoperative changes with posterior disc osteophyte complex, circumferential epidural enhancement and dorsal epidural fluid with moderate narrowing of the canal and partial effacement of the CSF space.  Limited evaluation of the neural foramina.    C7-T1: Postoperative changes.  No disc herniation.  No significant spinal canal stenosis.  Mild bilateral neural foraminal stenosis secondary to uncovertebral and facet hypertrophy.  Impression: 1. Postoperative changes of the cervical spine with diffuse circumferential epidural enhancement and heterogeneous rim enhancing fluid in the laminectomy beds at C3-C6.  2. Moderate to severe canal stenosis at C3-C4, moderate at C2-C3 and C4-C7.  3. Multilevel neural foraminal stenoses as described.  4. Limited evaluation of the cord without definite new signal abnormality.  No major change from the [facility] interpretation.    Electronically signed by: Janell Cormier  Date:    06/15/2022  Time:    09:02      Jayme Le MD   06/19/2022

## 2022-06-20 LAB
POCT GLUCOSE: 152 MG/DL (ref 70–110)
POCT GLUCOSE: 209 MG/DL (ref 70–110)
POCT GLUCOSE: 252 MG/DL (ref 70–110)
POCT GLUCOSE: 71 MG/DL (ref 70–110)

## 2022-06-20 PROCEDURE — 25000003 PHARM REV CODE 250: Performed by: NURSE PRACTITIONER

## 2022-06-20 PROCEDURE — 94761 N-INVAS EAR/PLS OXIMETRY MLT: CPT

## 2022-06-20 PROCEDURE — 63600175 PHARM REV CODE 636 W HCPCS: Performed by: INTERNAL MEDICINE

## 2022-06-20 PROCEDURE — 11000001 HC ACUTE MED/SURG PRIVATE ROOM

## 2022-06-20 PROCEDURE — 97530 THERAPEUTIC ACTIVITIES: CPT | Mod: CQ

## 2022-06-20 PROCEDURE — C9399 UNCLASSIFIED DRUGS OR BIOLOG: HCPCS | Performed by: INTERNAL MEDICINE

## 2022-06-20 PROCEDURE — 97168 OT RE-EVAL EST PLAN CARE: CPT

## 2022-06-20 PROCEDURE — 25000003 PHARM REV CODE 250: Performed by: INTERNAL MEDICINE

## 2022-06-20 PROCEDURE — 97530 THERAPEUTIC ACTIVITIES: CPT

## 2022-06-20 RX ORDER — DOXYCYCLINE HYCLATE 100 MG
100 TABLET ORAL EVERY 12 HOURS
Status: DISCONTINUED | OUTPATIENT
Start: 2022-06-20 | End: 2022-06-21

## 2022-06-20 RX ADMIN — ATORVASTATIN CALCIUM 40 MG: 40 TABLET, FILM COATED ORAL at 04:06

## 2022-06-20 RX ADMIN — HYDROCODONE BITARTRATE AND ACETAMINOPHEN 1 TABLET: 7.5; 325 TABLET ORAL at 09:06

## 2022-06-20 RX ADMIN — DOXYCYCLINE HYCLATE 100 MG: 100 TABLET, COATED ORAL at 09:06

## 2022-06-20 RX ADMIN — LABETALOL HYDROCHLORIDE 400 MG: 200 TABLET, FILM COATED ORAL at 05:06

## 2022-06-20 RX ADMIN — METHOCARBAMOL 1000 MG: 500 TABLET ORAL at 09:06

## 2022-06-20 RX ADMIN — NIFEDIPINE 90 MG: 90 TABLET, FILM COATED, EXTENDED RELEASE ORAL at 09:06

## 2022-06-20 RX ADMIN — ENOXAPARIN SODIUM 40 MG: 40 INJECTION SUBCUTANEOUS at 04:06

## 2022-06-20 RX ADMIN — BACLOFEN 5 MG: 5 TABLET ORAL at 09:06

## 2022-06-20 RX ADMIN — PREGABALIN 150 MG: 150 CAPSULE ORAL at 09:06

## 2022-06-20 RX ADMIN — METHOCARBAMOL 1000 MG: 500 TABLET ORAL at 01:06

## 2022-06-20 RX ADMIN — KETOROLAC TROMETHAMINE 15 MG: 30 INJECTION, SOLUTION INTRAMUSCULAR at 12:06

## 2022-06-20 RX ADMIN — KETOROLAC TROMETHAMINE 15 MG: 30 INJECTION, SOLUTION INTRAMUSCULAR at 05:06

## 2022-06-20 RX ADMIN — INSULIN ASPART 6 UNITS: 100 INJECTION, SOLUTION INTRAVENOUS; SUBCUTANEOUS at 04:06

## 2022-06-20 RX ADMIN — LABETALOL HYDROCHLORIDE 400 MG: 200 TABLET, FILM COATED ORAL at 01:06

## 2022-06-20 RX ADMIN — KETOROLAC TROMETHAMINE 15 MG: 30 INJECTION, SOLUTION INTRAMUSCULAR at 01:06

## 2022-06-20 RX ADMIN — LATANOPROST 1 DROP: 50 SOLUTION OPHTHALMIC at 09:06

## 2022-06-20 RX ADMIN — LABETALOL HYDROCHLORIDE 400 MG: 200 TABLET, FILM COATED ORAL at 09:06

## 2022-06-20 RX ADMIN — INSULIN DETEMIR 55 UNITS: 100 INJECTION, SOLUTION SUBCUTANEOUS at 09:06

## 2022-06-20 RX ADMIN — PANTOPRAZOLE SODIUM 40 MG: 40 TABLET, DELAYED RELEASE ORAL at 09:06

## 2022-06-20 RX ADMIN — METHOCARBAMOL 1000 MG: 500 TABLET ORAL at 05:06

## 2022-06-20 RX ADMIN — BACLOFEN 5 MG: 5 TABLET ORAL at 04:06

## 2022-06-20 NOTE — PT/OT/SLP RE-EVAL
Occupational Therapy   Re-evaluation    Name: Natty Nelson  MRN: [id number]  Admitting Diagnosis:  S/p C3-C7 ACDF  Recent Surgery: Procedure(s) (LRB):  FUSION, SPINE, POSTERIOR SPINAL COLUMN, CERVICAL, USING COMPUTER-ASSISTED NAVIGATION (N/A) 17 Days Post-Op    Recommendations:     Discharge Recommendations: rehabilitation facility, nursing facility, skilled  Discharge Equipment Recommendations:   TBD by next level of care  Barriers to discharge:   Severity of Deficits    Assessment:     Natty Nelson is a 82 y.o. female with a medical diagnosis of s/p C3-C7 ACDF. Performance deficits affecting function are weakness, gait instability, decreased upper extremity function, impaired endurance, impaired balance, decreased lower extremity function, impaired coordination, impaired fine motor, impaired self care skills, pain, orthopedic precautions, impaired functional mobilty. Pt presents w/ good improvement during today's re-evaluation. Pt tolerated ax for longer periods of time w/ improved quality of movement. Pt cont to demonstrate decreased functional use of L hand, and is unable to achieve full extension of digits and wrist, and reports difficulty feeding self. Plan to provide built up handles in next session. Discussed assessment w/ MD. Proposed potential use of Aspen collar vs soft c-collar for greater support during mobility in attempt to manage pain. If pt cont to demonstrate improvements in ax tolerance w/ better controlled pain, pt would benefit from IPR upon d/c.    Rehab Prognosis:  Good; patient would benefit from acute skilled OT services to address these deficits and reach maximum level of function.       Plan:     Patient to be seen 5 x/week to address the above listed problems via self-care/home management, therapeutic activities, therapeutic exercises  · Plan of Care Expires: 07/04/22  · Plan of Care Reviewed with: patient    Subjective     Chief Complaint: Pain  Patient/Family stated goals: To  walk/be IND  Communicated with: RN prior to session.  Pain/Comfort:  · Pain Rating 1: 2/10 (when at rest)  · Location - Orientation 1: posterior  · Location 1: cervical spine  · Pain Addressed 1: Distraction  · Pain Rating 2: 7/10 (During ax)  · Location - Orientation 2: posterior  · Location 2: cervical spine  · Pain Addressed 2: Reposition, Distraction, Cessation of Activity    Objective:     Communicated with: RN prior to session.  Patient found HOB elevated with: ESTELLE Weiss, telemetry upon OT entry to room.    General Precautions: Standard, fall   Orthopedic Precautions:spinal precautions   Braces: Cervical collar  Respiratory Status: Room air    Occupational Performance:    Bed Mobility:    · Patient completed Rolling/Turning to Left with  moderate assistance  · Patient completed Rolling/Turning to Right with moderate assistance  · Patient completed Supine to Sit with maximal assistance  · Patient completed Sit to Supine with maximal assistance    Functional Mobility/Transfers:  · Patient completed Sit <> Stand Transfer with moderate assistance and of 2 persons  with  rolling walker  X 2 trials.    Activities of Daily Living:  · Feeding:  Pt reports increased difficulty feeding self 2/2 impaired use of L hand. Pt has been observed bringing drink to mouth, however reports difficulty scooping foods w/ spoon, Plan to address at next session to provide AE    Cognitive/Visual Perceptual:  Cognitive/Psychosocial Skills:     -       Oriented to: Person, Place, Time and Situation   -       Follows Commands/attention:Follows two-step commands  -       Safety awareness/insight to disability: impaired     Physical Exam:  Sensation:    -       Intact  light/touch to BUEs  Upper Extremity Range of Motion:     -       Right Upper Extremity: WFL  -       Left Upper Extremity: WFL except moderatelty impaired AROM to digits and wrist  Upper Extremity Strength:    -       Right Upper Extremity: 3+/5  -       Left Upper  Extremity: 3+/5    AMPAC 6 Click:  AMPA Total Score: 11Education:      Patient left HOB elevated with call button in reach, RN notified and pt positioned on wedge under L side    GOALS:   Multidisciplinary Problems     Occupational Therapy Goals        Problem: Occupational Therapy    Goal Priority Disciplines Outcome Interventions   Occupational Therapy Goal     OT, PT/OT Ongoing, Progressing    Description: Goals to be met by: 07/04/2022    Pt will feed self with SBA within diet recs. Met as of 06/13- pt presents w/ worsening functional use of L hand, goal to be cont and progressing as of 06/20  Pt will perform grooming EOB with SBA, progressing cont  Pt will perform UB dressing with min A, progressing cont  Pt will perform toileting/bsc t/f with mod A, modified, progressing cont  Pt will tolerate EOB ax x5 minutes w/ SBA, progressing cont                   History:     Past Medical History:   Diagnosis Date    Arthritis     Diabetes mellitus     Hypertension        Past Surgical History:   Procedure Laterality Date    ANTERIOR CERVICAL DISCECTOMY W/ FUSION Bilateral 5/9/2022    Procedure: DISCECTOMY, SPINE, CERVICAL, ANTERIOR APPROACH, WITH FUSION;  Surgeon: Toni Joseph MD;  Location: [facility];  Service: Neurosurgery;  Laterality: Bilateral;  ACDF C5/6    FUSION OF POSTERIOR COLUMN OF CERVICAL SPINE USING COMPUTER AIDED NAVIGATION N/A 6/3/2022    Procedure: FUSION, SPINE, POSTERIOR SPINAL COLUMN, CERVICAL, USING COMPUTER-ASSISTED NAVIGATION;  Surgeon: Toni Joseph MD;  Location: [facility];  Service: Neurosurgery;  Laterality: N/A;  POSTERIOR C3 - C7  DECOMPRESSION AND FUSION // O-ARM // DIRECT SPINE // NDM -WE NEED TO CONTACT //        Time Tracking:     OT Date of Treatment: 06/20/22  OT Start Time: 1043  OT Stop Time: 1121  OT Total Time (min): 38 min    Billable Minutes:Re-eval 25  Therapeutic Activity 13    6/20/2022

## 2022-06-20 NOTE — PROGRESS NOTES
[facility]  Hospital Medicine Progress Note        Chief Complaint: Inpatient follow-up on neck pain     HPI:   Ms. Nelson is an 82-year-old  female with severe cervical stenosis status post discectomy with fusion of C5-C6 on 05/09/2022 by Dr. Joseph and ongoing bilateral lower extremity weakness since April 20, 2022, who presents to the ED from [facility] rehab for abnormal MRI with complaints of neck pain ongoing since surgery. She was seen at [facility] ED and underwent CT Cervical Spine that showed concerns for possible bone marrow edema at C3. MRI was also obtained that did not show this however it did show postoperative changes including stenosis around C5 -please note that this is all per secondary report from the ER physician. Images were not available. ED physician spoke to Neurosurgery who recommended repeating MRI Cervical Spine w/wo in AM.  Her laboratory work was unremarkable except for a mild worsening of her anemia with a hemoglobin of 8.2 (post-op Hgb 9.9). She was admitted to the hospitalist service for further management.     Patient admitted for severe intractable neck pain after a C5-C6 diskectomies recent procedure.  Neurosurgery has been consulted.  MRI C-spine ordered.  Neurosurgery performed C3 to C7 PCDF on 6/3. NSGY has signed off- no more surgical interventions      Interval Hx:   C/o- feeling her arms are getting worse with movement , pain control is improving  Legs are swollen, thigh are swollen, erythematous, warm to touch- begin po doxycycline bid for cellulitis   Will try an aspen collar to neck- unable to sit up to work with PT/OT   NSGY Notes reviewed- no new recs         Objective/physical exam:  General: Obese  female in no acute distress  HENT: normocephalic, atraumatic  Eye: PERRL, EOMI, clear conjunctiva  Neck:  Incision is clean dry and intact, AYLA drain is in place, soft collar is in place  Respiratory: clear to auscultation  bilaterally  Cardiovascular: regular rate and rhythm  Gastrointestinal: non-distended, positive bowel sounds  Musculoskeletal: no gross deformity  Integumentary: warm, dry, intact, no rashes  Neurological: cranial nerves grossly intact, unable to lift legs off bed (ongoing since surgery) able to dorsi and plantar flex both feet with normal strength  Psychiatric: cooperative        Assessment/Plan:  Neck Pain in the setting of recent cervical discectomy/fusion of C5-6  - s/p PCDF of C3-C7 on 06/03/2022  Ongoing BLE weakness 2/2 to above  Essential hypertension  Acute hypoxic respiratory failure likely secondary to volume overload, resolved   LALIT on Stage IIIB CKD  Non-insulin-dependent type 2 diabetes mellitus with hyperglycemia   CAD/CABG  JODI/Right CEA  PAD  B/L Thigh cellulitis         Plan:   pain control is improving   begin po doxycycline bid  Will try an aspen collar to neck- unable to sit up to work with PT/OT   NSGY Notes reviewed- no new recs   continue muscle relaxants   Continue pain control, physical therapy, occupational therapy, blood pressure control, diabetes control, and other supportive care  Completed Decadron taper  Titrate Levemir insulin to achieve blood glucose control.     Patient condition:  Stable     Anticipated discharge and Disposition:   [place] [facility]    VITAL SIGNS: 24 HRS MIN & MAX LAST   Temp  Min: 97.1 °F (36.2 °C)  Max: 98.2 °F (36.8 °C) 97.1 °F (36.2 °C)   BP  Min: 128/77  Max: 166/62 (!) 146/68   Pulse  Min: 59  Max: 77  67   Resp  Min: 15  Max: 20 18   SpO2  Min: 98 %  Max: 100 % 100 %       No results for input(s): WBC, RBC, HGB, HCT, MCV, MCH, MCHC, RDW, PLT, MPV, GRAN, LYMPH, MONO, BASO, NRBC in the last 168 hours.    No results for input(s): NA, K, CL, CO2, ANIONGAP, BUN, CREATININE, GLU, CALCIUM, PH, MG, ALBUMIN, PROT, ALKPHOS, ALT, AST, BILITOT in the last 168 hours.       Microbiology Results (last 7 days)     ** No results found for the last 168 hours.  **           See below for Radiology    Scheduled Med:   atorvastatin  40 mg Oral Daily    baclofen  5 mg Oral TID    bisacodyL  10 mg Rectal Daily    enoxaparin  40 mg Subcutaneous Daily    glipiZIDE  5 mg Oral Daily with breakfast    insulin detemir U-100  55 Units Subcutaneous BID    labetaloL  400 mg Oral Q8H    latanoprost  1 drop Both Eyes Nightly    methocarbamoL  1,000 mg Oral Q8H    NIFEdipine  90 mg Oral Daily    pantoprazole  40 mg Oral Daily    pregabalin  150 mg Oral BID        Continuous Infusions:       PRN Meds:  acetaminophen, albuterol-ipratropium, aluminum-magnesium hydroxide-simethicone, bisacodyL, hydrALAZINE, HYDROcodone-acetaminophen, HYDROmorphone, insulin aspart U-100, polyethylene glycol, senna-docusate 8.6-50 mg, trazodone         Patient condition:  Stable/Fair/Guarded/ Serious/ Critical    Anticipated discharge and Disposition:      All diagnosis and differential diagnosis have been reviewed; assessment and plan has been documented; I have personally reviewed the labs and test results that are presently available; I have reviewed the patients medication list; I have reviewed the consulting providers response and recommendations. I have reviewed or attempted to review medical records based upon their availability    All of the patient's questions have been  addressed and answered. Patient's is agreeable to the above stated plan. I will continue to monitor closely and make adjustments to medical management as needed.  _____________________________________________________________________    Nutrition Status:    Radiology:  MRI Cervical Spine W WO Cont  Narrative: EXAMINATION:  MRI CERVICAL SPINE W WO CONTRAST    CLINICAL HISTORY:  Neck pain, acute, prior cervical surgery;    TECHNIQUE:  Multiplanar, multisequence MR imaging of the cervical spine with and without contrast.    COMPARISON:  MRI cervical spine dated 05/05/2022    FINDINGS:  There are postoperative changes of the  cervical spine with posterior spinal fusion hardware extending from C4 through C7 and laminectomies at C3 through C7.  There is anterior fusion hardware at C5-C6 with disc spacer in place.  The remaining vertebral body heights are preserved.  The bone marrow is otherwise normal in signal.    Evaluation of the cord is limited secondary to artifact from the hardware and motion without definite new cord signal abnormality.  There is circumferential epidural enhancement throughout the cervical spine, measuring up to 3 mm in thickness dorsally at the level of C2-C3, best visualized on sagittal images.    There is a heterogeneous collection in the posterior paraspinal soft tissues, largest at the level of C2-C3, measuring approximately 2 x 2.6 x 3.4 cm in size, with extension into the dorsal epidural space, and spinal canal stenosis with deformity of the central cord (series 6, image 16).  Heterogeneous fluid collection extends inferiorly along the posterior epidural space, measuring approximately 2.9 cm in length (series 4, image 7).    An additional more superficial fluid collection in the posterior paraspinal soft tissues measures approximately 2.5 x 1.8 x 3.2 cm in size at the level of C7-T1 (series 6, image 26).    Disc level analysis as follows:    C2-C3: Posterior disc osteophyte complex and circumferential epidural enhancement with moderate narrowing of the spinal canal and complete effacement of the CSF space.  Mild bilateral neural foraminal stenosis secondary to uncovertebral and facet hypertrophy.    C3-C4: Postoperative changes with posterior disc osteophyte complex, epidural enhancement and dorsal epidural fluid with moderate to severe narrowing of the spinal canal with effacement of the CSF space and deformity of the cord.  There is moderate right neural foraminal stenosis.  The left neural foramina is obscured by motion artifact.    C4-C5: Postoperative changes with circumferential epidural enhancement,  dorsal epidural fluid and moderate narrowing of the spinal canal with deformity of the dorsal cord.  Mild to moderate bilateral neural foraminal stenosis secondary to uncovertebral and facet hypertrophy.    C5-C6: Postoperative changes with circumferential epidural enhancement and dorsal epidural fluid and moderate narrowing of the spinal canal.  Limited evaluation of the neural foramina.    C6-C7: Postoperative changes with posterior disc osteophyte complex, circumferential epidural enhancement and dorsal epidural fluid with moderate narrowing of the canal and partial effacement of the CSF space.  Limited evaluation of the neural foramina.    C7-T1: Postoperative changes.  No disc herniation.  No significant spinal canal stenosis.  Mild bilateral neural foraminal stenosis secondary to uncovertebral and facet hypertrophy.  Impression: 1. Postoperative changes of the cervical spine with diffuse circumferential epidural enhancement and heterogeneous rim enhancing fluid in the laminectomy beds at C3-C6.  2. Moderate to severe canal stenosis at C3-C4, moderate at C2-C3 and C4-C7.  3. Multilevel neural foraminal stenoses as described.  4. Limited evaluation of the cord without definite new signal abnormality.  No major change from the [facility] interpretation.    Electronically signed by: Janell Cormier  Date:    06/15/2022  Time:    09:02      Jayme Le MD   06/20/2022

## 2022-06-20 NOTE — PT/OT/SLP PROGRESS
Physical Therapy         Treatment        Natty Nelson   MRN: [id number]     PT Received On: 06/20/22  PT Start Time: 1042     PT Stop Time: 1122    PT Total Time (min): 40 min       Billable Minutes:  Therapeutic Activity 40  Total Minutes: 40    Treatment Type: Treatment  PT/PTA: PTA     PTA Visit Number: 2       General Precautions: Standard, fall  Orthopedic Precautions: Orthopedic Precautions : spinal precautions   Braces:      Spiritual, Cultural Beliefs, Sikh Practices, Values that Affect Care: no    Subjective:  Communicated with nurse prior to session.     Cervical collar donned.     Objective:  Patient found supine in bed, with Patient found with: ESTELLE Weiss, telemetry    Functional Mobility:  Bed Mobility:   Supine to sit: Moderate Assistance   Sit to supine: Moderate Assistance   Rolling: Moderate Assistance   Scooting: Activity did not occur    Balance:   Static Sit: FAIR-: Maintains without assist but inconsistent   Dynamic Sit:  FAIR: Cannot move trunk without losing balance  Static Stand: POOR: Needs MODERATE assist to maintain  Dynamic stand: 0: N/A    Transfer Training:  Sit to stand:Moderate Assistance with Rolling Walker x2 trials. Pt able to maintain static standing for approx 35 seconds each trial.       Additional Treatment:  Pt sat EOB x7 minutes with some posterior lean noted. Pt attempted lateral stepping but with decreased coordination in BLEs.     Activity Tolerance:  Patient tolerated treatment well    Patient left right sidelying with all lines intact and call button in reach.    Assessment:  Natty Nelson is a 82 y.o. female with a medical diagnosis of <principal problem not specified>. She presents with increased standing tolerance.    Rehab potential is good.    Activity tolerance: Good    Discharge recommendations: Discharge Facility/Level of Care Needs: nursing facility, skilled, rehabilitation facility     Equipment recommendations: Equipment Needed After Discharge:  walker, rolling     GOALS:   Multidisciplinary Problems     Physical Therapy Goals        Problem: Physical Therapy    Goal Priority Disciplines Outcome Goal Variances Interventions   Physical Therapy Goal     PT, PT/OT Ongoing, Progressing     Description: Goals to be met by: 2022    Patient will increase functional independence with mobility by performin. Sit to stand transfer with Modified [place]  2. Bed to chair transfer with Modified [place] using Rolling Walker  3. Gait  x 150 feet with Modified [place] using Rolling Walker.                      PLAN:    Patient to be seen daily  to address the above listed problems via gait training, therapeutic activities  Plan of Care expires: 22  Plan of Care reviewed with: patient         2022

## 2022-06-20 NOTE — PLAN OF CARE
Problem: Occupational Therapy  Goal: Occupational Therapy Goal  Description: Goals to be met by: 07/04/2022    Pt will feed self with SBA within diet recs. Met as of 06/13- pt presents w/ worsening functional use of L hand, goal to be cont and progressing as of 06/20  Pt will perform grooming EOB with SBA, progressing cont  Pt will perform UB dressing with min A, progressing cont  Pt will perform toileting/bsc t/f with mod A, modified, progressing cont  Pt will tolerate EOB ax x5 minutes w/ SBA, progressing cont  Outcome: Ongoing, Progressing   POC Updated

## 2022-06-20 NOTE — PLAN OF CARE
Spoke with the pt dgt Clarita who stated that her mother is not going to the nh that she will go and live with her and we can stop our placement process updated cm of the info rec'vd will f/u

## 2022-06-20 NOTE — PROGRESS NOTES
Much improved over the weekend. Loves her arms and legs well. Able lift arms overhead.  Wound C/I.  Will follow.

## 2022-06-21 LAB
ANION GAP SERPL CALC-SCNC: 7 MEQ/L
BASOPHILS # BLD AUTO: 0.03 X10(3)/MCL (ref 0–0.2)
BASOPHILS NFR BLD AUTO: 0.4 %
BUN SERPL-MCNC: 33.4 MG/DL (ref 9.8–20.1)
CALCIUM SERPL-MCNC: 8.9 MG/DL (ref 8.4–10.2)
CHLORIDE SERPL-SCNC: 109 MMOL/L (ref 98–107)
CO2 SERPL-SCNC: 23 MMOL/L (ref 23–31)
CREAT SERPL-MCNC: 1.26 MG/DL (ref 0.55–1.02)
CREAT/UREA NIT SERPL: 27
EOSINOPHIL # BLD AUTO: 0.27 X10(3)/MCL (ref 0–0.9)
EOSINOPHIL NFR BLD AUTO: 3.3 %
ERYTHROCYTE [DISTWIDTH] IN BLOOD BY AUTOMATED COUNT: 15.3 % (ref 11.5–17)
GLUCOSE SERPL-MCNC: 143 MG/DL (ref 82–115)
HCT VFR BLD AUTO: 34 % (ref 37–47)
HGB BLD-MCNC: 10.5 GM/DL (ref 12–16)
IMM GRANULOCYTES # BLD AUTO: 0.04 X10(3)/MCL (ref 0–0.02)
IMM GRANULOCYTES NFR BLD AUTO: 0.5 % (ref 0–0.43)
LYMPHOCYTES # BLD AUTO: 2.34 X10(3)/MCL (ref 0.6–4.6)
LYMPHOCYTES NFR BLD AUTO: 28.4 %
MCH RBC QN AUTO: 29 PG (ref 27–31)
MCHC RBC AUTO-ENTMCNC: 30.9 MG/DL (ref 33–36)
MCV RBC AUTO: 93.9 FL (ref 80–94)
MONOCYTES # BLD AUTO: 0.83 X10(3)/MCL (ref 0.1–1.3)
MONOCYTES NFR BLD AUTO: 10.1 %
NEUTROPHILS # BLD AUTO: 4.7 X10(3)/MCL (ref 2.1–9.2)
NEUTROPHILS NFR BLD AUTO: 57.3 %
NRBC BLD AUTO-RTO: 0 %
PLATELET # BLD AUTO: 226 X10(3)/MCL (ref 130–400)
PMV BLD AUTO: 12.4 FL (ref 9.4–12.4)
POCT GLUCOSE: 113 MG/DL (ref 70–110)
POCT GLUCOSE: 160 MG/DL (ref 70–110)
POCT GLUCOSE: 186 MG/DL (ref 70–110)
POCT GLUCOSE: 189 MG/DL (ref 70–110)
POTASSIUM SERPL-SCNC: 4.7 MMOL/L (ref 3.5–5.1)
RBC # BLD AUTO: 3.62 X10(6)/MCL (ref 4.2–5.4)
SARS-COV-2 RDRP RESP QL NAA+PROBE: NEGATIVE
SODIUM SERPL-SCNC: 139 MMOL/L (ref 136–145)
WBC # SPEC AUTO: 8.3 X10(3)/MCL (ref 4.5–11.5)

## 2022-06-21 PROCEDURE — 25000003 PHARM REV CODE 250: Performed by: NURSE PRACTITIONER

## 2022-06-21 PROCEDURE — 85025 COMPLETE CBC W/AUTO DIFF WBC: CPT | Performed by: INTERNAL MEDICINE

## 2022-06-21 PROCEDURE — 87635 SARS-COV-2 COVID-19 AMP PRB: CPT | Performed by: INTERNAL MEDICINE

## 2022-06-21 PROCEDURE — 94761 N-INVAS EAR/PLS OXIMETRY MLT: CPT

## 2022-06-21 PROCEDURE — 63600175 PHARM REV CODE 636 W HCPCS: Performed by: INTERNAL MEDICINE

## 2022-06-21 PROCEDURE — 11000001 HC ACUTE MED/SURG PRIVATE ROOM

## 2022-06-21 PROCEDURE — 36415 COLL VENOUS BLD VENIPUNCTURE: CPT | Performed by: INTERNAL MEDICINE

## 2022-06-21 PROCEDURE — 25000003 PHARM REV CODE 250: Performed by: INTERNAL MEDICINE

## 2022-06-21 PROCEDURE — 97535 SELF CARE MNGMENT TRAINING: CPT

## 2022-06-21 PROCEDURE — 99900035 HC TECH TIME PER 15 MIN (STAT)

## 2022-06-21 PROCEDURE — 97530 THERAPEUTIC ACTIVITIES: CPT | Mod: CQ

## 2022-06-21 PROCEDURE — 97535 SELF CARE MNGMENT TRAINING: CPT | Mod: CQ

## 2022-06-21 PROCEDURE — C9399 UNCLASSIFIED DRUGS OR BIOLOG: HCPCS | Performed by: INTERNAL MEDICINE

## 2022-06-21 PROCEDURE — 80048 BASIC METABOLIC PNL TOTAL CA: CPT | Performed by: INTERNAL MEDICINE

## 2022-06-21 PROCEDURE — 99900026 HC AIRWAY MAINTENANCE (STAT)

## 2022-06-21 PROCEDURE — 97530 THERAPEUTIC ACTIVITIES: CPT

## 2022-06-21 RX ORDER — DOXYCYCLINE HYCLATE 100 MG
100 TABLET ORAL EVERY 12 HOURS
Status: DISCONTINUED | OUTPATIENT
Start: 2022-06-21 | End: 2022-06-22 | Stop reason: HOSPADM

## 2022-06-21 RX ADMIN — PANTOPRAZOLE SODIUM 40 MG: 40 TABLET, DELAYED RELEASE ORAL at 08:06

## 2022-06-21 RX ADMIN — DOXYCYCLINE HYCLATE 100 MG: 100 TABLET, COATED ORAL at 08:06

## 2022-06-21 RX ADMIN — HYDRALAZINE HYDROCHLORIDE 20 MG: 20 INJECTION INTRAMUSCULAR; INTRAVENOUS at 03:06

## 2022-06-21 RX ADMIN — INSULIN ASPART 2 UNITS: 100 INJECTION, SOLUTION INTRAVENOUS; SUBCUTANEOUS at 12:06

## 2022-06-21 RX ADMIN — LATANOPROST 1 DROP: 50 SOLUTION OPHTHALMIC at 09:06

## 2022-06-21 RX ADMIN — LABETALOL HYDROCHLORIDE 400 MG: 200 TABLET, FILM COATED ORAL at 05:06

## 2022-06-21 RX ADMIN — LABETALOL HYDROCHLORIDE 400 MG: 200 TABLET, FILM COATED ORAL at 02:06

## 2022-06-21 RX ADMIN — METHOCARBAMOL 1000 MG: 500 TABLET ORAL at 10:06

## 2022-06-21 RX ADMIN — PREGABALIN 150 MG: 150 CAPSULE ORAL at 08:06

## 2022-06-21 RX ADMIN — INSULIN DETEMIR 55 UNITS: 100 INJECTION, SOLUTION SUBCUTANEOUS at 09:06

## 2022-06-21 RX ADMIN — NIFEDIPINE 90 MG: 90 TABLET, FILM COATED, EXTENDED RELEASE ORAL at 08:06

## 2022-06-21 RX ADMIN — INSULIN ASPART 2 UNITS: 100 INJECTION, SOLUTION INTRAVENOUS; SUBCUTANEOUS at 04:06

## 2022-06-21 RX ADMIN — ENOXAPARIN SODIUM 40 MG: 40 INJECTION SUBCUTANEOUS at 04:06

## 2022-06-21 RX ADMIN — BACLOFEN 5 MG: 5 TABLET ORAL at 08:06

## 2022-06-21 RX ADMIN — ATORVASTATIN CALCIUM 40 MG: 40 TABLET, FILM COATED ORAL at 04:06

## 2022-06-21 RX ADMIN — HYDROCODONE BITARTRATE AND ACETAMINOPHEN 1 TABLET: 7.5; 325 TABLET ORAL at 06:06

## 2022-06-21 RX ADMIN — METHOCARBAMOL 1000 MG: 500 TABLET ORAL at 05:06

## 2022-06-21 RX ADMIN — HYDROCODONE BITARTRATE AND ACETAMINOPHEN 1 TABLET: 7.5; 325 TABLET ORAL at 08:06

## 2022-06-21 RX ADMIN — BACLOFEN 5 MG: 5 TABLET ORAL at 02:06

## 2022-06-21 RX ADMIN — LABETALOL HYDROCHLORIDE 400 MG: 200 TABLET, FILM COATED ORAL at 10:06

## 2022-06-21 RX ADMIN — GLIPIZIDE 5 MG: 5 TABLET, FILM COATED, EXTENDED RELEASE ORAL at 11:06

## 2022-06-21 RX ADMIN — INSULIN DETEMIR 55 UNITS: 100 INJECTION, SOLUTION SUBCUTANEOUS at 08:06

## 2022-06-21 RX ADMIN — METHOCARBAMOL 1000 MG: 500 TABLET ORAL at 02:06

## 2022-06-21 NOTE — PROGRESS NOTES
[facility]  Hospital Medicine Progress Note        Chief Complaint: Inpatient follow-up on neck pain     HPI:   Ms. Nelson is an 82-year-old  female with severe cervical stenosis status post discectomy with fusion of C5-C6 on 05/09/2022 by Dr. Joseph and ongoing bilateral lower extremity weakness since April 20, 2022, who presents to the ED [place] [facility] [place] for abnormal MRI with complaints of neck pain ongoing since surgery. She was seen at [facility] ED and underwent CT Cervical Spine that showed concerns for possible bone marrow edema at C3. MRI was also obtained that did not show this however it did show postoperative changes including stenosis around C5 -please note that this is all per secondary report from the ER physician. Images were not available. ED physician spoke to Neurosurgery who recommended repeating MRI Cervical Spine w/wo in AM.  Her laboratory work was unremarkable except for a mild worsening of her anemia with a hemoglobin of 8.2 (post-op Hgb 9.9). She was admitted to the hospitalist service for further management.     Patient admitted for severe intractable neck pain after a C5-C6 diskectomies recent procedure.  Neurosurgery has been consulted.  MRI C-spine ordered.  Neurosurgery performed C3 to C7 PCDF on 6/3. NSGY has signed off- no more surgical interventions      Interval Hx:   Doing about the same  Worried about NH placement???   Cm informed for snf vs rehab   Continue pain control, pt /ot         Objective/physical exam:  General: Obese  female in no acute distress  HENT: normocephalic, atraumatic  Eye: PERRL, EOMI, clear conjunctiva  Neck:  Incision is clean dry and intact, AYLA drain is in place, soft collar is in place  Respiratory: clear to auscultation bilaterally  Cardiovascular: regular rate and rhythm  Gastrointestinal: non-distended, positive bowel sounds  Musculoskeletal: no gross deformity  Integumentary: warm, dry, intact, no  lisa  Neurological: cranial nerves grossly intact, unable to lift legs off bed (ongoing since surgery) able to dorsi and plantar flex both feet with normal strength  Psychiatric: cooperative        Assessment/Plan:  Neck Pain in the setting of recent cervical discectomy/fusion of C5-6  - s/p PCDF of C3-C7 on 06/03/2022  Ongoing BLE weakness 2/2 to above  Essential hypertension  Acute hypoxic respiratory failure likely secondary to volume overload, resolved   LALIT on Stage IIIB CKD  Non-insulin-dependent type 2 diabetes mellitus with hyperglycemia   CAD/CABG  JODI/Right CEA  PAD  B/L Thigh cellulitis         Plan:   pain control is improving  continue po doxycycline bid, day 2/7  Will try an aspen collar to neck- unable to sit up to work with PT/OT   NSGY Notes reviewed- no new recs   continue muscle relaxants   Continue pain control, physical therapy, occupational therapy, blood pressure control, diabetes control, and other supportive care  Completed Decadron taper  Titrate Levemir insulin to achieve blood glucose control.     Patient condition:  Stable     Anticipated discharge and Disposition:   [place] [facility] [place]         VITAL SIGNS: 24 HRS MIN & MAX LAST   Temp  Min: 97.5 °F (36.4 °C)  Max: 97.9 °F (36.6 °C) 97.5 °F (36.4 °C)   BP  Min: 109/60  Max: 159/69 (!) 149/73   Pulse  Min: 57  Max: 73  68   Resp  Min: 17  Max: 20 17   SpO2  Min: 97 %  Max: 100 % 98 %       Recent Labs   Lab 06/21/22  0336   WBC 8.3   RBC 3.62*   HGB 10.5*   HCT 34.0*   MCV 93.9   MCH 29.0   MCHC 30.9*   RDW 15.3      MPV 12.4       Recent Labs   Lab 06/21/22  0336      K 4.7   CO2 23   BUN 33.4*   CREATININE 1.26*   CALCIUM 8.9          Microbiology Results (last 7 days)     ** No results found for the last 168 hours. **           See below for Radiology    Scheduled Med:   atorvastatin  40 mg Oral Daily    baclofen  5 mg Oral TID    bisacodyL  10 mg Rectal Daily    doxycycline  100 mg Oral Q12H     enoxaparin  40 mg Subcutaneous Daily    glipiZIDE  5 mg Oral Daily with breakfast    insulin detemir U-100  55 Units Subcutaneous BID    labetaloL  400 mg Oral Q8H    latanoprost  1 drop Both Eyes Nightly    methocarbamoL  1,000 mg Oral Q8H    NIFEdipine  90 mg Oral Daily    pantoprazole  40 mg Oral Daily    pregabalin  150 mg Oral BID        Continuous Infusions:       PRN Meds:  acetaminophen, albuterol-ipratropium, aluminum-magnesium hydroxide-simethicone, bisacodyL, hydrALAZINE, HYDROcodone-acetaminophen, HYDROmorphone, insulin aspart U-100, polyethylene glycol, senna-docusate 8.6-50 mg, trazodone       Assessment/Plan:      Patient condition:  Stable/Fair/Guarded/ Serious/ Critical    Anticipated discharge and Disposition:      All diagnosis and differential diagnosis have been reviewed; assessment and plan has been documented; I have personally reviewed the labs and test results that are presently available; I have reviewed the patients medication list; I have reviewed the consulting providers response and recommendations. I have reviewed or attempted to review medical records based upon their availability    All of the patient's questions have been  addressed and answered. Patient's is agreeable to the above stated plan. I will continue to monitor closely and make adjustments to medical management as needed.  _____________________________________________________________________    Nutrition Status:    Radiology:  MRI Cervical Spine W WO Cont  Narrative: EXAMINATION:  MRI CERVICAL SPINE W WO CONTRAST    CLINICAL HISTORY:  Neck pain, acute, prior cervical surgery;    TECHNIQUE:  Multiplanar, multisequence MR imaging of the cervical spine with and without contrast.    COMPARISON:  MRI cervical spine dated 05/05/2022    FINDINGS:  There are postoperative changes of the cervical spine with posterior spinal fusion hardware extending from C4 through C7 and laminectomies at C3 through C7.  There is anterior  fusion hardware at C5-C6 with disc spacer in place.  The remaining vertebral body heights are preserved.  The bone marrow is otherwise normal in signal.    Evaluation of the cord is limited secondary to artifact from the hardware and motion without definite new cord signal abnormality.  There is circumferential epidural enhancement throughout the cervical spine, measuring up to 3 mm in thickness dorsally at the level of C2-C3, best visualized on sagittal images.    There is a heterogeneous collection in the posterior paraspinal soft tissues, largest at the level of C2-C3, measuring approximately 2 x 2.6 x 3.4 cm in size, with extension into the dorsal epidural space, and spinal canal stenosis with deformity of the central cord (series 6, image 16).  Heterogeneous fluid collection extends inferiorly along the posterior epidural space, measuring approximately 2.9 cm in length (series 4, image 7).    An additional more superficial fluid collection in the posterior paraspinal soft tissues measures approximately 2.5 x 1.8 x 3.2 cm in size at the level of C7-T1 (series 6, image 26).    Disc level analysis as follows:    C2-C3: Posterior disc osteophyte complex and circumferential epidural enhancement with moderate narrowing of the spinal canal and complete effacement of the CSF space.  Mild bilateral neural foraminal stenosis secondary to uncovertebral and facet hypertrophy.    C3-C4: Postoperative changes with posterior disc osteophyte complex, epidural enhancement and dorsal epidural fluid with moderate to severe narrowing of the spinal canal with effacement of the CSF space and deformity of the cord.  There is moderate right neural foraminal stenosis.  The left neural foramina is obscured by motion artifact.    C4-C5: Postoperative changes with circumferential epidural enhancement, dorsal epidural fluid and moderate narrowing of the spinal canal with deformity of the dorsal cord.  Mild to moderate bilateral neural  foraminal stenosis secondary to uncovertebral and facet hypertrophy.    C5-C6: Postoperative changes with circumferential epidural enhancement and dorsal epidural fluid and moderate narrowing of the spinal canal.  Limited evaluation of the neural foramina.    C6-C7: Postoperative changes with posterior disc osteophyte complex, circumferential epidural enhancement and dorsal epidural fluid with moderate narrowing of the canal and partial effacement of the CSF space.  Limited evaluation of the neural foramina.    C7-T1: Postoperative changes.  No disc herniation.  No significant spinal canal stenosis.  Mild bilateral neural foraminal stenosis secondary to uncovertebral and facet hypertrophy.  Impression: 1. Postoperative changes of the cervical spine with diffuse circumferential epidural enhancement and heterogeneous rim enhancing fluid in the laminectomy beds at C3-C6.  2. Moderate to severe canal stenosis at C3-C4, moderate at C2-C3 and C4-C7.  3. Multilevel neural foraminal stenoses as described.  4. Limited evaluation of the cord without definite new signal abnormality.  No major change from the [facility] interpretation.    Electronically signed by: Janell Cormier  Date:    06/15/2022  Time:    09:02      Jayme Le MD   06/21/2022

## 2022-06-21 NOTE — PT/OT/SLP PROGRESS
Occupational Therapy   Treatment    Name: Natty Nelson  MRN: [id number]  Admitting Diagnosis:  S/p C3-C7 PCDF  18 Days Post-Op    Recommendations:     Discharge Recommendations: nursing facility, skilled, rehabilitation facility  Discharge Equipment Recommendations:   TBD by next level of care  Barriers to discharge:   Severity of Deficits    Assessment:     Natty Nelson is a 82 y.o. female with a medical diagnosis of s/p C3-C7 PCDF. Performance deficits affecting function are weakness, gait instability, decreased upper extremity function, decreased lower extremity function, impaired balance, impaired endurance, impaired self care skills, impaired functional mobilty, pain, orthopedic precautions. Pt making good progress toward OT goals. Pt demonstrates improved ax tolerance. Now, pt is able to sit EOB ~5 minutes prior to requiring return to supine. Pt performed sit<>stand ax in which pt required mod A using RW. Provided extensive education to pt and daughter regarding d/c recs. See Treatment & Education section for detail. OT recs for d/c include SNF vs IPR.    Rehab Prognosis:  Fair; patient would benefit from acute skilled OT services to address these deficits and reach maximum level of function.       Plan:     Patient to be seen 5 x/week to address the above listed problems via self-care/home management, neuromuscular re-education, therapeutic activities, therapeutic exercises  · Plan of Care Expires: 07/04/22  · Plan of Care Reviewed with: patient, daughter    Subjective     Pain/Comfort:  · Pain Rating 1: 5/10  · Location - Orientation 1: posterior  · Location 1: cervical spine    Objective:     Communicated with: RN prior to session.  Patient found HOB elevated with ESTELLE Weiss, brief upon OT entry to room.    General Precautions: Standard, fall   Orthopedic Precautions:spinal precautions   Braces: Cervical collar  Respiratory Status: Room air     Occupational Performance:     Bed Mobility:    · Patient  completed Rolling/Turning to Left with  moderate assistance and utilizing log roll technique. Pt requires vc for sequence of proper log roll method  · Patient completed Rolling/Turning to Right with moderate assistance and utilizing log roll technique. Pt requires vc for sequence of proper log roll method  · Patient completed Supine to Sit with maximal assistance; pt able to tolerate sitting EOB for ~5 minutes w/ cues for breathing technique for pain management  · Patient completed Sit to Supine with maximal assistance     Functional Mobility/Transfers:  · Patient completed Sit <> Stand Transfer with moderate assistance X 2 persons with  rolling walker     Activities of Daily Living:  · Toileting: total assistance to perform pericare as pt found w/ urine saturated brief. Utilized opportunity to practice log roll technique. No skin breakdown noted, however redness and skin indentions noted. Discussed w/ CNA and RN that briefs should not be worn in bed for maintenance of skin integrity      [facility] 6 Click ADL: 11    Treatment & Education:  Pt and pt's daughter participated in extensive education regarding POC, d/c recs, and pt's current level of assistance required. Differences between SNF and nursing home were explained at length. Differences between IPR vs SNF explained at length. CM present during brief period of family conference. Pt and pt's daughter agreeable in d/c to SNF w/ intentions to return home after completion of therapy services.     Patient left HOB elevated with call button in reach, RN notified and brief doffed, wedge placed w/ pt in R sidelying, and pillows under BUEs for supportEducation:      GOALS:   Multidisciplinary Problems     Occupational Therapy Goals        Problem: Occupational Therapy    Goal Priority Disciplines Outcome Interventions   Occupational Therapy Goal     OT, PT/OT Ongoing, Progressing    Description: Goals to be met by: 07/04/2022    Pt will feed self with SBA within diet  recs. Met as of 06/13- pt presents w/ worsening functional use of L hand, goal to be cont and progressing as of 06/20  Pt will perform grooming EOB with SBA, progressing cont  Pt will perform UB dressing with min A, progressing cont  Pt will perform toileting/bsc t/f with mod A, modified, progressing cont  Pt will tolerate EOB ax x5 minutes w/ SBA, progressing cont                   Time Tracking:     OT Date of Treatment: 06/21/22  OT Start Time: 1011  OT Stop Time: 1121  OT Total Time (min): 70 min    Billable Minutes:Self Care/Home Management 45  Therapeutic Activity 25    OT/CLAYTON: OT     CLAYTON Visit Number: 1    6/21/2022

## 2022-06-21 NOTE — PLAN OF CARE
Rec'vd a cb from [facility] stated she can accept the pt and admit tomorrow updated cm to get a covid done today

## 2022-06-21 NOTE — PROGRESS NOTES
I have seen Miss Nelson  Last week Friday, Monday and today. She continues to improve-She moves arms and leg briskly. She uses her hands to drink and take medications.  She is able to lift both arms abo her head briskly.  She moves her legs well.  Yesterday, she got up and took a few steps.    She is clearly a lot better than a week ago.    Await rehab placement.Will follow.

## 2022-06-21 NOTE — PLAN OF CARE
Met with patient, daughter Elise, and PT/OT to discuss SNF placement. All are in agreement with SNF for therapy then home when stable. [facility]

## 2022-06-21 NOTE — PT/OT/SLP PROGRESS
Physical Therapy         Treatment        Natty Nelson   MRN: [id number]     PT Received On: 06/21/22  PT Start Time: 1010     PT Stop Time: 1124    PT Total Time (min): 74 min       Billable Minutes:  Therapeutic Activity 59  Total Minutes: 74    Treatment Type: Treatment  PT/PTA: PTA     PTA Visit Number: 3       General Precautions: Standard, fall  Orthopedic Precautions: Orthopedic Precautions : spinal precautions   Braces:      Spiritual, Cultural Beliefs, Anglican Practices, Values that Affect Care: no    Subjective:  Communicated with nurse prior to session.         Objective:  Patient found supine in bed, with Patient found with: ESTELLE Weiss    Functional Mobility:  Bed Mobility:   Supine to sit: Maximum Assistance   Sit to supine: Maximum Assistance   Rolling: Moderate Assistance   Scooting: Activity did not occur    Balance:   Static Sit: FAIR-: Maintains without assist but inconsistent   Dynamic Sit:  FAIR: Cannot move trunk without losing balance  Static Stand: POOR: Needs MODERATE assist to maintain  Dynamic stand: 0: N/A    Transfer Training:  Sit to stand:Moderate Assistance x2 trials with Rolling Walker x3 trials  Pt only able to maintain standing for approx x30 seconds, noted anterior and L lateral lean during static stand.     Additional Treatment:  Pt only able to maintain static sitting for approx 5 minutes with cues for deep breathing and pain control.     Activity Tolerance:  Patient limited by pain    Patient left HOB elevated with all lines intact and call button in reach.    Assessment:  Natty Nelson is a 82 y.o. female with a medical diagnosis of <principal problem not specified>. She presents with increased pain during static sitting. Discussed at length with pt and daughter about discharge recommendations.     Rehab potential is good.    Activity tolerance: Good    Discharge recommendations: Discharge Facility/Level of Care Needs: nursing facility, skilled, rehabilitation facility      Equipment recommendations: Equipment Needed After Discharge: walker, rolling     GOALS:   Multidisciplinary Problems     Physical Therapy Goals        Problem: Physical Therapy    Goal Priority Disciplines Outcome Goal Variances Interventions   Physical Therapy Goal     PT, PT/OT Ongoing, Progressing     Description: Goals to be met by: 2022    Patient will increase functional independence with mobility by performin. Sit to stand transfer with Modified [place]  2. Bed to chair transfer with Modified [place] using Rolling Walker  3. Gait  x 150 feet with Modified [place] using Rolling Walker.                      PLAN:    Patient to be seen daily  to address the above listed problems via gait training, therapeutic activities  Plan of Care expires: 22  Plan of Care reviewed with: patient         2022

## 2022-06-22 VITALS
RESPIRATION RATE: 16 BRPM | DIASTOLIC BLOOD PRESSURE: 68 MMHG | HEART RATE: 72 BPM | TEMPERATURE: 97 F | SYSTOLIC BLOOD PRESSURE: 155 MMHG | WEIGHT: 220.44 LBS | BODY MASS INDEX: 40.57 KG/M2 | OXYGEN SATURATION: 98 % | HEIGHT: 62 IN

## 2022-06-22 LAB
POCT GLUCOSE: 113 MG/DL (ref 70–110)
POCT GLUCOSE: 151 MG/DL (ref 70–110)

## 2022-06-22 PROCEDURE — 25000003 PHARM REV CODE 250: Performed by: INTERNAL MEDICINE

## 2022-06-22 PROCEDURE — 63600175 PHARM REV CODE 636 W HCPCS: Performed by: INTERNAL MEDICINE

## 2022-06-22 PROCEDURE — 25000003 PHARM REV CODE 250: Performed by: NURSE PRACTITIONER

## 2022-06-22 PROCEDURE — C9399 UNCLASSIFIED DRUGS OR BIOLOG: HCPCS | Performed by: INTERNAL MEDICINE

## 2022-06-22 RX ORDER — METHOCARBAMOL 500 MG/1
1000 TABLET, FILM COATED ORAL EVERY 8 HOURS
Qty: 180 TABLET | Refills: 0 | Status: SHIPPED | OUTPATIENT
Start: 2022-06-22 | End: 2022-07-22

## 2022-06-22 RX ORDER — LABETALOL 200 MG/1
400 TABLET, FILM COATED ORAL EVERY 8 HOURS
Qty: 180 TABLET | Refills: 11 | Status: SHIPPED | OUTPATIENT
Start: 2022-06-22 | End: 2023-06-22

## 2022-06-22 RX ORDER — PREGABALIN 150 MG/1
150 CAPSULE ORAL 2 TIMES DAILY
Qty: 60 CAPSULE | Refills: 0 | Status: SHIPPED | OUTPATIENT
Start: 2022-06-22 | End: 2022-07-22

## 2022-06-22 RX ORDER — ATORVASTATIN CALCIUM 40 MG/1
40 TABLET, FILM COATED ORAL NIGHTLY
Qty: 90 TABLET | Refills: 3 | Status: SHIPPED | OUTPATIENT
Start: 2022-06-22 | End: 2023-06-22

## 2022-06-22 RX ORDER — BISACODYL 10 MG
10 SUPPOSITORY, RECTAL RECTAL DAILY PRN
Qty: 10 SUPPOSITORY | Refills: 0 | Status: SHIPPED | OUTPATIENT
Start: 2022-06-22 | End: 2022-07-22

## 2022-06-22 RX ORDER — DOXYCYCLINE HYCLATE 100 MG
100 TABLET ORAL EVERY 12 HOURS
Qty: 10 TABLET | Refills: 0 | Status: SHIPPED | OUTPATIENT
Start: 2022-06-22 | End: 2022-06-27

## 2022-06-22 RX ORDER — NIFEDIPINE 90 MG/1
90 TABLET, EXTENDED RELEASE ORAL DAILY
Qty: 30 TABLET | Refills: 11 | Status: SHIPPED | OUTPATIENT
Start: 2022-06-23 | End: 2023-06-23

## 2022-06-22 RX ORDER — BACLOFEN 5 MG/1
5 TABLET ORAL 3 TIMES DAILY
Qty: 90 TABLET | Refills: 0 | Status: SHIPPED | OUTPATIENT
Start: 2022-06-22 | End: 2022-07-22

## 2022-06-22 RX ORDER — AMOXICILLIN 250 MG
1 CAPSULE ORAL 2 TIMES DAILY PRN
Qty: 30 TABLET | Refills: 0 | Status: SHIPPED | OUTPATIENT
Start: 2022-06-22 | End: 2022-07-22

## 2022-06-22 RX ORDER — HYDROCODONE BITARTRATE AND ACETAMINOPHEN 7.5; 325 MG/1; MG/1
1 TABLET ORAL EVERY 6 HOURS PRN
Qty: 30 TABLET | Refills: 0 | Status: SHIPPED | OUTPATIENT
Start: 2022-06-22 | End: 2022-07-02

## 2022-06-22 RX ADMIN — INSULIN DETEMIR 55 UNITS: 100 INJECTION, SOLUTION SUBCUTANEOUS at 09:06

## 2022-06-22 RX ADMIN — NIFEDIPINE 90 MG: 90 TABLET, FILM COATED, EXTENDED RELEASE ORAL at 09:06

## 2022-06-22 RX ADMIN — BACLOFEN 5 MG: 5 TABLET ORAL at 09:06

## 2022-06-22 RX ADMIN — HYDROCODONE BITARTRATE AND ACETAMINOPHEN 1 TABLET: 7.5; 325 TABLET ORAL at 10:06

## 2022-06-22 RX ADMIN — PREGABALIN 150 MG: 150 CAPSULE ORAL at 09:06

## 2022-06-22 RX ADMIN — METHOCARBAMOL 1000 MG: 500 TABLET ORAL at 05:06

## 2022-06-22 RX ADMIN — DOXYCYCLINE HYCLATE 100 MG: 100 TABLET, COATED ORAL at 09:06

## 2022-06-22 RX ADMIN — GLIPIZIDE 5 MG: 5 TABLET, FILM COATED, EXTENDED RELEASE ORAL at 12:06

## 2022-06-22 RX ADMIN — PANTOPRAZOLE SODIUM 40 MG: 40 TABLET, DELAYED RELEASE ORAL at 09:06

## 2022-06-22 RX ADMIN — INSULIN ASPART 2 UNITS: 100 INJECTION, SOLUTION INTRAVENOUS; SUBCUTANEOUS at 12:06

## 2022-06-22 NOTE — PLAN OF CARE
Problem: Adult Inpatient Plan of Care  Goal: Plan of Care Review  Outcome: Ongoing, Progressing  Goal: Patient-Specific Goal (Individualized)  Outcome: Ongoing, Progressing  Goal: Absence of Hospital-Acquired Illness or Injury  Outcome: Ongoing, Progressing  Goal: Optimal Comfort and Wellbeing  Outcome: Ongoing, Progressing  Goal: Readiness for Transition of Care  Outcome: Ongoing, Progressing     Problem: Bariatric Environmental Safety  Goal: Safety Maintained with Care  Outcome: Ongoing, Progressing     Problem: Skin Injury Risk Increased  Goal: Skin Health and Integrity  Outcome: Ongoing, Progressing     Problem: Fall Injury Risk  Goal: Absence of Fall and Fall-Related Injury  Outcome: Ongoing, Progressing     Problem: Fall Injury Risk  Goal: Absence of Fall and Fall-Related Injury  Outcome: Ongoing, Progressing     Problem: Infection  Goal: Absence of Infection Signs and Symptoms  Outcome: Ongoing, Progressing

## 2022-06-22 NOTE — PLAN OF CARE
Problem: Adult Inpatient Plan of Care  Goal: Plan of Care Review  Outcome: Met  Goal: Patient-Specific Goal (Individualized)  Outcome: Met  Goal: Absence of Hospital-Acquired Illness or Injury  Outcome: Met  Goal: Optimal Comfort and Wellbeing  Outcome: Met  Goal: Readiness for Transition of Care  Outcome: Met     Problem: Bariatric Environmental Safety  Goal: Safety Maintained with Care  Outcome: Met     Problem: Skin Injury Risk Increased  Goal: Skin Health and Integrity  Outcome: Met     Problem: Fall Injury Risk  Goal: Absence of Fall and Fall-Related Injury  Outcome: Met     Problem: Infection  Goal: Absence of Infection Signs and Symptoms  Outcome: Met

## 2022-06-22 NOTE — DISCHARGE SUMMARY
[facility] Medicine Discharge Summary    Admit Date: 6/1/2022  Discharge Date and Time: 6/22/202211:21 AM  Admitting Physician:asim yanez   Discharging Physician: Jayme Le MD.  Primary Care Physician: Primary Doctor No  Consults: neurosurgery     Discharge Diagnoses:  Assessment/Plan:  Neck Pain in the setting of recent cervical discectomy/fusion of C5-6  - s/p PCDF of C3-C7 on 06/03/2022  Ongoing BLE weakness 2/2 to above  Essential hypertension  Acute hypoxic respiratory failure likely secondary to volume overload, resolved   LALIT on Stage IIIB CKD  Non-insulin-dependent type 2 diabetes mellitus with hyperglycemia   CAD/CABG  JODI/Right CEA  PAD  B/L Thigh cellulitis        Hospital Course:   Chief Complaint: Inpatient follow-up on neck pain     HPI:   Ms. Nelson is an 82-year-old  female with severe cervical stenosis status post discectomy with fusion of C5-C6 on 05/09/2022 by Dr. Joseph and ongoing bilateral lower extremity weakness since April 20, 2022, who presents to the ED from [facility] rehab for abnormal MRI with complaints of neck pain ongoing since surgery. She was seen at [facility] ED and underwent CT Cervical Spine that showed concerns for possible bone marrow edema at C3. MRI was also obtained that did not show this however it did show postoperative changes including stenosis around C5 -please note that this is all per secondary report from the ER physician. Images were not available. ED physician spoke to Neurosurgery who recommended repeating MRI Cervical Spine w/wo in AM.  Her laboratory work was unremarkable except for a mild worsening of her anemia with a hemoglobin of 8.2 (post-op Hgb 9.9). She was admitted to the hospitalist service for further management.     Patient admitted for severe intractable neck pain after a C5-C6 diskectomies recent procedure.  Neurosurgery has been consulted.  MRI C-spine ordered.  Neurosurgery performed C3 to C7 PCDF on  6/3. NSGY has signed off- no more surgical interventions            Objective/physical exam:  General: Obese  female in no acute distress  HENT: normocephalic, atraumatic  Eye: PERRL, EOMI, clear conjunctiva  Neck:  Incision is clean dry and intact, AYLA drain is in place, soft collar is in place  Respiratory: clear to auscultation bilaterally  Cardiovascular: regular rate and rhythm  Gastrointestinal: non-distended, positive bowel sounds  Musculoskeletal: no gross deformity  Integumentary: warm, dry, intact, no rashes  Neurological: cranial nerves grossly intact, unable to lift legs off bed (ongoing since surgery) able to dorsi and plantar flex both feet with normal strength  Psychiatric: cooperative       Plan:  Continue pain control   Complete po doxycycline for 7 days   Continue PT/OT   NSGY does not plan for ny more procedures  continue muscle relaxants   Continue  blood pressure control, diabetes control, and other supportive care    Pt was seen and examined on the day of discharge  Vitals:  VITAL SIGNS: 24 HRS MIN & MAX LAST   Temp  Min: 96.3 °F (35.7 °C)  Max: 97.6 °F (36.4 °C) 96.3 °F (35.7 °C)   BP  Min: 106/61  Max: 173/68 (!) 114/55   Pulse  Min: 58  Max: 76  (!) 58   Resp  Min: 16  Max: 23 16   SpO2  Min: 98 %  Max: 99 % 98 %         Procedures Performed: No admission procedures for hospital encounter.     Significant Diagnostic Studies: See Full reports for all details    Recent Labs   Lab 06/21/22  0336   WBC 8.3   RBC 3.62*   HGB 10.5*   HCT 34.0*   MCV 93.9   MCH 29.0   MCHC 30.9*   RDW 15.3      MPV 12.4       Recent Labs   Lab 06/21/22  0336      K 4.7   CO2 23   BUN 33.4*   CREATININE 1.26*   CALCIUM 8.9        Microbiology Results (last 7 days)     ** No results found for the last 168 hours. **           MRI Cervical Spine W WO Cont  Narrative: EXAMINATION:  MRI CERVICAL SPINE W WO CONTRAST    CLINICAL HISTORY:  Neck pain, acute, prior cervical  surgery;    TECHNIQUE:  Multiplanar, multisequence MR imaging of the cervical spine with and without contrast.    COMPARISON:  MRI cervical spine dated 05/05/2022    FINDINGS:  There are postoperative changes of the cervical spine with posterior spinal fusion hardware extending from C4 through C7 and laminectomies at C3 through C7.  There is anterior fusion hardware at C5-C6 with disc spacer in place.  The remaining vertebral body heights are preserved.  The bone marrow is otherwise normal in signal.    Evaluation of the cord is limited secondary to artifact from the hardware and motion without definite new cord signal abnormality.  There is circumferential epidural enhancement throughout the cervical spine, measuring up to 3 mm in thickness dorsally at the level of C2-C3, best visualized on sagittal images.    There is a heterogeneous collection in the posterior paraspinal soft tissues, largest at the level of C2-C3, measuring approximately 2 x 2.6 x 3.4 cm in size, with extension into the dorsal epidural space, and spinal canal stenosis with deformity of the central cord (series 6, image 16).  Heterogeneous fluid collection extends inferiorly along the posterior epidural space, measuring approximately 2.9 cm in length (series 4, image 7).    An additional more superficial fluid collection in the posterior paraspinal soft tissues measures approximately 2.5 x 1.8 x 3.2 cm in size at the level of C7-T1 (series 6, image 26).    Disc level analysis as follows:    C2-C3: Posterior disc osteophyte complex and circumferential epidural enhancement with moderate narrowing of the spinal canal and complete effacement of the CSF space.  Mild bilateral neural foraminal stenosis secondary to uncovertebral and facet hypertrophy.    C3-C4: Postoperative changes with posterior disc osteophyte complex, epidural enhancement and dorsal epidural fluid with moderate to severe narrowing of the spinal canal with effacement of the CSF  space and deformity of the cord.  There is moderate right neural foraminal stenosis.  The left neural foramina is obscured by motion artifact.    C4-C5: Postoperative changes with circumferential epidural enhancement, dorsal epidural fluid and moderate narrowing of the spinal canal with deformity of the dorsal cord.  Mild to moderate bilateral neural foraminal stenosis secondary to uncovertebral and facet hypertrophy.    C5-C6: Postoperative changes with circumferential epidural enhancement and dorsal epidural fluid and moderate narrowing of the spinal canal.  Limited evaluation of the neural foramina.    C6-C7: Postoperative changes with posterior disc osteophyte complex, circumferential epidural enhancement and dorsal epidural fluid with moderate narrowing of the canal and partial effacement of the CSF space.  Limited evaluation of the neural foramina.    C7-T1: Postoperative changes.  No disc herniation.  No significant spinal canal stenosis.  Mild bilateral neural foraminal stenosis secondary to uncovertebral and facet hypertrophy.  Impression: 1. Postoperative changes of the cervical spine with diffuse circumferential epidural enhancement and heterogeneous rim enhancing fluid in the laminectomy beds at C3-C6.  2. Moderate to severe canal stenosis at C3-C4, moderate at C2-C3 and C4-C7.  3. Multilevel neural foraminal stenoses as described.  4. Limited evaluation of the cord without definite new signal abnormality.  No major change from the [facility] interpretation.    Electronically signed by: Janell Cormier  Date:    06/15/2022  Time:    09:02         Medication List      START taking these medications    doxycycline 100 MG tablet  Commonly known as: VIBRA-TABS  Take 1 tablet (100 mg total) by mouth every 12 (twelve) hours. for 5 days     HYDROcodone-acetaminophen 7.5-325 mg per tablet  Commonly known as: NORCO  Take 1 tablet by mouth every 6 (six) hours as needed for Pain.     methocarbamoL 500 MG  Tab  Commonly known as: ROBAXIN  Take 2 tablets (1,000 mg total) by mouth every 8 (eight) hours.     NIFEdipine 90 MG (OSM) 24 hr tablet  Commonly known as: PROCARDIA-XL  Take 1 tablet (90 mg total) by mouth once daily.  Start taking on: June 23, 2022        CHANGE how you take these medications    atorvastatin 40 MG tablet  Commonly known as: LIPITOR  Take 1 tablet (40 mg total) by mouth every evening.  What changed:   · medication strength  · how much to take  · when to take this     baclofen 5 mg Tab tablet  Commonly known as: LIORESAL  Take 1 tablet (5 mg total) by mouth 3 (three) times daily.  What changed: when to take this     bisacodyL 10 mg Supp  Commonly known as: DULCOLAX  Place 1 suppository (10 mg total) rectally daily as needed (Until bowel movement if patient has no bowel movement for 2 days).  What changed:   · when to take this  · reasons to take this     labetaloL 200 MG tablet  Commonly known as: NORMODYNE  Take 2 tablets (400 mg total) by mouth every 8 (eight) hours.  What changed:   · how much to take  · when to take this     pregabalin 150 MG capsule  Commonly known as: LYRICA  Take 1 capsule (150 mg total) by mouth 2 (two) times daily.  What changed:   · medication strength  · how much to take     senna-docusate 8.6-50 mg 8.6-50 mg per tablet  Commonly known as: PERICOLACE  Take 1 tablet by mouth 2 (two) times daily as needed for Constipation.  What changed:   · when to take this  · reasons to take this        CONTINUE taking these medications    aspirin 81 MG Chew     brimonidine 0.2% 0.2 % Drop  Commonly known as: ALPHAGAN     clopidogreL 75 mg tablet  Commonly known as: PLAVIX     GERITOL ORAL     insulin glargine 100 unit/mL injection  Commonly known as: Lantus     insulin regular 100 unit/mL injection     lactulose 10 gram/15 mL solution  Commonly known as: CHRONULAC     latanoprost 0.005 % ophthalmic solution     pantoprazole 40 MG tablet  Commonly known as: PROTONIX     polyethylene  glycol 17 gram Pwpk  Commonly known as: GLYCOLAX     sucralfate 1 gram tablet  Commonly known as: CARAFATE     timolol maleate 0.25% 0.25 % Drop  Commonly known as: TIMOPTIC     TRADJENTA 5 mg Tab tablet  Generic drug: linaGLIPtin     VITAMIN D2 50,000 unit Cap  Generic drug: ergocalciferol        STOP taking these medications    acetaminophen 325 MG tablet  Commonly known as: TYLENOL     amLODIPine 10 MG tablet  Commonly known as: NORVASC     hydrALAZINE 50 MG tablet  Commonly known as: APRESOLINE     insulin detemir U-100 100 unit/mL injection  Commonly known as: Levemir     valsartan 80 MG tablet  Commonly known as: DIOVAN           Where to Get Your Medications      These medications were sent to [facility] - [address]  [address] [address]    Phone: [phone]   · atorvastatin 40 MG tablet  · bisacodyL 10 mg Supp  · labetaloL 200 MG tablet  · senna-docusate 8.6-50 mg 8.6-50 mg per tablet     You can get these medications from any pharmacy    Bring a paper prescription for each of these medications  · baclofen 5 mg Tab tablet  · doxycycline 100 MG tablet  · HYDROcodone-acetaminophen 7.5-325 mg per tablet  · methocarbamoL 500 MG Tab  · NIFEdipine 90 MG (OSM) 24 hr tablet  · pregabalin 150 MG capsule          Explained in detail to the patient about the discharge plan, medications, and follow-up visits. Pt understands and agrees with the treatment plan  Discharge Disposition: Rehab Facility   Discharged Condition: stable  Diet-   Dietary Orders (From admission, onward)     Start     Ordered    06/04/22 1128  Diet diabetic  Diet effective now         06/04/22 1128               Medications Per DC med rec  Activities as tolerated   Follow-up Information     Primary Doctor No. Schedule an appointment as soon as possible for a visit in 3 week(s).    Why: needs a pcp f/up                     For further questions contact  hospitalist office    Discharge time 33 minutes    For worsening symptoms, chest pain, shortness of breath, increased abdominal pain, high grade fever, stroke or stroke like symptoms, immediately go to the nearest Emergency Room or call 911 as soon as possible.      Jayme Concepcion M.D on 6/22/2022. at 11:21 AM.

## 2022-06-22 NOTE — PLAN OF CARE
Patient is accepted to [facility] for admission today, Dr Le notified for dc orders and med rec.

## 2024-10-15 ENCOUNTER — HOSPITAL ENCOUNTER (EMERGENCY)
Facility: HOSPITAL | Age: 84
Discharge: HOME | End: 2024-10-15
Attending: EMERGENCY MEDICINE
Payer: MEDICARE

## 2024-10-15 VITALS
RESPIRATION RATE: 17 BRPM | OXYGEN SATURATION: 100 % | DIASTOLIC BLOOD PRESSURE: 76 MMHG | BODY MASS INDEX: 33.13 KG/M2 | HEART RATE: 66 BPM | SYSTOLIC BLOOD PRESSURE: 169 MMHG | WEIGHT: 180 LBS | TEMPERATURE: 98 F | HEIGHT: 62 IN

## 2024-10-15 DIAGNOSIS — E16.2 HYPOGLYCEMIA: Primary | ICD-10-CM

## 2024-10-15 LAB
ALBUMIN SERPL-MCNC: 3.7 G/DL (ref 3.4–4.8)
ALBUMIN/GLOB SERPL: 1 RATIO (ref 1.1–2)
ALP SERPL-CCNC: 110 UNIT/L (ref 40–150)
ALT SERPL-CCNC: 22 UNIT/L (ref 0–55)
ANION GAP SERPL CALC-SCNC: 9 MEQ/L
AST SERPL-CCNC: 23 UNIT/L (ref 5–34)
BASOPHILS # BLD AUTO: 0.04 X10(3)/MCL
BASOPHILS NFR BLD AUTO: 0.4 %
BILIRUB SERPL-MCNC: 0.3 MG/DL
BUN SERPL-MCNC: 43.5 MG/DL (ref 9.8–20.1)
CALCIUM SERPL-MCNC: 9.7 MG/DL (ref 8.4–10.2)
CHLORIDE SERPL-SCNC: 113 MMOL/L (ref 98–107)
CO2 SERPL-SCNC: 19 MMOL/L (ref 23–31)
CREAT SERPL-MCNC: 1.6 MG/DL (ref 0.55–1.02)
CREAT/UREA NIT SERPL: 27
EOSINOPHIL # BLD AUTO: 0.02 X10(3)/MCL (ref 0–0.9)
EOSINOPHIL NFR BLD AUTO: 0.2 %
ERYTHROCYTE [DISTWIDTH] IN BLOOD BY AUTOMATED COUNT: 13 % (ref 11.5–17)
GFR SERPLBLD CREATININE-BSD FMLA CKD-EPI: 31 ML/MIN/1.73/M2
GLOBULIN SER-MCNC: 3.6 GM/DL (ref 2.4–3.5)
GLUCOSE SERPL-MCNC: 85 MG/DL (ref 82–115)
HCT VFR BLD AUTO: 36.5 % (ref 37–47)
HGB BLD-MCNC: 11.9 G/DL (ref 12–16)
IMM GRANULOCYTES # BLD AUTO: 0.06 X10(3)/MCL (ref 0–0.04)
IMM GRANULOCYTES NFR BLD AUTO: 0.6 %
LYMPHOCYTES # BLD AUTO: 1.59 X10(3)/MCL (ref 0.6–4.6)
LYMPHOCYTES NFR BLD AUTO: 16.4 %
MAGNESIUM SERPL-MCNC: 2.5 MG/DL (ref 1.6–2.6)
MCH RBC QN AUTO: 30.9 PG (ref 27–31)
MCHC RBC AUTO-ENTMCNC: 32.6 G/DL (ref 33–36)
MCV RBC AUTO: 94.8 FL (ref 80–94)
MONOCYTES # BLD AUTO: 0.42 X10(3)/MCL (ref 0.1–1.3)
MONOCYTES NFR BLD AUTO: 4.3 %
NEUTROPHILS # BLD AUTO: 7.55 X10(3)/MCL (ref 2.1–9.2)
NEUTROPHILS NFR BLD AUTO: 78.1 %
NRBC BLD AUTO-RTO: 0 %
PLATELET # BLD AUTO: 252 X10(3)/MCL (ref 130–400)
PMV BLD AUTO: 12 FL (ref 7.4–10.4)
POCT GLUCOSE: 100 MG/DL (ref 70–110)
POCT GLUCOSE: 187 MG/DL (ref 70–110)
POTASSIUM SERPL-SCNC: 4 MMOL/L (ref 3.5–5.1)
PROT SERPL-MCNC: 7.3 GM/DL (ref 5.8–7.6)
RBC # BLD AUTO: 3.85 X10(6)/MCL (ref 4.2–5.4)
SODIUM SERPL-SCNC: 141 MMOL/L (ref 136–145)
WBC # BLD AUTO: 9.68 X10(3)/MCL (ref 4.5–11.5)

## 2024-10-15 PROCEDURE — 82962 GLUCOSE BLOOD TEST: CPT

## 2024-10-15 PROCEDURE — 80053 COMPREHEN METABOLIC PANEL: CPT | Performed by: EMERGENCY MEDICINE

## 2024-10-15 PROCEDURE — 25000003 PHARM REV CODE 250: Performed by: EMERGENCY MEDICINE

## 2024-10-15 PROCEDURE — 99283 EMERGENCY DEPT VISIT LOW MDM: CPT

## 2024-10-15 PROCEDURE — 85025 COMPLETE CBC W/AUTO DIFF WBC: CPT | Performed by: EMERGENCY MEDICINE

## 2024-10-15 PROCEDURE — 83735 ASSAY OF MAGNESIUM: CPT | Performed by: EMERGENCY MEDICINE

## 2024-10-15 RX ORDER — SODIUM CHLORIDE 9 MG/ML
500 INJECTION, SOLUTION INTRAVENOUS
Status: COMPLETED | OUTPATIENT
Start: 2024-10-15 | End: 2024-10-15

## 2024-10-15 RX ORDER — ACETAMINOPHEN 325 MG/1
650 TABLET ORAL
Status: COMPLETED | OUTPATIENT
Start: 2024-10-15 | End: 2024-10-15

## 2024-10-15 RX ADMIN — SODIUM CHLORIDE 500 ML: 9 INJECTION, SOLUTION INTRAVENOUS at 01:10

## 2024-10-15 RX ADMIN — ACETAMINOPHEN 325MG 650 MG: 325 TABLET ORAL at 02:10

## 2024-10-15 NOTE — DISCHARGE INSTRUCTIONS
Stop taking your Januvia for now.  You can continue your insulin.  In the future your doctor may want to restart it while decreasing your insulin to a degree.  Make sure you eat regularly.  If you feel worse at all please return to the emergency department

## 2024-10-15 NOTE — ED PROVIDER NOTES
"Encounter Date: 10/15/2024    SCRIBE #1 NOTE: I, Tyrel Javed, am scribing for, and in the presence of,  Harris Epps MD. I have scribed the following portions of the note - Other sections scribed: HPI, ROS, and PE.       History     Chief Complaint   Patient presents with    Hypoglycemia     Pt found unresponsive by family this morning. Pt states she was just tired and "sleeping good" per EMS CBG 37 on scene, given 1 amp D50.  after.  on arrival to ER. Pt denies any complaints at this time. GCS 15     Natty Nelson is a 84 y.o. female patient with a PMHx of DM and HTN who presents to the Emergency Department for evaluation of hypoglycemia which onset gradually PTA. Family members found her hard to wake this morning and called EMS. EMS says CBG was 37 on scene and given 1 amp of D50,  on arrival. She denies any complaints at this time and thought she was just "sleeping hard." Patient denies any fever, CP, SOB, abd pain, nausea, and vomiting.       The history is provided by the patient. No  was used.     Review of patient's allergies indicates:  No Known Allergies  Past Medical History:   Diagnosis Date    Arthritis     Diabetes mellitus     Hypertension      Past Surgical History:   Procedure Laterality Date    ANTERIOR CERVICAL DISCECTOMY W/ FUSION Bilateral 5/9/2022    Procedure: DISCECTOMY, SPINE, CERVICAL, ANTERIOR APPROACH, WITH FUSION;  Surgeon: Toni Joseph MD;  Location: [facility];  Service: Neurosurgery;  Laterality: Bilateral;  ACDF C5/6    FUSION OF POSTERIOR COLUMN OF CERVICAL SPINE USING COMPUTER AIDED NAVIGATION N/A 6/3/2022    Procedure: FUSION, SPINE, POSTERIOR SPINAL COLUMN, CERVICAL, USING COMPUTER-ASSISTED NAVIGATION;  Surgeon: Toni Joseph MD;  Location: [facility] OR;  Service: Neurosurgery;  Laterality: N/A;  POSTERIOR C3 - C7  DECOMPRESSION AND FUSION // O-ARM // DIRECT SPINE // NDM -WE NEED TO CONTACT //      No family history on file.   "   Review of Systems   Constitutional:  Negative for chills and fever.   Respiratory:  Negative for cough and shortness of breath.    Cardiovascular:  Negative for chest pain.   Gastrointestinal:  Negative for abdominal pain, nausea and vomiting.   Musculoskeletal:  Negative for myalgias.   Neurological:  Negative for syncope and headaches.   All other systems reviewed and are negative.      Physical Exam     Initial Vitals [10/15/24 1131]   BP Pulse Resp Temp SpO2   (!) 167/74 60 14 97.5 °F (36.4 °C) 100 %      MAP       --         Physical Exam    Nursing note and vitals reviewed.  Constitutional: She appears well-developed and well-nourished. No distress.   HENT:   Head: Normocephalic and atraumatic.   Eyes: Conjunctivae are normal.   Cardiovascular:  Normal rate and intact distal pulses.           Pulmonary/Chest: No respiratory distress. She has no rhonchi.   Abdominal: Abdomen is soft. Bowel sounds are normal. There is no abdominal tenderness. There is no rebound and no guarding.   Musculoskeletal:         General: No edema.     Neurological: She is alert. She has normal strength.   Skin: Skin is warm and dry.   Psychiatric: She has a normal mood and affect.         ED Course   Procedures  Labs Reviewed   COMPREHENSIVE METABOLIC PANEL - Abnormal       Result Value    Sodium 141      Potassium 4.0      Chloride 113 (*)     CO2 19 (*)     Glucose 85      Blood Urea Nitrogen 43.5 (*)     Creatinine 1.60 (*)     Calcium 9.7      Protein Total 7.3      Albumin 3.7      Globulin 3.6 (*)     Albumin/Globulin Ratio 1.0 (*)     Bilirubin Total 0.3            ALT 22      AST 23      eGFR 31      Anion Gap 9.0      BUN/Creatinine Ratio 27     CBC WITH DIFFERENTIAL - Abnormal    WBC 9.68      RBC 3.85 (*)     Hgb 11.9 (*)     Hct 36.5 (*)     MCV 94.8 (*)     MCH 30.9      MCHC 32.6 (*)     RDW 13.0      Platelet 252      MPV 12.0 (*)     Neut % 78.1      Lymph % 16.4      Mono % 4.3      Eos % 0.2      Basophil %  0.4      Lymph # 1.59      Neut # 7.55      Mono # 0.42      Eos # 0.02      Baso # 0.04      IG# 0.06 (*)     IG% 0.6      NRBC% 0.0     POCT GLUCOSE - Abnormal    POCT Glucose 187 (*)    MAGNESIUM - Normal    Magnesium Level 2.50     CBC W/ AUTO DIFFERENTIAL    Narrative:     The following orders were created for panel order CBC auto differential.  Procedure                               Abnormality         Status                     ---------                               -----------         ------                     CBC with Differential[[id number]]        Abnormal            Final result                 Please view results for these tests on the individual orders.   POCT GLUCOSE    POCT Glucose 100            Imaging Results    None          Medications   acetaminophen tablet 650 mg (has no administration in time range)   0.9%  NaCl infusion (500 mLs Intravenous New Bag 10/15/24 1330)     Medical Decision Making  The differential diagnosis includes, but is not limited to, hypoglycemia, UTI, and dehydration.     Amount and/or Complexity of Data Reviewed  Independent Historian: EMS     Details:  EMS says CBG was 37 on scene and given 1 amp of D50,  on arrival.  Labs: ordered. Decision-making details documented in ED Course.            Scribe Attestation:   Scribe #1: I performed the above scribed service and the documentation accurately describes the services I performed. I attest to the accuracy of the note.    Attending Attestation:           Physician Attestation for Scribe:  Physician Attestation Statement for Scribe #1: I, Harris Epps MD, reviewed documentation, as scribed by Tyrel Javed in my presence, and it is both accurate and complete.             ED Course as of 10/15/24 1429   Tue Oct 15, 2024   1315 Patient has eaten [LF]   1318 Creatinine mildly elevated today 1.6 apparently fluctuates 1.51.3 on last few labs.  Will give 500 cc fluid bolus here [LF]   1428 Patient reports she does not  have any dysuria it was not believe she has a urinary tract infection.  She was eaten multiple things here in the emergency department.  Repeated glucose is good.  For now I want to stop her Januvia continue home insulin.  Recommend she follow up with the PCP they may want to restarted at that time. [LF]   6198 Counseled her to make sure she eats regularly during the day she expressed understanding [LF]      ED Course User Index  [LF] Harris Epps MD                           Clinical Impression:  Final diagnoses:  [E16.2] Hypoglycemia (Primary)          ED Disposition Condition    Discharge Stable          ED Prescriptions    None       Follow-up Information       Follow up With Specialties Details Why Contact Info    Costa Cormier MD Family Medicine Schedule an appointment as soon as possible for a visit   [address] [place] [id number]  [phone]               Harris Epps MD  10/15/24 4402

## (undated) DEVICE — Device

## (undated) DEVICE — APPLICATOR CHLORAPREP ORN 26ML

## (undated) DEVICE — DRESSING TELFA N ADH 3X8IN

## (undated) DEVICE — ELECTRODE BLADE INSULATED 1 IN

## (undated) DEVICE — GLOVE PROTEXIS PI SYN SURG 6.5

## (undated) DEVICE — SOL NACL IRR 1000ML BTL

## (undated) DEVICE — CLOSURE SKIN STERI STRIP 1/2X4

## (undated) DEVICE — BENZOIN TINCTURE 0.66ML

## (undated) DEVICE — SPHERE NDI PASSIVE

## (undated) DEVICE — TUBING SILICON CLR 3/16IN 10FT

## (undated) DEVICE — TRAY CATH FOL SIL URIMTR 16FR

## (undated) DEVICE — COVER C-ARM STRAP BAND 44X80IN

## (undated) DEVICE — BIT DRILL FIXED 2.5MMX14MM

## (undated) DEVICE — BUR BONE CUT MICRO TPS 3X3.8MM

## (undated) DEVICE — DRAPE SURG LAP INCISE ADHESIVE

## (undated) DEVICE — DISH PETRI MED 3.5IN

## (undated) DEVICE — SUT VICRYL 4-0 18 P-3

## (undated) DEVICE — NDL HYPO 22GX1 1/2 SYR 10ML LL

## (undated) DEVICE — TIP KERRISON RONGEUR SHARP 4MM

## (undated) DEVICE — KIT SURGIFLO HEMOSTATIC MATRIX

## (undated) DEVICE — ELECTRODE BLD EXT 6.50 ST DISP

## (undated) DEVICE — GLOVE PROTEXIS BLUE LATEX 7

## (undated) DEVICE — GOWN SMARTSLEEVE AAMI LVL4 LG

## (undated) DEVICE — ELECTRODE PATIENT RETURN DISP

## (undated) DEVICE — DRESSING TELFA N ADH 3X8

## (undated) DEVICE — INSTRUMENT FRAZIER 10FR W/VENT

## (undated) DEVICE — BIT DRILL FIXED 2.9X12MM

## (undated) DEVICE — SUT BONE WAX 2.5 GRMS 12/BX

## (undated) DEVICE — SYR IRRIGATION BULB STER 60ML

## (undated) DEVICE — DRAPE SURG W/TWL 17 5/8X23

## (undated) DEVICE — DRAIN ROUND SUCTION 10FR

## (undated) DEVICE — RESERVOIR JACKSON-PRATT 100CC

## (undated) DEVICE — GAUZE VISTEC XR DTECT 16 4X4IN

## (undated) DEVICE — GLOVE PROTEXIS BLUE LATEX 8

## (undated) DEVICE — SUT VICRYL PLUS 0 CT-1 18IN

## (undated) DEVICE — SOL 9P NACL IRR PIC IL

## (undated) DEVICE — DRAPE C-ARMOR EQUIPMENT COVER

## (undated) DEVICE — GLOVE PROTEXIS LTX MICRO  7.5

## (undated) DEVICE — TIP KERRISON RONGEUR SHARP 2MM

## (undated) DEVICE — TAPE SURG PAPER 2 X10YD

## (undated) DEVICE — DRAPE OPMI STERILE

## (undated) DEVICE — SUT VICRYL PLUS 3-0 X-1 18IN

## (undated) DEVICE — DRAPE O-ARM STERILE

## (undated) DEVICE — TIP KERRISON RONGEUR SHARP 1MM

## (undated) DEVICE — SHEET AVIENE MICFIB 1.4X1.4IN

## (undated) DEVICE — KIT SURGICAL TURNOVER

## (undated) DEVICE — TIP KERRISON RONGEUR SHARP 3MM

## (undated) DEVICE — DRAPE FLUID WARMER 44X44IN

## (undated) DEVICE — COVER HD BACK TABLE 6FT

## (undated) DEVICE — COVER CAMERA OPERATING ROOM

## (undated) DEVICE — KIT POS JACKSON TABLE NO HDRST

## (undated) DEVICE — STRIP STERI REIN CLSR 1/2X2IN

## (undated) DEVICE — ELECTRODE BLADE E-Z CLEAN 4IN

## (undated) DEVICE — SEE MEDLINE ITEM 157150

## (undated) DEVICE — SPONGE PATTY SURGICAL .5X3IN

## (undated) DEVICE — DRESSING SURGICAL 3/4X3/4